# Patient Record
Sex: FEMALE | Race: WHITE | NOT HISPANIC OR LATINO | Employment: OTHER | URBAN - METROPOLITAN AREA
[De-identification: names, ages, dates, MRNs, and addresses within clinical notes are randomized per-mention and may not be internally consistent; named-entity substitution may affect disease eponyms.]

---

## 2017-01-31 ENCOUNTER — TELEPHONE (OUTPATIENT)
Dept: FAMILY MEDICINE | Facility: CLINIC | Age: 72
End: 2017-01-31

## 2017-01-31 RX ORDER — METOPROLOL SUCCINATE 50 MG/1
TABLET, EXTENDED RELEASE ORAL
Qty: 90 TABLET | Refills: 0 | OUTPATIENT
Start: 2017-01-31

## 2017-01-31 NOTE — TELEPHONE ENCOUNTER
Called pt to find out which med she needs replaced and to schedule appt. Last seen 3/2016. No answer. Left msg for pt to call back. Will ask Dr Gibson to approve x 1 mo.

## 2017-01-31 NOTE — TELEPHONE ENCOUNTER
----- Message from Hoa Floyd sent at 1/31/2017 10:18 AM CST -----  Patient has lost her blood pressure medication & she needs a new Rx, please call [phone]

## 2017-02-02 NOTE — TELEPHONE ENCOUNTER
Spoke w/ pt. She states she lost her metoprolol, but the pharmacy OK a refill that she had available. Disregard this request.

## 2017-02-13 ENCOUNTER — OFFICE VISIT (OUTPATIENT)
Dept: FAMILY MEDICINE | Facility: CLINIC | Age: 72
End: 2017-02-13
Payer: MEDICARE

## 2017-02-13 VITALS
DIASTOLIC BLOOD PRESSURE: 84 MMHG | HEIGHT: 64 IN | TEMPERATURE: 98 F | WEIGHT: 153 LBS | HEART RATE: 50 BPM | SYSTOLIC BLOOD PRESSURE: 164 MMHG | BODY MASS INDEX: 26.12 KG/M2

## 2017-02-13 DIAGNOSIS — I10 ESSENTIAL HYPERTENSION: Primary | ICD-10-CM

## 2017-02-13 DIAGNOSIS — F41.9 ANXIETY: ICD-10-CM

## 2017-02-13 PROCEDURE — 99499 UNLISTED E&M SERVICE: CPT | Mod: S$GLB,,, | Performed by: FAMILY MEDICINE

## 2017-02-13 PROCEDURE — 1160F RVW MEDS BY RX/DR IN RCRD: CPT | Mod: S$GLB,,, | Performed by: FAMILY MEDICINE

## 2017-02-13 PROCEDURE — 1159F MED LIST DOCD IN RCRD: CPT | Mod: S$GLB,,, | Performed by: FAMILY MEDICINE

## 2017-02-13 PROCEDURE — 1125F AMNT PAIN NOTED PAIN PRSNT: CPT | Mod: S$GLB,,, | Performed by: FAMILY MEDICINE

## 2017-02-13 PROCEDURE — 3079F DIAST BP 80-89 MM HG: CPT | Mod: S$GLB,,, | Performed by: FAMILY MEDICINE

## 2017-02-13 PROCEDURE — 1157F ADVNC CARE PLAN IN RCRD: CPT | Mod: S$GLB,,, | Performed by: FAMILY MEDICINE

## 2017-02-13 PROCEDURE — 99214 OFFICE O/P EST MOD 30 MIN: CPT | Mod: S$GLB,,, | Performed by: FAMILY MEDICINE

## 2017-02-13 PROCEDURE — 3077F SYST BP >= 140 MM HG: CPT | Mod: S$GLB,,, | Performed by: FAMILY MEDICINE

## 2017-02-13 PROCEDURE — 99999 PR PBB SHADOW E&M-EST. PATIENT-LVL III: CPT | Mod: PBBFAC,,, | Performed by: FAMILY MEDICINE

## 2017-02-13 RX ORDER — INDAPAMIDE 1.25 MG/1
1.25 TABLET ORAL DAILY
Qty: 90 TABLET | Refills: 3 | Status: SHIPPED | OUTPATIENT
Start: 2017-02-13 | End: 2017-02-24

## 2017-02-13 RX ORDER — LORAZEPAM 0.5 MG/1
0.5 TABLET ORAL 2 TIMES DAILY
Qty: 180 TABLET | Refills: 0 | Status: SHIPPED | OUTPATIENT
Start: 2017-02-13 | End: 2017-04-13 | Stop reason: SDUPTHER

## 2017-02-13 NOTE — MR AVS SNAPSHOT
[facility]  [address]  [facility] [address]  Phone: [phone]  Fax: [phone]                  Evelina Pinon   2017 3:30 PM   Office Visit    Description:  Female : 1945   Provider:  Cisco Gibson MD   Department:  [facility]           Reason for Visit     Hypertension           Diagnoses this Visit        Comments    Essential hypertension    -  Primary     Anxiety                To Do List           Future Appointments        Provider Department Dept Phone    2017 1:45 PM Cisco Gibson MD [facility] [phone]      Goals (5 Years of Data)     None      Follow-Up and Disposition     Return in about 1 week (around 2017).    Follow-up and Disposition History       These Medications        Disp Refills Start End    indapamide (LOZOL) 1.25 MG Tab 90 tablet 3 2017     Take 1 tablet (1.25 mg total) by mouth once daily. - Oral    Pharmacy: [facility] [place] [address] AT [place] [address] & [facility] Ph #: [phone]       lorazepam (ATIVAN) 0.5 MG tablet 180 tablet 0 2017     Take 1 tablet (0.5 mg total) by mouth 2 (two) times daily. - Oral    Pharmacy: [facility] [place] [address] & [facility] Ph #: [phone]         [facility] On Call     [facility] On Call Nurse Care Line -  Assistance  Registered nurses in the [facility] provide clinical advisement, health education, appointment booking, and other advisory services.  Call for this free service at [phone].             Medications           Message regarding Medications     Verify the changes and/or additions to your medication regime listed below are the same as discussed with your clinician today.  If any of these changes or additions are incorrect, please notify your healthcare provider.        CHANGE how you are taking these  "medications     Start Taking Instead of    lorazepam (ATIVAN) 0.5 MG tablet lorazepam (ATIVAN) 0.5 MG tablet    Dosage:  Take 1 tablet (0.5 mg total) by mouth 2 (two) times daily. Dosage:  TAKE 1 TABLET BY MOUTH TWICE DAILY    Reason for Change:  Reorder            Verify that the below list of medications is an accurate representation of the medications you are currently taking.  If none reported, the list may be blank. If incorrect, please contact your healthcare provider. Carry this list with you in case of emergency.           Current Medications     allopurinol (ZYLOPRIM) 100 MG tablet Take 2 tablets (200 mg total) by mouth once daily.    aspirin (ECOTRIN) 81 MG EC tablet Take 81 mg by mouth once daily.      cloNIDine (CATAPRES) 0.1 MG tablet Take 1 tablet (0.1 mg total) by mouth 2 (two) times daily.    clopidogrel (PLAVIX) 75 mg tablet TAKE 1 TABLET BY MOUTH EVERY DAY    lorazepam (ATIVAN) 0.5 MG tablet Take 1 tablet (0.5 mg total) by mouth 2 (two) times daily.    losartan (COZAAR) 100 MG tablet Take 1 tablet (100 mg total) by mouth once daily.    metoprolol succinate (TOPROL-XL) 50 MG 24 hr tablet TAKE 1 TABLET BY MOUTH EVERY DAY    ranitidine (ZANTAC) 150 MG tablet TAKE 1 TABLET BY MOUTH TWICE DAILY    indapamide (LOZOL) 1.25 MG Tab Take 1 tablet (1.25 mg total) by mouth once daily.    ketoconazole (NIZORAL) 2 % cream Apply topically once daily.           Clinical Reference Information           Your Vitals Were     BP Pulse Temp Height Weight BMI    164/84 50 98.3 °F (36.8 °C) (Oral) 5' 4" (1.626 m) 69.4 kg (153 lb) 26.26 kg/m2      Blood Pressure          Most Recent Value    BP  (!)  164/84      Allergies as of 2/13/2017     Augmentin [Amoxicillin-pot Clavulanate]    Keflex [Cephalexin]      Immunizations Administered on Date of Encounter - 2/13/2017     None      [facility] Sign-Up     Activating your [facility] account is as easy as 1-2-3!     1) Visit [email or web address], select Sign Up Now, enter this " activation code and your date of birth, then select Next.  [email or web address]  Expires: 3/19/2017  1:51 PM      2) Create a username and password to use when you visit [facility] in the future and select a security question in case you lose your password and select Next.    3) Enter your e-mail address and click Sign Up!    Additional Information  If you have questions, please e-mail [email or web address] or call [phone] to talk to our [facility] staff. Remember, [facility] is NOT to be used for urgent needs. For medical emergencies, dial 911.         Instructions    Resume indapamide.  Measure BP twice a day.  If your pressure is still 180 or higher, take 1.5 of the losartan 100 mg.  You can take 1 in am and 1/2 in pm       Language Assistance Services     ATTENTION: Language assistance services are available, free of charge. Please call [phone].      ATENCIÓN: Si habla tiffany, tiene a arzate disposición servicios gratuitos de asistencia lingüística. Llame al [phone].     TEODORA Ý: N?u b?n nói Ti?ng Vi?t, có các d?ch v? h? tr? ngôn ng? mi?n phí dành cho b?n. G?i s? [phone].         [facility] complies with applicable Federal civil rights laws and does not discriminate on the basis of race, color, national origin, age, disability, or sex.

## 2017-02-13 NOTE — PATIENT INSTRUCTIONS
Resume indapamide.  Measure BP twice a day.  If your pressure is still 180 or higher, take 1.5 of the losartan 100 mg.  You can take 1 in am and 1/2 in pm

## 2017-02-13 NOTE — PROGRESS NOTES
"Subjective:       Patient ID: Evelina Pinon is a 71 y.o. female.    Chief Complaint: Hypertension (Elevated BP x > 1wk. Pt [email or web address]last weekend) and [facility](Sat 2/11/17))    HPI Comments: Her blood pressures been very high the past 2 weeks.  She went to the emergency room on February 2 with accelerated blood pressure.  I reviewed the emergency room record and she had normal lab work and a normal chest x-ray and a unremarkable EKG.  She went to [facility] emergency room this past weekend for the same problem.  She does have some headaches.  No chest pain or dyspnea.  Sometimes she takes clonidine for high blood pressure.  It tends to make her very sluggish.  She has taken a higher dose of Toprol-XL in the past but it also made her very sluggish.  I started her on indapamide 11 months ago.  She says that she only took it for a few weeks.  A friend of first told her that it could harm her kidneys.  She has been having some anxiety.  She thinks that Ativan does help lower her blood pressure sometimes.    Hypertension   Associated symptoms include headaches. Pertinent negatives include no chest pain, palpitations or shortness of breath.     Review of Systems   Constitutional: Negative for fever and unexpected weight change.   Respiratory: Negative for cough and shortness of breath.    Cardiovascular: Negative for chest pain, palpitations and leg swelling.   Neurological: Positive for headaches.       Objective:     Blood pressure (!) 164/84, pulse (!) 50, temperature 98.3 °F (36.8 °C), temperature source Oral, height 5' 4" (1.626 m), weight 69.4 kg (153 lb).      Physical Exam   Constitutional: She appears well-nourished. No distress.   Eyes:   Normal blood vessels with no hemorrhages.  I think that her optic disc is sharp.   Cardiovascular: Normal rate, regular rhythm, normal heart sounds and intact distal pulses.    No murmur heard.  Pulmonary/Chest: Effort normal and breath sounds normal. No respiratory " distress.   Musculoskeletal: She exhibits no edema.   Neurological: She is alert.       Assessment:       1. Essential hypertension    2. Anxiety        Plan:       I gave her 0.1 mg of clonidine by mouth.  After observation and rest for one hour her blood pressure was 164/84.  She is to resume indapamide.  I would like to see her next week.

## 2017-02-20 ENCOUNTER — OFFICE VISIT (OUTPATIENT)
Dept: FAMILY MEDICINE | Facility: CLINIC | Age: 72
End: 2017-02-20
Payer: MEDICARE

## 2017-02-20 VITALS
DIASTOLIC BLOOD PRESSURE: 84 MMHG | WEIGHT: 147.5 LBS | TEMPERATURE: 98 F | HEIGHT: 64 IN | SYSTOLIC BLOOD PRESSURE: 124 MMHG | HEART RATE: 64 BPM | BODY MASS INDEX: 25.18 KG/M2

## 2017-02-20 DIAGNOSIS — I10 ESSENTIAL HYPERTENSION: Primary | ICD-10-CM

## 2017-02-20 DIAGNOSIS — F41.9 ANXIETY: ICD-10-CM

## 2017-02-20 PROCEDURE — 99999 PR PBB SHADOW E&M-EST. PATIENT-LVL III: CPT | Mod: PBBFAC,,, | Performed by: FAMILY MEDICINE

## 2017-02-20 PROCEDURE — 3074F SYST BP LT 130 MM HG: CPT | Mod: S$GLB,,, | Performed by: FAMILY MEDICINE

## 2017-02-20 PROCEDURE — 1159F MED LIST DOCD IN RCRD: CPT | Mod: S$GLB,,, | Performed by: FAMILY MEDICINE

## 2017-02-20 PROCEDURE — 99499 UNLISTED E&M SERVICE: CPT | Mod: S$GLB,,, | Performed by: FAMILY MEDICINE

## 2017-02-20 PROCEDURE — 1157F ADVNC CARE PLAN IN RCRD: CPT | Mod: S$GLB,,, | Performed by: FAMILY MEDICINE

## 2017-02-20 PROCEDURE — 99213 OFFICE O/P EST LOW 20 MIN: CPT | Mod: S$GLB,,, | Performed by: FAMILY MEDICINE

## 2017-02-20 PROCEDURE — 1126F AMNT PAIN NOTED NONE PRSNT: CPT | Mod: S$GLB,,, | Performed by: FAMILY MEDICINE

## 2017-02-20 PROCEDURE — 3079F DIAST BP 80-89 MM HG: CPT | Mod: S$GLB,,, | Performed by: FAMILY MEDICINE

## 2017-02-20 NOTE — PROGRESS NOTES
"Subjective:       Patient ID: Evelina Pinon is a 71 y.o. female.    Chief Complaint: Follow-up (1 wk f/u)    HPI Comments: FU HTN. She was seen last week with 220/110 and indapamide was restarted. Her home BP has improved. Still a bit high due to nervousness but overall feels fine. Has lost a little fluid weight. No chest pain or dyspnea.   I reviewed ER record. Back pain and neg CT renal stone study.     Review of Systems   Constitutional: Positive for unexpected weight change. Negative for fatigue and fever.   Respiratory: Negative for cough, shortness of breath and stridor.    Cardiovascular: Negative for chest pain, palpitations and leg swelling.   Psychiatric/Behavioral: The patient is nervous/anxious.        Objective:   Blood pressure 124/84, pulse 64, temperature 98.4 °F (36.9 °C), temperature source Oral, height 5' 4" (1.626 m), weight 66.9 kg (147 lb 7.8 oz).  I confirmed 128/72    Physical Exam   Constitutional: She appears well-nourished. No distress.   Cardiovascular: Normal rate, regular rhythm, normal heart sounds and intact distal pulses.    No murmur heard.  Pulmonary/Chest: Effort normal and breath sounds normal. No respiratory distress.   Musculoskeletal: She exhibits no edema.   Psychiatric: She has a normal mood and affect.       Assessment:       1. Essential hypertension    2. Anxiety        Plan:       Same meds. Gradually reduce BP check to  Twice a month.      "

## 2017-02-20 NOTE — MR AVS SNAPSHOT
[facility]  [address]  [place] [address]  Phone: [phone]  Fax: [phone]                  Evelina Pinon   2017 1:45 PM   Office Visit    Description:  Female : 1945   Provider:  Cisco Gibson MD   Department:  [facility]           Reason for Visit     Follow-up           Diagnoses this Visit        Comments    Essential hypertension    -  Primary     Anxiety                To Do List           Goals (5 Years of Data)     None      Follow-Up and Disposition     Return in about 6 months (around 2017).      [facility]     [facility] Care [facility] -  Assistance  Registered nurses in the [facility] provide clinical advisement, health education, appointment booking, and other advisory services.  Call for this free service at [phone].             Medications           Message regarding Medications     Verify the changes and/or additions to your medication regime listed below are the same as discussed with your clinician today.  If any of these changes or additions are incorrect, please notify your healthcare provider.             Verify that the below list of medications is an accurate representation of the medications you are currently taking.  If none reported, the list may be blank. If incorrect, please contact your healthcare provider. Carry this list with you in case of emergency.           Current Medications     allopurinol (ZYLOPRIM) 100 MG tablet Take 2 tablets (200 mg total) by mouth once daily.    aspirin (ECOTRIN) 81 MG EC tablet Take 81 mg by mouth once daily.      clopidogrel (PLAVIX) 75 mg tablet TAKE 1 TABLET BY MOUTH EVERY DAY    indapamide (LOZOL) 1.25 MG Tab Take 1 tablet (1.25 mg total) by mouth once daily.    lorazepam (ATIVAN) 0.5 MG tablet Take 1 tablet (0.5 mg total) by mouth 2 (two) times daily.    losartan (COZAAR) 100 MG tablet Take 1 tablet (100 mg total) by mouth once  "daily.    metoprolol succinate (TOPROL-XL) 50 MG 24 hr tablet TAKE 1 TABLET BY MOUTH EVERY DAY    ranitidine (ZANTAC) 150 MG tablet TAKE 1 TABLET BY MOUTH TWICE DAILY    ketoconazole (NIZORAL) 2 % cream Apply topically once daily.           Clinical Reference Information           Your Vitals Were     BP Pulse Temp Height Weight BMI    124/84 (BP Location: Left arm) 64 98.4 °F (36.9 °C) (Oral) 5' 4" (1.626 m) 66.9 kg (147 lb 7.8 oz) 25.32 kg/m2      Blood Pressure          Most Recent Value    BP  124/84      Allergies as of 2/20/2017     Augmentin [Amoxicillin-pot Clavulanate]    Keflex [Cephalexin]      Immunizations Administered on Date of Encounter - 2/20/2017     None      Language Assistance Services     ATTENTION: Language assistance services are available, free of charge. Please call [phone].      ATENCIÓN: Si habla bobañol, tiene a arzate disposición servicios gratuitos de asistencia lingüística. Llame al [phone].     TEODORA Ý: N?u b?n nói Ti?ng Vi?t, có các d?ch v? h? tr? ngôn ng? mi?n phí dành cho b?n. G?i s? [phone].         [facility] complies with applicable Federal civil rights laws and does not discriminate on the basis of race, color, national origin, age, disability, or sex.        "

## 2017-02-24 ENCOUNTER — TELEPHONE (OUTPATIENT)
Dept: FAMILY MEDICINE | Facility: CLINIC | Age: 72
End: 2017-02-24

## 2017-02-24 RX ORDER — HYDROCHLOROTHIAZIDE 12.5 MG/1
12.5 CAPSULE ORAL DAILY
Qty: 30 CAPSULE | Refills: 2 | Status: SHIPPED | OUTPATIENT
Start: 2017-02-24 | End: 2017-03-15

## 2017-02-24 NOTE — TELEPHONE ENCOUNTER
May try to discontinue Lozol and start HCTZ. Will send Rx to her pharmacy. Continue home b/p monitoring and notify clinic if b/p is > 140/90 persistently.       If sx persist, she may need to rtc.

## 2017-02-24 NOTE — TELEPHONE ENCOUNTER
----- Message from Levi Valladares sent at 2/24/2017 10:29 AM CST -----  Contact: nalini   Calling about side effects of new medication Rx Indapamide. Please call back [phone]. Thanks

## 2017-02-24 NOTE — TELEPHONE ENCOUNTER
Returned pt call. Pt states she has been getting nauseated since she started taking indapamide a week ago from Dr Gibson.  She states her blood pressure is running 110/50's on the medication.She is also c/o ringing in her ear which was present prior to taking the medication  Would like to know if she needs to change the dose or if there are any recommendations to ease the nausea symptoms. Please advise.

## 2017-03-01 NOTE — TELEPHONE ENCOUNTER
Spoke with pt,she will start HCTZ tomorrow morning and stop the inadapamide.  She understands to call if out of parameters.

## 2017-03-04 ENCOUNTER — NURSE TRIAGE (OUTPATIENT)
Dept: ADMINISTRATIVE | Facility: CLINIC | Age: 72
End: 2017-03-04

## 2017-03-04 NOTE — TELEPHONE ENCOUNTER
"    Reason for Disposition   Caller has URGENT medication question about med that PCP prescribed and triager unable to answer question    Answer Assessment - Initial Assessment Questions  1. BLOOD PRESSURE: "What is the blood pressure?" "Did you take at least two measurements 5 minutes apart?"      141/75-  Now.    2. ONSET: "When did you take your blood pressure?"      *No Answer*  3. HOW: "How did you obtain the blood pressure?" (e.g., visiting nurse, automatic home BP monitor)      *No Answer*  4. HISTORY: "Do you have a history of low blood pressure?" "What is your blood pressure normally?"      *No Answer*  5. MEDICATIONS: "Are you taking any medications for blood pressure?" If yes: "Have they been changed recently?"      *No Answer*  6. PULSE RATE: "Do you know what your pulse rate is?"       *No Answer*  7. OTHER SYMPTOMS: "Have you been sick recently?" "Have you had a recent injury?"      *No Answer*  8. PREGNANCY: "Is there any chance you are pregnant?" "When was your last menstrual period?"      *No Answer*    Protocols used:  MEDICATION QUESTION CALL-A-,  LOW BLOOD PRESSURE-A-AH    Was son indapamide for htn and was having very low blood pressure.  Was switched to HCTZ  4 days ago.  First 2 days blood pressure was ok but yesterday bp when down to - 92/56- and 86/56 . Yesterday was third day taking it.  She took her other two blood pressure medication this morning. metoprolol succinate 50mg. and cozaar 100mg but was not sure if she should take the HCTZ>  Advised will contact on call provider for additional recommendation.   Spoke with on call provider Dr. Mortensen.  advised to hold HCTZ and see how how blood does.  She can call back tomorrow for additional recommendation.  Need to follow up with Dr. Gibson on Monday.  She should go to ED for any fever, weakness, dizziness or low blood pressure.  Advised patient of above.  Wanted to scheduled an appt, but no available with Dr. Gibson until the end of the " week.  Did not want to scheduled with another provider.  Advised her to call the office on Monday.  Please contact caller directly with any further care advice regarding an appointment.

## 2017-03-07 ENCOUNTER — TELEPHONE (OUTPATIENT)
Dept: FAMILY MEDICINE | Facility: CLINIC | Age: 72
End: 2017-03-07

## 2017-03-07 NOTE — TELEPHONE ENCOUNTER
Yesterday at 2 pm  87/56- tired,dizzy, weak  161/80- took losartan and metoprolol-7:30 am  141/67 9:30 am  133/80 10:34 am  Pt has not taken the HCTZ this morning. Pt want to know if she should continue taking the HCTZ  Pt states she has a capsule form in the HCTZ right now. Can she get the tablet form and cut in half if you still want her to take.  Please advise.  See nurse triage message on 3/4

## 2017-03-07 NOTE — TELEPHONE ENCOUNTER
May take 1/2 tablet.   Hold for b/p < 100/60.    Ensure she is drinking adequate fluids.   If her b/p continues to fluctuate she needs an appt, as I  am not familiar with the patient since it has been 2 years since I have last seen her.

## 2017-03-07 NOTE — TELEPHONE ENCOUNTER
----- Message from Katja Johnsonza sent at 3/7/2017  7:41 AM CST -----  Contact: self [phone]  Please call her regarding her blood pressure.  It is low, so she is not sure if she should take the medication.  Thank you!

## 2017-03-13 ENCOUNTER — TELEPHONE (OUTPATIENT)
Dept: FAMILY MEDICINE | Facility: CLINIC | Age: 72
End: 2017-03-13

## 2017-03-13 NOTE — TELEPHONE ENCOUNTER
----- Message from Ada Polo sent at 3/13/2017 10:45 AM CDT -----  Patient is returning office call. Please call back with details at [phone].

## 2017-03-15 RX ORDER — HYDROCHLOROTHIAZIDE 12.5 MG/1
TABLET ORAL
Qty: 30 TABLET | Refills: 0 | Status: SHIPPED | OUTPATIENT
Start: 2017-03-15 | End: 2017-03-16 | Stop reason: SDUPTHER

## 2017-03-16 ENCOUNTER — OFFICE VISIT (OUTPATIENT)
Dept: FAMILY MEDICINE | Facility: CLINIC | Age: 72
End: 2017-03-16
Payer: MEDICARE

## 2017-03-16 VITALS
HEART RATE: 64 BPM | TEMPERATURE: 98 F | HEIGHT: 64 IN | BODY MASS INDEX: 27.35 KG/M2 | WEIGHT: 160.19 LBS | SYSTOLIC BLOOD PRESSURE: 146 MMHG | RESPIRATION RATE: 16 BRPM | DIASTOLIC BLOOD PRESSURE: 90 MMHG

## 2017-03-16 DIAGNOSIS — M1A.9XX0 CHRONIC GOUT INVOLVING TOE, UNSPECIFIED CAUSE, UNSPECIFIED LATERALITY: ICD-10-CM

## 2017-03-16 DIAGNOSIS — F41.9 ANXIETY: ICD-10-CM

## 2017-03-16 DIAGNOSIS — I10 ESSENTIAL HYPERTENSION: Primary | ICD-10-CM

## 2017-03-16 PROCEDURE — 1160F RVW MEDS BY RX/DR IN RCRD: CPT | Mod: S$GLB,,, | Performed by: FAMILY MEDICINE

## 2017-03-16 PROCEDURE — 3080F DIAST BP >= 90 MM HG: CPT | Mod: S$GLB,,, | Performed by: FAMILY MEDICINE

## 2017-03-16 PROCEDURE — 1126F AMNT PAIN NOTED NONE PRSNT: CPT | Mod: S$GLB,,, | Performed by: FAMILY MEDICINE

## 2017-03-16 PROCEDURE — 99999 PR PBB SHADOW E&M-EST. PATIENT-LVL III: CPT | Mod: PBBFAC,,, | Performed by: FAMILY MEDICINE

## 2017-03-16 PROCEDURE — 99499 UNLISTED E&M SERVICE: CPT | Mod: S$GLB,,, | Performed by: FAMILY MEDICINE

## 2017-03-16 PROCEDURE — 99214 OFFICE O/P EST MOD 30 MIN: CPT | Mod: S$GLB,,, | Performed by: FAMILY MEDICINE

## 2017-03-16 PROCEDURE — 1157F ADVNC CARE PLAN IN RCRD: CPT | Mod: S$GLB,,, | Performed by: FAMILY MEDICINE

## 2017-03-16 PROCEDURE — 3077F SYST BP >= 140 MM HG: CPT | Mod: S$GLB,,, | Performed by: FAMILY MEDICINE

## 2017-03-16 PROCEDURE — 1159F MED LIST DOCD IN RCRD: CPT | Mod: S$GLB,,, | Performed by: FAMILY MEDICINE

## 2017-03-16 RX ORDER — CLOPIDOGREL BISULFATE 75 MG/1
75 TABLET ORAL DAILY
Qty: 90 TABLET | Refills: 3 | Status: SHIPPED | OUTPATIENT
Start: 2017-03-16 | End: 2018-03-29 | Stop reason: SDUPTHER

## 2017-03-16 RX ORDER — ALLOPURINOL 100 MG/1
200 TABLET ORAL DAILY
Qty: 180 TABLET | Refills: 3 | Status: SHIPPED | OUTPATIENT
Start: 2017-03-16 | End: 2018-03-29 | Stop reason: SDUPTHER

## 2017-03-16 RX ORDER — METOPROLOL SUCCINATE 50 MG/1
50 TABLET, EXTENDED RELEASE ORAL DAILY
Qty: 90 TABLET | Refills: 3 | Status: SHIPPED | OUTPATIENT
Start: 2017-03-16 | End: 2018-01-15 | Stop reason: SDUPTHER

## 2017-03-16 RX ORDER — HYDROCHLOROTHIAZIDE 12.5 MG/1
12.5 TABLET ORAL DAILY
Qty: 90 TABLET | Refills: 3 | Status: SHIPPED | OUTPATIENT
Start: 2017-03-16 | End: 2017-04-13

## 2017-03-16 RX ORDER — LOSARTAN POTASSIUM 100 MG/1
100 TABLET ORAL DAILY
Qty: 90 TABLET | Refills: 3 | Status: SHIPPED | OUTPATIENT
Start: 2017-03-16 | End: 2018-03-29 | Stop reason: SDUPTHER

## 2017-03-16 NOTE — MR AVS SNAPSHOT
[facility]  [address]  [facility] [address]  Phone: [phone]  Fax: [phone]                  Evelina Pinon   3/16/2017 3:00 PM   Office Visit    Description:  Female : 1945   Provider:  Cisco Gibson MD   Department:  [facility]           Reason for Visit     Follow-up                To Do List           Future Appointments        Provider Department Dept Phone    2017 3:00 PM Cisco Gibson MD [facility] [phone]      Goals (5 Years of Data)     None      Follow-Up and Disposition     Return in about 4 weeks (around 2017).       These Medications        Disp Refills Start End    hydrochlorothiazide (HYDRODIURIL) 12.5 MG Tab 90 tablet 3 3/16/2017     Take 1 tablet (12.5 mg total) by mouth once daily. - Oral    Pharmacy: [place] [address] [address] [place] [address] & [facility] Ph #: [phone]       losartan (COZAAR) 100 MG tablet 90 tablet 3 3/16/2017     Take 1 tablet (100 mg total) by mouth once daily. - Oral    Pharmacy: [place] [address] [address] [place] [address] & [facility] Ph #: [phone]       allopurinol (ZYLOPRIM) 100 MG tablet 180 tablet 3 3/16/2017     Take 2 tablets (200 mg total) by mouth once daily. - Oral    Pharmacy: [place] [address] [address] [place] [address] & [facility] Ph #: [phone]       metoprolol succinate (TOPROL-XL) 50 MG 24 hr tablet 90 tablet 3 3/16/2017     Take 1 tablet (50 mg total) by mouth once daily. - Oral    Pharmacy: [place] [address] [address] [place] [address] & [facility] Ph #: [phone]       clopidogrel (PLAVIX) 75 mg tablet 90 tablet 3 3/16/2017     Take 1 tablet (75 mg total) by mouth once daily. - Oral    Pharmacy: [facility] [address] - [address] &  [facility] #: [phone]         [facility]     [facility] - 24/7 Assistance  Registered nurses in the [facility] provide clinical advisement, health education, appointment booking, and other advisory services.  Call for this free service at [phone].             Medications           Message regarding Medications     Verify the changes and/or additions to your medication regime listed below are the same as discussed with your clinician today.  If any of these changes or additions are incorrect, please notify your healthcare provider.        CHANGE how you are taking these medications     Start Taking Instead of    hydrochlorothiazide (HYDRODIURIL) 12.5 MG Tab hydrochlorothiazide (HYDRODIURIL) 12.5 MG Tab    Dosage:  Take 1 tablet (12.5 mg total) by mouth once daily. Dosage:  TAKE ONE TABLET BY MOUTH EVERY DAY    Reason for Change:  Reorder     metoprolol succinate (TOPROL-XL) 50 MG 24 hr tablet metoprolol succinate (TOPROL-XL) 50 MG 24 hr tablet    Dosage:  Take 1 tablet (50 mg total) by mouth once daily. Dosage:  TAKE 1 TABLET BY MOUTH EVERY DAY    Reason for Change:  Reorder     clopidogrel (PLAVIX) 75 mg tablet clopidogrel (PLAVIX) 75 mg tablet    Dosage:  Take 1 tablet (75 mg total) by mouth once daily. Dosage:  TAKE 1 TABLET BY MOUTH EVERY DAY    Reason for Change:  Reorder            Verify that the below list of medications is an accurate representation of the medications you are currently taking.  If none reported, the list may be blank. If incorrect, please contact your healthcare provider. Carry this list with you in case of emergency.           Current Medications     allopurinol (ZYLOPRIM) 100 MG tablet Take 2 tablets (200 mg total) by mouth once daily.    aspirin (ECOTRIN) 81 MG EC tablet Take 81 mg by mouth once daily.      clopidogrel (PLAVIX) 75 mg tablet Take 1 tablet (75 mg total) by mouth once daily.    hydrochlorothiazide (HYDRODIURIL) 12.5 MG  "Tab Take 1 tablet (12.5 mg total) by mouth once daily.    ketoconazole (NIZORAL) 2 % cream Apply topically once daily.    lorazepam (ATIVAN) 0.5 MG tablet Take 1 tablet (0.5 mg total) by mouth 2 (two) times daily.    losartan (COZAAR) 100 MG tablet Take 1 tablet (100 mg total) by mouth once daily.    metoprolol succinate (TOPROL-XL) 50 MG 24 hr tablet Take 1 tablet (50 mg total) by mouth once daily.    ranitidine (ZANTAC) 150 MG tablet TAKE 1 TABLET BY MOUTH TWICE DAILY           Clinical Reference Information           Your Vitals Were     BP Pulse Temp Resp Height Weight    146/90 (BP Location: Left arm, Patient Position: Sitting) 64 98 °F (36.7 °C) (Oral) 16 5' 4" (1.626 m) 72.7 kg (160 lb 2.6 oz)    BMI                27.49 kg/m2          Blood Pressure          Most Recent Value    BP  (!)  146/90      Allergies as of 3/16/2017     Augmentin [Amoxicillin-pot Clavulanate]    Keflex [Cephalexin]      Immunizations Administered on Date of Encounter - 3/16/2017     None      Instructions    Take losartan 100 mg in evening.   Take HCTZ tablet 12.5 mg in AM  Take Metoprolol XL 50 mg in the morning.   Check bp daily for 1-2 weeks. Some readings in am and some in PM.        Language Assistance Services     ATTENTION: Language assistance services are available, free of charge. Please call [phone].      ATENCIÓN: Si habla tiffany, tiene a arzate disposición servicios gratuitos de asistencia lingüística. Llame al [phone].     [facility] Ý: N?u b?n nói Ti?ng Vi?t, có các d?ch v? h? tr? ngôn ng? mi?n phí dành cho b?n. G?i s? [phone].         [facility] complies with applicable Federal civil rights laws and does not discriminate on the basis of race, color, national origin, age, disability, or sex.        "

## 2017-03-17 NOTE — PROGRESS NOTES
"Subjective:       Patient ID: Evelina Pinon is a 71 y.o. female.    Chief Complaint: Follow-up (blood pressure)    HPI Comments: She has been having labile Blood pressure.  It went to 220 and required clonidine. She took indapamide and went low and switched back to HCTZ.  She felt the capsule was too strong and switched to the tablet. Her AM BP yesterday was 170.  Sometimes lower in the afternoon No chest pain. No stimulants. She does acknowledge anxiety. No gout on allopurinol    Review of Systems   Constitutional: Negative for fever and unexpected weight change.   Respiratory: Negative for cough, chest tightness and shortness of breath.    Cardiovascular: Negative for palpitations and leg swelling.       Objective:     Blood pressure (!) 146/90, pulse 64, temperature 98 °F (36.7 °C), temperature source Oral, resp. rate 16, height 5' 4" (1.626 m), weight 72.7 kg (160 lb 2.6 oz).      Physical Exam   Constitutional: She appears well-nourished. No distress.   Cardiovascular: Normal rate, regular rhythm and normal heart sounds.    No murmur heard.  Pulmonary/Chest: Effort normal and breath sounds normal. No respiratory distress.   Musculoskeletal: She exhibits no edema.   Neurological: She is alert.       Assessment:       1. Essential hypertension    2. Anxiety    3. Chronic gout involving toe, unspecified cause, unspecified laterality        Plan:     Continue HCTZ . Move losartan to PM. See me in 4 weeks      "

## 2017-03-30 ENCOUNTER — PATIENT OUTREACH (OUTPATIENT)
Dept: ADMINISTRATIVE | Facility: HOSPITAL | Age: 72
End: 2017-03-30

## 2017-03-30 NOTE — LETTER
March 30, 2017    Evelina Pinon  [address] [facility] [address]             [facility]  [address]  [facility] [address]  Phone: [phone] Dear Mrs. Pinon:    [facility] is committed to your overall health.  To help you get the most out of each of your visits, we will review your information to make sure you are up to date on all of your recommended tests and/or procedures.      Dr. Cisco Gibson has found that you may be due for your fasting cholesterol labs, mammogram, osteoporosis screening, and possibly some immunizations (flu, pneumonia, and tetanus).     If you have had any of the above done at another facility, please bring the records or information with you so that your record at [facility] will be complete.  If you would like to schedule any of these, please contact me.    If you are currently taking medication, please bring it with you to your appointment for review.    Also, if you have any type of Advanced Directives, please bring them with you to your office visit so we may scan them into your chart.    If you have any questions or concerns, please don't hesitate to call.    Thank you for letting us care for you,  Comfort Soriano LPN Clinical Care Coordinator  [facility] [place]  [phone]

## 2017-04-13 ENCOUNTER — OFFICE VISIT (OUTPATIENT)
Dept: FAMILY MEDICINE | Facility: CLINIC | Age: 72
End: 2017-04-13
Payer: MEDICARE

## 2017-04-13 VITALS
HEIGHT: 64 IN | HEART RATE: 54 BPM | WEIGHT: 163.94 LBS | BODY MASS INDEX: 27.99 KG/M2 | TEMPERATURE: 98 F | DIASTOLIC BLOOD PRESSURE: 62 MMHG | SYSTOLIC BLOOD PRESSURE: 124 MMHG

## 2017-04-13 DIAGNOSIS — F41.9 ANXIETY: ICD-10-CM

## 2017-04-13 DIAGNOSIS — I10 ESSENTIAL HYPERTENSION: Primary | ICD-10-CM

## 2017-04-13 PROCEDURE — 1126F AMNT PAIN NOTED NONE PRSNT: CPT | Mod: S$GLB,,, | Performed by: FAMILY MEDICINE

## 2017-04-13 PROCEDURE — 1160F RVW MEDS BY RX/DR IN RCRD: CPT | Mod: S$GLB,,, | Performed by: FAMILY MEDICINE

## 2017-04-13 PROCEDURE — 1159F MED LIST DOCD IN RCRD: CPT | Mod: S$GLB,,, | Performed by: FAMILY MEDICINE

## 2017-04-13 PROCEDURE — 3074F SYST BP LT 130 MM HG: CPT | Mod: S$GLB,,, | Performed by: FAMILY MEDICINE

## 2017-04-13 PROCEDURE — 99999 PR PBB SHADOW E&M-EST. PATIENT-LVL III: CPT | Mod: PBBFAC,,, | Performed by: FAMILY MEDICINE

## 2017-04-13 PROCEDURE — 3078F DIAST BP <80 MM HG: CPT | Mod: S$GLB,,, | Performed by: FAMILY MEDICINE

## 2017-04-13 PROCEDURE — 99214 OFFICE O/P EST MOD 30 MIN: CPT | Mod: S$GLB,,, | Performed by: FAMILY MEDICINE

## 2017-04-13 PROCEDURE — 1157F ADVNC CARE PLAN IN RCRD: CPT | Mod: S$GLB,,, | Performed by: FAMILY MEDICINE

## 2017-04-13 RX ORDER — INDAPAMIDE 1.25 MG/1
1.25 TABLET ORAL DAILY
Qty: 90 TABLET | Refills: 3
Start: 2017-04-13 | End: 2018-03-29 | Stop reason: SDUPTHER

## 2017-04-13 RX ORDER — LORAZEPAM 0.5 MG/1
0.5 TABLET ORAL 2 TIMES DAILY
Qty: 180 TABLET | Refills: 1 | Status: SHIPPED | OUTPATIENT
Start: 2017-04-13 | End: 2018-03-28 | Stop reason: SDUPTHER

## 2017-04-13 NOTE — PATIENT INSTRUCTIONS
If your blood pressure is too low and you feel weak or lightheaded, then break the losartan in half.

## 2017-04-13 NOTE — PROGRESS NOTES
"Subjective:       Patient ID: Evelina Pinon is a 71 y.o. female.    Chief Complaint: Follow-up (1 mo f/u. Refill Ranitidine and Lorazepam. Please update medlist. Pt is taking Indapamide and not HCTZ.)    HPI Comments: Follow-up hypertension.  She had been having very labile hypertension.  Please see the previous note.  She had decided not to take indapamide but she ended up going back to it and I confirmed by showing her the colored picture of the tablet.  Her home blood pressures have been in the 123/90 range.  She has had some readings of 100/75.  She has not been feeling lightheaded.  She takes to losartan 100 mg at night and the other medications in the morning.  She has not had chest pain or dyspnea.  She takes lorazepam twice a day to help control her anxiety.  She takes Zantac 150 mg twice a day as needed for heartburn.    Review of Systems   Constitutional: Negative for appetite change, fever and unexpected weight change.   Respiratory: Negative for cough and shortness of breath.    Cardiovascular: Negative for chest pain, palpitations and leg swelling.       Objective:     Blood pressure 124/62, pulse (!) 54, temperature 97.7 °F (36.5 °C), temperature source Oral, height 5' 4" (1.626 m), weight 74.3 kg (163 lb 14.6 oz).      Physical Exam   Constitutional: She appears well-nourished. No distress.   Cardiovascular: Normal rate, regular rhythm and normal heart sounds.    Pulmonary/Chest: Effort normal and breath sounds normal. No respiratory distress.   Musculoskeletal: She exhibits no edema.   Neurological: She is alert.       Assessment:       1. Essential hypertension    2. Anxiety        Plan:       I updated her med list and refilled lorazepam and ranitidine.  Follow-up with me in 6 months.     "

## 2017-04-13 NOTE — MR AVS SNAPSHOT
[facility]  [address] [address]  Phone: [phone]  Fax: [phone]                  Evelina Pinon   2017 3:00 PM   Office Visit    Description:  Female : 1945   Provider:  Cisco Gibson MD   Department:  [facility]           Reason for Visit     Follow-up           Diagnoses this Visit        Comments    Essential hypertension    -  Primary     Anxiety                To Do List           Goals (5 Years of Data)     None       These Medications        Disp Refills Start End    indapamide (LOZOL) 1.25 MG Tab 90 tablet 3 2017     Take 1 tablet (1.25 mg total) by mouth once daily. - Oral    Pharmacy: [facility] [place] [address] [facility] Ph #: [phone]       ranitidine (ZANTAC) 150 MG tablet 60 tablet 11 2017     Take 1 tablet (150 mg total) by mouth 2 (two) times daily. - Oral    Pharmacy: [facility] [place] [address] [facility] Ph #: [phone]       lorazepam (ATIVAN) 0.5 MG tablet 180 tablet 1 2017     Take 1 tablet (0.5 mg total) by mouth 2 (two) times daily. - Oral    Pharmacy: [facility] [place] [address] [facility] Ph #: [phone]         [facility]     [facility] Nurse Care Line -  Assistance  Unless otherwise directed by your provider, please contact [facility], our nurse care line that is available for  assistance.     Registered nurses in the [facility] provide: appointment scheduling, clinical advisement, health education, and other advisory services.  Call: [phone] (toll free)               Medications           Message regarding Medications     Verify the changes and/or additions to your medication regime listed below are the same as discussed with your clinician today.  If  any of these changes or additions are incorrect, please notify your healthcare provider.        START taking these NEW medications        Refills    indapamide (LOZOL) 1.25 MG Tab 3    Sig: Take 1 tablet (1.25 mg total) by mouth once daily.    Class: No Print    Route: Oral      CHANGE how you are taking these medications     Start Taking Instead of    ranitidine (ZANTAC) 150 MG tablet ranitidine (ZANTAC) 150 MG tablet    Dosage:  Take 1 tablet (150 mg total) by mouth 2 (two) times daily. Dosage:  TAKE 1 TABLET BY MOUTH TWICE DAILY    Reason for Change:  Reorder       STOP taking these medications     hydrochlorothiazide (HYDRODIURIL) 12.5 MG Tab Take 1 tablet (12.5 mg total) by mouth once daily.           Verify that the below list of medications is an accurate representation of the medications you are currently taking.  If none reported, the list may be blank. If incorrect, please contact your healthcare provider. Carry this list with you in case of emergency.           Current Medications     allopurinol (ZYLOPRIM) 100 MG tablet Take 2 tablets (200 mg total) by mouth once daily.    clopidogrel (PLAVIX) 75 mg tablet Take 1 tablet (75 mg total) by mouth once daily.    lorazepam (ATIVAN) 0.5 MG tablet Take 1 tablet (0.5 mg total) by mouth 2 (two) times daily.    losartan (COZAAR) 100 MG tablet Take 1 tablet (100 mg total) by mouth once daily.    metoprolol succinate (TOPROL-XL) 50 MG 24 hr tablet Take 1 tablet (50 mg total) by mouth once daily.    ranitidine (ZANTAC) 150 MG tablet Take 1 tablet (150 mg total) by mouth 2 (two) times daily.    aspirin (ECOTRIN) 81 MG EC tablet Take 81 mg by mouth once daily.      indapamide (LOZOL) 1.25 MG Tab Take 1 tablet (1.25 mg total) by mouth once daily.    ketoconazole (NIZORAL) 2 % cream Apply topically once daily.           Clinical Reference Information           Your Vitals Were     BP Pulse Temp Height Weight BMI    124/62 (BP Location: Left arm) 54 97.7 °F (36.5 °C)  "(Oral) 5' 4" (1.626 m) 74.3 kg (163 lb 14.6 oz) 28.14 kg/m2      Blood Pressure          Most Recent Value    BP  124/62      Allergies as of 4/13/2017     Augmentin [Amoxicillin-pot Clavulanate]    Keflex [Cephalexin]      Immunizations Administered on Date of Encounter - 4/13/2017     None      Instructions    If your blood pressure is too low and you feel weak or lightheaded, then break the losartan in half.        Language Assistance Services     ATTENTION: Language assistance services are available, free of charge. Please call [phone].      ATENCIÓN: Si habla tiffany, tiene a arzate disposición servicios gratuitos de asistencia lingüística. Llame al [phone].     CHÚ Ý: N?u b?n nói Ti?ng Vi?t, có các d?ch v? h? tr? ngôn ng? mi?n phí dành cho b?n. G?i s? [phone].         [facility] complies with applicable Federal civil rights laws and does not discriminate on the basis of race, color, national origin, age, disability, or sex.        "

## 2017-04-23 RX ORDER — LOSARTAN POTASSIUM 100 MG/1
TABLET ORAL
Qty: 90 TABLET | Refills: 3 | Status: SHIPPED | OUTPATIENT
Start: 2017-04-23 | End: 2018-01-16 | Stop reason: SDUPTHER

## 2018-01-10 ENCOUNTER — TELEPHONE (OUTPATIENT)
Dept: FAMILY MEDICINE | Facility: CLINIC | Age: 73
End: 2018-01-10

## 2018-01-10 NOTE — TELEPHONE ENCOUNTER
----- Message from Hoa Floyd sent at 1/10/2018  8:42 AM CST -----  Please call patient in regards to handicap parking paperwork that was dropped off Friday, [phone]

## 2018-01-10 NOTE — TELEPHONE ENCOUNTER
Spoke w/ pt. Informed pt about need for appointment prior to form being filled out. pt verbalized understanding, but stated she did not want to come and that I should just throw away the paperwork because she is not coming in.

## 2018-01-15 RX ORDER — METOPROLOL SUCCINATE 50 MG/1
TABLET, EXTENDED RELEASE ORAL
Qty: 90 TABLET | Refills: 0 | Status: SHIPPED | OUTPATIENT
Start: 2018-01-15 | End: 2018-03-29 | Stop reason: SDUPTHER

## 2018-01-16 ENCOUNTER — OFFICE VISIT (OUTPATIENT)
Dept: FAMILY MEDICINE | Facility: CLINIC | Age: 73
End: 2018-01-16
Payer: MEDICARE

## 2018-01-16 VITALS
HEART RATE: 72 BPM | TEMPERATURE: 98 F | DIASTOLIC BLOOD PRESSURE: 70 MMHG | BODY MASS INDEX: 27.58 KG/M2 | SYSTOLIC BLOOD PRESSURE: 132 MMHG | HEIGHT: 65 IN | WEIGHT: 165.56 LBS

## 2018-01-16 DIAGNOSIS — M17.12 ARTHRITIS OF LEFT KNEE: ICD-10-CM

## 2018-01-16 DIAGNOSIS — F41.9 ANXIETY: ICD-10-CM

## 2018-01-16 DIAGNOSIS — G45.9 TRANSIENT CEREBRAL ISCHEMIA, UNSPECIFIED TYPE: Primary | ICD-10-CM

## 2018-01-16 DIAGNOSIS — M1A.9XX0 CHRONIC GOUT INVOLVING TOE WITHOUT TOPHUS, UNSPECIFIED CAUSE, UNSPECIFIED LATERALITY: ICD-10-CM

## 2018-01-16 DIAGNOSIS — I10 ESSENTIAL HYPERTENSION: ICD-10-CM

## 2018-01-16 DIAGNOSIS — Z78.0 POST-MENOPAUSE: ICD-10-CM

## 2018-01-16 PROCEDURE — 99999 PR PBB SHADOW E&M-EST. PATIENT-LVL III: CPT | Mod: PBBFAC,,, | Performed by: FAMILY MEDICINE

## 2018-01-16 PROCEDURE — 99499 UNLISTED E&M SERVICE: CPT | Mod: S$GLB,,, | Performed by: FAMILY MEDICINE

## 2018-01-16 PROCEDURE — G0009 ADMIN PNEUMOCOCCAL VACCINE: HCPCS | Mod: S$GLB,,, | Performed by: FAMILY MEDICINE

## 2018-01-16 PROCEDURE — 99397 PER PM REEVAL EST PAT 65+ YR: CPT | Mod: S$GLB,,, | Performed by: FAMILY MEDICINE

## 2018-01-16 PROCEDURE — 90670 PCV13 VACCINE IM: CPT | Mod: S$GLB,,, | Performed by: FAMILY MEDICINE

## 2018-01-16 NOTE — PROGRESS NOTES
"Subjective:       Patient ID: Evelina Pinon is a 72 y.o. female.    Chief Complaint: Annual Exam (Annual check up. Wants handicapped tag.)    Annual exam.  She would like to have a handicapped tag.  She is limited by left knee arthritis.  She had a fracture of her left patella approximately 4 or 5 years ago.  It required 2 surgeries.  She has to use an electric cart at the grocery store.  She has a past history of gout in her left great toe.  Nothing recent.  Her anxiety is controlled with Ativan twice a day.  She had flu in November but has recovered.  Her blood pressures been well-controlled.  She continues on Plavix for previous TIA.  Past Medical History, Surgical History, Family History, Social History, Medications and Allergies reviewed.         Review of Systems   Constitutional: Negative for fatigue, fever and unexpected weight change.   HENT: Negative for congestion, hearing loss and rhinorrhea.    Eyes: Negative for photophobia and visual disturbance.   Respiratory: Negative for cough, shortness of breath and wheezing.    Cardiovascular: Negative for chest pain and palpitations.   Gastrointestinal: Negative for abdominal pain, blood in stool, constipation, diarrhea and nausea.   Genitourinary: Negative for difficulty urinating, dysuria, hematuria, urgency, vaginal bleeding and vaginal discharge.   Musculoskeletal: Positive for arthralgias. Negative for joint swelling.   Neurological: Negative for numbness and headaches.       Objective:     Blood pressure 132/70, pulse 72, temperature 98.4 °F (36.9 °C), temperature source Oral, height 5' 5" (1.651 m), weight 75.1 kg (165 lb 9.1 oz).      Physical Exam   Constitutional: She is oriented to person, place, and time. She appears well-developed and well-nourished. No distress.   HENT:   Right Ear: External ear normal.   Left Ear: External ear normal.   Mouth/Throat: No oropharyngeal exudate.   Eyes: Conjunctivae and EOM are normal. Pupils are equal, round, " and reactive to light. No scleral icterus.   Neck: Normal range of motion. No thyromegaly present.   Cardiovascular: Normal rate, regular rhythm, normal heart sounds and intact distal pulses.    No murmur heard.  Pulmonary/Chest: Effort normal. No respiratory distress. She has no wheezes. She has no rales. She exhibits no tenderness.   Abdominal: Soft. She exhibits no distension and no mass. There is no tenderness.   Musculoskeletal: She exhibits no edema.   She has prominent anterior osteophytes on her left knee including patella.  She has difficulty and pain flexing further than 70°.   Lymphadenopathy:     She has no cervical adenopathy.   Neurological: She is alert and oriented to person, place, and time.   Normal gait   Skin: No rash noted.   Psychiatric: She has a normal mood and affect.       Assessment:       1. Transient cerebral ischemia, unspecified type    2. Essential hypertension    3. Chronic gout involving toe without tophus, unspecified cause, unspecified laterality    4. Anxiety    5. Arthritis of left knee    6. Post-menopause        Plan:       Lab work ordered.  Physical therapy for knee arthritis.    DEXA scan

## 2018-02-09 RX ORDER — INDAPAMIDE 1.25 MG/1
TABLET ORAL
Qty: 90 TABLET | Refills: 3 | Status: SHIPPED | OUTPATIENT
Start: 2018-02-09 | End: 2018-03-28 | Stop reason: SDUPTHER

## 2018-03-28 ENCOUNTER — OFFICE VISIT (OUTPATIENT)
Dept: FAMILY MEDICINE | Facility: CLINIC | Age: 73
End: 2018-03-28
Payer: MEDICARE

## 2018-03-28 VITALS
OXYGEN SATURATION: 98 % | SYSTOLIC BLOOD PRESSURE: 122 MMHG | WEIGHT: 166.56 LBS | HEART RATE: 75 BPM | TEMPERATURE: 98 F | DIASTOLIC BLOOD PRESSURE: 66 MMHG | HEIGHT: 65 IN | BODY MASS INDEX: 27.75 KG/M2

## 2018-03-28 DIAGNOSIS — I10 ESSENTIAL HYPERTENSION: Primary | ICD-10-CM

## 2018-03-28 DIAGNOSIS — F41.9 ANXIETY: ICD-10-CM

## 2018-03-28 DIAGNOSIS — M1A.9XX0 CHRONIC GOUT INVOLVING TOE WITHOUT TOPHUS, UNSPECIFIED CAUSE, UNSPECIFIED LATERALITY: ICD-10-CM

## 2018-03-28 PROCEDURE — 3078F DIAST BP <80 MM HG: CPT | Mod: CPTII,S$GLB,, | Performed by: FAMILY MEDICINE

## 2018-03-28 PROCEDURE — 3074F SYST BP LT 130 MM HG: CPT | Mod: CPTII,S$GLB,, | Performed by: FAMILY MEDICINE

## 2018-03-28 PROCEDURE — 99499 UNLISTED E&M SERVICE: CPT | Mod: S$GLB,,, | Performed by: FAMILY MEDICINE

## 2018-03-28 PROCEDURE — 99214 OFFICE O/P EST MOD 30 MIN: CPT | Mod: S$GLB,,, | Performed by: FAMILY MEDICINE

## 2018-03-28 PROCEDURE — 99999 PR PBB SHADOW E&M-EST. PATIENT-LVL III: CPT | Mod: PBBFAC,,, | Performed by: FAMILY MEDICINE

## 2018-03-28 RX ORDER — LORAZEPAM 0.5 MG/1
0.5 TABLET ORAL 2 TIMES DAILY
Qty: 180 TABLET | Refills: 1 | Status: SHIPPED | OUTPATIENT
Start: 2018-03-28 | End: 2018-12-24 | Stop reason: SDUPTHER

## 2018-03-28 NOTE — PROGRESS NOTES
"Subjective:       Patient ID: Evelina Pinon is a 72 y.o. female.    Chief Complaint: Follow-up (refills)    Due for refill of lorazepam. BID.  Wants written Rx. Worries a lot. Worried because I told her she had decreased kidney function. Her dGFR was 56 1 yr ago. Stable level. BP well controlled on indapamide, metoprolol and losartan. No recent gout.       Review of Systems   Constitutional: Negative for fever and unexpected weight change.   Respiratory: Negative for shortness of breath.    Cardiovascular: Negative for chest pain, palpitations and leg swelling.       Objective:     Blood pressure 122/66, pulse 75, temperature 98.2 °F (36.8 °C), temperature source Oral, height 5' 5" (1.651 m), weight 75.5 kg (166 lb 8.9 oz), SpO2 98 %.      Physical Exam   Constitutional: She appears well-developed and well-nourished. No distress.   Cardiovascular: Normal rate, regular rhythm and normal heart sounds.    No murmur heard.  Pulmonary/Chest: Effort normal and breath sounds normal. No respiratory distress.   Neurological: She is alert.       Assessment:       1. Essential hypertension    2. Anxiety    3. Chronic gout involving toe without tophus, unspecified cause, unspecified laterality        Plan:       Lab tomorrow. Refilled lorazepam for 6 months.    She last filled 180 on 10/17/17 so she does not take bid every day.   "

## 2018-03-29 ENCOUNTER — LAB VISIT (OUTPATIENT)
Dept: LAB | Facility: HOSPITAL | Age: 73
End: 2018-03-29
Attending: FAMILY MEDICINE
Payer: MEDICARE

## 2018-03-29 DIAGNOSIS — I10 ESSENTIAL HYPERTENSION: ICD-10-CM

## 2018-03-29 DIAGNOSIS — M1A.9XX0 CHRONIC GOUT INVOLVING TOE WITHOUT TOPHUS, UNSPECIFIED CAUSE, UNSPECIFIED LATERALITY: ICD-10-CM

## 2018-03-29 LAB
ALBUMIN SERPL BCP-MCNC: 4 G/DL
ALP SERPL-CCNC: 115 U/L
ALT SERPL W/O P-5'-P-CCNC: 16 U/L
ANION GAP SERPL CALC-SCNC: 10 MMOL/L
AST SERPL-CCNC: 20 U/L
BILIRUB SERPL-MCNC: 0.4 MG/DL
BUN SERPL-MCNC: 30 MG/DL
CALCIUM SERPL-MCNC: 9.6 MG/DL
CHLORIDE SERPL-SCNC: 109 MMOL/L
CHOLEST SERPL-MCNC: 200 MG/DL
CHOLEST/HDLC SERPL: 4.4 {RATIO}
CO2 SERPL-SCNC: 22 MMOL/L
CREAT SERPL-MCNC: 1.4 MG/DL
EST. GFR  (AFRICAN AMERICAN): 43 ML/MIN/1.73 M^2
EST. GFR  (NON AFRICAN AMERICAN): 37.3 ML/MIN/1.73 M^2
GLUCOSE SERPL-MCNC: 97 MG/DL
HDLC SERPL-MCNC: 45 MG/DL
HDLC SERPL: 22.5 %
LDLC SERPL CALC-MCNC: 131.8 MG/DL
NONHDLC SERPL-MCNC: 155 MG/DL
POTASSIUM SERPL-SCNC: 4.7 MMOL/L
PROT SERPL-MCNC: 6.7 G/DL
SODIUM SERPL-SCNC: 141 MMOL/L
TRIGL SERPL-MCNC: 116 MG/DL
URATE SERPL-MCNC: 4.9 MG/DL

## 2018-03-29 PROCEDURE — 36415 COLL VENOUS BLD VENIPUNCTURE: CPT | Mod: PN

## 2018-03-29 PROCEDURE — 80061 LIPID PANEL: CPT

## 2018-03-29 PROCEDURE — 84550 ASSAY OF BLOOD/URIC ACID: CPT

## 2018-03-29 PROCEDURE — 80053 COMPREHEN METABOLIC PANEL: CPT

## 2018-03-29 RX ORDER — CLOPIDOGREL BISULFATE 75 MG/1
75 TABLET ORAL DAILY
Qty: 90 TABLET | Refills: 3 | Status: SHIPPED | OUTPATIENT
Start: 2018-03-29 | End: 2019-04-11 | Stop reason: SDUPTHER

## 2018-03-29 RX ORDER — INDAPAMIDE 1.25 MG/1
1.25 TABLET ORAL DAILY
Qty: 90 TABLET | Refills: 3 | Status: SHIPPED | OUTPATIENT
Start: 2018-03-29 | End: 2019-04-11 | Stop reason: SDUPTHER

## 2018-03-29 RX ORDER — LOSARTAN POTASSIUM 100 MG/1
100 TABLET ORAL DAILY
Qty: 90 TABLET | Refills: 3 | Status: SHIPPED | OUTPATIENT
Start: 2018-03-29 | End: 2019-04-11 | Stop reason: SDUPTHER

## 2018-03-29 RX ORDER — ALLOPURINOL 100 MG/1
200 TABLET ORAL DAILY
Qty: 180 TABLET | Refills: 3 | Status: SHIPPED | OUTPATIENT
Start: 2018-03-29 | End: 2019-04-11 | Stop reason: SDUPTHER

## 2018-03-29 RX ORDER — METOPROLOL SUCCINATE 50 MG/1
TABLET, EXTENDED RELEASE ORAL
Qty: 90 TABLET | Refills: 3 | Status: SHIPPED | OUTPATIENT
Start: 2018-03-29 | End: 2019-04-11 | Stop reason: SDUPTHER

## 2018-05-10 RX ORDER — ALLOPURINOL 100 MG/1
TABLET ORAL
Qty: 180 TABLET | Refills: 3 | Status: SHIPPED | OUTPATIENT
Start: 2018-05-10 | End: 2019-04-11 | Stop reason: SDUPTHER

## 2018-06-26 ENCOUNTER — TELEPHONE (OUTPATIENT)
Dept: FAMILY MEDICINE | Facility: CLINIC | Age: 73
End: 2018-06-26

## 2018-06-26 NOTE — TELEPHONE ENCOUNTER
Pt due for mammo. Spoke w/ pt. She states she gets this done at a breast clinic in [place] and is followed there. She will try to get a copy of the last report.

## 2018-07-10 ENCOUNTER — PES CALL (OUTPATIENT)
Dept: ADMINISTRATIVE | Facility: CLINIC | Age: 73
End: 2018-07-10

## 2018-12-24 RX ORDER — CLOPIDOGREL BISULFATE 75 MG/1
TABLET ORAL
Qty: 90 TABLET | Refills: 0 | Status: SHIPPED | OUTPATIENT
Start: 2018-12-24 | End: 2019-04-11 | Stop reason: SDUPTHER

## 2018-12-24 RX ORDER — LORAZEPAM 0.5 MG/1
TABLET ORAL
Qty: 180 TABLET | Refills: 0 | Status: SHIPPED | OUTPATIENT
Start: 2018-12-24 | End: 2019-04-11 | Stop reason: SDUPTHER

## 2019-04-11 ENCOUNTER — OFFICE VISIT (OUTPATIENT)
Dept: FAMILY MEDICINE | Facility: CLINIC | Age: 74
End: 2019-04-11
Payer: MEDICARE

## 2019-04-11 ENCOUNTER — LAB VISIT (OUTPATIENT)
Dept: LAB | Facility: HOSPITAL | Age: 74
End: 2019-04-11
Attending: FAMILY MEDICINE
Payer: MEDICARE

## 2019-04-11 VITALS
TEMPERATURE: 98 F | SYSTOLIC BLOOD PRESSURE: 122 MMHG | BODY MASS INDEX: 26.08 KG/M2 | HEIGHT: 65 IN | DIASTOLIC BLOOD PRESSURE: 70 MMHG | WEIGHT: 156.5 LBS

## 2019-04-11 DIAGNOSIS — M1A.9XX0 CHRONIC GOUT INVOLVING TOE WITHOUT TOPHUS, UNSPECIFIED CAUSE, UNSPECIFIED LATERALITY: ICD-10-CM

## 2019-04-11 DIAGNOSIS — I10 ESSENTIAL HYPERTENSION: Primary | ICD-10-CM

## 2019-04-11 DIAGNOSIS — I10 ESSENTIAL HYPERTENSION: ICD-10-CM

## 2019-04-11 DIAGNOSIS — F41.9 ANXIETY: ICD-10-CM

## 2019-04-11 DIAGNOSIS — G45.9 TIA (TRANSIENT ISCHEMIC ATTACK): ICD-10-CM

## 2019-04-11 DIAGNOSIS — Z00.00 HEALTH MAINTENANCE EXAMINATION: ICD-10-CM

## 2019-04-11 DIAGNOSIS — K21.9 GASTROESOPHAGEAL REFLUX DISEASE WITHOUT ESOPHAGITIS: ICD-10-CM

## 2019-04-11 PROBLEM — L64.9: Status: ACTIVE | Noted: 2019-04-11

## 2019-04-11 LAB
ALBUMIN SERPL BCP-MCNC: 4 G/DL (ref 3.5–5.2)
ALP SERPL-CCNC: 97 U/L (ref 55–135)
ALT SERPL W/O P-5'-P-CCNC: 13 U/L (ref 10–44)
ANION GAP SERPL CALC-SCNC: 9 MMOL/L (ref 8–16)
AST SERPL-CCNC: 17 U/L (ref 10–40)
BILIRUB SERPL-MCNC: 0.7 MG/DL (ref 0.1–1)
BUN SERPL-MCNC: 32 MG/DL (ref 8–23)
CALCIUM SERPL-MCNC: 10.2 MG/DL (ref 8.7–10.5)
CHLORIDE SERPL-SCNC: 107 MMOL/L (ref 95–110)
CHOLEST SERPL-MCNC: 193 MG/DL (ref 120–199)
CHOLEST/HDLC SERPL: 4.6 {RATIO} (ref 2–5)
CO2 SERPL-SCNC: 24 MMOL/L (ref 23–29)
CREAT SERPL-MCNC: 1.4 MG/DL (ref 0.5–1.4)
ERYTHROCYTE [DISTWIDTH] IN BLOOD BY AUTOMATED COUNT: 13.7 % (ref 11.5–14.5)
EST. GFR  (AFRICAN AMERICAN): 42.7 ML/MIN/1.73 M^2
EST. GFR  (NON AFRICAN AMERICAN): 37 ML/MIN/1.73 M^2
GLUCOSE SERPL-MCNC: 90 MG/DL (ref 70–110)
HCT VFR BLD AUTO: 42.4 % (ref 37–48.5)
HDLC SERPL-MCNC: 42 MG/DL (ref 40–75)
HDLC SERPL: 21.8 % (ref 20–50)
HGB BLD-MCNC: 13.3 G/DL (ref 12–16)
LDLC SERPL CALC-MCNC: 123.8 MG/DL (ref 63–159)
MCH RBC QN AUTO: 30.3 PG (ref 27–31)
MCHC RBC AUTO-ENTMCNC: 31.4 G/DL (ref 32–36)
MCV RBC AUTO: 97 FL (ref 82–98)
NONHDLC SERPL-MCNC: 151 MG/DL
PLATELET # BLD AUTO: 221 K/UL (ref 150–350)
PMV BLD AUTO: 11.6 FL (ref 9.2–12.9)
POTASSIUM SERPL-SCNC: 4.3 MMOL/L (ref 3.5–5.1)
PROT SERPL-MCNC: 6.5 G/DL (ref 6–8.4)
RBC # BLD AUTO: 4.39 M/UL (ref 4–5.4)
SODIUM SERPL-SCNC: 140 MMOL/L (ref 136–145)
TRIGL SERPL-MCNC: 136 MG/DL (ref 30–150)
URATE SERPL-MCNC: 4.1 MG/DL (ref 2.4–5.7)
WBC # BLD AUTO: 7.41 K/UL (ref 3.9–12.7)

## 2019-04-11 PROCEDURE — 99499 RISK ADDL DX/OHS AUDIT: ICD-10-PCS | Mod: HCNC,S$GLB,, | Performed by: FAMILY MEDICINE

## 2019-04-11 PROCEDURE — 99499 UNLISTED E&M SERVICE: CPT | Mod: HCNC,S$GLB,, | Performed by: FAMILY MEDICINE

## 2019-04-11 PROCEDURE — 3078F PR MOST RECENT DIASTOLIC BLOOD PRESSURE < 80 MM HG: ICD-10-PCS | Mod: HCNC,CPTII,S$GLB, | Performed by: FAMILY MEDICINE

## 2019-04-11 PROCEDURE — 80061 LIPID PANEL: CPT | Mod: HCNC

## 2019-04-11 PROCEDURE — 3074F PR MOST RECENT SYSTOLIC BLOOD PRESSURE < 130 MM HG: ICD-10-PCS | Mod: HCNC,CPTII,S$GLB, | Performed by: FAMILY MEDICINE

## 2019-04-11 PROCEDURE — 99999 PR PBB SHADOW E&M-EST. PATIENT-LVL IV: CPT | Mod: PBBFAC,HCNC,, | Performed by: FAMILY MEDICINE

## 2019-04-11 PROCEDURE — 99999 PR PBB SHADOW E&M-EST. PATIENT-LVL IV: ICD-10-PCS | Mod: PBBFAC,HCNC,, | Performed by: FAMILY MEDICINE

## 2019-04-11 PROCEDURE — 99397 PER PM REEVAL EST PAT 65+ YR: CPT | Mod: HCNC,S$GLB,, | Performed by: FAMILY MEDICINE

## 2019-04-11 PROCEDURE — 80053 COMPREHEN METABOLIC PANEL: CPT | Mod: HCNC

## 2019-04-11 PROCEDURE — 85027 COMPLETE CBC AUTOMATED: CPT | Mod: HCNC

## 2019-04-11 PROCEDURE — 99397 PR PREVENTIVE VISIT,EST,65 & OVER: ICD-10-PCS | Mod: HCNC,S$GLB,, | Performed by: FAMILY MEDICINE

## 2019-04-11 PROCEDURE — 36415 COLL VENOUS BLD VENIPUNCTURE: CPT | Mod: HCNC,PN

## 2019-04-11 PROCEDURE — 84550 ASSAY OF BLOOD/URIC ACID: CPT | Mod: HCNC

## 2019-04-11 PROCEDURE — 3078F DIAST BP <80 MM HG: CPT | Mod: HCNC,CPTII,S$GLB, | Performed by: FAMILY MEDICINE

## 2019-04-11 PROCEDURE — 3074F SYST BP LT 130 MM HG: CPT | Mod: HCNC,CPTII,S$GLB, | Performed by: FAMILY MEDICINE

## 2019-04-11 RX ORDER — INDAPAMIDE 1.25 MG/1
1.25 TABLET ORAL DAILY
Qty: 90 TABLET | Refills: 3 | Status: SHIPPED | OUTPATIENT
Start: 2019-04-11 | End: 2020-01-25

## 2019-04-11 RX ORDER — ALLOPURINOL 100 MG/1
200 TABLET ORAL DAILY
Qty: 180 TABLET | Refills: 3 | Status: SHIPPED | OUTPATIENT
Start: 2019-04-11 | End: 2020-03-04

## 2019-04-11 RX ORDER — METOPROLOL SUCCINATE 50 MG/1
TABLET, EXTENDED RELEASE ORAL
Qty: 90 TABLET | Refills: 3 | Status: SHIPPED | OUTPATIENT
Start: 2019-04-11 | End: 2020-03-28

## 2019-04-11 RX ORDER — LOSARTAN POTASSIUM 100 MG/1
100 TABLET ORAL DAILY
Qty: 90 TABLET | Refills: 3 | Status: SHIPPED | OUTPATIENT
Start: 2019-04-11 | End: 2020-02-12 | Stop reason: SDUPTHER

## 2019-04-11 RX ORDER — CLOPIDOGREL BISULFATE 75 MG/1
75 TABLET ORAL DAILY
Qty: 90 TABLET | Refills: 0 | Status: SHIPPED | OUTPATIENT
Start: 2019-04-11 | End: 2019-07-16 | Stop reason: SDUPTHER

## 2019-04-11 RX ORDER — LORAZEPAM 0.5 MG/1
0.5 TABLET ORAL 2 TIMES DAILY
Qty: 180 TABLET | Refills: 0 | Status: SHIPPED | OUTPATIENT
Start: 2019-04-11 | End: 2019-11-17 | Stop reason: SDUPTHER

## 2019-04-11 NOTE — PROGRESS NOTES
"Subjective:       Patient ID: Evelina Pinon is a 73 y.o. female.    Chief Complaint: Follow-up (med f/u)    She would like a refill on all of her medications.  She takes lorazepam once a day but occasionally twice a day.  She gets 180 pills at a time and her last prescription was in December of 2018.  She is also due for Pneumovax.  Due for lab work.  She has a past history of gout but no recent attacks on allopurinol.  Her most recent uric acid level was 4.9.  She takes Plavix for TIA prevention.  Her blood pressure has been controlled on indapamide plus Cozaar plus metoprolol.  Past Medical History, Surgical History, Family History, Social History, Medications and Allergies reviewed.       Review of Systems   Constitutional: Negative for fatigue, fever and unexpected weight change.   HENT: Negative for congestion, hearing loss and rhinorrhea.    Eyes: Negative for photophobia and visual disturbance.   Respiratory: Negative for cough, shortness of breath and wheezing.    Cardiovascular: Negative for chest pain and palpitations.   Gastrointestinal: Negative for abdominal pain, blood in stool, constipation, diarrhea and nausea.   Genitourinary: Negative for difficulty urinating, dysuria, hematuria, urgency, vaginal bleeding and vaginal discharge.   Musculoskeletal: Negative for arthralgias and joint swelling.   Skin:        Her alopecia started age 11.  She wears a wig.   Neurological: Negative for numbness and headaches.       Objective:     Blood pressure 122/70, temperature 97.9 °F (36.6 °C), temperature source Oral, height 5' 5" (1.651 m), weight 71 kg (156 lb 8.4 oz).      Physical Exam   Constitutional: She is oriented to person, place, and time. She appears well-developed and well-nourished. No distress.   HENT:   Right Ear: External ear normal.   Left Ear: External ear normal.   Mouth/Throat: No oropharyngeal exudate.   Eyes: Pupils are equal, round, and reactive to light. Conjunctivae and EOM are " normal. No scleral icterus.   Neck: Normal range of motion. No thyromegaly present.   Cardiovascular: Normal rate, regular rhythm, normal heart sounds and intact distal pulses.   No murmur heard.  Pulmonary/Chest: Effort normal and breath sounds normal. No respiratory distress. She has no wheezes. She has no rales. She exhibits no tenderness.   Abdominal: Soft. She exhibits no distension and no mass. There is no tenderness.   Musculoskeletal: She exhibits no edema.   Lymphadenopathy:     She has no cervical adenopathy.   Neurological: She is alert and oriented to person, place, and time.   Normal gait   Skin: No rash noted.   Psychiatric: She has a normal mood and affect.       Assessment:       1. Essential hypertension    2. Health maintenance examination    3. Chronic gout involving toe without tophus, unspecified cause, unspecified laterality    4. Anxiety    5. TIA (transient ischemic attack)    6. Gastroesophageal reflux disease without esophagitis    7. Hereditary alopecia        Plan:       I refilled her medications.  Pneumovax today.  Lab work scheduled.

## 2019-04-11 NOTE — PATIENT INSTRUCTIONS
Understanding Alopecia Areata    Alopecia areata is a condition that causes your hair to fall out. It causes bald patches on the scalp, but it can also cause hair loss on other parts of the body.  How to say it  [id number]   What causes alopecia areata?  Alopecia areata is an autoimmune disease. This means its caused by the bodys immune system attacking its own tissues. The immune system attacks the hair follicles. It causes hair to stop growing, and then break off and fall out.  You may be more likely to have alopecia areata if you have another type of autoimmune disease, such as:  · Thyroid disease  · Vitiligo  · Eczema (atopic dermatitis)  Symptoms of alopecia areata  The main symptom is one or more bald patches on the scalp that occur over a few weeks as hair falls out. Bald patches most often occur on the scalp, but hair can also fall out on the face and other parts of the body. The skin may itch or burn before the hair falls out. Then the skin may be smooth, or have some short hairs left. In some people, the rest of the hair on the head and the entire body may also thin or fall out.  Some people also have small dents or pits in their fingernails, or other nail symptoms. These may include roughness, cracks, red spots, or the nail pulling away from the finger.  Treatment for alopecia areata  You can choose not to have any treatment. For many people, the hair will grow back in time. In some cases, it may fall out again. Treatment doesnt prevent hair from falling out in the future.  Some medicines can help regrow hair faster, or stop hair from falling out. These medicines include:  · Corticosteroid medicine. This is injected into the areas where hair is falling out or gone. The injections are done every 4 to 12 weeks. Corticosteroid medicine can also be used as an ointment or cream put on the skin. These are often used along with the injections.  · Immunotherapy medicine. This is a type of  medicine that causes an allergic reaction on the skin. You apply it once a week as a lotion onto the skin of the scalp.  · Topical minoxidil. You put this over-the-counter medicine right on the scalp. It may help new hair grow.  · Other medicines. Many other medicines can be used, but they may suppress the immune system. They can have unwanted side effects.  Medicines used for treatment have side effects. They also work better on some people than others. It depends on how severe your hair loss is. Talk with your healthcare provider about what medicines are the best options for you.  Living with alopecia areata  For many people, the hair grows back within a year. But your hair may fall out again the future. This process may occur a few times over several years. Or the hair may not fully grow back. In some people, all the scalp hair or body hair may fall out.  Hair loss is more likely to happen again if you have any of these:  · Hair loss for more than 1 year  · Nail symptoms  · A family history of alopecia areata  · Hair loss that started in childhood  · Loss of a lot of hair  · Loss of hair in a band from the ears around the back of the head  Hair loss can cause stress and other emotional upset. Talk with your healthcare provider about finding support groups in your area to help you manage your condition. You can also talk to him or her about cosmetic fixes. A wig can be used to conceal bald patches. Tattooing of the eyebrows can restore the look of eyebrow hairs. Your healthcare provider may have resources to help.  When to call your healthcare provider  Call your healthcare provider right away if you have any of these:  · Symptoms that dont get better, or get worse  · New symptoms   Date Last Reviewed: 5/1/2016  © 8622-7578 [employer]. [address] [place] [address], [place] [address]. All rights reserved. This information is not intended as a substitute for professional medical care. Always follow your  healthcare professional's instructions.

## 2019-07-02 ENCOUNTER — TELEPHONE (OUTPATIENT)
Dept: FAMILY MEDICINE | Facility: CLINIC | Age: 74
End: 2019-07-02

## 2019-07-16 RX ORDER — CLOPIDOGREL BISULFATE 75 MG/1
TABLET ORAL
Qty: 90 TABLET | Refills: 3 | Status: SHIPPED | OUTPATIENT
Start: 2019-07-16 | End: 2020-06-23

## 2019-08-21 ENCOUNTER — OFFICE VISIT (OUTPATIENT)
Dept: FAMILY MEDICINE | Facility: CLINIC | Age: 74
End: 2019-08-21
Payer: MEDICARE

## 2019-08-21 VITALS
WEIGHT: 152.31 LBS | SYSTOLIC BLOOD PRESSURE: 120 MMHG | HEART RATE: 56 BPM | HEIGHT: 65 IN | DIASTOLIC BLOOD PRESSURE: 74 MMHG | BODY MASS INDEX: 25.38 KG/M2

## 2019-08-21 DIAGNOSIS — R04.0 EPISTAXIS: Primary | ICD-10-CM

## 2019-08-21 PROCEDURE — 3074F PR MOST RECENT SYSTOLIC BLOOD PRESSURE < 130 MM HG: ICD-10-PCS | Mod: HCNC,CPTII,S$GLB, | Performed by: NURSE PRACTITIONER

## 2019-08-21 PROCEDURE — 99999 PR PBB SHADOW E&M-EST. PATIENT-LVL IV: ICD-10-PCS | Mod: PBBFAC,HCNC,, | Performed by: NURSE PRACTITIONER

## 2019-08-21 PROCEDURE — 1101F PR PT FALLS ASSESS DOC 0-1 FALLS W/OUT INJ PAST YR: ICD-10-PCS | Mod: HCNC,CPTII,S$GLB, | Performed by: NURSE PRACTITIONER

## 2019-08-21 PROCEDURE — 99213 OFFICE O/P EST LOW 20 MIN: CPT | Mod: HCNC,S$GLB,, | Performed by: NURSE PRACTITIONER

## 2019-08-21 PROCEDURE — 3074F SYST BP LT 130 MM HG: CPT | Mod: HCNC,CPTII,S$GLB, | Performed by: NURSE PRACTITIONER

## 2019-08-21 PROCEDURE — 3078F DIAST BP <80 MM HG: CPT | Mod: HCNC,CPTII,S$GLB, | Performed by: NURSE PRACTITIONER

## 2019-08-21 PROCEDURE — 3078F PR MOST RECENT DIASTOLIC BLOOD PRESSURE < 80 MM HG: ICD-10-PCS | Mod: HCNC,CPTII,S$GLB, | Performed by: NURSE PRACTITIONER

## 2019-08-21 PROCEDURE — 1101F PT FALLS ASSESS-DOCD LE1/YR: CPT | Mod: HCNC,CPTII,S$GLB, | Performed by: NURSE PRACTITIONER

## 2019-08-21 PROCEDURE — 99999 PR PBB SHADOW E&M-EST. PATIENT-LVL IV: CPT | Mod: PBBFAC,HCNC,, | Performed by: NURSE PRACTITIONER

## 2019-08-21 PROCEDURE — 99213 PR OFFICE/OUTPT VISIT, EST, LEVL III, 20-29 MIN: ICD-10-PCS | Mod: HCNC,S$GLB,, | Performed by: NURSE PRACTITIONER

## 2019-08-21 NOTE — PATIENT INSTRUCTIONS
Hold pressure on nose if bleeding occurs. Return to clinic if it happens more often. Seek care if you have other bleeding, bruising, black stools, or blood in stool.   Afrin nasal spray will stop active bleeding. Caution it does burn.

## 2019-08-21 NOTE — PROGRESS NOTES
This dictation has been generated using Modal Fluency Dictation some phonetic errors may occur. Please contact author for clarification if needed.     Problem List Items Addressed This Visit     None      Visit Diagnoses     Epistaxis    -  Primary        Epistaxis.  No current nose bleed.  Discussed using pressure and ice to control bleeding.  Also discussed that Afrin can stop bleeding but will cause burning.  Patient Instructions   Hold pressure on nose if bleeding occurs. Return to clinic if it happens more often. Seek care if you have other bleeding, bruising, black stools, or blood in stool.   Afrin nasal spray will stop active bleeding. Caution it does burn.     Continue Plavix.  May discuss with PCP or neuro aspirin use as solo therapy.  Follow up if symptoms worsen or fail to improve.    ________________________________________________________________  ________________________________________________________________      Chief Complaint   Patient presents with    Epistaxis     pt states she had a nose bleed recently and this morning she noticed some dried blood in her nose     History of present illness  This 74 y.o. presents today for complaint of blood in nose.  Symptoms started today.  She noted some crusting around her nose.  She noted that it was dark.  She is worried about nose bleed and impetigo.  Patient denies presence of rash.  No fever or chills  No sinus pain or pressure  No hemoptysis    Past medical and social history reviewed.  Patient new to me.  Follows with in the clinic    Past Medical History:   Diagnosis Date    ALLERGIC RHINITIS 2/22/2012    Anxiety 2/22/2012    Fx patella     Left    Gout 2/22/2012    HTN (hypertension) 2/22/2012    TIA (transient ischemic attack) 2/22/2012       Past Surgical History:   Procedure Laterality Date    HYSTERECTOMY      PATELLA FRACTURE SURGERY Left 2013       Family History   Problem Relation Age of Onset    Anxiety disorder Mother         Social History     Socioeconomic History    Marital status:      Spouse name: Not on file    Number of children: Not on file    Years of education: Not on file    Highest education level: Not on file   Occupational History    Not on file   Social Needs    Financial resource strain: Not on file    Food insecurity:     Worry: Not on file     Inability: Not on file    Transportation needs:     Medical: Not on file     Non-medical: Not on file   Tobacco Use    Smoking status: Former Smoker    Smokeless tobacco: Never Used   Substance and Sexual Activity    Alcohol use: No    Drug use: No    Sexual activity: Not on file   Lifestyle    Physical activity:     Days per week: Not on file     Minutes per session: Not on file    Stress: Not on file   Relationships    Social connections:     Talks on phone: Not on file     Gets together: Not on file     Attends [employer]an service: Not on file     Active member of club or organization: Not on file     Attends meetings of clubs or organizations: Not on file     Relationship status: Not on file   Other Topics Concern    Not on file   Social History Narrative    Not on file       Current Outpatient Medications   Medication Sig Dispense Refill    allopurinol (ZYLOPRIM) 100 MG tablet Take 2 tablets (200 mg total) by mouth once daily. 180 tablet 3    clopidogrel (PLAVIX) 75 mg tablet TAKE 1 TABLET BY MOUTH ONCE DAILY 90 tablet 3    indapamide (LOZOL) 1.25 MG Tab Take 1 tablet (1.25 mg total) by mouth once daily. 90 tablet 3    LORazepam (ATIVAN) 0.5 MG tablet Take 1 tablet (0.5 mg total) by mouth 2 (two) times daily. 180 tablet 0    losartan (COZAAR) 100 MG tablet Take 1 tablet (100 mg total) by mouth once daily. 90 tablet 3    metoprolol succinate (TOPROL-XL) 50 MG 24 hr tablet TAKE 1 TABLET(50MG) BY MOUTH ONCE DAILY 90 tablet 3    ranitidine (ZANTAC) 150 MG tablet Take 1 tablet (150 mg total) by mouth 2 (two) times daily. 180 tablet 3    aspirin  (ECOTRIN) 81 MG EC tablet Take 81 mg by mouth once daily.         No current facility-administered medications for this visit.        Review of patient's allergies indicates:   Allergen Reactions    Augmentin [amoxicillin-pot clavulanate] Rash    Keflex [cephalexin] Rash       Physical examination  Vitals Reviewed  Gen. Well-dressed well-nourished   Skin warm dry and intact.  No rashes noted.  HEENT.  TM intact bilateral with normal light reflex.  No mastoid tenderness during percussion.  Nares patent bilateral.  Pharynx is unremarkable.  No maxillary or frontal sinus tenderness when percussed.    Neck is supple without adenopathy  Chest.  Respirations are even unlabored.  Lungs are clear to auscultation.  Cardiac regular rate and rhythm.  No chest wall adenopathy noted.  Neuro. Awake alert oriented x4.  Normal judgment and cognition noted.  Extremities no clubbing cyanosis     Call or return to clinic prn if these symptoms worsen or fail to improve as anticipated.

## 2019-10-21 ENCOUNTER — OFFICE VISIT (OUTPATIENT)
Dept: FAMILY MEDICINE | Facility: CLINIC | Age: 74
End: 2019-10-21
Payer: MEDICARE

## 2019-10-21 VITALS
OXYGEN SATURATION: 97 % | HEART RATE: 60 BPM | BODY MASS INDEX: 24.96 KG/M2 | HEIGHT: 64 IN | TEMPERATURE: 98 F | DIASTOLIC BLOOD PRESSURE: 60 MMHG | WEIGHT: 146.19 LBS | SYSTOLIC BLOOD PRESSURE: 116 MMHG

## 2019-10-21 DIAGNOSIS — B02.9 HERPES ZOSTER WITHOUT COMPLICATION: Primary | ICD-10-CM

## 2019-10-21 PROCEDURE — 3078F DIAST BP <80 MM HG: CPT | Mod: HCNC,CPTII,S$GLB, | Performed by: NURSE PRACTITIONER

## 2019-10-21 PROCEDURE — 99999 PR PBB SHADOW E&M-EST. PATIENT-LVL IV: CPT | Mod: PBBFAC,HCNC,, | Performed by: NURSE PRACTITIONER

## 2019-10-21 PROCEDURE — 3074F SYST BP LT 130 MM HG: CPT | Mod: HCNC,CPTII,S$GLB, | Performed by: NURSE PRACTITIONER

## 2019-10-21 PROCEDURE — 1101F PR PT FALLS ASSESS DOC 0-1 FALLS W/OUT INJ PAST YR: ICD-10-PCS | Mod: HCNC,CPTII,S$GLB, | Performed by: NURSE PRACTITIONER

## 2019-10-21 PROCEDURE — 99214 PR OFFICE/OUTPT VISIT, EST, LEVL IV, 30-39 MIN: ICD-10-PCS | Mod: HCNC,S$GLB,, | Performed by: NURSE PRACTITIONER

## 2019-10-21 PROCEDURE — 99999 PR PBB SHADOW E&M-EST. PATIENT-LVL IV: ICD-10-PCS | Mod: PBBFAC,HCNC,, | Performed by: NURSE PRACTITIONER

## 2019-10-21 PROCEDURE — 99214 OFFICE O/P EST MOD 30 MIN: CPT | Mod: HCNC,S$GLB,, | Performed by: NURSE PRACTITIONER

## 2019-10-21 PROCEDURE — 1101F PT FALLS ASSESS-DOCD LE1/YR: CPT | Mod: HCNC,CPTII,S$GLB, | Performed by: NURSE PRACTITIONER

## 2019-10-21 PROCEDURE — 3078F PR MOST RECENT DIASTOLIC BLOOD PRESSURE < 80 MM HG: ICD-10-PCS | Mod: HCNC,CPTII,S$GLB, | Performed by: NURSE PRACTITIONER

## 2019-10-21 PROCEDURE — 3074F PR MOST RECENT SYSTOLIC BLOOD PRESSURE < 130 MM HG: ICD-10-PCS | Mod: HCNC,CPTII,S$GLB, | Performed by: NURSE PRACTITIONER

## 2019-10-21 RX ORDER — GABAPENTIN 100 MG/1
100 CAPSULE ORAL 3 TIMES DAILY
Qty: 90 CAPSULE | Refills: 0 | Status: SHIPPED | OUTPATIENT
Start: 2019-10-21 | End: 2019-12-19 | Stop reason: ALTCHOICE

## 2019-10-21 RX ORDER — VALACYCLOVIR HYDROCHLORIDE 1 G/1
1000 TABLET, FILM COATED ORAL EVERY 12 HOURS
Qty: 10 TABLET | Refills: 0 | Status: SHIPPED | OUTPATIENT
Start: 2019-10-21 | End: 2019-12-19 | Stop reason: ALTCHOICE

## 2019-10-21 RX ORDER — TRAMADOL HYDROCHLORIDE 50 MG/1
50 TABLET ORAL EVERY 6 HOURS PRN
Qty: 10 TABLET | Refills: 0 | Status: SHIPPED | OUTPATIENT
Start: 2019-10-21 | End: 2019-12-19

## 2019-10-21 NOTE — PROGRESS NOTES
This dictation has been generated using Modal Fluency Dictation some phonetic errors may occur. Please contact author for clarification if needed.     Problem List Items Addressed This Visit     None      Visit Diagnoses     Herpes zoster without complication    -  Primary        Orders Placed This Encounter    traMADol (ULTRAM) 50 mg tablet    valACYclovir (VALTREX) 1000 MG tablet    gabapentin (NEURONTIN) 100 MG capsule     Herpes zoster: valacyclovir. Gabapentin up to three times daily for nerve pain. Instructed patient to begin taking once daily at night for pain and increase to daytime dose if needed. Tramadol  as needed for pain. Instructed to avoid driving while taking these medications. Discussed need for shingles vaccine in the future. Risk of somnolence discussed with patient. Do not drive or operate machinery while taking medication. Do not engage in task that require mental alertness.       Follow up if symptoms worsen or fail to improve.    ________________________________________________________________  ________________________________________________________________      Chief Complaint   Patient presents with    Hospital Follow Up     painful red rash on upper back      History of present illness  This 74 y.o. presents today for complaint of painful rash to her back and left axilla. Pt notes falling about one week ago and began having pain to her mid back. She was seen at Urgent Care the evening of 10/14/19 for her back pain, she was concerned there was a problem with her lungs/t-spine.  Xrays was completed and showed no problems concerning lungs.  At this time no rash was present. Within a few days, she notes the pain worsened and a rash developed first on her back and spreading under her left arm and across her breast. Days before the rash began, she admits she felt feverish, had a decreased appetite, and was feeling unwell overall. The pain has kept her from sleeping well at night. She  "explains that it feels like "hot marbles" under her arm. She states the pain is excruciating. She took tylenol for the pain but saw no improvement. She took half of an 800mg Ibuprofen which helped her pain. She notes having chicken pox as a child as well as shingles. She did not have last flare of shingles treated, rash improved before she sought treatment. Discussed avoiding topical agents as well as NSAIDs.   Review of Systems:   Notes fever. Denies chills  Notes fatigue, decreased appetite.  Rash to mid-upper back spreading under left arm and across breast. Severe pain associated with rash.     Past medical history reviewed.    Past Medical History:   Diagnosis Date    ALLERGIC RHINITIS 2/22/2012    Anxiety 2/22/2012    Fx patella     Left    Gout 2/22/2012    HTN (hypertension) 2/22/2012    TIA (transient ischemic attack) 2/22/2012       Past Surgical History:   Procedure Laterality Date    HYSTERECTOMY      PATELLA FRACTURE SURGERY Left 2013       Family History   Problem Relation Age of Onset    Anxiety disorder Mother        Social History     Socioeconomic History    Marital status:      Spouse name: Not on file    Number of children: Not on file    Years of education: Not on file    Highest education level: Not on file   Occupational History    Not on file   Social Needs    Financial resource strain: Not on file    Food insecurity:     Worry: Not on file     Inability: Not on file    Transportation needs:     Medical: Not on file     Non-medical: Not on file   Tobacco Use    Smoking status: Former Smoker    Smokeless tobacco: Never Used   Substance and Sexual Activity    Alcohol use: No    Drug use: No    Sexual activity: Not on file   Lifestyle    Physical activity:     Days per week: Not on file     Minutes per session: Not on file    Stress: Not on file   Relationships    Social connections:     Talks on phone: Not on file     Gets together: Not on file     Attends " Protestant service: Not on file     Active member of club or organization: Not on file     Attends meetings of clubs or organizations: Not on file     Relationship status: Not on file   Other Topics Concern    Not on file   Social History Narrative    Not on file       Current Outpatient Medications   Medication Sig Dispense Refill    allopurinol (ZYLOPRIM) 100 MG tablet Take 2 tablets (200 mg total) by mouth once daily. 180 tablet 3    clopidogrel (PLAVIX) 75 mg tablet TAKE 1 TABLET BY MOUTH ONCE DAILY 90 tablet 3    indapamide (LOZOL) 1.25 MG Tab Take 1 tablet (1.25 mg total) by mouth once daily. 90 tablet 3    losartan (COZAAR) 100 MG tablet Take 1 tablet (100 mg total) by mouth once daily. 90 tablet 3    metoprolol succinate (TOPROL-XL) 50 MG 24 hr tablet TAKE 1 TABLET(50MG) BY MOUTH ONCE DAILY 90 tablet 3    ranitidine (ZANTAC) 150 MG tablet Take 1 tablet (150 mg total) by mouth 2 (two) times daily. 180 tablet 3    gabapentin (NEURONTIN) 100 MG capsule Take 1 capsule (100 mg total) by mouth 3 (three) times daily. Start at night and add daytime doses if needed. 90 capsule 0    LORazepam (ATIVAN) 0.5 MG tablet Take 1 tablet (0.5 mg total) by mouth 2 (two) times daily. (Patient not taking: Reported on 10/21/2019) 180 tablet 0    traMADol (ULTRAM) 50 mg tablet Take 1 tablet (50 mg total) by mouth every 6 (six) hours as needed for Pain. 10 tablet 0    valACYclovir (VALTREX) 1000 MG tablet Take 1 tablet (1,000 mg total) by mouth every 12 (twelve) hours. for 5 days 10 tablet 0     No current facility-administered medications for this visit.        Review of patient's allergies indicates:   Allergen Reactions    Augmentin [amoxicillin-pot clavulanate] Rash    Keflex [cephalexin] Rash       Physical examination  Vitals Reviewed  Gen. Well-dressed well-nourished   Skin warm dry and intact. Erythematous without forming vesicles rash to upper back spreading along dermatome under left axilla.  Chest.   Respirations are even unlabored.   Neuro. Awake alert oriented x4.  Normal judgment and cognition noted.  Extremities no clubbing cyanosis or edema noted.     Call or return to clinic prn if these symptoms worsen or fail to improve as anticipated.

## 2019-11-17 DIAGNOSIS — I10 ESSENTIAL HYPERTENSION: Primary | ICD-10-CM

## 2019-11-18 RX ORDER — LORAZEPAM 0.5 MG/1
TABLET ORAL
Qty: 180 TABLET | Refills: 0 | Status: SHIPPED | OUTPATIENT
Start: 2019-11-18 | End: 2020-03-28

## 2019-11-18 NOTE — TELEPHONE ENCOUNTER
Called and spoke with patient. Pt was informed of rx refill and that she needed to have labs and follow up appt. Pt asked why she had to have follow up labs. Pt informed her labs came back abnormal and that Dr. Amaya's nurse would call her back.

## 2019-11-20 NOTE — TELEPHONE ENCOUNTER
Spoke w/ pt. Advised her labs were abnormal but stable, per Dr Gibson and she is due for 6 mo repeat labs and appt for her med f/u. Lab sched for  12/9/19 and f/u appt for 12/11/19 at 3pm. Pt agreed.

## 2019-12-06 ENCOUNTER — LAB VISIT (OUTPATIENT)
Dept: LAB | Facility: HOSPITAL | Age: 74
End: 2019-12-06
Attending: FAMILY MEDICINE
Payer: MEDICARE

## 2019-12-06 DIAGNOSIS — I10 ESSENTIAL HYPERTENSION: ICD-10-CM

## 2019-12-06 LAB
ERYTHROCYTE [DISTWIDTH] IN BLOOD BY AUTOMATED COUNT: 14.1 % (ref 11.5–14.5)
HCT VFR BLD AUTO: 39.7 % (ref 37–48.5)
HGB BLD-MCNC: 12 G/DL (ref 12–16)
MCH RBC QN AUTO: 31 PG (ref 27–31)
MCHC RBC AUTO-ENTMCNC: 30.2 G/DL (ref 32–36)
MCV RBC AUTO: 103 FL (ref 82–98)
PLATELET # BLD AUTO: 223 K/UL (ref 150–350)
PMV BLD AUTO: 11.5 FL (ref 9.2–12.9)
RBC # BLD AUTO: 3.87 M/UL (ref 4–5.4)
WBC # BLD AUTO: 7.08 K/UL (ref 3.9–12.7)

## 2019-12-06 PROCEDURE — 80053 COMPREHEN METABOLIC PANEL: CPT | Mod: HCNC

## 2019-12-06 PROCEDURE — 85027 COMPLETE CBC AUTOMATED: CPT | Mod: HCNC

## 2019-12-06 PROCEDURE — 36415 COLL VENOUS BLD VENIPUNCTURE: CPT | Mod: HCNC,PN

## 2019-12-07 LAB
ALBUMIN SERPL BCP-MCNC: 3.7 G/DL (ref 3.5–5.2)
ALP SERPL-CCNC: 79 U/L (ref 55–135)
ALT SERPL W/O P-5'-P-CCNC: 10 U/L (ref 10–44)
ANION GAP SERPL CALC-SCNC: 6 MMOL/L (ref 8–16)
AST SERPL-CCNC: 17 U/L (ref 10–40)
BILIRUB SERPL-MCNC: 0.4 MG/DL (ref 0.1–1)
BUN SERPL-MCNC: 24 MG/DL (ref 8–23)
CALCIUM SERPL-MCNC: 9.2 MG/DL (ref 8.7–10.5)
CHLORIDE SERPL-SCNC: 108 MMOL/L (ref 95–110)
CO2 SERPL-SCNC: 26 MMOL/L (ref 23–29)
CREAT SERPL-MCNC: 1.1 MG/DL (ref 0.5–1.4)
EST. GFR  (AFRICAN AMERICAN): 56.8 ML/MIN/1.73 M^2
EST. GFR  (NON AFRICAN AMERICAN): 49.2 ML/MIN/1.73 M^2
GLUCOSE SERPL-MCNC: 85 MG/DL (ref 70–110)
POTASSIUM SERPL-SCNC: 4.1 MMOL/L (ref 3.5–5.1)
PROT SERPL-MCNC: 5.9 G/DL (ref 6–8.4)
SODIUM SERPL-SCNC: 140 MMOL/L (ref 136–145)

## 2019-12-19 ENCOUNTER — OFFICE VISIT (OUTPATIENT)
Dept: FAMILY MEDICINE | Facility: CLINIC | Age: 74
End: 2019-12-19
Payer: MEDICARE

## 2019-12-19 VITALS
BODY MASS INDEX: 25.43 KG/M2 | HEIGHT: 64 IN | DIASTOLIC BLOOD PRESSURE: 80 MMHG | WEIGHT: 148.94 LBS | SYSTOLIC BLOOD PRESSURE: 138 MMHG | HEART RATE: 60 BPM | TEMPERATURE: 98 F

## 2019-12-19 DIAGNOSIS — F41.9 ANXIETY: Primary | ICD-10-CM

## 2019-12-19 DIAGNOSIS — I10 ESSENTIAL HYPERTENSION: ICD-10-CM

## 2019-12-19 DIAGNOSIS — D75.89 MACROCYTOSIS: ICD-10-CM

## 2019-12-19 DIAGNOSIS — M1A.9XX0 CHRONIC GOUT INVOLVING TOE WITHOUT TOPHUS, UNSPECIFIED CAUSE, UNSPECIFIED LATERALITY: ICD-10-CM

## 2019-12-19 PROCEDURE — 3079F PR MOST RECENT DIASTOLIC BLOOD PRESSURE 80-89 MM HG: ICD-10-PCS | Mod: HCNC,CPTII,S$GLB, | Performed by: FAMILY MEDICINE

## 2019-12-19 PROCEDURE — 3075F SYST BP GE 130 - 139MM HG: CPT | Mod: HCNC,CPTII,S$GLB, | Performed by: FAMILY MEDICINE

## 2019-12-19 PROCEDURE — 99214 PR OFFICE/OUTPT VISIT, EST, LEVL IV, 30-39 MIN: ICD-10-PCS | Mod: HCNC,S$GLB,, | Performed by: FAMILY MEDICINE

## 2019-12-19 PROCEDURE — 3079F DIAST BP 80-89 MM HG: CPT | Mod: HCNC,CPTII,S$GLB, | Performed by: FAMILY MEDICINE

## 2019-12-19 PROCEDURE — 1126F AMNT PAIN NOTED NONE PRSNT: CPT | Mod: HCNC,S$GLB,, | Performed by: FAMILY MEDICINE

## 2019-12-19 PROCEDURE — 3075F PR MOST RECENT SYSTOLIC BLOOD PRESS GE 130-139MM HG: ICD-10-PCS | Mod: HCNC,CPTII,S$GLB, | Performed by: FAMILY MEDICINE

## 2019-12-19 PROCEDURE — 99999 PR PBB SHADOW E&M-EST. PATIENT-LVL III: ICD-10-PCS | Mod: PBBFAC,HCNC,, | Performed by: FAMILY MEDICINE

## 2019-12-19 PROCEDURE — 1101F PT FALLS ASSESS-DOCD LE1/YR: CPT | Mod: HCNC,CPTII,S$GLB, | Performed by: FAMILY MEDICINE

## 2019-12-19 PROCEDURE — 99999 PR PBB SHADOW E&M-EST. PATIENT-LVL III: CPT | Mod: PBBFAC,HCNC,, | Performed by: FAMILY MEDICINE

## 2019-12-19 PROCEDURE — 1101F PR PT FALLS ASSESS DOC 0-1 FALLS W/OUT INJ PAST YR: ICD-10-PCS | Mod: HCNC,CPTII,S$GLB, | Performed by: FAMILY MEDICINE

## 2019-12-19 PROCEDURE — 99214 OFFICE O/P EST MOD 30 MIN: CPT | Mod: HCNC,S$GLB,, | Performed by: FAMILY MEDICINE

## 2019-12-19 PROCEDURE — 1159F MED LIST DOCD IN RCRD: CPT | Mod: HCNC,S$GLB,, | Performed by: FAMILY MEDICINE

## 2019-12-19 PROCEDURE — 1159F PR MEDICATION LIST DOCUMENTED IN MEDICAL RECORD: ICD-10-PCS | Mod: HCNC,S$GLB,, | Performed by: FAMILY MEDICINE

## 2019-12-19 PROCEDURE — 1126F PR PAIN SEVERITY QUANTIFIED, NO PAIN PRESENT: ICD-10-PCS | Mod: HCNC,S$GLB,, | Performed by: FAMILY MEDICINE

## 2019-12-19 RX ORDER — PRAVASTATIN SODIUM 20 MG/1
20 TABLET ORAL DAILY
Qty: 90 TABLET | Refills: 3 | Status: SHIPPED | OUTPATIENT
Start: 2019-12-19 | End: 2020-08-13

## 2019-12-19 NOTE — PROGRESS NOTES
"Subjective:       Patient ID: Evelina Pinon is a 74 y.o. female.    Chief Complaint: Follow-up    Follow-up anxiety.  She has long-term anxiety with panic attacks.  [place] she was a young mother [place] for little daughters.  She has had a lot of difficulty driving on free weighs and bridges since then.  She has made some progress but still is frightened by high bridges.  She had to call for help when she took a wrong turn and ended up in front of the [place].  Ativan 0.5 mg b.i.d. has been a stable help for her.  She has hypertension and her home blood pressure is usually in the 130/80 range.  She takes Plavix for previous TIA.  She has never taken a statin.  She has a past history of gout but no recent attacks.    Review of Systems   Constitutional: Negative for fever and unexpected weight change.   Neurological:        She had a recent severe episode of shingles.       Objective:     Blood pressure 138/80, pulse 60, temperature 97.7 °F (36.5 °C), temperature source Oral, height 5' 4" (1.626 m), weight 67.5 kg (148 lb 14.7 oz).      Physical Exam   Constitutional: She appears well-developed and well-nourished. No distress.   Cardiovascular: Normal rate and regular rhythm.   No carotid bruits.  No heart murmur.   Pulmonary/Chest: Effort normal and breath sounds normal. No respiratory distress.   Musculoskeletal: She exhibits no edema.   Neurological: She is alert.       Assessment:       1. Anxiety    2. Essential hypertension    3. Macrocytosis    4. Chronic gout involving toe without tophus, unspecified cause, unspecified laterality        Plan:       I reviewed recent lab work.  .  Her previous cholesterol profile was pretty good but she would still benefit from a statin for risk reduction.  Pravastatin 20 mg.  Vitamin B12 1000 mcg daily.  I will continue to refill her Ativan when it is due.  Follow up every 6 months.      "

## 2020-01-25 RX ORDER — INDAPAMIDE 1.25 MG/1
TABLET ORAL
Qty: 90 TABLET | Refills: 3 | Status: ON HOLD | OUTPATIENT
Start: 2020-01-25 | End: 2020-08-25 | Stop reason: HOSPADM

## 2020-02-12 RX ORDER — LOSARTAN POTASSIUM 100 MG/1
TABLET ORAL
Qty: 90 TABLET | Refills: 3 | Status: SHIPPED | OUTPATIENT
Start: 2020-02-12 | End: 2021-03-31

## 2020-02-20 ENCOUNTER — OFFICE VISIT (OUTPATIENT)
Dept: FAMILY MEDICINE | Facility: CLINIC | Age: 75
End: 2020-02-20
Payer: MEDICARE

## 2020-02-20 VITALS
OXYGEN SATURATION: 99 % | HEIGHT: 64 IN | DIASTOLIC BLOOD PRESSURE: 70 MMHG | TEMPERATURE: 98 F | BODY MASS INDEX: 25.61 KG/M2 | SYSTOLIC BLOOD PRESSURE: 136 MMHG | WEIGHT: 150 LBS | HEART RATE: 53 BPM

## 2020-02-20 DIAGNOSIS — L93.2 CUTANEOUS LUPUS ERYTHEMATOSUS: Primary | ICD-10-CM

## 2020-02-20 DIAGNOSIS — F41.9 ANXIETY: ICD-10-CM

## 2020-02-20 PROCEDURE — 99499 UNLISTED E&M SERVICE: CPT | Mod: HCNC,S$GLB,, | Performed by: FAMILY MEDICINE

## 2020-02-20 PROCEDURE — 99214 PR OFFICE/OUTPT VISIT, EST, LEVL IV, 30-39 MIN: ICD-10-PCS | Mod: HCNC,S$GLB,, | Performed by: FAMILY MEDICINE

## 2020-02-20 PROCEDURE — 3075F PR MOST RECENT SYSTOLIC BLOOD PRESS GE 130-139MM HG: ICD-10-PCS | Mod: HCNC,CPTII,S$GLB, | Performed by: FAMILY MEDICINE

## 2020-02-20 PROCEDURE — 3078F DIAST BP <80 MM HG: CPT | Mod: HCNC,CPTII,S$GLB, | Performed by: FAMILY MEDICINE

## 2020-02-20 PROCEDURE — 1159F PR MEDICATION LIST DOCUMENTED IN MEDICAL RECORD: ICD-10-PCS | Mod: HCNC,S$GLB,, | Performed by: FAMILY MEDICINE

## 2020-02-20 PROCEDURE — 1126F PR PAIN SEVERITY QUANTIFIED, NO PAIN PRESENT: ICD-10-PCS | Mod: HCNC,S$GLB,, | Performed by: FAMILY MEDICINE

## 2020-02-20 PROCEDURE — 3078F PR MOST RECENT DIASTOLIC BLOOD PRESSURE < 80 MM HG: ICD-10-PCS | Mod: HCNC,CPTII,S$GLB, | Performed by: FAMILY MEDICINE

## 2020-02-20 PROCEDURE — 1101F PR PT FALLS ASSESS DOC 0-1 FALLS W/OUT INJ PAST YR: ICD-10-PCS | Mod: HCNC,CPTII,S$GLB, | Performed by: FAMILY MEDICINE

## 2020-02-20 PROCEDURE — 1159F MED LIST DOCD IN RCRD: CPT | Mod: HCNC,S$GLB,, | Performed by: FAMILY MEDICINE

## 2020-02-20 PROCEDURE — 99214 OFFICE O/P EST MOD 30 MIN: CPT | Mod: HCNC,S$GLB,, | Performed by: FAMILY MEDICINE

## 2020-02-20 PROCEDURE — 99999 PR PBB SHADOW E&M-EST. PATIENT-LVL IV: ICD-10-PCS | Mod: PBBFAC,HCNC,, | Performed by: FAMILY MEDICINE

## 2020-02-20 PROCEDURE — 1101F PT FALLS ASSESS-DOCD LE1/YR: CPT | Mod: HCNC,CPTII,S$GLB, | Performed by: FAMILY MEDICINE

## 2020-02-20 PROCEDURE — 1126F AMNT PAIN NOTED NONE PRSNT: CPT | Mod: HCNC,S$GLB,, | Performed by: FAMILY MEDICINE

## 2020-02-20 PROCEDURE — 99999 PR PBB SHADOW E&M-EST. PATIENT-LVL IV: CPT | Mod: PBBFAC,HCNC,, | Performed by: FAMILY MEDICINE

## 2020-02-20 PROCEDURE — 99499 RISK ADDL DX/OHS AUDIT: ICD-10-PCS | Mod: HCNC,S$GLB,, | Performed by: FAMILY MEDICINE

## 2020-02-20 PROCEDURE — 3075F SYST BP GE 130 - 139MM HG: CPT | Mod: HCNC,CPTII,S$GLB, | Performed by: FAMILY MEDICINE

## 2020-02-20 RX ORDER — FAMOTIDINE 40 MG/1
40 TABLET, FILM COATED ORAL DAILY PRN
Qty: 30 TABLET | Refills: 11 | Status: SHIPPED | OUTPATIENT
Start: 2020-02-20 | End: 2021-07-04 | Stop reason: CLARIF

## 2020-02-20 NOTE — PROGRESS NOTES
"Subjective:       Patient ID: Evelina Pinon is a 74 y.o. female.    Chief Complaint: lab results (patient is here to go over some recent results she got )    She is here today quite concerned about a recent diagnosis of lupus.  She had developed a red patch on her back that lasted for several weeks.  Then she developed another 1 and it was somewhat itchy.  She saw Dermatology, Dr. Andersen.  She brings in her lab results.  Total complement level slightly elevated at 60, creatinine 1.12, normal urinalysis.  SSA 2.7.  SSB negative.  CARMINE screen positive at 1:80.  Nuclear speckled pattern.  She had another CARMINE titer 1:640 with nucleolar pattern.  Her rash responded to betamethasone cream.  It came back but still responds to the cream.  She also had a skin biopsy that shows interface dermatitis.  Those findings are compatible with lupus or lupus-like drug eruption.  I reviewed her medication list.  She is tolerating indapamide losartan and metoprolol for her hypertension.  She is also tolerating pravastatin.  She has heartburn that has been treated with ranitidine but she needs a replacement.    Review of Systems   Constitutional: Negative for fever and unexpected weight change.   Eyes: Negative for visual disturbance.   Respiratory: Negative for choking, chest tightness and shortness of breath.    Cardiovascular: Negative for chest pain, palpitations and leg swelling.   Genitourinary:        No history of kidney disease.   Musculoskeletal: Negative for arthralgias.       Objective:     Blood pressure 136/70, pulse (!) 53, temperature 97.8 °F (36.6 °C), temperature source Oral, height 5' 4" (1.626 m), weight 68.1 kg (150 lb 0.4 oz), SpO2 99 %.      Physical Exam   Constitutional: She appears well-developed and well-nourished. No distress.   Cardiovascular: Normal rate.   Pulmonary/Chest: Effort normal and breath sounds normal.   Skin:   She does have the residual of a rash in her mid thoracic back.  The area is " approximately 6 x 10 cm.  Mild redness.       Assessment:       1. Cutaneous lupus erythematosus    2. Anxiety        Plan:       I do not think we need to do any further workup presently.  She does not seem to have systemic involvement of lupus.  We will consider rheumatology consultation in the future if needed.  Continue current medication.    she does have an MCV of 103.  I suggest vitamin B12 1000 mcg daily.

## 2020-03-04 RX ORDER — ALLOPURINOL 100 MG/1
TABLET ORAL
Qty: 180 TABLET | Refills: 3 | Status: SHIPPED | OUTPATIENT
Start: 2020-03-04 | End: 2021-03-31

## 2020-03-28 RX ORDER — METOPROLOL SUCCINATE 50 MG/1
TABLET, EXTENDED RELEASE ORAL
Qty: 90 TABLET | Refills: 3 | Status: SHIPPED | OUTPATIENT
Start: 2020-03-28 | End: 2021-03-23

## 2020-03-28 RX ORDER — LORAZEPAM 0.5 MG/1
TABLET ORAL
Qty: 180 TABLET | Refills: 0 | Status: ON HOLD | OUTPATIENT
Start: 2020-03-28 | End: 2020-08-21 | Stop reason: SDUPTHER

## 2020-05-26 ENCOUNTER — TELEPHONE (OUTPATIENT)
Dept: FAMILY MEDICINE | Facility: CLINIC | Age: 75
End: 2020-05-26

## 2020-05-26 ENCOUNTER — OFFICE VISIT (OUTPATIENT)
Dept: URGENT CARE | Facility: CLINIC | Age: 75
End: 2020-05-26
Payer: MEDICARE

## 2020-05-26 VITALS
SYSTOLIC BLOOD PRESSURE: 134 MMHG | OXYGEN SATURATION: 97 % | TEMPERATURE: 98 F | DIASTOLIC BLOOD PRESSURE: 71 MMHG | BODY MASS INDEX: 25.61 KG/M2 | HEART RATE: 54 BPM | WEIGHT: 150 LBS | HEIGHT: 64 IN

## 2020-05-26 DIAGNOSIS — H61.22 CERUMEN DEBRIS ON TYMPANIC MEMBRANE, LEFT: Primary | ICD-10-CM

## 2020-05-26 DIAGNOSIS — T78.40XA ALLERGIC STATE, INITIAL ENCOUNTER: ICD-10-CM

## 2020-05-26 DIAGNOSIS — H92.02 LEFT EAR PAIN: ICD-10-CM

## 2020-05-26 DIAGNOSIS — H60.90 OTITIS EXTERNA, UNSPECIFIED CHRONICITY, UNSPECIFIED LATERALITY, UNSPECIFIED TYPE: ICD-10-CM

## 2020-05-26 PROCEDURE — 69209 EAR CERUMEN REMOVAL: ICD-10-PCS | Mod: LT,S$GLB,, | Performed by: NURSE PRACTITIONER

## 2020-05-26 PROCEDURE — 99214 OFFICE O/P EST MOD 30 MIN: CPT | Mod: 25,S$GLB,, | Performed by: NURSE PRACTITIONER

## 2020-05-26 PROCEDURE — 99214 PR OFFICE/OUTPT VISIT, EST, LEVL IV, 30-39 MIN: ICD-10-PCS | Mod: 25,S$GLB,, | Performed by: NURSE PRACTITIONER

## 2020-05-26 PROCEDURE — 69209 REMOVE IMPACTED EAR WAX UNI: CPT | Mod: LT,S$GLB,, | Performed by: NURSE PRACTITIONER

## 2020-05-26 RX ORDER — FLUTICASONE PROPIONATE 50 MCG
SPRAY, SUSPENSION (ML) NASAL
Qty: 48 G | Refills: 0 | Status: SHIPPED | OUTPATIENT
Start: 2020-05-26 | End: 2020-08-20 | Stop reason: CLARIF

## 2020-05-26 RX ORDER — NEOMYCIN SULFATE, POLYMYXIN B SULFATE, HYDROCORTISONE 3.5; 10000; 1 MG/ML; [USP'U]/ML; MG/ML
3 SOLUTION/ DROPS AURICULAR (OTIC) 3 TIMES DAILY
Qty: 10 ML | Refills: 0 | Status: SHIPPED | OUTPATIENT
Start: 2020-05-26 | End: 2020-06-05

## 2020-05-26 RX ORDER — FLUTICASONE PROPIONATE 50 MCG
1 SPRAY, SUSPENSION (ML) NASAL DAILY
Qty: 16 G | Refills: 0 | Status: SHIPPED | OUTPATIENT
Start: 2020-05-26 | End: 2020-05-26

## 2020-05-26 NOTE — PATIENT INSTRUCTIONS
[address] [place] [address] Patient Status:  Hospital Outpatient Surgery   Age/Gender 69year old male MRN [id number]   [facility] SURGERY Attending Price Huff MD   Hosp Day # 0 PCP Fauzia Wong MD       Anesthesia Post-op Not Follow up with your doctor in a few days.  Return to the urgent care or go to the ER if symptoms get worse.      Ear drops to left ear as directed.  Start flonase spray daily and daily claritin , allergra, or zyrtec for the next 7-10 days.  Follow up if ear pain not improving.      Earwax Removal    The ear canal makes earwax from the canals lining. The ears make wax to lubricate and protect the ear canal. The ear canal is the tube that connects the middle ear to the outside of the ear. The wax protects the ear from bacteria, infection, and damage from water or trauma.  The wax that forms in the canal naturally moves toward the outside of the ear and falls out. In some cases, the ear may make too much wax. If the wax causes problems or keeps the healthcare provider from seeing into the ear, the extra wax may be removed.  Too much wax can affect your hearing. It can cause itching. In rare cases, it can be painful. Earwax should not be removed unless it is causing a problem. You should not stick objects into your ear to remove wax unless told to do so by your healthcare provider.  Healthcare providers can remove earwax safely. It is important to stay still during the procedure to avoid damage to the ear canal. But removing earwax generally doesnt hurt. You will not usually need anesthesia or pain medicine when the provider removes the earwax.  A number of conditions lead to earwax buildup. These include some skin problems, a narrow ear canal, or ears that make too much earwax. Using cotton swabs in the canal pushes earwax deeper into the ear and contributes to the buildup of earwax.  Home care  · The healthcare provider may recommend mineral oil or an over-the-counter eardrop to use at home to soften the earwax. Use these products only if the provider recommends them. Use these products only if the provider recommends them. Carefully follow the instructions given.  · Dont use mineral oil or OTC eardrops if you  might have an ear infection or a ruptured eardrum. Tell your healthcare provider right away if you have diabetes or an immune disorder.  · Dont use cotton swabs in your ears. Cotton swabs may push wax deeper into the ear canal or damage the eardrum. Use cotton gauze or a wet washcloth  to gently remove wax on the outside of the ear and around the opening to the ear canal.  · Don't use any probing device or object such as cotton-tipped swabs or gabrielle pins to clean the inside of your ears.  · Dont use ear candles to clean your ears. Candling can be dangerous. It can burn the ear canal. It can also make the condition worse instead of better.  · Dont use cold water to rinse the ear. This will make you dizzy. If your provider tells you to rinse your ear, use only warm water or follow his or her instructions.  · Check the ear for signs of infection or irritation listed below under When to seek medical advice.  Steps for using eardrops  1. Warm the medicine bottle by rubbing it between your hands for a few minutes.  2. Lie down on your side, with the affected ear up.  3. Place the recommended number of drops in the ear. Wet a cotton ball with the medicine. Gently put the cotton ball into the ear opening.  Follow-up care  Follow up with your healthcare provider, or as directed.  When to seek medical advice  Call the provider right away if you have:  · Ear pain that gets worse  · Fever of 100.4F°F (38°C) or higher, or as directed by your healthcare provider  · Worsening wax buildup  · Severe pain, dizziness, or nausea  · Bleeding from the ear  · Hearing problems  · Signs of irritation from the eardrops, such as burning, stinging, or swelling and tenderness  · Foul-smelling fluid draining from the ear  · Swelling, redness, or tenderness of the outer ear  · Headache, neck pain, or stiff neck  Date Last Reviewed: 3/22/2015  © 5446-2599 [employer]. [address] [place] [address], [place] [address]. All rights reserved.  This information is not intended as a substitute for professional medical care. Always follow your healthcare professional's instructions.        Earache, No Infection (Adult)  Earaches can happen without an infection. This occurs when air and fluid build up behind the eardrum causing a feeling of fullness and discomfort and reduced hearing. This is called otitis media with effusion (OME) or serous otitis media. It means there is fluid in the middle ear. It is not the same as acute otitis media, which is typically from infection.  OME can happen when you have a cold if congestion blocks the passage that drains the middle ear. This passage is called the eustachian tube. OME may also occur with nasal allergies or after a bacterial middle ear infection.    The pain or discomfort may come and go. You may hear clicking or popping sounds when you chew or swallow. You may feel that your balance is off. Or you may hear ringing in the ear.  It often takes from several weeks up to 3 months for the fluid to clear on its own. Oral pain relievers and ear drops help if there is pain. Decongestants and antihistamines sometimes help. Antibiotics don't help since there is no infection. Your doctor may prescribe a nasal spray to help reduce swelling in the nose and eustachian tube. This can allow the ear to drain.  If your OME doesn't improve after 3 months, surgery may be used to drain the fluid and insert a small tube in the eardrum to allow continued drainage.  Because the middle ear fluid can become infected, it is important to watch for signs of an ear infection which may develop later. These signs include increased ear pain, fever, or drainage from the ear.  Home care  The following guidelines will help you care for yourself at home:  · You may use over-the-counter medicine as directed to control pain, unless another medicine was prescribed. If you have chronic liver or kidney disease or ever had a stomach ulcer or GI bleeding,  talk with your doctor before using these medicines. Aspirin should never be used in anyone under 18 years of age who is ill with a fever. It may cause severe liver damage.  · You may use over-the-counter decongestants such as phenylephrine or pseudoephedrine. But they are not always helpful. Don't use nasal spray decongestants more than 3 days. Longer use can make congestion worse. Prescription nasal sprays from your doctor don't typically have those restrictions.  · Antihistamines may help if you are also having allergy symptoms.  · You may use medicines such as guaifenesin to thin mucus and promote drainage.  Follow-up care  Follow up with your healthcare provider or as advised if you are not feeling better after 3 days.  When to seek medical advice  Call your healthcare provider right away if any of the following occur:  · Your ear pain gets worse or does not start to improve   · Fever of 100.4°F (38°C) or higher, or as directed by your healthcare provider  · Fluid or blood draining from the ear  · Headache or sinus pain  · Stiff neck  · Unusual drowsiness or confusion  Date Last Reviewed: 10/1/2016  © 5733-3343 [employer]. [address] [place] [address], [place] [address]. All rights reserved. This information is not intended as a substitute for professional medical care. Always follow your healthcare professional's instructions.

## 2020-05-26 NOTE — PROGRESS NOTES
"Subjective:       Patient ID: Evelina Pinon is a 74 y.o. female.    Vitals:  height is 5' 4" (1.626 m) and weight is 68 kg (150 lb). Her temperature is 97.5 °F (36.4 °C). Her blood pressure is 134/71 and her pulse is 54 (abnormal). Her oxygen saturation is 97%.     Chief Complaint: Otalgia    Patient presents to clinic today with left ear pain when swallowing and taking a deep breath for approximately 3 days. Patient states she did have a sinus drip on the same side approximately 2 weeks ago.   smh htn, allergic rhinitis, sle. Denies dizziness/nausea. reporrts hx of dizziness/vertigo like in the past.     Otalgia    There is pain in the left ear. This is a new problem. The current episode started in the past 7 days. The problem occurs constantly. The problem has been gradually worsening. There has been no fever. Pertinent negatives include no abdominal pain, coughing, diarrhea, ear discharge, headaches, hearing loss, neck pain, rash, rhinorrhea, sore throat or vomiting. There is no history of a chronic ear infection, hearing loss or a tympanostomy tube.       Constitution: Negative for chills, fatigue and fever.   HENT: Positive for ear pain. Negative for ear discharge, hearing loss, congestion and sore throat.    Neck: Negative for neck pain and painful lymph nodes.   Cardiovascular: Negative for chest pain and leg swelling.   Eyes: Negative for double vision and blurred vision.   Respiratory: Negative for cough and shortness of breath.    Gastrointestinal: Negative for abdominal pain, nausea, vomiting and diarrhea.   Genitourinary: Negative for dysuria, frequency, urgency and history of kidney stones.   Musculoskeletal: Negative for joint pain, joint swelling, muscle cramps and muscle ache.   Skin: Negative for color change, pale, rash and bruising.   Allergic/Immunologic: Negative for seasonal allergies.   Neurological: Negative for dizziness, history of vertigo, light-headedness, passing out and headaches. "   Hematologic/Lymphatic: Negative for swollen lymph nodes.   Psychiatric/Behavioral: Negative for nervous/anxious, sleep disturbance and depression. The patient is not nervous/anxious.          Objective:      Physical Exam   Constitutional: She is oriented to person, place, and time. She appears well-developed and well-nourished. She is cooperative.  Non-toxic appearance. She does not have a sickly appearance. She does not appear ill. No distress.   HENT:   Head: Normocephalic and atraumatic.   Right Ear: Hearing, tympanic membrane, external ear and ear canal normal. No cerumen not present. Tympanic membrane is not erythematous and not bulging. No middle ear effusion.   Left Ear: Hearing, tympanic membrane, external ear and ear canal normal. There is cerumen present.   Nose: Nose normal. No mucosal edema, rhinorrhea or nasal deformity. No epistaxis. Right sinus exhibits no maxillary sinus tenderness and no frontal sinus tenderness. Left sinus exhibits no maxillary sinus tenderness and no frontal sinus tenderness.   Mouth/Throat: Uvula is midline, oropharynx is clear and moist and mucous membranes are normal. No trismus in the jaw. Normal dentition. No uvula swelling. No oropharyngeal exudate, posterior oropharyngeal edema or posterior oropharyngeal erythema.   Left cerumen impaction- post cerumen removal- TM intact, canal with erythema and mild inflammation noted.   Eyes: Conjunctivae and lids are normal. No scleral icterus.   Neck: Trachea normal, full passive range of motion without pain and phonation normal. Neck supple. No neck rigidity. No edema and no erythema present.   Cardiovascular: Normal rate, regular rhythm, normal heart sounds, intact distal pulses and normal pulses.   Pulmonary/Chest: Effort normal and breath sounds normal. No respiratory distress. She has no decreased breath sounds. She has no rhonchi.   Abdominal: Normal appearance.   Musculoskeletal: Normal range of motion. She exhibits no edema  or deformity.   Neurological: She is alert and oriented to person, place, and time. She exhibits normal muscle tone. Coordination normal.   Skin: Skin is warm, dry, intact, not diaphoretic and not pale.   Psychiatric: She has a normal mood and affect. Her speech is normal and behavior is normal. Judgment and thought content normal. Cognition and memory are normal.   Nursing note and vitals reviewed.      Ear Cerumen Removal  Date/Time: 5/26/2020 12:05 PM  Performed by: Fide Machado NP  Authorized by: Fide Machado NP     Medication Used:  Other  Location details:  Left ear  Procedure type: irrigation    Cerumen  Removal Results:  Cerumen completely removed      Assessment:       1. Cerumen debris on tympanic membrane, left    2. Left ear pain    3. Otitis externa, unspecified chronicity, unspecified laterality, unspecified type    4. Allergic state, initial encounter        Plan:     treat with antihistamine, flonase for possible allergies  Ear drops for OE      Cerumen debris on tympanic membrane, left  -     Ear wax removal    Left ear pain    Otitis externa, unspecified chronicity, unspecified laterality, unspecified type  -     neomycin-polymyxin-hydrocortisone (CORTISPORIN) otic solution; Place 3 drops into the left ear 3 (three) times daily. for 10 days  Dispense: 10 mL; Refill: 0    Allergic state, initial encounter  -     Discontinue: fluticasone propionate (FLONASE) 50 mcg/actuation nasal spray; 1 spray (50 mcg total) by Each Nostril route once daily.  Dispense: 16 g; Refill: 0    Other orders  -     Ear Cerumen Removal      Patient Instructions   Follow up with your doctor in a few days.  Return to the urgent care or go to the ER if symptoms get worse.      Ear drops to left ear as directed.  Start flonase spray daily and daily claritin , allergra, or zyrtec for the next 7-10 days.  Follow up if ear pain not improving.      Earwax Removal    The ear canal makes earwax from the canals  lining. The ears make wax to lubricate and protect the ear canal. The ear canal is the tube that connects the middle ear to the outside of the ear. The wax protects the ear from bacteria, infection, and damage from water or trauma.  The wax that forms in the canal naturally moves toward the outside of the ear and falls out. In some cases, the ear may make too much wax. If the wax causes problems or keeps the healthcare provider from seeing into the ear, the extra wax may be removed.  Too much wax can affect your hearing. It can cause itching. In rare cases, it can be painful. Earwax should not be removed unless it is causing a problem. You should not stick objects into your ear to remove wax unless told to do so by your healthcare provider.  Healthcare providers can remove earwax safely. It is important to stay still during the procedure to avoid damage to the ear canal. But removing earwax generally doesnt hurt. You will not usually need anesthesia or pain medicine when the provider removes the earwax.  A number of conditions lead to earwax buildup. These include some skin problems, a narrow ear canal, or ears that make too much earwax. Using cotton swabs in the canal pushes earwax deeper into the ear and contributes to the buildup of earwax.  Home care  · The healthcare provider may recommend mineral oil or an over-the-counter eardrop to use at home to soften the earwax. Use these products only if the provider recommends them. Use these products only if the provider recommends them. Carefully follow the instructions given.  · Dont use mineral oil or OTC eardrops if you might have an ear infection or a ruptured eardrum. Tell your healthcare provider right away if you have diabetes or an immune disorder.  · Dont use cotton swabs in your ears. Cotton swabs may push wax deeper into the ear canal or damage the eardrum. Use cotton gauze or a wet washcloth  to gently remove wax on the outside of the ear and around the  opening to the ear canal.  · Don't use any probing device or object such as cotton-tipped swabs or gabrielle pins to clean the inside of your ears.  · Dont use ear candles to clean your ears. Candling can be dangerous. It can burn the ear canal. It can also make the condition worse instead of better.  · Dont use cold water to rinse the ear. This will make you dizzy. If your provider tells you to rinse your ear, use only warm water or follow his or her instructions.  · Check the ear for signs of infection or irritation listed below under When to seek medical advice.  Steps for using eardrops  1. Warm the medicine bottle by rubbing it between your hands for a few minutes.  2. Lie down on your side, with the affected ear up.  3. Place the recommended number of drops in the ear. Wet a cotton ball with the medicine. Gently put the cotton ball into the ear opening.  Follow-up care  Follow up with your healthcare provider, or as directed.  When to seek medical advice  Call the provider right away if you have:  · Ear pain that gets worse  · Fever of 100.4F°F (38°C) or higher, or as directed by your healthcare provider  · Worsening wax buildup  · Severe pain, dizziness, or nausea  · Bleeding from the ear  · Hearing problems  · Signs of irritation from the eardrops, such as burning, stinging, or swelling and tenderness  · Foul-smelling fluid draining from the ear  · Swelling, redness, or tenderness of the outer ear  · Headache, neck pain, or stiff neck  Date Last Reviewed: 3/22/2015  © 6364-5479 [employer] [place]. [address] [place] [address]. All rights reserved. This information is not intended as a substitute for professional medical care. Always follow your healthcare professional's instructions.        Earache, No Infection (Adult)  Earaches can happen without an infection. This occurs when air and fluid build up behind the eardrum causing a feeling of fullness and discomfort and reduced hearing. This  is called otitis media with effusion (OME) or serous otitis media. It means there is fluid in the middle ear. It is not the same as acute otitis media, which is typically from infection.  OME can happen when you have a cold if congestion blocks the passage that drains the middle ear. This passage is called the eustachian tube. OME may also occur with nasal allergies or after a bacterial middle ear infection.    The pain or discomfort may come and go. You may hear clicking or popping sounds when you chew or swallow. You may feel that your balance is off. Or you may hear ringing in the ear.  It often takes from several weeks up to 3 months for the fluid to clear on its own. Oral pain relievers and ear drops help if there is pain. Decongestants and antihistamines sometimes help. Antibiotics don't help since there is no infection. Your doctor may prescribe a nasal spray to help reduce swelling in the nose and eustachian tube. This can allow the ear to drain.  If your OME doesn't improve after 3 months, surgery may be used to drain the fluid and insert a small tube in the eardrum to allow continued drainage.  Because the middle ear fluid can become infected, it is important to watch for signs of an ear infection which may develop later. These signs include increased ear pain, fever, or drainage from the ear.  Home care  The following guidelines will help you care for yourself at home:  · You may use over-the-counter medicine as directed to control pain, unless another medicine was prescribed. If you have chronic liver or kidney disease or ever had a stomach ulcer or GI bleeding, talk with your doctor before using these medicines. Aspirin should never be used in anyone under 18 years of age who is ill with a fever. It may cause severe liver damage.  · You may use over-the-counter decongestants such as phenylephrine or pseudoephedrine. But they are not always helpful. Don't use nasal spray decongestants more than 3 days.  Longer use can make congestion worse. Prescription nasal sprays from your doctor don't typically have those restrictions.  · Antihistamines may help if you are also having allergy symptoms.  · You may use medicines such as guaifenesin to thin mucus and promote drainage.  Follow-up care  Follow up with your healthcare provider or as advised if you are not feeling better after 3 days.  When to seek medical advice  Call your healthcare provider right away if any of the following occur:  · Your ear pain gets worse or does not start to improve   · Fever of 100.4°F (38°C) or higher, or as directed by your healthcare provider  · Fluid or blood draining from the ear  · Headache or sinus pain  · Stiff neck  · Unusual drowsiness or confusion  Date Last Reviewed: 10/1/2016  © 5924-8770 [employer] [address] [place] [address], [place] [address]. All rights reserved. This information is not intended as a substitute for professional medical care. Always follow your healthcare professional's instructions.

## 2020-05-26 NOTE — TELEPHONE ENCOUNTER
----- Message from Chanel Jefferson sent at 5/26/2020  8:16 AM CDT -----  Contact: patient  Name of Caller patient  Reason for Visit/Symptoms left ear pain  Best Contact Number or Confirm if Anjali Preferred [phone]  Preferred Date/Time of Appointment Today  Interested in Virtual Visit (yes/no) No  Additional Information requesting a call back to confirm appt

## 2020-05-27 ENCOUNTER — OFFICE VISIT (OUTPATIENT)
Dept: FAMILY MEDICINE | Facility: CLINIC | Age: 75
End: 2020-05-27
Payer: MEDICARE

## 2020-05-27 VITALS
OXYGEN SATURATION: 98 % | TEMPERATURE: 99 F | WEIGHT: 154.31 LBS | SYSTOLIC BLOOD PRESSURE: 108 MMHG | BODY MASS INDEX: 26.34 KG/M2 | HEART RATE: 55 BPM | HEIGHT: 64 IN | DIASTOLIC BLOOD PRESSURE: 60 MMHG

## 2020-05-27 DIAGNOSIS — H69.90 DISORDER OF EUSTACHIAN TUBE, UNSPECIFIED LATERALITY: Primary | ICD-10-CM

## 2020-05-27 PROCEDURE — 1101F PR PT FALLS ASSESS DOC 0-1 FALLS W/OUT INJ PAST YR: ICD-10-PCS | Mod: HCNC,CPTII,S$GLB, | Performed by: FAMILY MEDICINE

## 2020-05-27 PROCEDURE — 1101F PT FALLS ASSESS-DOCD LE1/YR: CPT | Mod: HCNC,CPTII,S$GLB, | Performed by: FAMILY MEDICINE

## 2020-05-27 PROCEDURE — 1125F PR PAIN SEVERITY QUANTIFIED, PAIN PRESENT: ICD-10-PCS | Mod: HCNC,S$GLB,, | Performed by: FAMILY MEDICINE

## 2020-05-27 PROCEDURE — 3078F PR MOST RECENT DIASTOLIC BLOOD PRESSURE < 80 MM HG: ICD-10-PCS | Mod: HCNC,CPTII,S$GLB, | Performed by: FAMILY MEDICINE

## 2020-05-27 PROCEDURE — 3074F SYST BP LT 130 MM HG: CPT | Mod: HCNC,CPTII,S$GLB, | Performed by: FAMILY MEDICINE

## 2020-05-27 PROCEDURE — 99999 PR PBB SHADOW E&M-EST. PATIENT-LVL III: CPT | Mod: PBBFAC,HCNC,, | Performed by: FAMILY MEDICINE

## 2020-05-27 PROCEDURE — 99213 PR OFFICE/OUTPT VISIT, EST, LEVL III, 20-29 MIN: ICD-10-PCS | Mod: HCNC,S$GLB,, | Performed by: FAMILY MEDICINE

## 2020-05-27 PROCEDURE — 1159F PR MEDICATION LIST DOCUMENTED IN MEDICAL RECORD: ICD-10-PCS | Mod: HCNC,S$GLB,, | Performed by: FAMILY MEDICINE

## 2020-05-27 PROCEDURE — 99999 PR PBB SHADOW E&M-EST. PATIENT-LVL III: ICD-10-PCS | Mod: PBBFAC,HCNC,, | Performed by: FAMILY MEDICINE

## 2020-05-27 PROCEDURE — 1159F MED LIST DOCD IN RCRD: CPT | Mod: HCNC,S$GLB,, | Performed by: FAMILY MEDICINE

## 2020-05-27 PROCEDURE — 99213 OFFICE O/P EST LOW 20 MIN: CPT | Mod: HCNC,S$GLB,, | Performed by: FAMILY MEDICINE

## 2020-05-27 PROCEDURE — 3074F PR MOST RECENT SYSTOLIC BLOOD PRESSURE < 130 MM HG: ICD-10-PCS | Mod: HCNC,CPTII,S$GLB, | Performed by: FAMILY MEDICINE

## 2020-05-27 PROCEDURE — 1125F AMNT PAIN NOTED PAIN PRSNT: CPT | Mod: HCNC,S$GLB,, | Performed by: FAMILY MEDICINE

## 2020-05-27 PROCEDURE — 3078F DIAST BP <80 MM HG: CPT | Mod: HCNC,CPTII,S$GLB, | Performed by: FAMILY MEDICINE

## 2020-05-27 RX ORDER — BETAMETHASONE DIPROPIONATE 0.5 MG/G
CREAM TOPICAL
COMMUNITY
Start: 2020-04-14 | End: 2020-07-13 | Stop reason: SDUPTHER

## 2020-05-27 RX ORDER — METHYLPREDNISOLONE 4 MG/1
TABLET ORAL
Qty: 1 PACKAGE | Refills: 0 | Status: SHIPPED | OUTPATIENT
Start: 2020-05-27 | End: 2020-08-13 | Stop reason: ALTCHOICE

## 2020-05-27 NOTE — PROGRESS NOTES
THIS DOCUMENT WAS MADE IN PART WITH VOICE RECOGNITION SOFTWARE.  OCCASIONALLY THIS SOFTWARE WILL MISINTERPRET WORDS OR PHRASES.    Assessment and Plan:    1. Disorder of Eustachian tube, unspecified laterality  Recommended Flonase and antihistamines  - methylPREDNISolone (MEDROL DOSEPACK) 4 mg tablet; use as directed  Dispense: 1 Package; Refill: 0        ______________________________________________________________________  Subjective:    Chief Complaint:  Chief Complaint   Patient presents with    Otalgia     x 4 days, no dizziness or drainage         HPI:  Evelina is a 74 y.o. year old     74-year-old female complains of left ear discomfort  Recently seen and urgent care, prescribed topical antibiotic, steroid  Reports no improvement in symptoms  Denies any ear discharge, change in hearing, tinnitus  Associated with mild nasal symptoms  Denies any fever    Past Medical History:  Past Medical History:   Diagnosis Date    ALLERGIC RHINITIS 2/22/2012    Anxiety 2/22/2012    Fx patella     Left    Gout 2/22/2012    HTN (hypertension) 2/22/2012    TIA (transient ischemic attack) 2/22/2012       Past Surgical History:  Past Surgical History:   Procedure Laterality Date    HYSTERECTOMY      PATELLA FRACTURE SURGERY Left 2013       Family History:  Family History   Problem Relation Age of Onset    Anxiety disorder Mother        Social History:  Social History     Socioeconomic History    Marital status:      Spouse name: Not on file    Number of children: Not on file    Years of education: Not on file    Highest education level: Not on file   Occupational History    Not on file   Social Needs    Financial resource strain: Not on file    Food insecurity:     Worry: Not on file     Inability: Not on file    Transportation needs:     Medical: Not on file     Non-medical: Not on file   Tobacco Use    Smoking status: Former Smoker    Smokeless tobacco: Never Used   Substance and Sexual Activity     Alcohol use: No    Drug use: No    Sexual activity: Not on file   Lifestyle    Physical activity:     Days per week: Not on file     Minutes per session: Not on file    Stress: Not on file   Relationships    Social connections:     Talks on phone: Not on file     Gets together: Not on file     Attends Jainism service: Not on file     Active member of club or organization: Not on file     Attends meetings of clubs or organizations: Not on file     Relationship status: Not on file   Other Topics Concern    Not on file   Social History Narrative    Not on file       Medications:  Current Outpatient Medications on File Prior to Visit   Medication Sig Dispense Refill    allopurinoL (ZYLOPRIM) 100 MG tablet TAKE TWO TABLETS BY MOUTH ONCE DAILY 180 tablet 3    betamethasone dipropionate (DIPROLENE) 0.05 % cream       clopidogrel (PLAVIX) 75 mg tablet TAKE 1 TABLET BY MOUTH ONCE DAILY 90 tablet 3    famotidine (PEPCID) 40 MG tablet Take 1 tablet (40 mg total) by mouth daily as needed for Heartburn. 30 tablet 11    fluticasone propionate (FLONASE) 50 mcg/actuation nasal spray SHAKE LIQUID AND USE 1 SPRAY(50 MCG) IN EACH NOSTRIL EVERY DAY 48 g 0    indapamide (LOZOL) 1.25 MG Tab TAKE 1 TABLET BY MOUTH EVERY DAY 90 tablet 3    LORazepam (ATIVAN) 0.5 MG tablet TAKE 1 TABLET BY MOUTH TWICE DAILY 180 tablet 0    losartan (COZAAR) 100 MG tablet TAKE 1 TABLET BY MOUTH EVERY DAY 90 tablet 3    metoprolol succinate (TOPROL-XL) 50 MG 24 hr tablet TAKE 1 TABLET BY MOUTH EVERY DAY 90 tablet 3    neomycin-polymyxin-hydrocortisone (CORTISPORIN) otic solution Place 3 drops into the left ear 3 (three) times daily. for 10 days 10 mL 0    pravastatin (PRAVACHOL) 20 MG tablet Take 1 tablet (20 mg total) by mouth once daily. 90 tablet 3    ranitidine (ZANTAC) 150 MG tablet Take 1 tablet (150 mg total) by mouth 2 (two) times daily. 180 tablet 3     No current facility-administered medications on file prior to visit.   "      Allergies:  Augmentin [amoxicillin-pot clavulanate] and Keflex [cephalexin]    Immunizations:  Immunization History   Administered Date(s) Administered    Pneumococcal Conjugate - 13 Valent 01/16/2018       Review of Systems:  Review of Systems   HENT: Positive for ear pain.    All other systems reviewed and are negative.      Objective:    Vitals:  Vitals:    05/27/20 1518   BP: 108/60   Pulse: (!) 55   Temp: 98.8 °F (37.1 °C)   TempSrc: Oral   SpO2: 98%   Weight: 70 kg (154 lb 5.2 oz)   Height: 5' 4" (1.626 m)   PainSc:   5   PainLoc: Ear       Physical Exam   Constitutional: She appears well-developed. No distress.   HENT:   Head: Normocephalic and atraumatic.   Left Ear: External ear normal. A middle ear effusion is present.   Eyes: EOM are normal.   Neck: Normal range of motion.   Pulmonary/Chest: Effort normal. No respiratory distress.   Psychiatric: She has a normal mood and affect. Her behavior is normal. Judgment and thought content normal.   Vitals reviewed.      Data:  Previous Urgent care visit reviewed and pertinent for Treatment of ear pain.        Garcia Sarmiento MD  Family Medicine    "

## 2020-07-13 ENCOUNTER — PES CALL (OUTPATIENT)
Dept: ADMINISTRATIVE | Facility: CLINIC | Age: 75
End: 2020-07-13

## 2020-07-13 RX ORDER — BETAMETHASONE DIPROPIONATE 0.5 MG/G
CREAM TOPICAL 2 TIMES DAILY
Qty: 45 G | Refills: 1 | Status: SHIPPED | OUTPATIENT
Start: 2020-07-13 | End: 2020-08-13 | Stop reason: SDUPTHER

## 2020-07-13 NOTE — TELEPHONE ENCOUNTER
----- Message from Leandro Mahajan sent at 7/13/2020  7:14 AM CDT -----  Regarding: refill  Contact: patient  Patient called in and stated her skin lupus is acting up & wanted to see if Dr. Gibson would refill her Rx for betamethasone dipropionate (DIPROLENE) 0.05 % cream (listed as historical med)?  Patient did make an appointment for a skin lupus check up for 7/27/2020.      [id number] [place] [address] & [address] [address]  Phone: [phone] Fax: [phone]    Patient call back number is [phone]

## 2020-08-13 ENCOUNTER — OFFICE VISIT (OUTPATIENT)
Dept: FAMILY MEDICINE | Facility: CLINIC | Age: 75
End: 2020-08-13
Payer: MEDICARE

## 2020-08-13 VITALS
WEIGHT: 155.63 LBS | SYSTOLIC BLOOD PRESSURE: 136 MMHG | DIASTOLIC BLOOD PRESSURE: 74 MMHG | TEMPERATURE: 99 F | HEIGHT: 64 IN | BODY MASS INDEX: 26.57 KG/M2

## 2020-08-13 DIAGNOSIS — L93.2 CUTANEOUS LUPUS ERYTHEMATOSUS: Primary | ICD-10-CM

## 2020-08-13 DIAGNOSIS — R53.83 FATIGUE, UNSPECIFIED TYPE: ICD-10-CM

## 2020-08-13 DIAGNOSIS — E78.5 HYPERLIPIDEMIA, UNSPECIFIED HYPERLIPIDEMIA TYPE: ICD-10-CM

## 2020-08-13 PROCEDURE — 99499 UNLISTED E&M SERVICE: CPT | Mod: S$GLB,,, | Performed by: FAMILY MEDICINE

## 2020-08-13 PROCEDURE — 3078F DIAST BP <80 MM HG: CPT | Mod: HCNC,CPTII,S$GLB, | Performed by: FAMILY MEDICINE

## 2020-08-13 PROCEDURE — 3078F PR MOST RECENT DIASTOLIC BLOOD PRESSURE < 80 MM HG: ICD-10-PCS | Mod: HCNC,CPTII,S$GLB, | Performed by: FAMILY MEDICINE

## 2020-08-13 PROCEDURE — 1101F PT FALLS ASSESS-DOCD LE1/YR: CPT | Mod: HCNC,CPTII,S$GLB, | Performed by: FAMILY MEDICINE

## 2020-08-13 PROCEDURE — 3075F SYST BP GE 130 - 139MM HG: CPT | Mod: HCNC,CPTII,S$GLB, | Performed by: FAMILY MEDICINE

## 2020-08-13 PROCEDURE — 99499 RISK ADDL DX/OHS AUDIT: ICD-10-PCS | Mod: S$GLB,,, | Performed by: FAMILY MEDICINE

## 2020-08-13 PROCEDURE — 99999 PR PBB SHADOW E&M-EST. PATIENT-LVL IV: CPT | Mod: PBBFAC,HCNC,, | Performed by: FAMILY MEDICINE

## 2020-08-13 PROCEDURE — 1126F PR PAIN SEVERITY QUANTIFIED, NO PAIN PRESENT: ICD-10-PCS | Mod: HCNC,S$GLB,, | Performed by: FAMILY MEDICINE

## 2020-08-13 PROCEDURE — 1159F PR MEDICATION LIST DOCUMENTED IN MEDICAL RECORD: ICD-10-PCS | Mod: HCNC,S$GLB,, | Performed by: FAMILY MEDICINE

## 2020-08-13 PROCEDURE — 99999 PR PBB SHADOW E&M-EST. PATIENT-LVL IV: ICD-10-PCS | Mod: PBBFAC,HCNC,, | Performed by: FAMILY MEDICINE

## 2020-08-13 PROCEDURE — 99214 OFFICE O/P EST MOD 30 MIN: CPT | Mod: HCNC,S$GLB,, | Performed by: FAMILY MEDICINE

## 2020-08-13 PROCEDURE — 3075F PR MOST RECENT SYSTOLIC BLOOD PRESS GE 130-139MM HG: ICD-10-PCS | Mod: HCNC,CPTII,S$GLB, | Performed by: FAMILY MEDICINE

## 2020-08-13 PROCEDURE — 1101F PR PT FALLS ASSESS DOC 0-1 FALLS W/OUT INJ PAST YR: ICD-10-PCS | Mod: HCNC,CPTII,S$GLB, | Performed by: FAMILY MEDICINE

## 2020-08-13 PROCEDURE — 1159F MED LIST DOCD IN RCRD: CPT | Mod: HCNC,S$GLB,, | Performed by: FAMILY MEDICINE

## 2020-08-13 PROCEDURE — 99214 PR OFFICE/OUTPT VISIT, EST, LEVL IV, 30-39 MIN: ICD-10-PCS | Mod: HCNC,S$GLB,, | Performed by: FAMILY MEDICINE

## 2020-08-13 PROCEDURE — 1126F AMNT PAIN NOTED NONE PRSNT: CPT | Mod: HCNC,S$GLB,, | Performed by: FAMILY MEDICINE

## 2020-08-13 RX ORDER — PRAVASTATIN SODIUM 40 MG/1
40 TABLET ORAL DAILY
Qty: 90 TABLET | Refills: 3
Start: 2020-08-13 | End: 2021-05-18 | Stop reason: SINTOL

## 2020-08-13 RX ORDER — BETAMETHASONE DIPROPIONATE 0.5 MG/G
CREAM TOPICAL 2 TIMES DAILY
Qty: 90 G | Refills: 3 | Status: SHIPPED | OUTPATIENT
Start: 2020-08-13 | End: 2021-01-14

## 2020-08-13 NOTE — PROGRESS NOTES
"Subjective:       Patient ID: Evelina Pinon is a 75 y.o. female.    Chief Complaint: Follow-up (Pt was rx'd betamethasone cream for "lupus skin condition" would like refill)    76yo woman with PMH of cutaneous lupus erythematosus, hereditary alopecia, HTN, TIA, anxiety, gout, macrocytosis, allergic rhinitis, and GERD presents today for f/u of cutaneous lupus erythematosus and refill of betamethasone 0.05% cream. She has a scaly, erythematous, macular rash with ill-defined borders on sun-exposed regions of her body, including chest, ears, posterior neck, upper back, shoulders, and upper arms. She reports several lesions on her bilateral buttocks as well as a lesion on her right leg. The lesions occasionally itch/burn but overall do not bother her. The rash is well-controlled with betamethasone 0.05% cream, which she applies to affected areas 1-2x daily. She reports occasional joint pain in knees, which is alleviated with OTC Tylenol as needed. She also reports fatigue, which is possibly attributed to her low vitamin B12 levels as she reports improvement in fatigue after receiving vitamin B12 injection. She denies HA, dizziness, changes in vision, photophobia, SOB, chest pain, leg edema, flank pain, hematuria, changes in bladder pattern, changes in bowel habits, and new or worsening skin lesions. She recently saw Dr. Cari Deal, who ordered lab work, which showed elevated cholesterol levels. Given elevated cholesterol levels and patient's calculated 10-year ASCVD risk is 25.5%, we will increase pravastatin from 20mg to 40mg qd. She is tolerating all of her medications well without any side effects.    Review of Systems   All other systems reviewed and are negative.        Objective:      Physical Exam  Constitutional:       General: She is not in acute distress.     Appearance: Normal appearance. She is normal weight. She is not ill-appearing.   HENT:      Head: Normocephalic and atraumatic.      Ears:      " Comments: Erythematous, scaly, macular lesions present on bilateral external ears, sparing ear lobes     Nose: Nose normal.   Cardiovascular:      Rate and Rhythm: Normal rate and regular rhythm.      Pulses: Normal pulses.      Heart sounds: Normal heart sounds.   Pulmonary:      Effort: Pulmonary effort is normal.      Breath sounds: Normal breath sounds.   Musculoskeletal: Normal range of motion.   Skin:     General: Skin is warm and dry.      Findings: Rash present. Rash is macular and scaling.   Neurological:      Mental Status: She is alert.         Assessment:       1. Cutaneous lupus erythematosus    2. Hyperlipidemia, unspecified hyperlipidemia type    3. Fatigue, unspecified type        Plan:       #cutaneous lupus erythematous  -patient has no evidence of systemic involvement at this time; continue applying betamethasone 0.05% cream 1x daily to affected areas  -if rash worsens, will consider referring to dermatology    #HLD  -considering patient's 10-year ASCVD risk of 25.5%, we will increase pravastatin from 20mg qd to 40mg qd    #fatigue  -patient's 12/6/19 labs showed macrocytosis of 103; recommend that patient takes 1000mcg oral pills of vitamin B12 daily

## 2020-08-20 PROBLEM — R07.9 CHEST PAIN: Status: ACTIVE | Noted: 2020-08-20

## 2020-08-22 PROBLEM — I25.9 CHEST PAIN DUE TO MYOCARDIAL ISCHEMIA: Status: ACTIVE | Noted: 2020-08-20

## 2020-08-22 PROBLEM — R94.39 POSITIVE CARDIAC STRESS TEST: Status: ACTIVE | Noted: 2020-08-22

## 2020-08-24 ENCOUNTER — OFFICE VISIT (OUTPATIENT)
Dept: CARDIOLOGY | Facility: CLINIC | Age: 75
End: 2020-08-24
Payer: MEDICARE

## 2020-08-24 VITALS
WEIGHT: 152.13 LBS | DIASTOLIC BLOOD PRESSURE: 60 MMHG | HEIGHT: 60 IN | BODY MASS INDEX: 29.86 KG/M2 | HEART RATE: 50 BPM | SYSTOLIC BLOOD PRESSURE: 131 MMHG

## 2020-08-24 DIAGNOSIS — E78.5 HYPERLIPIDEMIA, UNSPECIFIED HYPERLIPIDEMIA TYPE: ICD-10-CM

## 2020-08-24 DIAGNOSIS — I10 ESSENTIAL HYPERTENSION: ICD-10-CM

## 2020-08-24 DIAGNOSIS — R94.39 POSITIVE CARDIAC STRESS TEST: Primary | ICD-10-CM

## 2020-08-24 DIAGNOSIS — R07.2 PRECORDIAL PAIN: ICD-10-CM

## 2020-08-24 PROCEDURE — 1101F PT FALLS ASSESS-DOCD LE1/YR: CPT | Mod: HCNC,CPTII,S$GLB, | Performed by: INTERNAL MEDICINE

## 2020-08-24 PROCEDURE — 1159F PR MEDICATION LIST DOCUMENTED IN MEDICAL RECORD: ICD-10-PCS | Mod: HCNC,S$GLB,, | Performed by: INTERNAL MEDICINE

## 2020-08-24 PROCEDURE — 1126F PR PAIN SEVERITY QUANTIFIED, NO PAIN PRESENT: ICD-10-PCS | Mod: HCNC,S$GLB,, | Performed by: INTERNAL MEDICINE

## 2020-08-24 PROCEDURE — 1159F MED LIST DOCD IN RCRD: CPT | Mod: HCNC,S$GLB,, | Performed by: INTERNAL MEDICINE

## 2020-08-24 PROCEDURE — 99999 PR PBB SHADOW E&M-EST. PATIENT-LVL III: ICD-10-PCS | Mod: PBBFAC,HCNC,, | Performed by: INTERNAL MEDICINE

## 2020-08-24 PROCEDURE — 3075F SYST BP GE 130 - 139MM HG: CPT | Mod: HCNC,CPTII,S$GLB, | Performed by: INTERNAL MEDICINE

## 2020-08-24 PROCEDURE — 3075F PR MOST RECENT SYSTOLIC BLOOD PRESS GE 130-139MM HG: ICD-10-PCS | Mod: HCNC,CPTII,S$GLB, | Performed by: INTERNAL MEDICINE

## 2020-08-24 PROCEDURE — 1101F PR PT FALLS ASSESS DOC 0-1 FALLS W/OUT INJ PAST YR: ICD-10-PCS | Mod: HCNC,CPTII,S$GLB, | Performed by: INTERNAL MEDICINE

## 2020-08-24 PROCEDURE — 1126F AMNT PAIN NOTED NONE PRSNT: CPT | Mod: HCNC,S$GLB,, | Performed by: INTERNAL MEDICINE

## 2020-08-24 PROCEDURE — 3078F PR MOST RECENT DIASTOLIC BLOOD PRESSURE < 80 MM HG: ICD-10-PCS | Mod: HCNC,CPTII,S$GLB, | Performed by: INTERNAL MEDICINE

## 2020-08-24 PROCEDURE — 3078F DIAST BP <80 MM HG: CPT | Mod: HCNC,CPTII,S$GLB, | Performed by: INTERNAL MEDICINE

## 2020-08-24 PROCEDURE — 99999 PR PBB SHADOW E&M-EST. PATIENT-LVL III: CPT | Mod: PBBFAC,HCNC,, | Performed by: INTERNAL MEDICINE

## 2020-08-24 PROCEDURE — 99215 OFFICE O/P EST HI 40 MIN: CPT | Mod: HCNC,S$GLB,, | Performed by: INTERNAL MEDICINE

## 2020-08-24 PROCEDURE — 99215 PR OFFICE/OUTPT VISIT, EST, LEVL V, 40-54 MIN: ICD-10-PCS | Mod: HCNC,S$GLB,, | Performed by: INTERNAL MEDICINE

## 2020-08-24 RX ORDER — CLOPIDOGREL 300 MG/1
300 TABLET, FILM COATED ORAL ONCE
Status: CANCELLED | OUTPATIENT
Start: 2020-08-24 | End: 2020-08-24

## 2020-08-24 RX ORDER — CLOPIDOGREL BISULFATE 75 MG/1
75 TABLET ORAL ONCE
Status: CANCELLED | OUTPATIENT
Start: 2020-08-24 | End: 2020-08-24

## 2020-08-24 RX ORDER — SODIUM CHLORIDE 450 MG/100ML
INJECTION, SOLUTION INTRAVENOUS CONTINUOUS
Status: CANCELLED | OUTPATIENT
Start: 2020-08-24

## 2020-08-24 NOTE — PROGRESS NOTES
Subjective:    Patient ID:  Evelina Pinon is a 75 y.o. female who presents for evaluation of chest pain    HPI  She was admitted to [facility] last weekend with chest pain. Stress test (+) for ischemia LAD territory    Review of Systems   Constitution: Negative for decreased appetite, malaise/fatigue, weight gain and weight loss.   Cardiovascular: Negative for chest pain, dyspnea on exertion, leg swelling, palpitations and syncope.   Respiratory: Negative for cough and shortness of breath.    Gastrointestinal: Negative.    Neurological: Negative for weakness.   All other systems reviewed and are negative.       Objective:      Physical Exam   Constitutional: She is oriented to person, place, and time. She appears well-developed and well-nourished.   HENT:   Head: Normocephalic.   Eyes: Pupils are equal, round, and reactive to light.   Neck: Normal range of motion. Neck supple. No JVD present. Carotid bruit is not present. No thyromegaly present.   Cardiovascular: Normal rate, regular rhythm, normal heart sounds, intact distal pulses and normal pulses. PMI is not displaced. Exam reveals no gallop.   No murmur heard.  Pulmonary/Chest: Effort normal and breath sounds normal.   Abdominal: Soft. Normal appearance. She exhibits no mass. There is no hepatosplenomegaly. There is no abdominal tenderness.   Musculoskeletal: Normal range of motion.         General: No edema.   Neurological: She is alert and oriented to person, place, and time. She has normal strength and normal reflexes. No sensory deficit.   Skin: Skin is warm and intact.   Psychiatric: She has a normal mood and affect.   Nursing note and vitals reviewed.        Assessment:       1. Positive cardiac stress test    2. Precordial pain    3. Essential hypertension    4. Hyperlipidemia, unspecified hyperlipidemia type         Plan:   C +/-  groin access

## 2020-08-24 NOTE — PATIENT INSTRUCTIONS
Angiogram    Arrive for procedure at: [facility] Tuesday 8/24/20 @ 11:00 AM.  THE PROCEDURE WILL START AT 1 PM WITH DR. MERCADO.    You will receive a phone call from [facility] Pre-Op Department with further instructions prior to your scheduled procedure.    Notify the nurse if you are ALLERGIC TO IODINE.    FASTING: You MAY NOT have anything to eat or drink AFTER MIDNIGHT the day before your procedure. If your procedure is scheduled in the afternoon, you may have a LIGHT BREAKFAST 6-8 hours prior to your procedure.  For example: Two slices of toast; black coffee or black tea.    MEDICATIONS: You may take your regular morning medications with water. If there are any medications that you should not take, you will be instructed to hold them for that morning.    ? CARDIOLOGY PRE-PROCEDURE MEDICATION ORDERS:  ** Please hold any medications that are checked below:    HOLD   # OF DAYS TO HOLD  ? Coumadin   Consult with Coumadin Clinic   ? Xarelto    _DAY BEFORE & DAY OF_  ? Pradaxa  _ DAY BEFORE & DAY OF _  ? Eliquis   _ DAY BEFORE & DAY OF _  ? Metformin    Day before procedure & morning of procedure  ? Short acting insulin   Morning of procedure    CONTINUE the Following Medications   ? Plavix      ? Effient     ? Aspirin    WHAT TO EXPECT:    How long will the procedure take?  The procedure will take an average of 1 - 2 hours to perform.  After the procedure, you will need to lay flat for around 4 - 6 hours to minimize bleeding from the puncture site. If the wrist is accessed you will need to keep your arm still as instructed by the nurse.    When can I go home?  You may be able to be discharged home that same afternoon if there were no complications.  If you have one of the following: balloon; stent; pacemaker or defibrillator procedures, you may spend one night for observation.  Your doctor will determine your discharge based upon your progress.  The results of your procedure will be discussed with you  before you are discharged.  Any further testing or procedures will be scheduled for you either before you leave or you will be instructed to call for a future appointment.      TRANSPORTATION:  PLEASE ARRANGE TO HAVE SOMEONE DRIVE YOU HOME FOLLOWING YOUR PROCEDURE, YOU WILL NOT BE ALLOWED TO DRIVE.

## 2020-09-09 ENCOUNTER — PATIENT OUTREACH (OUTPATIENT)
Dept: ADMINISTRATIVE | Facility: OTHER | Age: 75
End: 2020-09-09

## 2020-09-09 NOTE — PROGRESS NOTES
LINKS immunization registry updated  Care Everywhere updated  Health Maintenance updated  Chart reviewed for overdue Proactive [facility] Encounters (SUKI) health maintenance testing (CRS, Breast Ca, Diabetic Eye Exam)   Orders entered:N/A

## 2020-09-17 ENCOUNTER — OFFICE VISIT (OUTPATIENT)
Dept: CARDIOLOGY | Facility: CLINIC | Age: 75
End: 2020-09-17
Payer: MEDICARE

## 2020-09-17 VITALS — HEIGHT: 64 IN | BODY MASS INDEX: 26.39 KG/M2 | WEIGHT: 154.56 LBS

## 2020-09-17 DIAGNOSIS — I25.10 CORONARY ARTERY DISEASE INVOLVING NATIVE CORONARY ARTERY OF NATIVE HEART WITHOUT ANGINA PECTORIS: ICD-10-CM

## 2020-09-17 DIAGNOSIS — I10 ESSENTIAL HYPERTENSION: Primary | ICD-10-CM

## 2020-09-17 DIAGNOSIS — E78.5 HYPERLIPIDEMIA, UNSPECIFIED HYPERLIPIDEMIA TYPE: ICD-10-CM

## 2020-09-17 PROCEDURE — 1126F AMNT PAIN NOTED NONE PRSNT: CPT | Mod: HCNC,S$GLB,, | Performed by: INTERNAL MEDICINE

## 2020-09-17 PROCEDURE — 1101F PT FALLS ASSESS-DOCD LE1/YR: CPT | Mod: HCNC,CPTII,S$GLB, | Performed by: INTERNAL MEDICINE

## 2020-09-17 PROCEDURE — 99213 OFFICE O/P EST LOW 20 MIN: CPT | Mod: HCNC,S$GLB,, | Performed by: INTERNAL MEDICINE

## 2020-09-17 PROCEDURE — 99999 PR PBB SHADOW E&M-EST. PATIENT-LVL II: ICD-10-PCS | Mod: PBBFAC,HCNC,, | Performed by: INTERNAL MEDICINE

## 2020-09-17 PROCEDURE — 1126F PR PAIN SEVERITY QUANTIFIED, NO PAIN PRESENT: ICD-10-PCS | Mod: HCNC,S$GLB,, | Performed by: INTERNAL MEDICINE

## 2020-09-17 PROCEDURE — 99999 PR PBB SHADOW E&M-EST. PATIENT-LVL II: CPT | Mod: PBBFAC,HCNC,, | Performed by: INTERNAL MEDICINE

## 2020-09-17 PROCEDURE — 1101F PR PT FALLS ASSESS DOC 0-1 FALLS W/OUT INJ PAST YR: ICD-10-PCS | Mod: HCNC,CPTII,S$GLB, | Performed by: INTERNAL MEDICINE

## 2020-09-17 PROCEDURE — 1159F MED LIST DOCD IN RCRD: CPT | Mod: HCNC,S$GLB,, | Performed by: INTERNAL MEDICINE

## 2020-09-17 PROCEDURE — 99213 PR OFFICE/OUTPT VISIT, EST, LEVL III, 20-29 MIN: ICD-10-PCS | Mod: HCNC,S$GLB,, | Performed by: INTERNAL MEDICINE

## 2020-09-17 PROCEDURE — 1159F PR MEDICATION LIST DOCUMENTED IN MEDICAL RECORD: ICD-10-PCS | Mod: HCNC,S$GLB,, | Performed by: INTERNAL MEDICINE

## 2020-09-17 NOTE — PROGRESS NOTES
Subjective:    Patient ID:  Evelina Pinon is a 75 y.o. female who presents for follow-up of cad    HPI  Had LHC last month revealing non-obstructive CAD.  She comes with no complaints, no chest pain, no shortness of breath      Review of Systems   Constitution: Negative for decreased appetite, malaise/fatigue, weight gain and weight loss.   Cardiovascular: Negative for chest pain, dyspnea on exertion, leg swelling, palpitations and syncope.   Respiratory: Negative for cough and shortness of breath.    Gastrointestinal: Negative.    Neurological: Negative for weakness.   All other systems reviewed and are negative.       Objective:      Physical Exam   Constitutional: She is oriented to person, place, and time. She appears well-developed and well-nourished.   HENT:   Head: Normocephalic.   Eyes: Pupils are equal, round, and reactive to light.   Neck: Normal range of motion. Neck supple. No JVD present. Carotid bruit is not present. No thyromegaly present.   Cardiovascular: Normal rate, regular rhythm, normal heart sounds, intact distal pulses and normal pulses. PMI is not displaced. Exam reveals no gallop.   No murmur heard.  Pulmonary/Chest: Effort normal and breath sounds normal.   Abdominal: Soft. Normal appearance. She exhibits no mass. There is no hepatosplenomegaly. There is no abdominal tenderness.   Musculoskeletal: Normal range of motion.         General: No edema.   Neurological: She is alert and oriented to person, place, and time. She has normal strength and normal reflexes. No sensory deficit.   Skin: Skin is warm and intact.   Psychiatric: She has a normal mood and affect.   Nursing note and vitals reviewed.        Assessment:       1. Essential hypertension    2. Coronary artery disease involving native coronary artery of native heart without angina pectoris    3. Hyperlipidemia, unspecified hyperlipidemia type         Plan:     Continue all cardiac medications  Regular exercise program  Weight  loss  1 yr f/u           187.96

## 2020-10-19 ENCOUNTER — TELEPHONE (OUTPATIENT)
Dept: FAMILY MEDICINE | Facility: CLINIC | Age: 75
End: 2020-10-19

## 2020-10-19 RX ORDER — INDAPAMIDE 1.25 MG/1
1.25 TABLET ORAL DAILY
Qty: 90 TABLET | Refills: 3 | Status: SHIPPED | OUTPATIENT
Start: 2020-10-19 | End: 2021-05-18 | Stop reason: SINTOL

## 2020-10-19 NOTE — TELEPHONE ENCOUNTER
"Spoke w/ pt. She states the hospital MD d/c'd indapamide because [facility] told her "it was stopped". Advised pt that this has not been stopped, per Dr Escalante and that we took out a duplicate on her list in her chart. She should have refills available. Recommended that tika contact the pharmacy and have them check her refills based on the medication and not the Rx# as they may have it on file as another rx #. Pt agreed. She will call back to get Dr Escalante to refill if there is any problems refilling.  "

## 2020-10-19 NOTE — TELEPHONE ENCOUNTER
----- Message from Arabella Baker sent at 10/19/2020  9:12 AM CDT -----  Regarding: sooner appt request  Contact: GILMA BANKS [[id number]]  Type:  Sooner Appointment Request    Caller is requesting a sooner appointment.  Caller is requesting a message be sent to doctor.    Name of Caller: GILMA BANKS [[id number]]  When is the first available appointment? 11/16/2020  Symptoms: due to changes in her medication and she unsure why, and expresses she really need to get in to be seen because she only have a couple days of medication left   Would the patient rather a call back or a response via [facility]? Call back   Best Call Back Number: [phone]

## 2020-10-19 NOTE — TELEPHONE ENCOUNTER
----- Message from Laura Eldridge sent at 10/19/2020 12:06 PM CDT -----  Regarding: pharmacy  Contact: Cesar with [facility]  Type:  Pharmacy Calling to Clarify an RX    Name of Caller:  Cesar  Pharmacy Name:  [facility]  Prescription Name:  indapamide (LOZOL) 1.25 MG Tab  What do they need to clarify?:    Best Call Back Number:  [phone]  Additional Information:  Cesar states patient was unaware the doctor stopped the medication. Cesar needs instructions if the patient needs to be inform to stop taking the medication. Please call Cesar. Thanks!

## 2020-10-19 NOTE — TELEPHONE ENCOUNTER
Called to speak w/ Cesar. Sat on hold x 6:30min. Had to disconnect due to nurse visit in clinic. We will have to try again later as we need to clarify this pt's rx prior to end of day so we can let her know what the plan is.

## 2020-10-19 NOTE — TELEPHONE ENCOUNTER
----- Message from Elia Reyes sent at 10/19/2020 12:12 PM CDT -----  Regarding: Rx refill  Contact: Patient [phone]  RX request - refill or new RX.  Is this a refill or new RX:  Refill   RX name and strength: indapamide (LOZOL) 1.25 MG Tab  Directions:   Is this a 30 day or 90 day RX:    Pharmacy name and phone # ([id number] [place] [address] AT [address] [phone] (Phone)  [phone] (Fax)    Comments:  patient stating the pharmacy does not have any Rx refills on file, is needing office to send request, call to inform has been sent, only has pills left for today.     Please call an advise  Thank you

## 2020-10-19 NOTE — TELEPHONE ENCOUNTER
Spoke w/ Andra. Evidently indapamide was a duplicate at one point and when MD took it out of her chart, it canceled her future refills at the pharmacy. She states that they have since gotten refills, looks like OK'd by Dr Chan and pt can fill this. Spoke w/ pt and advised this has been done

## 2021-01-09 ENCOUNTER — IMMUNIZATION (OUTPATIENT)
Dept: FAMILY MEDICINE | Facility: CLINIC | Age: 76
End: 2021-01-09
Payer: MEDICARE

## 2021-01-09 DIAGNOSIS — Z23 NEED FOR VACCINATION: ICD-10-CM

## 2021-01-09 PROCEDURE — 91300 COVID-19, MRNA, LNP-S, PF, 30 MCG/0.3 ML DOSE VACCINE: CPT | Mod: PBBFAC | Performed by: FAMILY MEDICINE

## 2021-01-30 ENCOUNTER — IMMUNIZATION (OUTPATIENT)
Dept: FAMILY MEDICINE | Facility: CLINIC | Age: 76
End: 2021-01-30
Payer: MEDICARE

## 2021-01-30 DIAGNOSIS — Z23 NEED FOR VACCINATION: Primary | ICD-10-CM

## 2021-01-30 PROCEDURE — 91300 COVID-19, MRNA, LNP-S, PF, 30 MCG/0.3 ML DOSE VACCINE: CPT | Mod: PBBFAC | Performed by: FAMILY MEDICINE

## 2021-01-30 PROCEDURE — 0002A COVID-19, MRNA, LNP-S, PF, 30 MCG/0.3 ML DOSE VACCINE: CPT | Mod: PBBFAC | Performed by: FAMILY MEDICINE

## 2021-03-23 RX ORDER — METOPROLOL SUCCINATE 50 MG/1
TABLET, EXTENDED RELEASE ORAL
Qty: 90 TABLET | Refills: 3 | Status: ON HOLD | OUTPATIENT
Start: 2021-03-23 | End: 2021-07-10 | Stop reason: HOSPADM

## 2021-03-31 RX ORDER — LORAZEPAM 0.5 MG/1
TABLET ORAL
Qty: 180 TABLET | Refills: 0 | Status: SHIPPED | OUTPATIENT
Start: 2021-03-31 | End: 2021-07-27 | Stop reason: SDUPTHER

## 2021-03-31 RX ORDER — ALLOPURINOL 100 MG/1
TABLET ORAL
Qty: 180 TABLET | Refills: 0 | Status: SHIPPED | OUTPATIENT
Start: 2021-03-31 | End: 2021-06-26

## 2021-03-31 RX ORDER — LOSARTAN POTASSIUM 100 MG/1
TABLET ORAL
Qty: 90 TABLET | Refills: 0 | Status: SHIPPED | OUTPATIENT
Start: 2021-03-31 | End: 2021-06-26

## 2021-05-18 ENCOUNTER — LAB VISIT (OUTPATIENT)
Dept: LAB | Facility: HOSPITAL | Age: 76
End: 2021-05-18
Attending: FAMILY MEDICINE
Payer: MEDICARE

## 2021-05-18 ENCOUNTER — OFFICE VISIT (OUTPATIENT)
Dept: FAMILY MEDICINE | Facility: CLINIC | Age: 76
End: 2021-05-18
Payer: MEDICARE

## 2021-05-18 VITALS
SYSTOLIC BLOOD PRESSURE: 90 MMHG | HEART RATE: 60 BPM | WEIGHT: 136.56 LBS | DIASTOLIC BLOOD PRESSURE: 48 MMHG | BODY MASS INDEX: 22.75 KG/M2 | HEIGHT: 65 IN

## 2021-05-18 DIAGNOSIS — M62.81 MUSCLE WEAKNESS: Primary | ICD-10-CM

## 2021-05-18 DIAGNOSIS — M62.81 MUSCLE WEAKNESS: ICD-10-CM

## 2021-05-18 DIAGNOSIS — R63.4 ABNORMAL WEIGHT LOSS: ICD-10-CM

## 2021-05-18 DIAGNOSIS — I10 ESSENTIAL HYPERTENSION: ICD-10-CM

## 2021-05-18 DIAGNOSIS — I25.10 CORONARY ARTERY DISEASE INVOLVING NATIVE CORONARY ARTERY OF NATIVE HEART WITHOUT ANGINA PECTORIS: ICD-10-CM

## 2021-05-18 DIAGNOSIS — R29.6 FREQUENT FALLS: ICD-10-CM

## 2021-05-18 LAB
ALBUMIN SERPL BCP-MCNC: 3.4 G/DL (ref 3.5–5.2)
ALP SERPL-CCNC: 62 U/L (ref 55–135)
ALT SERPL W/O P-5'-P-CCNC: 11 U/L (ref 10–44)
ANION GAP SERPL CALC-SCNC: 10 MMOL/L (ref 8–16)
AST SERPL-CCNC: 21 U/L (ref 10–40)
BILIRUB SERPL-MCNC: 0.6 MG/DL (ref 0.1–1)
BUN SERPL-MCNC: 35 MG/DL (ref 8–23)
CALCIUM SERPL-MCNC: 11 MG/DL (ref 8.7–10.5)
CHLORIDE SERPL-SCNC: 109 MMOL/L (ref 95–110)
CK SERPL-CCNC: 34 U/L (ref 20–180)
CO2 SERPL-SCNC: 21 MMOL/L (ref 23–29)
CREAT SERPL-MCNC: 1.4 MG/DL (ref 0.5–1.4)
CRP SERPL-MCNC: 3.6 MG/L (ref 0–8.2)
ERYTHROCYTE [SEDIMENTATION RATE] IN BLOOD BY WESTERGREN METHOD: 21 MM/HR (ref 0–36)
EST. GFR  (AFRICAN AMERICAN): 42.1 ML/MIN/1.73 M^2
EST. GFR  (NON AFRICAN AMERICAN): 36.5 ML/MIN/1.73 M^2
GLUCOSE SERPL-MCNC: 87 MG/DL (ref 70–110)
MAGNESIUM SERPL-MCNC: 1.2 MG/DL (ref 1.6–2.6)
POTASSIUM SERPL-SCNC: 4.5 MMOL/L (ref 3.5–5.1)
PROT SERPL-MCNC: 5.8 G/DL (ref 6–8.4)
SODIUM SERPL-SCNC: 140 MMOL/L (ref 136–145)
TSH SERPL DL<=0.005 MIU/L-ACNC: 3.28 UIU/ML (ref 0.4–4)

## 2021-05-18 PROCEDURE — 84443 ASSAY THYROID STIM HORMONE: CPT | Performed by: FAMILY MEDICINE

## 2021-05-18 PROCEDURE — 1159F MED LIST DOCD IN RCRD: CPT | Mod: S$GLB,,, | Performed by: FAMILY MEDICINE

## 2021-05-18 PROCEDURE — 1126F PR PAIN SEVERITY QUANTIFIED, NO PAIN PRESENT: ICD-10-PCS | Mod: S$GLB,,, | Performed by: FAMILY MEDICINE

## 2021-05-18 PROCEDURE — 99215 PR OFFICE/OUTPT VISIT, EST, LEVL V, 40-54 MIN: ICD-10-PCS | Mod: S$GLB,,, | Performed by: FAMILY MEDICINE

## 2021-05-18 PROCEDURE — 99999 PR PBB SHADOW E&M-EST. PATIENT-LVL IV: ICD-10-PCS | Mod: PBBFAC,,, | Performed by: FAMILY MEDICINE

## 2021-05-18 PROCEDURE — 3288F FALL RISK ASSESSMENT DOCD: CPT | Mod: CPTII,S$GLB,, | Performed by: FAMILY MEDICINE

## 2021-05-18 PROCEDURE — 1100F PTFALLS ASSESS-DOCD GE2>/YR: CPT | Mod: CPTII,S$GLB,, | Performed by: FAMILY MEDICINE

## 2021-05-18 PROCEDURE — 1159F PR MEDICATION LIST DOCUMENTED IN MEDICAL RECORD: ICD-10-PCS | Mod: S$GLB,,, | Performed by: FAMILY MEDICINE

## 2021-05-18 PROCEDURE — 82550 ASSAY OF CK (CPK): CPT | Performed by: FAMILY MEDICINE

## 2021-05-18 PROCEDURE — 99499 RISK ADDL DX/OHS AUDIT: ICD-10-PCS | Mod: HCNC,S$GLB,, | Performed by: FAMILY MEDICINE

## 2021-05-18 PROCEDURE — 99999 PR PBB SHADOW E&M-EST. PATIENT-LVL IV: CPT | Mod: PBBFAC,,, | Performed by: FAMILY MEDICINE

## 2021-05-18 PROCEDURE — 99215 OFFICE O/P EST HI 40 MIN: CPT | Mod: S$GLB,,, | Performed by: FAMILY MEDICINE

## 2021-05-18 PROCEDURE — 36415 COLL VENOUS BLD VENIPUNCTURE: CPT | Mod: PN | Performed by: FAMILY MEDICINE

## 2021-05-18 PROCEDURE — 85652 RBC SED RATE AUTOMATED: CPT | Performed by: FAMILY MEDICINE

## 2021-05-18 PROCEDURE — 99499 UNLISTED E&M SERVICE: CPT | Mod: HCNC,S$GLB,, | Performed by: FAMILY MEDICINE

## 2021-05-18 PROCEDURE — 83735 ASSAY OF MAGNESIUM: CPT | Performed by: FAMILY MEDICINE

## 2021-05-18 PROCEDURE — 1100F PR PT FALLS ASSESS DOC 2+ FALLS/FALL W/INJURY/YR: ICD-10-PCS | Mod: CPTII,S$GLB,, | Performed by: FAMILY MEDICINE

## 2021-05-18 PROCEDURE — 86140 C-REACTIVE PROTEIN: CPT | Performed by: FAMILY MEDICINE

## 2021-05-18 PROCEDURE — 80053 COMPREHEN METABOLIC PANEL: CPT | Performed by: FAMILY MEDICINE

## 2021-05-18 PROCEDURE — 3288F PR FALLS RISK ASSESSMENT DOCUMENTED: ICD-10-PCS | Mod: CPTII,S$GLB,, | Performed by: FAMILY MEDICINE

## 2021-05-18 PROCEDURE — 1126F AMNT PAIN NOTED NONE PRSNT: CPT | Mod: S$GLB,,, | Performed by: FAMILY MEDICINE

## 2021-05-18 PROCEDURE — 85027 COMPLETE CBC AUTOMATED: CPT | Performed by: FAMILY MEDICINE

## 2021-05-18 RX ORDER — HYDROCORTISONE 25 MG/G
CREAM TOPICAL 2 TIMES DAILY
COMMUNITY
End: 2021-05-18 | Stop reason: SDUPTHER

## 2021-05-18 RX ORDER — HYDROCORTISONE 25 MG/G
CREAM TOPICAL 2 TIMES DAILY
Qty: 30 G | Refills: 6 | Status: ON HOLD | OUTPATIENT
Start: 2021-05-18 | End: 2023-12-20 | Stop reason: HOSPADM

## 2021-05-18 RX ORDER — BETAMETHASONE DIPROPIONATE 0.5 MG/G
CREAM TOPICAL
Qty: 90 G | Refills: 3 | Status: ON HOLD | OUTPATIENT
Start: 2021-05-18 | End: 2023-12-20 | Stop reason: HOSPADM

## 2021-05-19 ENCOUNTER — TELEPHONE (OUTPATIENT)
Dept: FAMILY MEDICINE | Facility: CLINIC | Age: 76
End: 2021-05-19

## 2021-05-19 DIAGNOSIS — R63.4 ABNORMAL WEIGHT LOSS: ICD-10-CM

## 2021-05-19 DIAGNOSIS — E83.52 HYPERCALCEMIA: Primary | ICD-10-CM

## 2021-05-19 LAB
ERYTHROCYTE [DISTWIDTH] IN BLOOD BY AUTOMATED COUNT: 14.8 % (ref 11.5–14.5)
HCT VFR BLD AUTO: 39.3 % (ref 37–48.5)
HGB BLD-MCNC: 11.9 G/DL (ref 12–16)
MCH RBC QN AUTO: 31.2 PG (ref 27–31)
MCHC RBC AUTO-ENTMCNC: 30.3 G/DL (ref 32–36)
MCV RBC AUTO: 103 FL (ref 82–98)
PLATELET # BLD AUTO: 233 K/UL (ref 150–450)
PMV BLD AUTO: 11 FL (ref 9.2–12.9)
RBC # BLD AUTO: 3.81 M/UL (ref 4–5.4)
WBC # BLD AUTO: 7.55 K/UL (ref 3.9–12.7)

## 2021-05-19 RX ORDER — LANOLIN ALCOHOL/MO/W.PET/CERES
400 CREAM (GRAM) TOPICAL DAILY
Qty: 30 TABLET | Refills: 3 | Status: SHIPPED | OUTPATIENT
Start: 2021-05-19 | End: 2021-09-06 | Stop reason: SDUPTHER

## 2021-05-20 ENCOUNTER — TELEPHONE (OUTPATIENT)
Dept: FAMILY MEDICINE | Facility: CLINIC | Age: 76
End: 2021-05-20

## 2021-05-26 PROBLEM — R93.89 ABNORMAL FINDING ON CT SCAN: Status: ACTIVE | Noted: 2021-05-26

## 2021-06-26 RX ORDER — ALLOPURINOL 100 MG/1
TABLET ORAL
Qty: 180 TABLET | Refills: 3 | Status: SHIPPED | OUTPATIENT
Start: 2021-06-26 | End: 2021-12-30

## 2021-06-26 RX ORDER — LOSARTAN POTASSIUM 100 MG/1
TABLET ORAL
Qty: 90 TABLET | Refills: 3 | Status: ON HOLD | OUTPATIENT
Start: 2021-06-26 | End: 2021-07-10 | Stop reason: HOSPADM

## 2021-06-26 RX ORDER — CLOPIDOGREL BISULFATE 75 MG/1
TABLET ORAL
Qty: 90 TABLET | Refills: 3 | Status: SHIPPED | OUTPATIENT
Start: 2021-06-26 | End: 2022-05-05 | Stop reason: SDUPTHER

## 2021-07-01 ENCOUNTER — PATIENT MESSAGE (OUTPATIENT)
Dept: ADMINISTRATIVE | Facility: OTHER | Age: 76
End: 2021-07-01

## 2021-07-04 PROBLEM — E87.1 HYPONATREMIA: Status: ACTIVE | Noted: 2021-07-04

## 2021-07-04 PROBLEM — E83.42 HYPOMAGNESEMIA: Status: ACTIVE | Noted: 2021-07-04

## 2021-07-04 PROBLEM — M62.82 NON-TRAUMATIC RHABDOMYOLYSIS: Status: ACTIVE | Noted: 2021-07-04

## 2021-07-04 PROBLEM — R59.0 MEDIASTINAL LYMPHADENOPATHY: Status: ACTIVE | Noted: 2021-07-04

## 2021-07-04 PROBLEM — R79.89 ELEVATED BRAIN NATRIURETIC PEPTIDE (BNP) LEVEL: Status: ACTIVE | Noted: 2021-07-04
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
## 2021-07-04 PROBLEM — R29.6 FALLS: Status: ACTIVE | Noted: 2021-07-04

## 2021-07-04 PROBLEM — R79.89 TROPONIN LEVEL ELEVATED: Status: ACTIVE | Noted: 2021-07-04

## 2021-07-04 PROBLEM — R06.2 WHEEZING: Status: ACTIVE | Noted: 2021-07-04

## 2021-07-04 PROBLEM — A04.8 H. PYLORI INFECTION: Status: ACTIVE | Noted: 2021-07-04

## 2021-07-04 PROBLEM — L92.9 NON-CASEATING GRANULOMA: Status: ACTIVE | Noted: 2021-07-04

## 2021-07-05 PROBLEM — J96.01 ACUTE HYPOXEMIC RESPIRATORY FAILURE: Status: ACTIVE | Noted: 2021-07-05

## 2021-07-08 PROBLEM — I27.20 PULMONARY HYPERTENSION: Status: ACTIVE | Noted: 2021-07-08

## 2021-07-09 PROBLEM — R79.89 ELEVATED BRAIN NATRIURETIC PEPTIDE (BNP) LEVEL: Status: RESOLVED | Noted: 2021-07-04 | Resolved: 2021-07-09

## 2021-07-09 PROBLEM — R06.2 WHEEZING: Status: RESOLVED | Noted: 2021-07-04 | Resolved: 2021-07-09

## 2021-07-09 PROBLEM — E83.42 HYPOMAGNESEMIA: Status: RESOLVED | Noted: 2021-07-04 | Resolved: 2021-07-09

## 2021-07-09 PROBLEM — R59.0 MEDIASTINAL LYMPHADENOPATHY: Status: RESOLVED | Noted: 2021-07-04 | Resolved: 2021-07-09

## 2021-07-09 PROBLEM — A04.8 H. PYLORI INFECTION: Status: RESOLVED | Noted: 2021-07-04 | Resolved: 2021-07-09

## 2021-07-09 PROBLEM — J96.01 ACUTE HYPOXEMIC RESPIRATORY FAILURE: Status: RESOLVED | Noted: 2021-07-05 | Resolved: 2021-07-09

## 2021-07-11 PROCEDURE — G0180 MD CERTIFICATION HHA PATIENT: HCPCS | Mod: ,,, | Performed by: FAMILY MEDICINE

## 2021-07-11 PROCEDURE — G0180 PR HOME HEALTH MD CERTIFICATION: ICD-10-PCS | Mod: ,,, | Performed by: FAMILY MEDICINE

## 2021-07-15 ENCOUNTER — TELEPHONE (OUTPATIENT)
Dept: FAMILY MEDICINE | Facility: CLINIC | Age: 76
End: 2021-07-15

## 2021-07-16 ENCOUNTER — TELEPHONE (OUTPATIENT)
Dept: FAMILY MEDICINE | Facility: CLINIC | Age: 76
End: 2021-07-16

## 2021-07-16 RX ORDER — ONDANSETRON 4 MG/1
4 TABLET, ORALLY DISINTEGRATING ORAL EVERY 8 HOURS PRN
Qty: 10 TABLET | Refills: 1 | Status: SHIPPED | OUTPATIENT
Start: 2021-07-16 | End: 2022-01-26

## 2021-07-19 ENCOUNTER — TELEPHONE (OUTPATIENT)
Dept: FAMILY MEDICINE | Facility: CLINIC | Age: 76
End: 2021-07-19

## 2021-07-27 ENCOUNTER — OFFICE VISIT (OUTPATIENT)
Dept: FAMILY MEDICINE | Facility: CLINIC | Age: 76
End: 2021-07-27
Payer: MEDICARE

## 2021-07-27 VITALS
BODY MASS INDEX: 20.97 KG/M2 | WEIGHT: 125.88 LBS | SYSTOLIC BLOOD PRESSURE: 136 MMHG | HEART RATE: 62 BPM | HEIGHT: 65 IN | DIASTOLIC BLOOD PRESSURE: 84 MMHG | RESPIRATION RATE: 16 BRPM

## 2021-07-27 DIAGNOSIS — I10 ESSENTIAL HYPERTENSION: ICD-10-CM

## 2021-07-27 DIAGNOSIS — D53.8 OTHER SPECIFIED NUTRITIONAL ANEMIAS: ICD-10-CM

## 2021-07-27 DIAGNOSIS — D53.9 NUTRITIONAL ANEMIA, UNSPECIFIED: ICD-10-CM

## 2021-07-27 DIAGNOSIS — L92.9 NON-CASEATING GRANULOMA: Primary | ICD-10-CM

## 2021-07-27 PROCEDURE — 1126F AMNT PAIN NOTED NONE PRSNT: CPT | Mod: CPTII,S$GLB,, | Performed by: FAMILY MEDICINE

## 2021-07-27 PROCEDURE — 3075F SYST BP GE 130 - 139MM HG: CPT | Mod: CPTII,S$GLB,, | Performed by: FAMILY MEDICINE

## 2021-07-27 PROCEDURE — 1159F PR MEDICATION LIST DOCUMENTED IN MEDICAL RECORD: ICD-10-PCS | Mod: CPTII,S$GLB,, | Performed by: FAMILY MEDICINE

## 2021-07-27 PROCEDURE — 3079F DIAST BP 80-89 MM HG: CPT | Mod: CPTII,S$GLB,, | Performed by: FAMILY MEDICINE

## 2021-07-27 PROCEDURE — 99999 PR PBB SHADOW E&M-EST. PATIENT-LVL IV: CPT | Mod: PBBFAC,,, | Performed by: FAMILY MEDICINE

## 2021-07-27 PROCEDURE — 1126F PR PAIN SEVERITY QUANTIFIED, NO PAIN PRESENT: ICD-10-PCS | Mod: CPTII,S$GLB,, | Performed by: FAMILY MEDICINE

## 2021-07-27 PROCEDURE — 1160F RVW MEDS BY RX/DR IN RCRD: CPT | Mod: CPTII,S$GLB,, | Performed by: FAMILY MEDICINE

## 2021-07-27 PROCEDURE — 3075F PR MOST RECENT SYSTOLIC BLOOD PRESS GE 130-139MM HG: ICD-10-PCS | Mod: CPTII,S$GLB,, | Performed by: FAMILY MEDICINE

## 2021-07-27 PROCEDURE — 3079F PR MOST RECENT DIASTOLIC BLOOD PRESSURE 80-89 MM HG: ICD-10-PCS | Mod: CPTII,S$GLB,, | Performed by: FAMILY MEDICINE

## 2021-07-27 PROCEDURE — 1111F DSCHRG MED/CURRENT MED MERGE: CPT | Mod: CPTII,S$GLB,, | Performed by: FAMILY MEDICINE

## 2021-07-27 PROCEDURE — 99214 OFFICE O/P EST MOD 30 MIN: CPT | Mod: S$GLB,,, | Performed by: FAMILY MEDICINE

## 2021-07-27 PROCEDURE — 1101F PT FALLS ASSESS-DOCD LE1/YR: CPT | Mod: CPTII,S$GLB,, | Performed by: FAMILY MEDICINE

## 2021-07-27 PROCEDURE — 1160F PR REVIEW ALL MEDS BY PRESCRIBER/CLIN PHARMACIST DOCUMENTED: ICD-10-PCS | Mod: CPTII,S$GLB,, | Performed by: FAMILY MEDICINE

## 2021-07-27 PROCEDURE — 3288F FALL RISK ASSESSMENT DOCD: CPT | Mod: CPTII,S$GLB,, | Performed by: FAMILY MEDICINE

## 2021-07-27 PROCEDURE — 1111F PR DISCHARGE MEDS RECONCILED W/ CURRENT OUTPATIENT MED LIST: ICD-10-PCS | Mod: CPTII,S$GLB,, | Performed by: FAMILY MEDICINE

## 2021-07-27 PROCEDURE — 1101F PR PT FALLS ASSESS DOC 0-1 FALLS W/OUT INJ PAST YR: ICD-10-PCS | Mod: CPTII,S$GLB,, | Performed by: FAMILY MEDICINE

## 2021-07-27 PROCEDURE — 3288F PR FALLS RISK ASSESSMENT DOCUMENTED: ICD-10-PCS | Mod: CPTII,S$GLB,, | Performed by: FAMILY MEDICINE

## 2021-07-27 PROCEDURE — 1159F MED LIST DOCD IN RCRD: CPT | Mod: CPTII,S$GLB,, | Performed by: FAMILY MEDICINE

## 2021-07-27 PROCEDURE — 99214 PR OFFICE/OUTPT VISIT, EST, LEVL IV, 30-39 MIN: ICD-10-PCS | Mod: S$GLB,,, | Performed by: FAMILY MEDICINE

## 2021-07-27 PROCEDURE — 99999 PR PBB SHADOW E&M-EST. PATIENT-LVL IV: ICD-10-PCS | Mod: PBBFAC,,, | Performed by: FAMILY MEDICINE

## 2021-07-27 RX ORDER — METOPROLOL SUCCINATE 100 MG/1
100 TABLET, EXTENDED RELEASE ORAL DAILY
Qty: 90 TABLET | Refills: 3 | Status: SHIPPED | OUTPATIENT
Start: 2021-07-27 | End: 2022-04-28

## 2021-07-27 RX ORDER — LORAZEPAM 0.5 MG/1
0.5 TABLET ORAL 2 TIMES DAILY PRN
Qty: 60 TABLET | Refills: 1 | Status: SHIPPED | OUTPATIENT
Start: 2021-07-27 | End: 2021-10-05

## 2021-07-28 ENCOUNTER — TELEPHONE (OUTPATIENT)
Dept: FAMILY MEDICINE | Facility: CLINIC | Age: 76
End: 2021-07-28

## 2021-07-28 DIAGNOSIS — D86.9 SARCOIDOSIS: Primary | ICD-10-CM

## 2021-08-02 ENCOUNTER — EXTERNAL HOME HEALTH (OUTPATIENT)
Dept: HOME HEALTH SERVICES | Facility: HOSPITAL | Age: 76
End: 2021-08-02
Payer: MEDICARE

## 2021-08-02 ENCOUNTER — TELEPHONE (OUTPATIENT)
Dept: FAMILY MEDICINE | Facility: CLINIC | Age: 76
End: 2021-08-02

## 2021-08-03 ENCOUNTER — TELEPHONE (OUTPATIENT)
Dept: FAMILY MEDICINE | Facility: CLINIC | Age: 76
End: 2021-08-03

## 2021-08-03 RX ORDER — ALBUTEROL SULFATE 0.63 MG/3ML
0.63 SOLUTION RESPIRATORY (INHALATION) EVERY 6 HOURS PRN
Qty: 1 BOX | Refills: 3 | Status: SHIPPED | OUTPATIENT
Start: 2021-08-03 | End: 2021-09-21

## 2021-08-05 ENCOUNTER — DOCUMENT SCAN (OUTPATIENT)
Dept: HOME HEALTH SERVICES | Facility: HOSPITAL | Age: 76
End: 2021-08-05
Payer: MEDICARE

## 2021-08-10 ENCOUNTER — TELEPHONE (OUTPATIENT)
Dept: FAMILY MEDICINE | Facility: CLINIC | Age: 76
End: 2021-08-10

## 2021-08-16 ENCOUNTER — DOCUMENT SCAN (OUTPATIENT)
Dept: HOME HEALTH SERVICES | Facility: HOSPITAL | Age: 76
End: 2021-08-16
Payer: MEDICARE

## 2021-09-14 ENCOUNTER — LAB VISIT (OUTPATIENT)
Dept: LAB | Facility: HOSPITAL | Age: 76
End: 2021-09-14
Attending: FAMILY MEDICINE
Payer: MEDICARE

## 2021-09-14 DIAGNOSIS — D53.9 NUTRITIONAL ANEMIA, UNSPECIFIED: ICD-10-CM

## 2021-09-14 DIAGNOSIS — D53.8 OTHER SPECIFIED NUTRITIONAL ANEMIAS: ICD-10-CM

## 2021-09-14 DIAGNOSIS — L92.9 NON-CASEATING GRANULOMA: ICD-10-CM

## 2021-09-14 LAB
ERYTHROCYTE [DISTWIDTH] IN BLOOD BY AUTOMATED COUNT: 14 % (ref 11.5–14.5)
HCT VFR BLD AUTO: 41.9 % (ref 37–48.5)
HGB BLD-MCNC: 12.8 G/DL (ref 12–16)
MCH RBC QN AUTO: 30.8 PG (ref 27–31)
MCHC RBC AUTO-ENTMCNC: 30.5 G/DL (ref 32–36)
MCV RBC AUTO: 101 FL (ref 82–98)
PLATELET # BLD AUTO: 269 K/UL (ref 150–450)
PMV BLD AUTO: 10.8 FL (ref 9.2–12.9)
RBC # BLD AUTO: 4.15 M/UL (ref 4–5.4)
WBC # BLD AUTO: 5.56 K/UL (ref 3.9–12.7)

## 2021-09-14 PROCEDURE — 85027 COMPLETE CBC AUTOMATED: CPT | Mod: HCNC | Performed by: FAMILY MEDICINE

## 2021-09-14 PROCEDURE — 84466 ASSAY OF TRANSFERRIN: CPT | Mod: HCNC | Performed by: FAMILY MEDICINE

## 2021-09-14 PROCEDURE — 82607 VITAMIN B-12: CPT | Mod: HCNC | Performed by: FAMILY MEDICINE

## 2021-09-14 PROCEDURE — 36415 COLL VENOUS BLD VENIPUNCTURE: CPT | Mod: HCNC,PN | Performed by: FAMILY MEDICINE

## 2021-09-15 LAB
IRON SERPL-MCNC: 63 UG/DL (ref 30–160)
SATURATED IRON: 20 % (ref 20–50)
TOTAL IRON BINDING CAPACITY: 314 UG/DL (ref 250–450)
TRANSFERRIN SERPL-MCNC: 212 MG/DL (ref 200–375)
VIT B12 SERPL-MCNC: 156 PG/ML (ref 210–950)

## 2021-09-21 ENCOUNTER — OFFICE VISIT (OUTPATIENT)
Dept: FAMILY MEDICINE | Facility: CLINIC | Age: 76
End: 2021-09-21
Payer: MEDICARE

## 2021-09-21 ENCOUNTER — LAB VISIT (OUTPATIENT)
Dept: LAB | Facility: HOSPITAL | Age: 76
End: 2021-09-21
Attending: FAMILY MEDICINE
Payer: MEDICARE

## 2021-09-21 VITALS
OXYGEN SATURATION: 97 % | DIASTOLIC BLOOD PRESSURE: 66 MMHG | HEART RATE: 60 BPM | BODY MASS INDEX: 20.73 KG/M2 | HEIGHT: 65 IN | SYSTOLIC BLOOD PRESSURE: 136 MMHG | WEIGHT: 124.44 LBS

## 2021-09-21 DIAGNOSIS — G45.9 TIA (TRANSIENT ISCHEMIC ATTACK): ICD-10-CM

## 2021-09-21 DIAGNOSIS — R63.4 ABNORMAL WEIGHT LOSS: ICD-10-CM

## 2021-09-21 DIAGNOSIS — F41.9 ANXIETY: ICD-10-CM

## 2021-09-21 DIAGNOSIS — R63.4 ABNORMAL WEIGHT LOSS: Primary | ICD-10-CM

## 2021-09-21 DIAGNOSIS — E53.8 VITAMIN B12 DEFICIENCY: ICD-10-CM

## 2021-09-21 DIAGNOSIS — L92.9 NON-CASEATING GRANULOMA: ICD-10-CM

## 2021-09-21 DIAGNOSIS — I10 ESSENTIAL HYPERTENSION: ICD-10-CM

## 2021-09-21 LAB
ALBUMIN SERPL BCP-MCNC: 3.4 G/DL (ref 3.5–5.2)
ALP SERPL-CCNC: 73 U/L (ref 55–135)
ALT SERPL W/O P-5'-P-CCNC: 8 U/L (ref 10–44)
ANION GAP SERPL CALC-SCNC: 9 MMOL/L (ref 8–16)
AST SERPL-CCNC: 21 U/L (ref 10–40)
BILIRUB SERPL-MCNC: 0.4 MG/DL (ref 0.1–1)
BUN SERPL-MCNC: 21 MG/DL (ref 8–23)
CALCIUM SERPL-MCNC: 10.8 MG/DL (ref 8.7–10.5)
CHLORIDE SERPL-SCNC: 106 MMOL/L (ref 95–110)
CO2 SERPL-SCNC: 23 MMOL/L (ref 23–29)
CREAT SERPL-MCNC: 1 MG/DL (ref 0.5–1.4)
ERYTHROCYTE [SEDIMENTATION RATE] IN BLOOD BY WESTERGREN METHOD: 13 MM/HR (ref 0–36)
EST. GFR  (AFRICAN AMERICAN): >60 ML/MIN/1.73 M^2
EST. GFR  (NON AFRICAN AMERICAN): 54.5 ML/MIN/1.73 M^2
GLUCOSE SERPL-MCNC: 96 MG/DL (ref 70–110)
POTASSIUM SERPL-SCNC: 4.4 MMOL/L (ref 3.5–5.1)
PROT SERPL-MCNC: 5.9 G/DL (ref 6–8.4)
SODIUM SERPL-SCNC: 138 MMOL/L (ref 136–145)

## 2021-09-21 PROCEDURE — 3288F FALL RISK ASSESSMENT DOCD: CPT | Mod: HCNC,CPTII,S$GLB, | Performed by: FAMILY MEDICINE

## 2021-09-21 PROCEDURE — 3075F PR MOST RECENT SYSTOLIC BLOOD PRESS GE 130-139MM HG: ICD-10-PCS | Mod: HCNC,CPTII,S$GLB, | Performed by: FAMILY MEDICINE

## 2021-09-21 PROCEDURE — 99499 RISK ADDL DX/OHS AUDIT: ICD-10-PCS | Mod: S$GLB,,, | Performed by: FAMILY MEDICINE

## 2021-09-21 PROCEDURE — 3078F DIAST BP <80 MM HG: CPT | Mod: HCNC,CPTII,S$GLB, | Performed by: FAMILY MEDICINE

## 2021-09-21 PROCEDURE — 1100F PR PT FALLS ASSESS DOC 2+ FALLS/FALL W/INJURY/YR: ICD-10-PCS | Mod: HCNC,CPTII,S$GLB, | Performed by: FAMILY MEDICINE

## 2021-09-21 PROCEDURE — 36415 COLL VENOUS BLD VENIPUNCTURE: CPT | Mod: HCNC,PN | Performed by: FAMILY MEDICINE

## 2021-09-21 PROCEDURE — 99999 PR PBB SHADOW E&M-EST. PATIENT-LVL IV: CPT | Mod: PBBFAC,HCNC,, | Performed by: FAMILY MEDICINE

## 2021-09-21 PROCEDURE — 99999 PR PBB SHADOW E&M-EST. PATIENT-LVL IV: ICD-10-PCS | Mod: PBBFAC,HCNC,, | Performed by: FAMILY MEDICINE

## 2021-09-21 PROCEDURE — 85652 RBC SED RATE AUTOMATED: CPT | Mod: HCNC | Performed by: FAMILY MEDICINE

## 2021-09-21 PROCEDURE — 1100F PTFALLS ASSESS-DOCD GE2>/YR: CPT | Mod: HCNC,CPTII,S$GLB, | Performed by: FAMILY MEDICINE

## 2021-09-21 PROCEDURE — 99214 PR OFFICE/OUTPT VISIT, EST, LEVL IV, 30-39 MIN: ICD-10-PCS | Mod: HCNC,S$GLB,, | Performed by: FAMILY MEDICINE

## 2021-09-21 PROCEDURE — 99214 OFFICE O/P EST MOD 30 MIN: CPT | Mod: HCNC,S$GLB,, | Performed by: FAMILY MEDICINE

## 2021-09-21 PROCEDURE — 3288F PR FALLS RISK ASSESSMENT DOCUMENTED: ICD-10-PCS | Mod: HCNC,CPTII,S$GLB, | Performed by: FAMILY MEDICINE

## 2021-09-21 PROCEDURE — 80053 COMPREHEN METABOLIC PANEL: CPT | Mod: HCNC | Performed by: FAMILY MEDICINE

## 2021-09-21 PROCEDURE — 1160F RVW MEDS BY RX/DR IN RCRD: CPT | Mod: HCNC,CPTII,S$GLB, | Performed by: FAMILY MEDICINE

## 2021-09-21 PROCEDURE — 3075F SYST BP GE 130 - 139MM HG: CPT | Mod: HCNC,CPTII,S$GLB, | Performed by: FAMILY MEDICINE

## 2021-09-21 PROCEDURE — 1159F PR MEDICATION LIST DOCUMENTED IN MEDICAL RECORD: ICD-10-PCS | Mod: HCNC,CPTII,S$GLB, | Performed by: FAMILY MEDICINE

## 2021-09-21 PROCEDURE — 1159F MED LIST DOCD IN RCRD: CPT | Mod: HCNC,CPTII,S$GLB, | Performed by: FAMILY MEDICINE

## 2021-09-21 PROCEDURE — 1160F PR REVIEW ALL MEDS BY PRESCRIBER/CLIN PHARMACIST DOCUMENTED: ICD-10-PCS | Mod: HCNC,CPTII,S$GLB, | Performed by: FAMILY MEDICINE

## 2021-09-21 PROCEDURE — 99499 UNLISTED E&M SERVICE: CPT | Mod: S$GLB,,, | Performed by: FAMILY MEDICINE

## 2021-09-21 PROCEDURE — 3078F PR MOST RECENT DIASTOLIC BLOOD PRESSURE < 80 MM HG: ICD-10-PCS | Mod: HCNC,CPTII,S$GLB, | Performed by: FAMILY MEDICINE

## 2021-09-21 RX ORDER — LOSARTAN POTASSIUM 100 MG/1
100 TABLET ORAL DAILY
Qty: 90 TABLET | Refills: 3
Start: 2021-09-21 | End: 2021-11-23 | Stop reason: SDUPTHER

## 2021-09-21 RX ORDER — ATORVASTATIN CALCIUM 10 MG/1
10 TABLET, FILM COATED ORAL DAILY
Qty: 90 TABLET | Refills: 3 | Status: SHIPPED | OUTPATIENT
Start: 2021-09-21 | End: 2022-08-08 | Stop reason: SDUPTHER

## 2021-09-23 ENCOUNTER — OFFICE VISIT (OUTPATIENT)
Dept: CARDIOLOGY | Facility: CLINIC | Age: 76
End: 2021-09-23
Payer: MEDICARE

## 2021-09-23 VITALS
HEIGHT: 65 IN | WEIGHT: 125 LBS | HEART RATE: 62 BPM | DIASTOLIC BLOOD PRESSURE: 66 MMHG | SYSTOLIC BLOOD PRESSURE: 176 MMHG | BODY MASS INDEX: 20.83 KG/M2

## 2021-09-23 DIAGNOSIS — I10 ESSENTIAL HYPERTENSION: Primary | ICD-10-CM

## 2021-09-23 DIAGNOSIS — I27.20 PULMONARY HYPERTENSION: ICD-10-CM

## 2021-09-23 DIAGNOSIS — I25.10 CORONARY ARTERY DISEASE INVOLVING NATIVE CORONARY ARTERY OF NATIVE HEART WITHOUT ANGINA PECTORIS: ICD-10-CM

## 2021-09-23 DIAGNOSIS — E78.2 MIXED HYPERLIPIDEMIA: ICD-10-CM

## 2021-09-23 PROCEDURE — 1159F PR MEDICATION LIST DOCUMENTED IN MEDICAL RECORD: ICD-10-PCS | Mod: HCNC,CPTII,S$GLB, | Performed by: INTERNAL MEDICINE

## 2021-09-23 PROCEDURE — 99999 PR PBB SHADOW E&M-EST. PATIENT-LVL III: ICD-10-PCS | Mod: PBBFAC,HCNC,, | Performed by: INTERNAL MEDICINE

## 2021-09-23 PROCEDURE — 99499 UNLISTED E&M SERVICE: CPT | Mod: S$GLB,,, | Performed by: INTERNAL MEDICINE

## 2021-09-23 PROCEDURE — 99499 RISK ADDL DX/OHS AUDIT: ICD-10-PCS | Mod: S$GLB,,, | Performed by: INTERNAL MEDICINE

## 2021-09-23 PROCEDURE — 3077F PR MOST RECENT SYSTOLIC BLOOD PRESSURE >= 140 MM HG: ICD-10-PCS | Mod: HCNC,CPTII,S$GLB, | Performed by: INTERNAL MEDICINE

## 2021-09-23 PROCEDURE — 99999 PR PBB SHADOW E&M-EST. PATIENT-LVL III: CPT | Mod: PBBFAC,HCNC,, | Performed by: INTERNAL MEDICINE

## 2021-09-23 PROCEDURE — 3078F DIAST BP <80 MM HG: CPT | Mod: HCNC,CPTII,S$GLB, | Performed by: INTERNAL MEDICINE

## 2021-09-23 PROCEDURE — 1101F PR PT FALLS ASSESS DOC 0-1 FALLS W/OUT INJ PAST YR: ICD-10-PCS | Mod: HCNC,CPTII,S$GLB, | Performed by: INTERNAL MEDICINE

## 2021-09-23 PROCEDURE — 99214 OFFICE O/P EST MOD 30 MIN: CPT | Mod: HCNC,S$GLB,, | Performed by: INTERNAL MEDICINE

## 2021-09-23 PROCEDURE — 1160F RVW MEDS BY RX/DR IN RCRD: CPT | Mod: HCNC,CPTII,S$GLB, | Performed by: INTERNAL MEDICINE

## 2021-09-23 PROCEDURE — 1160F PR REVIEW ALL MEDS BY PRESCRIBER/CLIN PHARMACIST DOCUMENTED: ICD-10-PCS | Mod: HCNC,CPTII,S$GLB, | Performed by: INTERNAL MEDICINE

## 2021-09-23 PROCEDURE — 99214 PR OFFICE/OUTPT VISIT, EST, LEVL IV, 30-39 MIN: ICD-10-PCS | Mod: HCNC,S$GLB,, | Performed by: INTERNAL MEDICINE

## 2021-09-23 PROCEDURE — 1159F MED LIST DOCD IN RCRD: CPT | Mod: HCNC,CPTII,S$GLB, | Performed by: INTERNAL MEDICINE

## 2021-09-23 PROCEDURE — 3288F PR FALLS RISK ASSESSMENT DOCUMENTED: ICD-10-PCS | Mod: HCNC,CPTII,S$GLB, | Performed by: INTERNAL MEDICINE

## 2021-09-23 PROCEDURE — 1101F PT FALLS ASSESS-DOCD LE1/YR: CPT | Mod: HCNC,CPTII,S$GLB, | Performed by: INTERNAL MEDICINE

## 2021-09-23 PROCEDURE — 1126F AMNT PAIN NOTED NONE PRSNT: CPT | Mod: HCNC,CPTII,S$GLB, | Performed by: INTERNAL MEDICINE

## 2021-09-23 PROCEDURE — 3288F FALL RISK ASSESSMENT DOCD: CPT | Mod: HCNC,CPTII,S$GLB, | Performed by: INTERNAL MEDICINE

## 2021-09-23 PROCEDURE — 3077F SYST BP >= 140 MM HG: CPT | Mod: HCNC,CPTII,S$GLB, | Performed by: INTERNAL MEDICINE

## 2021-09-23 PROCEDURE — 1126F PR PAIN SEVERITY QUANTIFIED, NO PAIN PRESENT: ICD-10-PCS | Mod: HCNC,CPTII,S$GLB, | Performed by: INTERNAL MEDICINE

## 2021-09-23 PROCEDURE — 3078F PR MOST RECENT DIASTOLIC BLOOD PRESSURE < 80 MM HG: ICD-10-PCS | Mod: HCNC,CPTII,S$GLB, | Performed by: INTERNAL MEDICINE

## 2021-09-23 RX ORDER — AMLODIPINE BESYLATE 2.5 MG/1
2.5 TABLET ORAL DAILY
Qty: 30 TABLET | Refills: 11 | Status: SHIPPED | OUTPATIENT
Start: 2021-09-23 | End: 2022-09-02

## 2021-09-27 ENCOUNTER — DOCUMENT SCAN (OUTPATIENT)
Dept: HOME HEALTH SERVICES | Facility: HOSPITAL | Age: 76
End: 2021-09-27
Payer: MEDICARE

## 2021-09-27 ENCOUNTER — HOSPITAL ENCOUNTER (OUTPATIENT)
Dept: RADIOLOGY | Facility: HOSPITAL | Age: 76
Discharge: HOME | End: 2021-09-27
Attending: FAMILY MEDICINE
Payer: MEDICARE

## 2021-09-27 DIAGNOSIS — G45.9 TIA (TRANSIENT ISCHEMIC ATTACK): ICD-10-CM

## 2021-09-27 PROCEDURE — 93880 EXTRACRANIAL BILAT STUDY: CPT | Mod: TC,HCNC,PO

## 2021-09-27 PROCEDURE — 93880 EXTRACRANIAL BILAT STUDY: CPT | Mod: 26,HCNC,, | Performed by: RADIOLOGY

## 2021-09-27 PROCEDURE — 93880 US CAROTID BILATERAL: ICD-10-PCS | Mod: 26,HCNC,, | Performed by: RADIOLOGY

## 2021-09-28 ENCOUNTER — DOCUMENT SCAN (OUTPATIENT)
Dept: HOME HEALTH SERVICES | Facility: HOSPITAL | Age: 76
End: 2021-09-28
Payer: MEDICARE

## 2021-10-05 RX ORDER — LORAZEPAM 0.5 MG/1
TABLET ORAL
Qty: 60 TABLET | Refills: 3 | Status: SHIPPED | OUTPATIENT
Start: 2021-10-05 | End: 2021-10-26 | Stop reason: SDUPTHER

## 2021-10-26 ENCOUNTER — OFFICE VISIT (OUTPATIENT)
Dept: FAMILY MEDICINE | Facility: CLINIC | Age: 76
End: 2021-10-26
Payer: MEDICARE

## 2021-10-26 VITALS
HEART RATE: 51 BPM | BODY MASS INDEX: 21.74 KG/M2 | WEIGHT: 130.5 LBS | SYSTOLIC BLOOD PRESSURE: 134 MMHG | OXYGEN SATURATION: 99 % | DIASTOLIC BLOOD PRESSURE: 58 MMHG | HEIGHT: 65 IN

## 2021-10-26 DIAGNOSIS — I25.10 CORONARY ARTERY DISEASE INVOLVING NATIVE CORONARY ARTERY OF NATIVE HEART WITHOUT ANGINA PECTORIS: ICD-10-CM

## 2021-10-26 DIAGNOSIS — F41.9 ANXIETY: ICD-10-CM

## 2021-10-26 DIAGNOSIS — I10 ESSENTIAL HYPERTENSION: Primary | ICD-10-CM

## 2021-10-26 DIAGNOSIS — R63.4 ABNORMAL WEIGHT LOSS: ICD-10-CM

## 2021-10-26 PROCEDURE — 3078F DIAST BP <80 MM HG: CPT | Mod: HCNC,CPTII,S$GLB, | Performed by: FAMILY MEDICINE

## 2021-10-26 PROCEDURE — 3288F PR FALLS RISK ASSESSMENT DOCUMENTED: ICD-10-PCS | Mod: HCNC,CPTII,S$GLB, | Performed by: FAMILY MEDICINE

## 2021-10-26 PROCEDURE — 99499 RISK ADDL DX/OHS AUDIT: ICD-10-PCS | Mod: HCNC,S$GLB,, | Performed by: FAMILY MEDICINE

## 2021-10-26 PROCEDURE — 1126F AMNT PAIN NOTED NONE PRSNT: CPT | Mod: HCNC,CPTII,S$GLB, | Performed by: FAMILY MEDICINE

## 2021-10-26 PROCEDURE — 3078F PR MOST RECENT DIASTOLIC BLOOD PRESSURE < 80 MM HG: ICD-10-PCS | Mod: HCNC,CPTII,S$GLB, | Performed by: FAMILY MEDICINE

## 2021-10-26 PROCEDURE — 1100F PR PT FALLS ASSESS DOC 2+ FALLS/FALL W/INJURY/YR: ICD-10-PCS | Mod: HCNC,CPTII,S$GLB, | Performed by: FAMILY MEDICINE

## 2021-10-26 PROCEDURE — 1126F PR PAIN SEVERITY QUANTIFIED, NO PAIN PRESENT: ICD-10-PCS | Mod: HCNC,CPTII,S$GLB, | Performed by: FAMILY MEDICINE

## 2021-10-26 PROCEDURE — 1160F RVW MEDS BY RX/DR IN RCRD: CPT | Mod: HCNC,CPTII,S$GLB, | Performed by: FAMILY MEDICINE

## 2021-10-26 PROCEDURE — 99499 UNLISTED E&M SERVICE: CPT | Mod: HCNC,S$GLB,, | Performed by: FAMILY MEDICINE

## 2021-10-26 PROCEDURE — 1159F PR MEDICATION LIST DOCUMENTED IN MEDICAL RECORD: ICD-10-PCS | Mod: HCNC,CPTII,S$GLB, | Performed by: FAMILY MEDICINE

## 2021-10-26 PROCEDURE — 99999 PR PBB SHADOW E&M-EST. PATIENT-LVL IV: ICD-10-PCS | Mod: PBBFAC,HCNC,, | Performed by: FAMILY MEDICINE

## 2021-10-26 PROCEDURE — 99214 PR OFFICE/OUTPT VISIT, EST, LEVL IV, 30-39 MIN: ICD-10-PCS | Mod: HCNC,S$GLB,, | Performed by: FAMILY MEDICINE

## 2021-10-26 PROCEDURE — 3075F PR MOST RECENT SYSTOLIC BLOOD PRESS GE 130-139MM HG: ICD-10-PCS | Mod: HCNC,CPTII,S$GLB, | Performed by: FAMILY MEDICINE

## 2021-10-26 PROCEDURE — 3288F FALL RISK ASSESSMENT DOCD: CPT | Mod: HCNC,CPTII,S$GLB, | Performed by: FAMILY MEDICINE

## 2021-10-26 PROCEDURE — 99999 PR PBB SHADOW E&M-EST. PATIENT-LVL IV: CPT | Mod: PBBFAC,HCNC,, | Performed by: FAMILY MEDICINE

## 2021-10-26 PROCEDURE — 3075F SYST BP GE 130 - 139MM HG: CPT | Mod: HCNC,CPTII,S$GLB, | Performed by: FAMILY MEDICINE

## 2021-10-26 PROCEDURE — 1159F MED LIST DOCD IN RCRD: CPT | Mod: HCNC,CPTII,S$GLB, | Performed by: FAMILY MEDICINE

## 2021-10-26 PROCEDURE — 1100F PTFALLS ASSESS-DOCD GE2>/YR: CPT | Mod: HCNC,CPTII,S$GLB, | Performed by: FAMILY MEDICINE

## 2021-10-26 PROCEDURE — 99214 OFFICE O/P EST MOD 30 MIN: CPT | Mod: HCNC,S$GLB,, | Performed by: FAMILY MEDICINE

## 2021-10-26 PROCEDURE — 1160F PR REVIEW ALL MEDS BY PRESCRIBER/CLIN PHARMACIST DOCUMENTED: ICD-10-PCS | Mod: HCNC,CPTII,S$GLB, | Performed by: FAMILY MEDICINE

## 2021-10-26 RX ORDER — LORAZEPAM 0.5 MG/1
0.5 TABLET ORAL 2 TIMES DAILY
Qty: 60 TABLET | Refills: 3 | Status: SHIPPED | OUTPATIENT
Start: 2021-10-26 | End: 2022-01-26

## 2021-11-23 ENCOUNTER — TELEPHONE (OUTPATIENT)
Dept: FAMILY MEDICINE | Facility: CLINIC | Age: 76
End: 2021-11-23
Payer: MEDICARE

## 2021-11-23 RX ORDER — LOSARTAN POTASSIUM 100 MG/1
100 TABLET ORAL DAILY
Qty: 90 TABLET | Refills: 3 | Status: SHIPPED | OUTPATIENT
Start: 2021-11-23 | End: 2022-01-26 | Stop reason: SDUPTHER

## 2021-11-23 NOTE — TELEPHONE ENCOUNTER
----- Message from Melanie Wiseman sent at 11/23/2021 10:09 AM CST -----  Contact: Jailene  Type:  RX Refill Request    Who Called:  Jailene    Refill or New Rx: Refill    RX Name and Strength: losartan (COZAAR) 100 MG tablet    How is the patient currently taking it? (ex. 1XDay):     Is this a 30 day or 90 day RX: 90    Preferred Pharmacy with phone number:     [id number] [place] [address] & [address] [address]  Phone: [phone] Fax: [phone]      Local or Mail Order: Local     Ordering Provider: Dr Gibson     Would the patient rather a call back or a response via [facility]?  Call    Best Call Back Number: [phone] (home)     Additional Information:

## 2021-12-30 RX ORDER — ALLOPURINOL 100 MG/1
TABLET ORAL
Qty: 180 TABLET | Refills: 3 | Status: SHIPPED | OUTPATIENT
Start: 2021-12-30 | End: 2022-01-26 | Stop reason: SDUPTHER

## 2021-12-30 RX ORDER — ALLOPURINOL 100 MG/1
TABLET ORAL
Qty: 180 TABLET | Refills: 3 | OUTPATIENT
Start: 2021-12-30

## 2021-12-30 RX ORDER — ALLOPURINOL 100 MG/1
TABLET ORAL
Qty: 180 TABLET | Refills: 3 | Status: SHIPPED | OUTPATIENT
Start: 2021-12-30 | End: 2022-02-11

## 2022-01-18 RX ORDER — LANOLIN ALCOHOL/MO/W.PET/CERES
CREAM (GRAM) TOPICAL
Qty: 30 TABLET | Refills: 3 | Status: SHIPPED | OUTPATIENT
Start: 2022-01-18 | End: 2022-05-05 | Stop reason: SDUPTHER

## 2022-01-26 ENCOUNTER — OFFICE VISIT (OUTPATIENT)
Dept: FAMILY MEDICINE | Facility: CLINIC | Age: 77
End: 2022-01-26
Payer: MEDICARE

## 2022-01-26 VITALS
DIASTOLIC BLOOD PRESSURE: 80 MMHG | HEART RATE: 68 BPM | HEIGHT: 64 IN | WEIGHT: 135.06 LBS | BODY MASS INDEX: 23.06 KG/M2 | SYSTOLIC BLOOD PRESSURE: 128 MMHG

## 2022-01-26 DIAGNOSIS — F41.9 ANXIETY: ICD-10-CM

## 2022-01-26 DIAGNOSIS — I10 ESSENTIAL HYPERTENSION: Primary | ICD-10-CM

## 2022-01-26 DIAGNOSIS — I25.10 CORONARY ARTERY DISEASE INVOLVING NATIVE CORONARY ARTERY OF NATIVE HEART WITHOUT ANGINA PECTORIS: ICD-10-CM

## 2022-01-26 DIAGNOSIS — I27.20 PULMONARY HYPERTENSION: ICD-10-CM

## 2022-01-26 DIAGNOSIS — G45.9 TIA (TRANSIENT ISCHEMIC ATTACK): ICD-10-CM

## 2022-01-26 PROCEDURE — 3074F PR MOST RECENT SYSTOLIC BLOOD PRESSURE < 130 MM HG: ICD-10-PCS | Mod: HCNC,CPTII,S$GLB, | Performed by: FAMILY MEDICINE

## 2022-01-26 PROCEDURE — 99214 PR OFFICE/OUTPT VISIT, EST, LEVL IV, 30-39 MIN: ICD-10-PCS | Mod: HCNC,S$GLB,, | Performed by: FAMILY MEDICINE

## 2022-01-26 PROCEDURE — 3079F DIAST BP 80-89 MM HG: CPT | Mod: HCNC,CPTII,S$GLB, | Performed by: FAMILY MEDICINE

## 2022-01-26 PROCEDURE — 1160F RVW MEDS BY RX/DR IN RCRD: CPT | Mod: HCNC,CPTII,S$GLB, | Performed by: FAMILY MEDICINE

## 2022-01-26 PROCEDURE — 1101F PT FALLS ASSESS-DOCD LE1/YR: CPT | Mod: HCNC,CPTII,S$GLB, | Performed by: FAMILY MEDICINE

## 2022-01-26 PROCEDURE — 3074F SYST BP LT 130 MM HG: CPT | Mod: HCNC,CPTII,S$GLB, | Performed by: FAMILY MEDICINE

## 2022-01-26 PROCEDURE — 99999 PR PBB SHADOW E&M-EST. PATIENT-LVL III: ICD-10-PCS | Mod: PBBFAC,HCNC,, | Performed by: FAMILY MEDICINE

## 2022-01-26 PROCEDURE — 1160F PR REVIEW ALL MEDS BY PRESCRIBER/CLIN PHARMACIST DOCUMENTED: ICD-10-PCS | Mod: HCNC,CPTII,S$GLB, | Performed by: FAMILY MEDICINE

## 2022-01-26 PROCEDURE — 1126F AMNT PAIN NOTED NONE PRSNT: CPT | Mod: HCNC,CPTII,S$GLB, | Performed by: FAMILY MEDICINE

## 2022-01-26 PROCEDURE — 3079F PR MOST RECENT DIASTOLIC BLOOD PRESSURE 80-89 MM HG: ICD-10-PCS | Mod: HCNC,CPTII,S$GLB, | Performed by: FAMILY MEDICINE

## 2022-01-26 PROCEDURE — 99499 RISK ADDL DX/OHS AUDIT: ICD-10-PCS | Mod: S$GLB,,, | Performed by: FAMILY MEDICINE

## 2022-01-26 PROCEDURE — 1159F MED LIST DOCD IN RCRD: CPT | Mod: HCNC,CPTII,S$GLB, | Performed by: FAMILY MEDICINE

## 2022-01-26 PROCEDURE — 1126F PR PAIN SEVERITY QUANTIFIED, NO PAIN PRESENT: ICD-10-PCS | Mod: HCNC,CPTII,S$GLB, | Performed by: FAMILY MEDICINE

## 2022-01-26 PROCEDURE — 99214 OFFICE O/P EST MOD 30 MIN: CPT | Mod: HCNC,S$GLB,, | Performed by: FAMILY MEDICINE

## 2022-01-26 PROCEDURE — 99999 PR PBB SHADOW E&M-EST. PATIENT-LVL III: CPT | Mod: PBBFAC,HCNC,, | Performed by: FAMILY MEDICINE

## 2022-01-26 PROCEDURE — 1101F PR PT FALLS ASSESS DOC 0-1 FALLS W/OUT INJ PAST YR: ICD-10-PCS | Mod: HCNC,CPTII,S$GLB, | Performed by: FAMILY MEDICINE

## 2022-01-26 PROCEDURE — 1159F PR MEDICATION LIST DOCUMENTED IN MEDICAL RECORD: ICD-10-PCS | Mod: HCNC,CPTII,S$GLB, | Performed by: FAMILY MEDICINE

## 2022-01-26 PROCEDURE — 3288F PR FALLS RISK ASSESSMENT DOCUMENTED: ICD-10-PCS | Mod: HCNC,CPTII,S$GLB, | Performed by: FAMILY MEDICINE

## 2022-01-26 PROCEDURE — 99499 UNLISTED E&M SERVICE: CPT | Mod: S$GLB,,, | Performed by: FAMILY MEDICINE

## 2022-01-26 PROCEDURE — 3288F FALL RISK ASSESSMENT DOCD: CPT | Mod: HCNC,CPTII,S$GLB, | Performed by: FAMILY MEDICINE

## 2022-01-26 RX ORDER — LORAZEPAM 0.5 MG/1
0.5 TABLET ORAL 2 TIMES DAILY
Qty: 60 TABLET | Refills: 3 | Status: SHIPPED | OUTPATIENT
Start: 2022-01-26 | End: 2022-08-08 | Stop reason: SDUPTHER

## 2022-01-26 RX ORDER — LOSARTAN POTASSIUM 100 MG/1
100 TABLET ORAL DAILY
Qty: 90 TABLET | Refills: 3 | Status: SHIPPED | OUTPATIENT
Start: 2022-01-26 | End: 2022-05-05 | Stop reason: SDUPTHER

## 2022-01-26 NOTE — PROGRESS NOTES
"Subjective:       Patient ID: Evelina Pinon is a 76 y.o. female.    Chief Complaint: Hypertension (HTN f/u)    Follow-up hypertension anxiety and cardiovascular disease.  She had a TIA last year.  She was wondering if that was related to her COVID vaccine 5 months prior.  I do not think so.  She was noted to have an abnormality on chest CT which may have been loculated fluid versus mass.  She is scheduled for pulmonary follow-up next month.  She takes losartan 100 mg, metoprolol 100 mg, amlodipine 2.5 mg. Her blood pressure has been well controlled.  She also has a history of pulmonary hypertension.    Review of Systems   Constitutional: Positive for appetite change and unexpected weight change (She has gained 5 lb.). Negative for activity change.   Respiratory: Negative for cough and shortness of breath.    Cardiovascular: Negative for chest pain, palpitations, leg swelling and claudication.   Gastrointestinal: Negative for abdominal pain.   Psychiatric/Behavioral:        She takes Ativan at night and sometimes during the day if she is anxious.         Objective:     Blood pressure 128/80, pulse 68, height 5' 4" (1.626 m), weight 61.3 kg (135 lb 0.5 oz).      Physical Exam  Constitutional:       General: She is not in acute distress.     Appearance: She is normal weight.   Cardiovascular:      Rate and Rhythm: Normal rate and regular rhythm.      Pulses: Normal pulses.      Heart sounds: No murmur heard.      Pulmonary:      Effort: No respiratory distress.      Breath sounds: No wheezing.   Abdominal:      General: There is no distension.      Tenderness: There is no abdominal tenderness.   Musculoskeletal:      Right lower leg: No edema.      Left lower leg: No edema.   Lymphadenopathy:      Cervical: No cervical adenopathy.   Neurological:      Mental Status: She is alert.         Assessment:       Problem List Items Addressed This Visit     Essential hypertension - Primary    Anxiety    TIA (transient " ischemic attack)    Coronary artery disease involving native coronary artery of native heart    Pulmonary hypertension          Plan:       I refilled losartan and lorazepam and will continue to refill her usual medications.    she will need CMP and lipid at her next visit.

## 2022-01-29 NOTE — TELEPHONE ENCOUNTER
No new care gaps identified.  Powered by [employer]. Reference number: [id number].   1/29/2022 8:04:38 AM CST

## 2022-02-11 RX ORDER — ALLOPURINOL 100 MG/1
TABLET ORAL
Qty: 180 TABLET | Refills: 1 | Status: SHIPPED | OUTPATIENT
Start: 2022-02-11 | End: 2022-06-30

## 2022-02-11 NOTE — TELEPHONE ENCOUNTER
Refill Authorization Note   Evelina Pinon  is requesting a refill authorization.  Brief Assessment and Rationale for Refill:  Approve     Medication Therapy Plan:       Medication Reconciliation Completed: No   Comments:   --->Care Gap information included below if applicable.       Requested Prescriptions   Pending Prescriptions Disp Refills    allopurinoL (ZYLOPRIM) 100 MG tablet [Pharmacy Med Name: ALLOPURINOL 100MG TABLETS] 180 tablet 1     Sig: TAKE 2 TABLETS BY MOUTH EVERY DAY       Endocrinology:  Gout Agents - allopurinol Passed - 1/29/2022  8:04 AM        Passed - Patient is at least 18 years old        Passed - Valid encounter within last 15 months     Recent Visits  Date Type Provider Dept   01/26/22 Office Visit Cisco Gibson MD [facility]   10/26/21 Office Visit Cisco Gibson MD [facility]   09/21/21 Office Visit Cisco Gibson MD [facility]   07/27/21 Office Visit Cisco Gibson MD [facility]   05/18/21 Office Visit Cisco Gibson MD [facility]   08/13/20 Office Visit Cisco Gibson MD [facility]   Showing recent visits within past 720 days and meeting all other requirements  Future Appointments  No visits were found meeting these conditions.  Showing future appointments within next 150 days and meeting all other requirements      Future Appointments              In 1 week RESPIRATORY THERAPY, [facility] PULMONARY FUNCTION [facility]    In 1 week Damaris Ye MD [place]    In 1 month Rex Kovacs MD [place] [facility] [place]                Passed - Uric Acid within 360 days     Uric Acid   Date Value Ref Range Status   07/07/2021 6.4 (H) 2.4 - 5.7 mg/dL Final   04/11/2019 4.1 2.4 - 5.7 mg/dL Final   03/29/2018 4.9 2.4 - 5.7 mg/dL Final              Passed - Cr is 1.39 or below and within 360  days     Lab Results   Component Value Date    CREATININE 1.0 09/21/2021    CREATININE 1.03 09/10/2021    CREATININE 0.77 07/10/2021              Passed - WBC within 360 days     WBC   Date Value Ref Range Status   09/14/2021 5.56 3.90 - 12.70 K/uL Final   09/10/2021 7.18 3.90 - 12.70 K/uL Final   07/10/2021 6.39 3.90 - 12.70 K/uL Final              Passed - RBC within 360 days     RBC   Date Value Ref Range Status   09/14/2021 4.15 4.00 - 5.40 M/uL Final   09/10/2021 3.79 (L) 4.00 - 5.40 M/uL Final   07/10/2021 3.14 (L) 4.00 - 5.40 M/uL Final              Passed - HGB within 360 days     Hemoglobin   Date Value Ref Range Status   09/14/2021 12.8 12.0 - 16.0 g/dL Final   09/10/2021 11.7 (L) 12.0 - 16.0 g/dL Final   07/10/2021 9.9 (L) 12.0 - 16.0 g/dL Final              Passed - HCT within 360 days     Hematocrit   Date Value Ref Range Status   09/14/2021 41.9 37.0 - 48.5 % Final   09/10/2021 36.6 (L) 37.0 - 48.5 % Final   07/10/2021 30.3 (L) 37.0 - 48.5 % Final              Passed - PLT within 360 days     Platelets   Date Value Ref Range Status   09/14/2021 269 150 - 450 K/uL Final   09/10/2021 256 150 - 450 K/uL Final   07/10/2021 278 150 - 450 K/uL Final              Passed - ALT is 131 or below and within 360 days     ALT   Date Value Ref Range Status   09/21/2021 8 (L) 10 - 44 U/L Final   09/10/2021 8 0 - 35 U/L Final   07/05/2021 23 0 - 35 U/L Final              Passed - AST is 119 or below and within 360 days     AST ([place])   Date Value Ref Range Status   02/21/2016 28 14 - 36 U/L Final     AST   Date Value Ref Range Status   09/21/2021 21 10 - 40 U/L Final   09/10/2021 27 14 - 36 U/L Final   07/05/2021 43 (H) 14 - 36 U/L Final              Passed - eGFR within 360 days     Lab Results   Component Value Date    EGFRNONAA 54.5 (A) 09/21/2021    EGFRNONAA 53 (A) 09/10/2021    EGFRNONAA >60 07/10/2021                    Appointments  past 12m or future 3m with PCP    Date Provider   Last Visit    1/26/2022 Cisco Gibson MD   Next Visit   Visit date not found Cisco Gibson MD   ED visits in past 90 days: 0     Note composed:8:40 AM 02/11/2022

## 2022-02-23 DIAGNOSIS — D84.9 IMMUNOSUPPRESSED STATUS: ICD-10-CM

## 2022-03-29 ENCOUNTER — OFFICE VISIT (OUTPATIENT)
Dept: CARDIOLOGY | Facility: CLINIC | Age: 77
End: 2022-03-29
Payer: MEDICARE

## 2022-03-29 VITALS
WEIGHT: 138.88 LBS | HEART RATE: 42 BPM | HEIGHT: 64 IN | SYSTOLIC BLOOD PRESSURE: 129 MMHG | BODY MASS INDEX: 23.71 KG/M2 | DIASTOLIC BLOOD PRESSURE: 65 MMHG

## 2022-03-29 DIAGNOSIS — E78.2 MIXED HYPERLIPIDEMIA: ICD-10-CM

## 2022-03-29 DIAGNOSIS — I25.10 CORONARY ARTERY DISEASE INVOLVING NATIVE CORONARY ARTERY OF NATIVE HEART WITHOUT ANGINA PECTORIS: ICD-10-CM

## 2022-03-29 DIAGNOSIS — I10 PRIMARY HYPERTENSION: Primary | ICD-10-CM

## 2022-03-29 PROCEDURE — 3074F PR MOST RECENT SYSTOLIC BLOOD PRESSURE < 130 MM HG: ICD-10-PCS | Mod: CPTII,S$GLB,, | Performed by: INTERNAL MEDICINE

## 2022-03-29 PROCEDURE — 1160F PR REVIEW ALL MEDS BY PRESCRIBER/CLIN PHARMACIST DOCUMENTED: ICD-10-PCS | Mod: CPTII,S$GLB,, | Performed by: INTERNAL MEDICINE

## 2022-03-29 PROCEDURE — 1126F PR PAIN SEVERITY QUANTIFIED, NO PAIN PRESENT: ICD-10-PCS | Mod: CPTII,S$GLB,, | Performed by: INTERNAL MEDICINE

## 2022-03-29 PROCEDURE — 3074F SYST BP LT 130 MM HG: CPT | Mod: CPTII,S$GLB,, | Performed by: INTERNAL MEDICINE

## 2022-03-29 PROCEDURE — 3078F DIAST BP <80 MM HG: CPT | Mod: CPTII,S$GLB,, | Performed by: INTERNAL MEDICINE

## 2022-03-29 PROCEDURE — 1126F AMNT PAIN NOTED NONE PRSNT: CPT | Mod: CPTII,S$GLB,, | Performed by: INTERNAL MEDICINE

## 2022-03-29 PROCEDURE — 99214 PR OFFICE/OUTPT VISIT, EST, LEVL IV, 30-39 MIN: ICD-10-PCS | Mod: S$GLB,,, | Performed by: INTERNAL MEDICINE

## 2022-03-29 PROCEDURE — 99999 PR PBB SHADOW E&M-EST. PATIENT-LVL III: ICD-10-PCS | Mod: PBBFAC,,, | Performed by: INTERNAL MEDICINE

## 2022-03-29 PROCEDURE — 1159F MED LIST DOCD IN RCRD: CPT | Mod: CPTII,S$GLB,, | Performed by: INTERNAL MEDICINE

## 2022-03-29 PROCEDURE — 1159F PR MEDICATION LIST DOCUMENTED IN MEDICAL RECORD: ICD-10-PCS | Mod: CPTII,S$GLB,, | Performed by: INTERNAL MEDICINE

## 2022-03-29 PROCEDURE — 1160F RVW MEDS BY RX/DR IN RCRD: CPT | Mod: CPTII,S$GLB,, | Performed by: INTERNAL MEDICINE

## 2022-03-29 PROCEDURE — 3078F PR MOST RECENT DIASTOLIC BLOOD PRESSURE < 80 MM HG: ICD-10-PCS | Mod: CPTII,S$GLB,, | Performed by: INTERNAL MEDICINE

## 2022-03-29 PROCEDURE — 99999 PR PBB SHADOW E&M-EST. PATIENT-LVL III: CPT | Mod: PBBFAC,,, | Performed by: INTERNAL MEDICINE

## 2022-03-29 PROCEDURE — 99214 OFFICE O/P EST MOD 30 MIN: CPT | Mod: S$GLB,,, | Performed by: INTERNAL MEDICINE

## 2022-03-29 NOTE — PROGRESS NOTES
Subjective:    Patient ID:  Evelina Pinon is a 76 y.o. female who presents for follow-up of Hypertension (6 month f/u )      HPI  She comes with no complaints, no chest pain, no shortness of breath  FC II  normal BP at home    Review of Systems   Constitutional: Negative for decreased appetite, malaise/fatigue, weight gain and weight loss.   Cardiovascular: Negative for chest pain, dyspnea on exertion, leg swelling, palpitations and syncope.   Respiratory: Negative for cough and shortness of breath.    Gastrointestinal: Negative.    Neurological: Negative for weakness.   All other systems reviewed and are negative.       Objective:      Physical Exam  Vitals and nursing note reviewed.   Constitutional:       Appearance: Normal appearance. She is well-developed.   HENT:      Head: Normocephalic.   Eyes:      Pupils: Pupils are equal, round, and reactive to light.   Neck:      Thyroid: No thyromegaly.      Vascular: No carotid bruit or JVD.   Cardiovascular:      Rate and Rhythm: Normal rate and regular rhythm.      Chest Wall: PMI is not displaced.      Pulses: Normal pulses and intact distal pulses.      Heart sounds: Normal heart sounds. No murmur heard.    No gallop.   Pulmonary:      Effort: Pulmonary effort is normal.      Breath sounds: Normal breath sounds.   Abdominal:      Palpations: Abdomen is soft. There is no mass.      Tenderness: There is no abdominal tenderness.   Musculoskeletal:         General: Normal range of motion.      Cervical back: Normal range of motion and neck supple.   Skin:     General: Skin is warm.   Neurological:      Mental Status: She is alert and oriented to person, place, and time.      Sensory: No sensory deficit.      Deep Tendon Reflexes: Reflexes are normal and symmetric.           Assessment:       1. Primary hypertension    2. Coronary artery disease involving native coronary artery of native heart without angina pectoris    3. Mixed hyperlipidemia         Plan:      Continue all cardiac medications  Regular exercise program  1 yr f/u with horace

## 2022-04-28 ENCOUNTER — TELEPHONE (OUTPATIENT)
Dept: FAMILY MEDICINE | Facility: CLINIC | Age: 77
End: 2022-04-28
Payer: MEDICARE

## 2022-04-28 ENCOUNTER — TELEPHONE (OUTPATIENT)
Dept: PRIMARY CARE CLINIC | Facility: CLINIC | Age: 77
End: 2022-04-28
Payer: MEDICARE

## 2022-04-28 NOTE — TELEPHONE ENCOUNTER
Spoke to patient and she was needing to establish with Dr. Garibay, due to Dr. Gibson retiring. Stated to patient that I could get her an appointment to see Dr. Garibay for her medication refills, but that in the future we may have to switch her to a new doctor coming on board with [facility] Pt verbalized understanding. States her  is a patient of Dr. Garibay and would love to stay with him.

## 2022-04-28 NOTE — TELEPHONE ENCOUNTER
----- Message from Caroline Cummings sent at 4/28/2022 10:55 AM CDT -----  Contact: pt  Type: Needs Medical Advice    Who Called: pt  Best Call Back Number: [phone]  Inquiry/Question: pt would like to est care with provider, please advise pt for scheduling  Thank you~

## 2022-04-28 NOTE — TELEPHONE ENCOUNTER
----- Message from Jordon Bob sent at 4/28/2022 10:42 AM CDT -----  Type: Needs Medical Advice  Who Called: Jillian/ Jailene     Pharmacy name and phone #:    [id number] - [place], [place] - [address] [place] [address] [address] [facility] [address] [address]  Phone: [phone] Fax: [phone]      Best Call Back Number: [phone]  Additional Information: Caller states that the patient's refill has not been called in yet:  metoprolol succinate (TOPROL-XL) 100 MG 24 hr tablet      Caller was informed that Dr. Gibson is retired but another provider could help with the order.

## 2022-04-28 NOTE — TELEPHONE ENCOUNTER
Being addressed in refill encounter. Pt aware she will be contacted once medication is sent to pharm.

## 2022-04-29 RX ORDER — METOPROLOL SUCCINATE 100 MG/1
TABLET, EXTENDED RELEASE ORAL
Qty: 90 TABLET | Refills: 0 | Status: SHIPPED | OUTPATIENT
Start: 2022-04-29 | End: 2022-05-05 | Stop reason: SDUPTHER

## 2022-05-05 ENCOUNTER — OFFICE VISIT (OUTPATIENT)
Dept: PRIMARY CARE CLINIC | Facility: CLINIC | Age: 77
End: 2022-05-05
Payer: MEDICARE

## 2022-05-05 VITALS
OXYGEN SATURATION: 95 % | BODY MASS INDEX: 23.88 KG/M2 | HEART RATE: 53 BPM | DIASTOLIC BLOOD PRESSURE: 60 MMHG | WEIGHT: 139.88 LBS | SYSTOLIC BLOOD PRESSURE: 135 MMHG | HEIGHT: 64 IN

## 2022-05-05 DIAGNOSIS — R79.89 LOW VITAMIN B12 LEVEL: Primary | ICD-10-CM

## 2022-05-05 DIAGNOSIS — N18.31 STAGE 3A CHRONIC KIDNEY DISEASE: ICD-10-CM

## 2022-05-05 DIAGNOSIS — Z23 NEED FOR PNEUMOCOCCAL VACCINATION: ICD-10-CM

## 2022-05-05 DIAGNOSIS — I25.10 CORONARY ARTERY DISEASE INVOLVING NATIVE CORONARY ARTERY OF NATIVE HEART WITHOUT ANGINA PECTORIS: Chronic | ICD-10-CM

## 2022-05-05 DIAGNOSIS — I10 ESSENTIAL HYPERTENSION: ICD-10-CM

## 2022-05-05 DIAGNOSIS — M1A.9XX0 CHRONIC GOUT INVOLVING TOE WITHOUT TOPHUS, UNSPECIFIED CAUSE, UNSPECIFIED LATERALITY: ICD-10-CM

## 2022-05-05 DIAGNOSIS — K21.9 GASTROESOPHAGEAL REFLUX DISEASE WITHOUT ESOPHAGITIS: Chronic | ICD-10-CM

## 2022-05-05 DIAGNOSIS — F41.9 ANXIETY: ICD-10-CM

## 2022-05-05 DIAGNOSIS — Z79.899 CHRONIC PRESCRIPTION BENZODIAZEPINE USE: ICD-10-CM

## 2022-05-05 DIAGNOSIS — E78.2 MIXED HYPERLIPIDEMIA: ICD-10-CM

## 2022-05-05 PROBLEM — I27.20 PULMONARY HYPERTENSION: Chronic | Status: ACTIVE | Noted: 2021-07-08

## 2022-05-05 PROBLEM — E78.5 HYPERLIPIDEMIA: Chronic | Status: ACTIVE | Noted: 2020-08-13

## 2022-05-05 PROCEDURE — 99215 OFFICE O/P EST HI 40 MIN: CPT | Mod: S$GLB,,, | Performed by: FAMILY MEDICINE

## 2022-05-05 PROCEDURE — 90732 PPSV23 VACC 2 YRS+ SUBQ/IM: CPT | Mod: S$GLB,,, | Performed by: FAMILY MEDICINE

## 2022-05-05 PROCEDURE — 99999 PR PBB SHADOW E&M-EST. PATIENT-LVL III: ICD-10-PCS | Mod: PBBFAC,,, | Performed by: FAMILY MEDICINE

## 2022-05-05 PROCEDURE — 1160F PR REVIEW ALL MEDS BY PRESCRIBER/CLIN PHARMACIST DOCUMENTED: ICD-10-PCS | Mod: CPTII,S$GLB,, | Performed by: FAMILY MEDICINE

## 2022-05-05 PROCEDURE — 99215 PR OFFICE/OUTPT VISIT, EST, LEVL V, 40-54 MIN: ICD-10-PCS | Mod: S$GLB,,, | Performed by: FAMILY MEDICINE

## 2022-05-05 PROCEDURE — G0009 ADMIN PNEUMOCOCCAL VACCINE: HCPCS | Mod: S$GLB,,, | Performed by: FAMILY MEDICINE

## 2022-05-05 PROCEDURE — 1126F AMNT PAIN NOTED NONE PRSNT: CPT | Mod: CPTII,S$GLB,, | Performed by: FAMILY MEDICINE

## 2022-05-05 PROCEDURE — 1126F PR PAIN SEVERITY QUANTIFIED, NO PAIN PRESENT: ICD-10-PCS | Mod: CPTII,S$GLB,, | Performed by: FAMILY MEDICINE

## 2022-05-05 PROCEDURE — 99499 UNLISTED E&M SERVICE: CPT | Mod: S$GLB,,, | Performed by: FAMILY MEDICINE

## 2022-05-05 PROCEDURE — 1159F MED LIST DOCD IN RCRD: CPT | Mod: CPTII,S$GLB,, | Performed by: FAMILY MEDICINE

## 2022-05-05 PROCEDURE — 3078F PR MOST RECENT DIASTOLIC BLOOD PRESSURE < 80 MM HG: ICD-10-PCS | Mod: CPTII,S$GLB,, | Performed by: FAMILY MEDICINE

## 2022-05-05 PROCEDURE — 1101F PR PT FALLS ASSESS DOC 0-1 FALLS W/OUT INJ PAST YR: ICD-10-PCS | Mod: CPTII,S$GLB,, | Performed by: FAMILY MEDICINE

## 2022-05-05 PROCEDURE — 99999 PR PBB SHADOW E&M-EST. PATIENT-LVL III: CPT | Mod: PBBFAC,,, | Performed by: FAMILY MEDICINE

## 2022-05-05 PROCEDURE — 1160F RVW MEDS BY RX/DR IN RCRD: CPT | Mod: CPTII,S$GLB,, | Performed by: FAMILY MEDICINE

## 2022-05-05 PROCEDURE — G0009 PNEUMOCOCCAL POLYSACCHARIDE VACCINE 23-VALENT =>2YO SQ IM: ICD-10-PCS | Mod: S$GLB,,, | Performed by: FAMILY MEDICINE

## 2022-05-05 PROCEDURE — 3075F PR MOST RECENT SYSTOLIC BLOOD PRESS GE 130-139MM HG: ICD-10-PCS | Mod: CPTII,S$GLB,, | Performed by: FAMILY MEDICINE

## 2022-05-05 PROCEDURE — 1101F PT FALLS ASSESS-DOCD LE1/YR: CPT | Mod: CPTII,S$GLB,, | Performed by: FAMILY MEDICINE

## 2022-05-05 PROCEDURE — 3075F SYST BP GE 130 - 139MM HG: CPT | Mod: CPTII,S$GLB,, | Performed by: FAMILY MEDICINE

## 2022-05-05 PROCEDURE — 3288F FALL RISK ASSESSMENT DOCD: CPT | Mod: CPTII,S$GLB,, | Performed by: FAMILY MEDICINE

## 2022-05-05 PROCEDURE — 3288F PR FALLS RISK ASSESSMENT DOCUMENTED: ICD-10-PCS | Mod: CPTII,S$GLB,, | Performed by: FAMILY MEDICINE

## 2022-05-05 PROCEDURE — 1159F PR MEDICATION LIST DOCUMENTED IN MEDICAL RECORD: ICD-10-PCS | Mod: CPTII,S$GLB,, | Performed by: FAMILY MEDICINE

## 2022-05-05 PROCEDURE — 99499 RISK ADDL DX/OHS AUDIT: ICD-10-PCS | Mod: S$GLB,,, | Performed by: FAMILY MEDICINE

## 2022-05-05 PROCEDURE — 3078F DIAST BP <80 MM HG: CPT | Mod: CPTII,S$GLB,, | Performed by: FAMILY MEDICINE

## 2022-05-05 PROCEDURE — 90732 PNEUMOCOCCAL POLYSACCHARIDE VACCINE 23-VALENT =>2YO SQ IM: ICD-10-PCS | Mod: S$GLB,,, | Performed by: FAMILY MEDICINE

## 2022-05-05 RX ORDER — LOSARTAN POTASSIUM 100 MG/1
100 TABLET ORAL DAILY
Qty: 90 TABLET | Refills: 3 | Status: SHIPPED | OUTPATIENT
Start: 2022-05-05 | End: 2022-08-08 | Stop reason: SDUPTHER

## 2022-05-05 RX ORDER — METOPROLOL SUCCINATE 100 MG/1
100 TABLET, EXTENDED RELEASE ORAL DAILY
Qty: 90 TABLET | Refills: 3 | Status: SHIPPED | OUTPATIENT
Start: 2022-05-05 | End: 2022-07-26

## 2022-05-05 RX ORDER — CLOPIDOGREL BISULFATE 75 MG/1
75 TABLET ORAL DAILY
Qty: 90 TABLET | Refills: 3 | Status: SHIPPED | OUTPATIENT
Start: 2022-05-05 | End: 2022-09-02 | Stop reason: SDUPTHER

## 2022-05-05 RX ORDER — LANOLIN ALCOHOL/MO/W.PET/CERES
400 CREAM (GRAM) TOPICAL DAILY
Qty: 90 TABLET | Refills: 3 | Status: SHIPPED | OUTPATIENT
Start: 2022-05-05 | End: 2022-06-30

## 2022-05-05 NOTE — ASSESSMENT & PLAN NOTE
Baseline creatinine is about 1.0.  This appears stable and age-appropriate.  No NSAID usage.  No urinary obstructive symptoms or uremic symptoms.

## 2022-05-05 NOTE — ASSESSMENT & PLAN NOTE
She presents to me today as a new patient dependent on prescription lorazepam 0.5 mg, prescribed twice a day, typically once most days, for previous diagnosis of chronic anxiety.  Discussed the nature of this medication, controlled substance, highly addictive, cognitive impairment, musculoskeletal impairment, risk of falls.  Discussed [place] list criteria.

## 2022-05-05 NOTE — ASSESSMENT & PLAN NOTE
Chart review revealed a low vitamin B12 level last year.  She has not been taking supplements I do recommend OTC B12 supplement 1000 mcg daily.

## 2022-05-05 NOTE — ASSESSMENT & PLAN NOTE
This appears well controlled on pantoprazole.  She follows with Dr. Beasley or Dr. Camilo.  She did have an EGD last year.  She also states they handle her colonoscopies and she feels she is up-to-date.

## 2022-05-05 NOTE — ASSESSMENT & PLAN NOTE
Apparently has a longstanding anxiety, history suggestive of generalized anxiety disorder.  She reports she has been on many different medications over the years but only lorazepam has controlled her symptoms.  Apparently she has been on this for many years typically 0.5 mg daily occasionally b.i.d..  She is very anxious about considering other options at this time, and is comfortable with her current therapy

## 2022-05-05 NOTE — PROGRESS NOTES
THIS DOCUMENT WAS MADE IN PART WITH VOICE RECOGNITION SOFTWARE.  OCCASIONALLY THIS SOFTWARE WILL MISINTERPRET WORDS OR PHRASES.      Primary Care Provider Appointment - [facility],   [facility] ([facility])      Patient ID: Evelina Pinon is a 76 y.o. female.    ASSESSMENT/PLAN by Problem List:  Problem List Items Addressed This Visit     Coronary artery disease involving native coronary artery of native heart (Chronic)     Followed by Cardiology.  Appears stable, she denies angina or change in current condition           Essential hypertension (Chronic)     Stable, satisfactory control on current medications           Relevant Orders    Comprehensive Metabolic Panel    CBC Auto Differential    Hyperlipidemia (Chronic)     Remains on atorvastatin.  No recent labs, these will be scheduled           Relevant Orders    Lipid Panel    Anxiety (Chronic)     Apparently has a longstanding anxiety, history suggestive of generalized anxiety disorder.  She reports she has been on many different medications over the years but only lorazepam has controlled her symptoms.  Apparently she has been on this for many years typically 0.5 mg daily occasionally b.i.d..  She is very anxious about considering other options at this time, and is comfortable with her current therapy           Chronic prescription benzodiazepine use (Chronic)     She presents to me today as a new patient dependent on prescription lorazepam 0.5 mg, prescribed twice a day, typically once most days, for previous diagnosis of chronic anxiety.  Discussed the nature of this medication, controlled substance, highly addictive, cognitive impairment, musculoskeletal impairment, risk of falls.  Discussed Beers list criteria.             Gout (Chronic)     Stable on allopurinol, no recent episodes           Relevant Orders    Uric Acid    Gastroesophageal reflux disease without esophagitis (Chronic)     This appears well controlled on  pantoprazole.  She follows with Dr. Beasley or Dr. Camilo.  She did have an EGD last year.  She also states they handle her colonoscopies and she feels she is up-to-date.           Low vitamin B12 level - Primary     Chart review revealed a low vitamin B12 level last year.  She has not been taking supplements I do recommend OTC B12 supplement 1000 mcg daily.           Relevant Orders    Vitamin B12    Stage 3a chronic kidney disease     Baseline creatinine is about 1.0.  This appears stable and age-appropriate.  No NSAID usage.  No urinary obstructive symptoms or uremic symptoms.           Need for pneumococcal vaccination    Relevant Orders    (In Office Administered) Pneumococcal Polysaccharide Vaccine (23 Valent) (SQ/IM)          Follow Up:  3-4 months    50+ minutes of total time spent on the encounter, which includes face to face time and non-face to face time preparing to see the patient (eg, review of tests), Obtaining and/or reviewing separately obtained history, Documenting clinical information in the electronic or other health record, Independently interpreting results (not separately reported) and communicating results to the patient/family/caregiver, or Care coordination (not separately reported).    Health Maintenance       Date Due Completion Date    Hepatitis C Screening Never done ---   Discussed and recommended, required at pharmacy TETANUS VACCINE Never done ---   Discussed and recommended, must get at pharmacy Shingles Vaccine (1 of 2) Never done ---   Discussed recommended, she declined.  I will remind her next time as well DEXA Scan 02/05/2016 2/5/2013 (Done)    Override on 2/5/2013: Done   Patient agrees, administered today. Pneumococcal Vaccines (Age 65+) (2 - PPSV23 or PCV20) 01/16/2019 1/16/2018   Encouraged her to get a booster shot. COVID-19 Vaccine (3 - Booster for Pfizer series) 06/30/2021 1/30/2021   Scheduled Lipid Panel 08/21/2021 8/21/2020    Influenza Vaccine (Season Ended)  "09/01/2022 ---              Subjective:     Chief Complaint   Patient presents with    [facility]     Former [facility] pt     I have reviewed the information entered by the ancillary staff regarding the chief complaint as well as the related history.    HPI    Patient is a/an 76 y.o.  female   RISK of ADMIT/ED: 15    [facility], previous PCP retired.  No acute concerns today.  See above for details.    For complete problem list, past medical history, surgical history, social history, etc., see appropriate section in the electronic medical record      Review of Systems   HENT: Negative.    Gastrointestinal: Negative.    Genitourinary: Negative.    Musculoskeletal: Negative.    Skin: Positive for rash.   Psychiatric/Behavioral: The patient is nervous/anxious.        Objective     Physical Exam  Vitals reviewed.   Constitutional:       General: She is not in acute distress.     Appearance: She is well-developed. She is not toxic-appearing.   HENT:      Head: Normocephalic and atraumatic.   Eyes:      General: No scleral icterus.  Cardiovascular:      Rate and Rhythm: Regular rhythm. Bradycardia present.      Heart sounds: Normal heart sounds. No murmur heard.     Comments: No peripheral edema  Pulmonary:      Effort: Pulmonary effort is normal. No respiratory distress.      Breath sounds: Normal breath sounds. No wheezing or rales.   Skin:     General: Skin is dry.   Neurological:      Mental Status: She is alert and oriented to person, place, and time.   Psychiatric:         Behavior: Behavior normal.       Vitals:    05/05/22 1104   BP: 135/60   BP Location: Right arm   Patient Position: Sitting   BP Method: Medium (Manual)   Pulse: (!) 53   SpO2: 95%   Weight: 63.5 kg (139 lb 14.1 oz)   Height: 5' 4" (1.626 m)       RECENT LABS:    Lab Results   Component Value Date    WBC 5.56 09/14/2021    HGB 12.8 09/14/2021    HCT 41.9 09/14/2021     09/14/2021    CHOL 132 08/21/2020    TRIG 179 (H) 08/21/2020    HDL " 33 (L) 08/21/2020    ALT 8 (L) 09/21/2021    AST 21 09/21/2021     09/21/2021    K 4.4 09/21/2021     09/21/2021    CREATININE 1.0 09/21/2021    BUN 21 09/21/2021    CO2 23 09/21/2021    TSH 3.540 07/04/2021    INR 1.2 08/20/2020    HGBA1C 5.4 08/20/2020       Results for orders placed or performed in visit on 09/21/21   Sedimentation rate   Result Value Ref Range    Sed Rate 13 0 - 36 mm/Hr   Comprehensive Metabolic Panel   Result Value Ref Range    Sodium 138 136 - 145 mmol/L    Potassium 4.4 3.5 - 5.1 mmol/L    Chloride 106 95 - 110 mmol/L    CO2 23 23 - 29 mmol/L    Glucose 96 70 - 110 mg/dL    BUN 21 8 - 23 mg/dL    Creatinine 1.0 0.5 - 1.4 mg/dL    Calcium 10.8 (H) 8.7 - 10.5 mg/dL    Total Protein 5.9 (L) 6.0 - 8.4 g/dL    Albumin 3.4 (L) 3.5 - 5.2 g/dL    Total Bilirubin 0.4 0.1 - 1.0 mg/dL    Alkaline Phosphatase 73 55 - 135 U/L    AST 21 10 - 40 U/L    ALT 8 (L) 10 - 44 U/L    Anion Gap 9 8 - 16 mmol/L    eGFR if African American >60.0 >60 mL/min/1.73 m^2    eGFR if non African American 54.5 (A) >60 mL/min/1.73 m^2

## 2022-05-18 DIAGNOSIS — Z78.0 ASYMPTOMATIC MENOPAUSAL STATE: ICD-10-CM

## 2022-05-23 ENCOUNTER — PATIENT MESSAGE (OUTPATIENT)
Dept: ADMINISTRATIVE | Facility: HOSPITAL | Age: 77
End: 2022-05-23
Payer: MEDICARE

## 2022-06-17 ENCOUNTER — PATIENT OUTREACH (OUTPATIENT)
Dept: ADMINISTRATIVE | Facility: HOSPITAL | Age: 77
End: 2022-06-17
Payer: MEDICARE

## 2022-07-06 ENCOUNTER — TELEPHONE (OUTPATIENT)
Dept: PRIMARY CARE CLINIC | Facility: CLINIC | Age: 77
End: 2022-07-06
Payer: MEDICARE

## 2022-07-06 DIAGNOSIS — M10.9 ACUTE GOUT INVOLVING TOE OF LEFT FOOT, UNSPECIFIED CAUSE: Primary | ICD-10-CM

## 2022-07-06 DIAGNOSIS — M1A.9XX0 CHRONIC GOUT INVOLVING TOE WITHOUT TOPHUS, UNSPECIFIED CAUSE, UNSPECIFIED LATERALITY: Primary | ICD-10-CM

## 2022-07-06 RX ORDER — COLCHICINE 0.6 MG/1
0.6 TABLET ORAL 2 TIMES DAILY
Qty: 14 TABLET | Refills: 0 | Status: SHIPPED | OUTPATIENT
Start: 2022-07-06 | End: 2022-07-07

## 2022-07-06 NOTE — TELEPHONE ENCOUNTER
She already ordered for labs. I would encourage her to have at least the uric acid done.   I will send in for colchicine as probenocid is contraindicated with her renal function.   Hydrate well while on the medication.

## 2022-07-06 NOTE — TELEPHONE ENCOUNTER
Spoke to patient and she is having pain in her L big toe in the joint. Pt believes she is having a gout flare up. Pt takes Allopurinol daily. Please advise if Colchicine could be prescribed and if labs should be ordered. Thanks.      Please advise in PCP absence.

## 2022-07-06 NOTE — TELEPHONE ENCOUNTER
----- Message from Clara Gil sent at 7/5/2022  4:46 PM CDT -----  Who Called: Patient    What is the reqeust in detail: Requesting call back to discuss her recent gout flare up and to have her medication for gout refilled. Please advise.     PHARMACY: [id number] [place] [address] AT [place] [address] & [facility]    Can the clinic reply by [facility]? no    Best Call Back Number: [phone]    Additional Information:

## 2022-07-06 NOTE — TELEPHONE ENCOUNTER
Spoke to patient and stated to her what was noted. Pt states she will have the lab drawn today. Verbalized understanding.

## 2022-07-07 RX ORDER — COLCHICINE 0.6 MG/1
0.6 CAPSULE ORAL 2 TIMES DAILY
Qty: 14 CAPSULE | Refills: 0 | Status: ON HOLD | OUTPATIENT
Start: 2022-07-07 | End: 2023-12-20 | Stop reason: HOSPADM

## 2022-07-07 NOTE — TELEPHONE ENCOUNTER
Received fax from pharmacy in regards to Colchicine. This medication is not covered under patient's plan. Alternative pended. Please advise. Thanks.

## 2022-07-25 NOTE — TELEPHONE ENCOUNTER
Care Due:                  Date            Visit Type   Department     Provider  --------------------------------------------------------------------------------                                ESTABLISHED                  Jeffrey Leon  Last Visit: 05-      PATIENT      None Found     Garibay                              ESTABLISHED                  Jeffrey Leon  Next Visit: 09-      PATIENT      None Found     Garibay                                                            Last  Test          Frequency    Reason                     Performed    Due Date  --------------------------------------------------------------------------------    CBC.........  12 months..  allopurinoL, clopidogreL.  09-   09-    CMP.........  12 months..  allopurinoL,               09- 09-                             atorvastatin, losartan...    Lipid Panel.  12 months..  atorvastatin.............  08- 08-    Uric Acid...  12 months..  allopurinoL..............  07- 07-    [facility] Embedded Care Gaps. Reference number: [id number]. 7/25/2022   10:43:32 AM CDT

## 2022-07-26 RX ORDER — METOPROLOL SUCCINATE 100 MG/1
TABLET, EXTENDED RELEASE ORAL
Qty: 90 TABLET | Refills: 3 | Status: SHIPPED | OUTPATIENT
Start: 2022-07-26 | End: 2023-05-31

## 2022-07-26 NOTE — TELEPHONE ENCOUNTER
Refill Decision Note   Evelina Pinon  is requesting a refill authorization.  Brief Assessment and Rationale for Refill:  Approve    -Medication-Related Problems Identified: Requires labs  Medication Therapy Plan:       Medication Reconciliation Completed: No   Comments:     No Care Gaps recommended.     Note composed:1:48 PM 07/26/2022

## 2022-08-02 ENCOUNTER — CLINICAL SUPPORT (OUTPATIENT)
Dept: PRIMARY CARE CLINIC | Facility: CLINIC | Age: 77
End: 2022-08-02
Payer: MEDICARE

## 2022-08-02 ENCOUNTER — TELEPHONE (OUTPATIENT)
Dept: PRIMARY CARE CLINIC | Facility: CLINIC | Age: 77
End: 2022-08-02
Payer: MEDICARE

## 2022-08-02 DIAGNOSIS — N39.0 URINARY TRACT INFECTION WITHOUT HEMATURIA, SITE UNSPECIFIED: ICD-10-CM

## 2022-08-02 DIAGNOSIS — R30.0 DYSURIA: ICD-10-CM

## 2022-08-02 DIAGNOSIS — R30.0 DYSURIA: Primary | ICD-10-CM

## 2022-08-02 LAB
BACTERIA #/AREA URNS AUTO: ABNORMAL /HPF
BILIRUB UR QL STRIP: NEGATIVE
CLARITY UR REFRACT.AUTO: ABNORMAL
COLOR UR AUTO: YELLOW
GLUCOSE UR QL STRIP: NEGATIVE
HGB UR QL STRIP: NEGATIVE
KETONES UR QL STRIP: NEGATIVE
LEUKOCYTE ESTERASE UR QL STRIP: NEGATIVE
MICROSCOPIC COMMENT: ABNORMAL
NITRITE UR QL STRIP: POSITIVE
PH UR STRIP: 5 [PH] (ref 5–8)
PROT UR QL STRIP: NEGATIVE
RBC #/AREA URNS AUTO: 0 /HPF (ref 0–4)
SP GR UR STRIP: 1.01 (ref 1–1.03)
SQUAMOUS #/AREA URNS AUTO: 0 /HPF
URN SPEC COLLECT METH UR: ABNORMAL
WBC #/AREA URNS AUTO: 1 /HPF (ref 0–5)

## 2022-08-02 PROCEDURE — 87088 URINE BACTERIA CULTURE: CPT | Performed by: FAMILY MEDICINE

## 2022-08-02 PROCEDURE — 87086 URINE CULTURE/COLONY COUNT: CPT | Performed by: FAMILY MEDICINE

## 2022-08-02 PROCEDURE — 87186 SC STD MICRODIL/AGAR DIL: CPT | Performed by: FAMILY MEDICINE

## 2022-08-02 PROCEDURE — 87077 CULTURE AEROBIC IDENTIFY: CPT | Performed by: FAMILY MEDICINE

## 2022-08-02 PROCEDURE — 81001 URINALYSIS AUTO W/SCOPE: CPT | Performed by: FAMILY MEDICINE

## 2022-08-02 RX ORDER — CIPROFLOXACIN 250 MG/1
250 TABLET, FILM COATED ORAL 2 TIMES DAILY
Qty: 14 TABLET | Refills: 0 | Status: SHIPPED | OUTPATIENT
Start: 2022-08-02 | End: 2022-08-09

## 2022-08-02 NOTE — TELEPHONE ENCOUNTER
I sent in a prescription for Cipro but please advise her not to start this until after she has collected the urine

## 2022-08-02 NOTE — TELEPHONE ENCOUNTER
Spoke to patient and she states she is having burning with urination, chills and urinary frequency. Orders entered for urinalysis and culture. Pt states she will come by our clinic this afternoon to give a urine sample. Please advise on antibiotic. Thanks.

## 2022-08-02 NOTE — TELEPHONE ENCOUNTER
----- Message from Peggy Harrison sent at 8/2/2022 10:50 AM CDT -----  Regarding: pt called  Name of Who is Calling: GILMA BANKS [[id number]]      What is the request in detail: pt is requesting to be seen today  due to UTI . Please advise       Can the clinic reply by [facility]: No      What Number to Call Back if not [facility]:   [phone]

## 2022-08-05 LAB — BACTERIA UR CULT: ABNORMAL

## 2022-08-08 ENCOUNTER — CLINICAL SUPPORT (OUTPATIENT)
Dept: PRIMARY CARE CLINIC | Facility: CLINIC | Age: 77
End: 2022-08-08
Payer: MEDICARE

## 2022-08-08 ENCOUNTER — TELEPHONE (OUTPATIENT)
Dept: PRIMARY CARE CLINIC | Facility: CLINIC | Age: 77
End: 2022-08-08

## 2022-08-08 DIAGNOSIS — Z79.899 ENCOUNTER FOR MEDICATION REVIEW: Primary | ICD-10-CM

## 2022-08-08 PROCEDURE — 99999 PR PBB SHADOW E&M-EST. PATIENT-LVL II: ICD-10-PCS | Mod: PBBFAC,,,

## 2022-08-08 PROCEDURE — 99999 PR PBB SHADOW E&M-EST. PATIENT-LVL II: CPT | Mod: PBBFAC,,,

## 2022-08-08 RX ORDER — LORAZEPAM 0.5 MG/1
0.5 TABLET ORAL 2 TIMES DAILY
Qty: 60 TABLET | Refills: 3 | Status: SHIPPED | OUTPATIENT
Start: 2022-08-08 | End: 2023-03-07 | Stop reason: SDUPTHER

## 2022-08-08 RX ORDER — LOSARTAN POTASSIUM 100 MG/1
100 TABLET ORAL DAILY
Qty: 90 TABLET | Refills: 3 | Status: SHIPPED | OUTPATIENT
Start: 2022-08-08 | End: 2022-09-02 | Stop reason: SDUPTHER

## 2022-08-08 RX ORDER — PANTOPRAZOLE SODIUM 40 MG/1
40 TABLET, DELAYED RELEASE ORAL DAILY
Qty: 90 TABLET | Refills: 0 | Status: SHIPPED | OUTPATIENT
Start: 2022-08-08 | End: 2022-10-11

## 2022-08-08 RX ORDER — ALLOPURINOL 100 MG/1
200 TABLET ORAL DAILY
Qty: 180 TABLET | Refills: 1 | Status: SHIPPED | OUTPATIENT
Start: 2022-08-08 | End: 2022-12-15

## 2022-08-08 RX ORDER — ATORVASTATIN CALCIUM 10 MG/1
10 TABLET, FILM COATED ORAL DAILY
Qty: 90 TABLET | Refills: 3 | Status: SHIPPED | OUTPATIENT
Start: 2022-08-08 | End: 2023-08-22 | Stop reason: SDUPTHER

## 2022-08-08 NOTE — PROGRESS NOTES
Pt here for medication review. Would like all medications that were previously filled by Dr. Gibson, to be filled by Dr. Garibay.

## 2022-08-08 NOTE — TELEPHONE ENCOUNTER
----- Message from Caroline Brown sent at 8/8/2022  1:44 PM CDT -----  Contact: Pt  Type: Needs Medical Advice    Who Called:  Pt    Best Call Back Number: [phone]    Additional Information: Please call back about pt's prescriptions.  Thanks.

## 2022-08-08 NOTE — Clinical Note
Please advise on medication refills. Pt requesting these medications to be sent to pharmacy under Dr. Garibay's name. Pended.

## 2022-09-01 ENCOUNTER — LAB VISIT (OUTPATIENT)
Dept: LAB | Facility: HOSPITAL | Age: 77
End: 2022-09-01
Attending: FAMILY MEDICINE
Payer: MEDICARE

## 2022-09-01 DIAGNOSIS — M1A.9XX0 CHRONIC GOUT INVOLVING TOE WITHOUT TOPHUS, UNSPECIFIED CAUSE, UNSPECIFIED LATERALITY: ICD-10-CM

## 2022-09-01 DIAGNOSIS — R79.89 LOW VITAMIN B12 LEVEL: ICD-10-CM

## 2022-09-01 DIAGNOSIS — I10 ESSENTIAL HYPERTENSION: ICD-10-CM

## 2022-09-01 DIAGNOSIS — E78.2 MIXED HYPERLIPIDEMIA: ICD-10-CM

## 2022-09-01 LAB
ALBUMIN SERPL BCP-MCNC: 3.3 G/DL (ref 3.5–5.2)
ALP SERPL-CCNC: 75 U/L (ref 55–135)
ALT SERPL W/O P-5'-P-CCNC: 13 U/L (ref 10–44)
ANION GAP SERPL CALC-SCNC: 10 MMOL/L (ref 8–16)
AST SERPL-CCNC: 21 U/L (ref 10–40)
BASOPHILS # BLD AUTO: 0.02 K/UL (ref 0–0.2)
BASOPHILS NFR BLD: 0.3 % (ref 0–1.9)
BILIRUB SERPL-MCNC: 0.6 MG/DL (ref 0.1–1)
BUN SERPL-MCNC: 30 MG/DL (ref 8–23)
CALCIUM SERPL-MCNC: 11.6 MG/DL (ref 8.7–10.5)
CHLORIDE SERPL-SCNC: 107 MMOL/L (ref 95–110)
CHOLEST SERPL-MCNC: 115 MG/DL (ref 120–199)
CHOLEST/HDLC SERPL: 3.8 {RATIO} (ref 2–5)
CO2 SERPL-SCNC: 23 MMOL/L (ref 23–29)
CREAT SERPL-MCNC: 1.4 MG/DL (ref 0.5–1.4)
DIFFERENTIAL METHOD: ABNORMAL
EOSINOPHIL # BLD AUTO: 0.2 K/UL (ref 0–0.5)
EOSINOPHIL NFR BLD: 3.2 % (ref 0–8)
ERYTHROCYTE [DISTWIDTH] IN BLOOD BY AUTOMATED COUNT: 14.4 % (ref 11.5–14.5)
EST. GFR  (NO RACE VARIABLE): 38.5 ML/MIN/1.73 M^2
GLUCOSE SERPL-MCNC: 88 MG/DL (ref 70–110)
HCT VFR BLD AUTO: 39.1 % (ref 37–48.5)
HDLC SERPL-MCNC: 30 MG/DL (ref 40–75)
HDLC SERPL: 26.1 % (ref 20–50)
HGB BLD-MCNC: 12.2 G/DL (ref 12–16)
IMM GRANULOCYTES # BLD AUTO: 0.02 K/UL (ref 0–0.04)
IMM GRANULOCYTES NFR BLD AUTO: 0.3 % (ref 0–0.5)
LDLC SERPL CALC-MCNC: 59.6 MG/DL (ref 63–159)
LYMPHOCYTES # BLD AUTO: 1.5 K/UL (ref 1–4.8)
LYMPHOCYTES NFR BLD: 22.5 % (ref 18–48)
MCH RBC QN AUTO: 31.9 PG (ref 27–31)
MCHC RBC AUTO-ENTMCNC: 31.2 G/DL (ref 32–36)
MCV RBC AUTO: 102 FL (ref 82–98)
MONOCYTES # BLD AUTO: 0.6 K/UL (ref 0.3–1)
MONOCYTES NFR BLD: 9.2 % (ref 4–15)
NEUTROPHILS # BLD AUTO: 4.2 K/UL (ref 1.8–7.7)
NEUTROPHILS NFR BLD: 64.5 % (ref 38–73)
NONHDLC SERPL-MCNC: 85 MG/DL
NRBC BLD-RTO: 0 /100 WBC
PLATELET # BLD AUTO: 241 K/UL (ref 150–450)
PMV BLD AUTO: 11.3 FL (ref 9.2–12.9)
POTASSIUM SERPL-SCNC: 4.2 MMOL/L (ref 3.5–5.1)
PROT SERPL-MCNC: 5.5 G/DL (ref 6–8.4)
RBC # BLD AUTO: 3.83 M/UL (ref 4–5.4)
SODIUM SERPL-SCNC: 140 MMOL/L (ref 136–145)
TRIGL SERPL-MCNC: 127 MG/DL (ref 30–150)
URATE SERPL-MCNC: 4.5 MG/DL (ref 2.4–5.7)
VIT B12 SERPL-MCNC: 214 PG/ML (ref 210–950)
WBC # BLD AUTO: 6.54 K/UL (ref 3.9–12.7)

## 2022-09-01 PROCEDURE — 80053 COMPREHEN METABOLIC PANEL: CPT | Performed by: FAMILY MEDICINE

## 2022-09-01 PROCEDURE — 80061 LIPID PANEL: CPT | Performed by: FAMILY MEDICINE

## 2022-09-01 PROCEDURE — 36415 COLL VENOUS BLD VENIPUNCTURE: CPT | Mod: PN | Performed by: FAMILY MEDICINE

## 2022-09-01 PROCEDURE — 82607 VITAMIN B-12: CPT | Performed by: FAMILY MEDICINE

## 2022-09-01 PROCEDURE — 84550 ASSAY OF BLOOD/URIC ACID: CPT | Performed by: FAMILY MEDICINE

## 2022-09-01 PROCEDURE — 85025 COMPLETE CBC W/AUTO DIFF WBC: CPT | Performed by: FAMILY MEDICINE

## 2022-09-02 ENCOUNTER — TELEPHONE (OUTPATIENT)
Dept: PRIMARY CARE CLINIC | Facility: CLINIC | Age: 77
End: 2022-09-02

## 2022-09-02 DIAGNOSIS — E83.52 HYPERCALCEMIA: Primary | ICD-10-CM

## 2022-09-02 NOTE — TELEPHONE ENCOUNTER
Spoke to patient and stated to her what was noted. Pt verbalized understanding and labs scheduled.

## 2022-09-02 NOTE — TELEPHONE ENCOUNTER
Please CALL and speak to her regarding lab results.  Her calcium is very high.  She immediately needs to start drinking more water to help dilute this.  She needs to return for additional tests as soon as possible.  Orders entered.  If she is feeling very badly then I want her to go to the emergency room.

## 2022-09-06 ENCOUNTER — LAB VISIT (OUTPATIENT)
Dept: LAB | Facility: HOSPITAL | Age: 77
End: 2022-09-06
Attending: FAMILY MEDICINE
Payer: MEDICARE

## 2022-09-06 DIAGNOSIS — E83.52 HYPERCALCEMIA: ICD-10-CM

## 2022-09-06 LAB
25(OH)D3+25(OH)D2 SERPL-MCNC: 14 NG/ML (ref 30–96)
ANION GAP SERPL CALC-SCNC: 8 MMOL/L (ref 8–16)
BUN SERPL-MCNC: 30 MG/DL (ref 8–23)
CA-I BLDV-SCNC: 1.56 MMOL/L (ref 1.06–1.42)
CALCIUM SERPL-MCNC: 11.5 MG/DL (ref 8.7–10.5)
CHLORIDE SERPL-SCNC: 100 MMOL/L (ref 95–110)
CO2 SERPL-SCNC: 25 MMOL/L (ref 23–29)
CREAT SERPL-MCNC: 1.4 MG/DL (ref 0.5–1.4)
EST. GFR  (NO RACE VARIABLE): 38.5 ML/MIN/1.73 M^2
GLUCOSE SERPL-MCNC: 99 MG/DL (ref 70–110)
POTASSIUM SERPL-SCNC: 4.6 MMOL/L (ref 3.5–5.1)
PTH-INTACT SERPL-MCNC: 8.1 PG/ML (ref 9–77)
SODIUM SERPL-SCNC: 133 MMOL/L (ref 136–145)

## 2022-09-06 PROCEDURE — 80048 BASIC METABOLIC PNL TOTAL CA: CPT | Performed by: FAMILY MEDICINE

## 2022-09-06 PROCEDURE — 82306 VITAMIN D 25 HYDROXY: CPT | Performed by: FAMILY MEDICINE

## 2022-09-06 PROCEDURE — 83970 ASSAY OF PARATHORMONE: CPT | Performed by: FAMILY MEDICINE

## 2022-09-06 PROCEDURE — 36415 COLL VENOUS BLD VENIPUNCTURE: CPT | Mod: PN | Performed by: FAMILY MEDICINE

## 2022-09-06 PROCEDURE — 82330 ASSAY OF CALCIUM: CPT | Performed by: FAMILY MEDICINE

## 2022-09-07 ENCOUNTER — TELEPHONE (OUTPATIENT)
Dept: PRIMARY CARE CLINIC | Facility: CLINIC | Age: 77
End: 2022-09-07

## 2022-09-07 NOTE — TELEPHONE ENCOUNTER
Please call regarding labs.  Calcium remains elevated.  It is very important that she keep her appointment tomorrow to discuss in more detail.  Please call her and advise

## 2022-09-07 NOTE — TELEPHONE ENCOUNTER
Spoke to patient and stated to her what was noted. Pt verbalized understanding. States she will be at her appointment tomorrow.

## 2022-09-08 ENCOUNTER — OFFICE VISIT (OUTPATIENT)
Dept: PRIMARY CARE CLINIC | Facility: CLINIC | Age: 77
End: 2022-09-08
Payer: MEDICARE

## 2022-09-08 VITALS
HEART RATE: 48 BPM | HEIGHT: 64 IN | BODY MASS INDEX: 22.7 KG/M2 | DIASTOLIC BLOOD PRESSURE: 60 MMHG | WEIGHT: 132.94 LBS | SYSTOLIC BLOOD PRESSURE: 128 MMHG | OXYGEN SATURATION: 97 % | RESPIRATION RATE: 18 BRPM

## 2022-09-08 DIAGNOSIS — E83.52 HYPERCALCEMIA: Primary | ICD-10-CM

## 2022-09-08 DIAGNOSIS — R79.89 LOW SERUM PARATHYROID HORMONE (PTH): ICD-10-CM

## 2022-09-08 DIAGNOSIS — R59.0 MEDIASTINAL ADENOPATHY: ICD-10-CM

## 2022-09-08 DIAGNOSIS — M54.31 SCIATICA OF RIGHT SIDE: ICD-10-CM

## 2022-09-08 PROCEDURE — 99999 PR PBB SHADOW E&M-EST. PATIENT-LVL IV: CPT | Mod: PBBFAC,,, | Performed by: FAMILY MEDICINE

## 2022-09-08 PROCEDURE — 3074F SYST BP LT 130 MM HG: CPT | Mod: CPTII,S$GLB,, | Performed by: FAMILY MEDICINE

## 2022-09-08 PROCEDURE — 99999 PR PBB SHADOW E&M-EST. PATIENT-LVL IV: ICD-10-PCS | Mod: PBBFAC,,, | Performed by: FAMILY MEDICINE

## 2022-09-08 PROCEDURE — 1159F PR MEDICATION LIST DOCUMENTED IN MEDICAL RECORD: ICD-10-PCS | Mod: CPTII,S$GLB,, | Performed by: FAMILY MEDICINE

## 2022-09-08 PROCEDURE — 3074F PR MOST RECENT SYSTOLIC BLOOD PRESSURE < 130 MM HG: ICD-10-PCS | Mod: CPTII,S$GLB,, | Performed by: FAMILY MEDICINE

## 2022-09-08 PROCEDURE — 1101F PR PT FALLS ASSESS DOC 0-1 FALLS W/OUT INJ PAST YR: ICD-10-PCS | Mod: CPTII,S$GLB,, | Performed by: FAMILY MEDICINE

## 2022-09-08 PROCEDURE — 3288F PR FALLS RISK ASSESSMENT DOCUMENTED: ICD-10-PCS | Mod: CPTII,S$GLB,, | Performed by: FAMILY MEDICINE

## 2022-09-08 PROCEDURE — 99215 PR OFFICE/OUTPT VISIT, EST, LEVL V, 40-54 MIN: ICD-10-PCS | Mod: S$GLB,,, | Performed by: FAMILY MEDICINE

## 2022-09-08 PROCEDURE — 1159F MED LIST DOCD IN RCRD: CPT | Mod: CPTII,S$GLB,, | Performed by: FAMILY MEDICINE

## 2022-09-08 PROCEDURE — 1126F AMNT PAIN NOTED NONE PRSNT: CPT | Mod: CPTII,S$GLB,, | Performed by: FAMILY MEDICINE

## 2022-09-08 PROCEDURE — 1101F PT FALLS ASSESS-DOCD LE1/YR: CPT | Mod: CPTII,S$GLB,, | Performed by: FAMILY MEDICINE

## 2022-09-08 PROCEDURE — 99215 OFFICE O/P EST HI 40 MIN: CPT | Mod: S$GLB,,, | Performed by: FAMILY MEDICINE

## 2022-09-08 PROCEDURE — 3288F FALL RISK ASSESSMENT DOCD: CPT | Mod: CPTII,S$GLB,, | Performed by: FAMILY MEDICINE

## 2022-09-08 PROCEDURE — 99499 UNLISTED E&M SERVICE: CPT | Mod: HCNC,S$GLB,, | Performed by: FAMILY MEDICINE

## 2022-09-08 PROCEDURE — 3078F DIAST BP <80 MM HG: CPT | Mod: CPTII,S$GLB,, | Performed by: FAMILY MEDICINE

## 2022-09-08 PROCEDURE — 1126F PR PAIN SEVERITY QUANTIFIED, NO PAIN PRESENT: ICD-10-PCS | Mod: CPTII,S$GLB,, | Performed by: FAMILY MEDICINE

## 2022-09-08 PROCEDURE — 3078F PR MOST RECENT DIASTOLIC BLOOD PRESSURE < 80 MM HG: ICD-10-PCS | Mod: CPTII,S$GLB,, | Performed by: FAMILY MEDICINE

## 2022-09-08 NOTE — PROGRESS NOTES
THIS DOCUMENT WAS MADE IN PART WITH VOICE RECOGNITION SOFTWARE.  OCCASIONALLY THIS SOFTWARE WILL MISINTERPRET WORDS OR PHRASES.      Primary Care Provider Appointment   [facility] ([facility])  [address], [place] [address]   Ph: [phone]  Fax: [phone]      Patient ID: Evelina Pinon is a 77 y.o. female.    Evelina was seen today for coronary artery disease, hypertension and anxiety.    Diagnoses and all orders for this visit:    Hypercalcemia  -     CT Chest Abdomen Pelvis Without Contrast (XPD); Future  -     PROTEIN ELECTROPHORESIS, SERUM; Future  -     IMMUNOFIXATION ELECTROPHORESIS, SERUM; Future  -     Protein Electrophoresis, Random Urine  -     IMMUNOGLOBULIN FREE LT CHAINS BLOOD; Future    Low serum parathyroid hormone (PTH)  -     CT Chest Abdomen Pelvis Without Contrast (XPD); Future    Mediastinal adenopathy  -     CT Chest Abdomen Pelvis Without Contrast (XPD); Future    Discussion:  Hypercalcemia, new onset, low PTH.  No thiazide diuretics or other obvious medication trigger.  Patient has risk for cancer, history of smoking, previously known mediastinal adenopathy, esophageal mass, (although biopsy was reportedly normal).  Sarcoidosis previously suspected (although I did not find lymph node biopsy to confirm, only speculation from pulmonology) and that can cause hypercalcemia however I do feel given other risk factors she needs to repeat CT of the chest, will go ahead and extend to the abdomen and pelvis.  Patient does need to follow back with pulmonology but will review the results of these images with her 1st.  Will check labs to screen for multiple myeloma.  May benefit from Endocrinology consultation although with low PTH, feel workup for malignancy should occur 1st.    Also note that patient was extremely anxious about these findings and initially and repeatedly declined to consider further workup.  However after I discuss this with her and  her , she does agree to proceed with workup although if we find anything of concern it is unclear whether she would want to even pursue that further but I feel we should at least gather all the information to help her make the most appropriate decision    Sciatica of right side  -     Ambulatory referral/consult to Physical/Occupational Therapy; Future        Follow Up:  After tests  Total time 52 minutes.  This included prolonged discussion, chart review, examination, etc..    Health Maintenance         Date Due Completion Date    Hepatitis C Screening Never done ---    TETANUS VACCINE Never done ---    Shingles Vaccine (1 of 2) Never done ---    DEXA Scan 02/05/2016 2/5/2013 (Done)    Override on 2/5/2013: Done    COVID-19 Vaccine (3 - Booster for Pfizer series) 06/30/2021 1/30/2021    Influenza Vaccine (1) Never done ---    Lipid Panel 09/01/2023 9/1/2022    Aspirin/Antiplatelet Therapy 09/02/2023 9/2/2022            Subjective:     Follow-up labs      HPI    Patient is a/an 77 y.o.  female   RISK of ADMIT/ED: 11    Follow-up multiple topics but recently found to have hypercalcemia.  Repeat confirmed hypercalcemia, parathyroid hormone level was low.  Raising suspicion for malignant process.  Patient was found to have mediastinal lymphadenopathy last year as well as an esophageal mass.  She underwent biopsy that was reported as negative.  She also consulted with pulmonology.  I do see mention of sarcoidosis although I do not see pathology confirming this.  She also follows with pulmonology for pulmonary hypertension.    Patient did not follow with pulmonology as instructed earlier this year and did not report for repeat CT scan.    No colonoscopy on file.  She has follows with external Gastroenterology, stated she was up-to-date but vague and not able to provide specific dates    She does report right-sided leg pain in the distribution of the sciatic nerve.  She says it occurred suddenly one morning when  she woke up.  Nothing strenuous prior although she has been doing some light walking for exercise.  Symptoms have since settle down.  She did have similar symptoms in the past.  She is interested in physical therapy to help improve strength, posture and reduce recurrence.        For complete problem list, past medical history, surgical history, social history, etc., see appropriate section in the electronic medical record    Review of Systems   Respiratory: Negative.     Cardiovascular: Negative.    Musculoskeletal:  Positive for arthralgias.   Psychiatric/Behavioral:  The patient is nervous/anxious.      Objective     Physical Exam  Vitals reviewed.   Constitutional:       General: She is not in acute distress.     Appearance: She is well-developed. She is not toxic-appearing.   HENT:      Head: Normocephalic and atraumatic.   Eyes:      General: No scleral icterus.  Cardiovascular:      Rate and Rhythm: Regular rhythm. Bradycardia present.      Heart sounds: Normal heart sounds. No murmur heard.     Comments: No peripheral edema  Pulmonary:      Effort: Pulmonary effort is normal.      Breath sounds: Normal breath sounds.   Skin:     General: Skin is dry.   Neurological:      Mental Status: She is alert and oriented to person, place, and time.   Psychiatric:         Mood and Affect: Mood is anxious.     There were no vitals filed for this visit.    RECENT LABS:    Lab Results   Component Value Date    WBC 6.54 09/01/2022    HGB 12.2 09/01/2022    HCT 39.1 09/01/2022     09/01/2022    CHOL 115 (L) 09/01/2022    TRIG 127 09/01/2022    HDL 30 (L) 09/01/2022    ALT 13 09/01/2022    AST 21 09/01/2022     (L) 09/06/2022    K 4.6 09/06/2022     09/06/2022    CREATININE 1.4 09/06/2022    BUN 30 (H) 09/06/2022    CO2 25 09/06/2022    TSH 3.540 07/04/2021    INR 1.2 08/20/2020    HGBA1C 5.4 08/20/2020       Results for orders placed or performed in visit on 09/06/22   Basic Metabolic Panel   Result Value  Ref Range    Sodium 133 (L) 136 - 145 mmol/L    Potassium 4.6 3.5 - 5.1 mmol/L    Chloride 100 95 - 110 mmol/L    CO2 25 23 - 29 mmol/L    Glucose 99 70 - 110 mg/dL    BUN 30 (H) 8 - 23 mg/dL    Creatinine 1.4 0.5 - 1.4 mg/dL    Calcium 11.5 (H) 8.7 - 10.5 mg/dL    Anion Gap 8 8 - 16 mmol/L    eGFR 38.5 (A) >60 mL/min/1.73 m^2   PTH, Intact   Result Value Ref Range    PTH, Intact 8.1 (L) 9.0 - 77.0 pg/mL   Vitamin D   Result Value Ref Range    Vit D, 25-Hydroxy 14 (L) 30 - 96 ng/mL   Calcium, Ionized   Result Value Ref Range    Ionized Calcium 1.56 (H) 1.06 - 1.42 mmol/L

## 2022-09-09 ENCOUNTER — TELEPHONE (OUTPATIENT)
Dept: PRIMARY CARE CLINIC | Facility: CLINIC | Age: 77
End: 2022-09-09
Payer: MEDICARE

## 2022-09-09 NOTE — TELEPHONE ENCOUNTER
Spoke to pt's , he stated that there is contrast that should be taken by pt, the company stated that they do not provide the oral contrast.     Called ST, out pt lenora, spoke with My, she stated that pt will be provided oral contrast and pt will need to be NPO for 4 hours prior and arrive 2 hours early to drink the oral contrast.     Called pt , Mr. Marlow, informed him of directions.    He also asked about labs. Explained pt does have labs to be drawn prior to test. Verbalized understanding.

## 2022-09-09 NOTE — TELEPHONE ENCOUNTER
----- Message from Diana Olivier sent at 9/9/2022  8:32 AM CDT -----  Regarding: ct test  If you please give  a call at [phone] concerning his wife's appointment for today. They are confuse with the instruction.      Thanks.    Diana

## 2022-10-25 ENCOUNTER — OFFICE VISIT (OUTPATIENT)
Dept: CARDIOLOGY | Facility: CLINIC | Age: 77
End: 2022-10-25
Payer: MEDICARE

## 2022-10-25 VITALS
HEIGHT: 64 IN | HEART RATE: 53 BPM | WEIGHT: 134.69 LBS | SYSTOLIC BLOOD PRESSURE: 145 MMHG | BODY MASS INDEX: 22.99 KG/M2 | DIASTOLIC BLOOD PRESSURE: 71 MMHG

## 2022-10-25 DIAGNOSIS — I25.10 CORONARY ARTERY DISEASE INVOLVING NATIVE CORONARY ARTERY OF NATIVE HEART WITHOUT ANGINA PECTORIS: Chronic | ICD-10-CM

## 2022-10-25 DIAGNOSIS — I10 ESSENTIAL HYPERTENSION: Primary | Chronic | ICD-10-CM

## 2022-10-25 DIAGNOSIS — E78.2 MIXED HYPERLIPIDEMIA: Chronic | ICD-10-CM

## 2022-10-25 PROCEDURE — 3078F DIAST BP <80 MM HG: CPT | Mod: CPTII,S$GLB,, | Performed by: INTERNAL MEDICINE

## 2022-10-25 PROCEDURE — 3078F PR MOST RECENT DIASTOLIC BLOOD PRESSURE < 80 MM HG: ICD-10-PCS | Mod: CPTII,S$GLB,, | Performed by: INTERNAL MEDICINE

## 2022-10-25 PROCEDURE — 3077F PR MOST RECENT SYSTOLIC BLOOD PRESSURE >= 140 MM HG: ICD-10-PCS | Mod: CPTII,S$GLB,, | Performed by: INTERNAL MEDICINE

## 2022-10-25 PROCEDURE — 99999 PR PBB SHADOW E&M-EST. PATIENT-LVL III: ICD-10-PCS | Mod: PBBFAC,,, | Performed by: INTERNAL MEDICINE

## 2022-10-25 PROCEDURE — 1160F RVW MEDS BY RX/DR IN RCRD: CPT | Mod: CPTII,S$GLB,, | Performed by: INTERNAL MEDICINE

## 2022-10-25 PROCEDURE — 99214 OFFICE O/P EST MOD 30 MIN: CPT | Mod: S$GLB,,, | Performed by: INTERNAL MEDICINE

## 2022-10-25 PROCEDURE — 1160F PR REVIEW ALL MEDS BY PRESCRIBER/CLIN PHARMACIST DOCUMENTED: ICD-10-PCS | Mod: CPTII,S$GLB,, | Performed by: INTERNAL MEDICINE

## 2022-10-25 PROCEDURE — 1126F AMNT PAIN NOTED NONE PRSNT: CPT | Mod: CPTII,S$GLB,, | Performed by: INTERNAL MEDICINE

## 2022-10-25 PROCEDURE — 99999 PR PBB SHADOW E&M-EST. PATIENT-LVL III: CPT | Mod: PBBFAC,,, | Performed by: INTERNAL MEDICINE

## 2022-10-25 PROCEDURE — 99214 PR OFFICE/OUTPT VISIT, EST, LEVL IV, 30-39 MIN: ICD-10-PCS | Mod: S$GLB,,, | Performed by: INTERNAL MEDICINE

## 2022-10-25 PROCEDURE — 3077F SYST BP >= 140 MM HG: CPT | Mod: CPTII,S$GLB,, | Performed by: INTERNAL MEDICINE

## 2022-10-25 PROCEDURE — 1126F PR PAIN SEVERITY QUANTIFIED, NO PAIN PRESENT: ICD-10-PCS | Mod: CPTII,S$GLB,, | Performed by: INTERNAL MEDICINE

## 2022-10-25 PROCEDURE — 1159F PR MEDICATION LIST DOCUMENTED IN MEDICAL RECORD: ICD-10-PCS | Mod: CPTII,S$GLB,, | Performed by: INTERNAL MEDICINE

## 2022-10-25 PROCEDURE — 1159F MED LIST DOCD IN RCRD: CPT | Mod: CPTII,S$GLB,, | Performed by: INTERNAL MEDICINE

## 2022-10-25 NOTE — PROGRESS NOTES
Subjective:    Patient ID:  Evelina Pinon is a 77 y.o. female who presents for follow-up of Hypertension      HPI  She comes with no complaints, no chest pain, no shortness of breath  BP normal at home    Review of Systems   Constitutional: Negative for decreased appetite, malaise/fatigue, weight gain and weight loss.   Cardiovascular:  Negative for chest pain, dyspnea on exertion, leg swelling, palpitations and syncope.   Respiratory:  Negative for cough and shortness of breath.    Gastrointestinal: Negative.    Neurological:  Negative for weakness.   All other systems reviewed and are negative.     Objective:      Physical Exam  Vitals and nursing note reviewed.   Constitutional:       Appearance: Normal appearance. She is well-developed.   HENT:      Head: Normocephalic.   Eyes:      Pupils: Pupils are equal, round, and reactive to light.   Neck:      Thyroid: No thyromegaly.      Vascular: No carotid bruit or JVD.   Cardiovascular:      Rate and Rhythm: Normal rate and regular rhythm.      Chest Wall: PMI is not displaced.      Pulses: Normal pulses and intact distal pulses.      Heart sounds: Normal heart sounds. No murmur heard.    No gallop.   Pulmonary:      Effort: Pulmonary effort is normal.      Breath sounds: Normal breath sounds.   Abdominal:      Palpations: Abdomen is soft. There is no mass.      Tenderness: There is no abdominal tenderness.   Musculoskeletal:         General: Normal range of motion.      Cervical back: Normal range of motion and neck supple.   Skin:     General: Skin is warm.   Neurological:      Mental Status: She is alert and oriented to person, place, and time.      Sensory: No sensory deficit.      Deep Tendon Reflexes: Reflexes are normal and symmetric.         Assessment:       1. Essential hypertension    2. Mixed hyperlipidemia    3. Coronary artery disease involving native coronary artery of native heart without angina pectoris         Plan:     Continue all cardiac  medications  Regular exercise program  1 yr f/u

## 2022-11-07 ENCOUNTER — TELEPHONE (OUTPATIENT)
Dept: FAMILY MEDICINE | Facility: CLINIC | Age: 77
End: 2022-11-07
Payer: MEDICARE

## 2022-11-28 ENCOUNTER — OFFICE VISIT (OUTPATIENT)
Dept: PRIMARY CARE CLINIC | Facility: CLINIC | Age: 77
End: 2022-11-28
Payer: MEDICARE

## 2022-11-28 VITALS
DIASTOLIC BLOOD PRESSURE: 70 MMHG | HEART RATE: 55 BPM | TEMPERATURE: 98 F | HEIGHT: 64 IN | SYSTOLIC BLOOD PRESSURE: 142 MMHG | WEIGHT: 131.19 LBS | BODY MASS INDEX: 22.4 KG/M2 | OXYGEN SATURATION: 94 %

## 2022-11-28 DIAGNOSIS — B37.31 VAGINAL YEAST INFECTION: Primary | ICD-10-CM

## 2022-11-28 DIAGNOSIS — N89.8 VAGINAL ITCHING: ICD-10-CM

## 2022-11-28 LAB
BACTERIA #/AREA URNS AUTO: ABNORMAL /HPF
BILIRUB UR QL STRIP: NEGATIVE
CLARITY UR REFRACT.AUTO: ABNORMAL
COLOR UR AUTO: YELLOW
GLUCOSE UR QL STRIP: NEGATIVE
HGB UR QL STRIP: NEGATIVE
KETONES UR QL STRIP: NEGATIVE
LEUKOCYTE ESTERASE UR QL STRIP: NEGATIVE
MICROSCOPIC COMMENT: ABNORMAL
NITRITE UR QL STRIP: POSITIVE
PH UR STRIP: 5 [PH] (ref 5–8)
PROT UR QL STRIP: NEGATIVE
RBC #/AREA URNS AUTO: 1 /HPF (ref 0–4)
SP GR UR STRIP: 1.02 (ref 1–1.03)
SQUAMOUS #/AREA URNS AUTO: 0 /HPF
URN SPEC COLLECT METH UR: ABNORMAL
WBC #/AREA URNS AUTO: 1 /HPF (ref 0–5)

## 2022-11-28 PROCEDURE — 99999 PR PBB SHADOW E&M-EST. PATIENT-LVL IV: ICD-10-PCS | Mod: PBBFAC,,, | Performed by: INTERNAL MEDICINE

## 2022-11-28 PROCEDURE — 99203 OFFICE O/P NEW LOW 30 MIN: CPT | Mod: S$GLB,,, | Performed by: INTERNAL MEDICINE

## 2022-11-28 PROCEDURE — 1159F PR MEDICATION LIST DOCUMENTED IN MEDICAL RECORD: ICD-10-PCS | Mod: CPTII,S$GLB,, | Performed by: INTERNAL MEDICINE

## 2022-11-28 PROCEDURE — 3077F SYST BP >= 140 MM HG: CPT | Mod: CPTII,S$GLB,, | Performed by: INTERNAL MEDICINE

## 2022-11-28 PROCEDURE — 81001 URINALYSIS AUTO W/SCOPE: CPT | Performed by: INTERNAL MEDICINE

## 2022-11-28 PROCEDURE — 1160F RVW MEDS BY RX/DR IN RCRD: CPT | Mod: CPTII,S$GLB,, | Performed by: INTERNAL MEDICINE

## 2022-11-28 PROCEDURE — 3078F PR MOST RECENT DIASTOLIC BLOOD PRESSURE < 80 MM HG: ICD-10-PCS | Mod: CPTII,S$GLB,, | Performed by: INTERNAL MEDICINE

## 2022-11-28 PROCEDURE — 99999 PR PBB SHADOW E&M-EST. PATIENT-LVL IV: CPT | Mod: PBBFAC,,, | Performed by: INTERNAL MEDICINE

## 2022-11-28 PROCEDURE — 1101F PR PT FALLS ASSESS DOC 0-1 FALLS W/OUT INJ PAST YR: ICD-10-PCS | Mod: CPTII,S$GLB,, | Performed by: INTERNAL MEDICINE

## 2022-11-28 PROCEDURE — 3077F PR MOST RECENT SYSTOLIC BLOOD PRESSURE >= 140 MM HG: ICD-10-PCS | Mod: CPTII,S$GLB,, | Performed by: INTERNAL MEDICINE

## 2022-11-28 PROCEDURE — 1160F PR REVIEW ALL MEDS BY PRESCRIBER/CLIN PHARMACIST DOCUMENTED: ICD-10-PCS | Mod: CPTII,S$GLB,, | Performed by: INTERNAL MEDICINE

## 2022-11-28 PROCEDURE — 1126F PR PAIN SEVERITY QUANTIFIED, NO PAIN PRESENT: ICD-10-PCS | Mod: CPTII,S$GLB,, | Performed by: INTERNAL MEDICINE

## 2022-11-28 PROCEDURE — 1101F PT FALLS ASSESS-DOCD LE1/YR: CPT | Mod: CPTII,S$GLB,, | Performed by: INTERNAL MEDICINE

## 2022-11-28 PROCEDURE — 99203 PR OFFICE/OUTPT VISIT, NEW, LEVL III, 30-44 MIN: ICD-10-PCS | Mod: S$GLB,,, | Performed by: INTERNAL MEDICINE

## 2022-11-28 PROCEDURE — 3288F FALL RISK ASSESSMENT DOCD: CPT | Mod: CPTII,S$GLB,, | Performed by: INTERNAL MEDICINE

## 2022-11-28 PROCEDURE — 1126F AMNT PAIN NOTED NONE PRSNT: CPT | Mod: CPTII,S$GLB,, | Performed by: INTERNAL MEDICINE

## 2022-11-28 PROCEDURE — 3288F PR FALLS RISK ASSESSMENT DOCUMENTED: ICD-10-PCS | Mod: CPTII,S$GLB,, | Performed by: INTERNAL MEDICINE

## 2022-11-28 PROCEDURE — 3078F DIAST BP <80 MM HG: CPT | Mod: CPTII,S$GLB,, | Performed by: INTERNAL MEDICINE

## 2022-11-28 PROCEDURE — 1159F MED LIST DOCD IN RCRD: CPT | Mod: CPTII,S$GLB,, | Performed by: INTERNAL MEDICINE

## 2022-11-28 RX ORDER — FLUCONAZOLE 150 MG/1
150 TABLET ORAL DAILY
Qty: 1 TABLET | Refills: 0 | Status: SHIPPED | OUTPATIENT
Start: 2022-11-28 | End: 2022-11-29

## 2022-11-28 RX ORDER — FLUCONAZOLE 150 MG/1
150 TABLET ORAL DAILY
Qty: 1 TABLET | Refills: 0 | Status: SHIPPED | OUTPATIENT
Start: 2022-11-28 | End: 2022-11-28

## 2022-11-28 NOTE — PROGRESS NOTES
INTERNAL MEDICINE PROGRESS/URGENT CARE NOTE    CHIEF COMPLAINT     Chief Complaint   Patient presents with    Vaginal Itching     Patient presents today with complaints of vaginal itching x 5 days. States she used OTC medication but symptoms returned. Pt denies any discharge at this time       HPI     Evelina Pinon is a 77 y.o.  female who presents for an urgent/follow up visit today.  Patient relays that for the past 5 days, shes had vaginal itching that was relieved with OTC antifungal cream and suppositories, but then the itching restarted.   She is here today for vaginal itching. Denies discharge. Says shes had fungal infections in the past and this is the exact sensation she had then   Vaginal exam notable for excoriations from itching. Erythematous, but with no discharge     Also mentions hemorrhoidal itch. Encouraged her to use preparation H.     Past Medical History:  Past Medical History:   Diagnosis Date    ALLERGIC RHINITIS 2/22/2012    Alopecia     Anticoagulant long-term use     Anxiety 2/22/2012    Dyslipidemia     Fx patella     Left    Gout 2/22/2012    HTN (hypertension) 2/22/2012    TIA (transient ischemic attack) 2/22/2012       Home Medications:  Prior to Admission medications    Medication Sig Start Date End Date Taking? Authorizing Provider   allopurinoL (ZYLOPRIM) 100 MG tablet Take 2 tablets (200 mg total) by mouth once daily. 8/8/22  Yes Jeffrey Garibay MD   amLODIPine (NORVASC) 2.5 MG tablet Take 1 tablet (2.5 mg total) by mouth once daily. 9/2/22  Yes Rex Kovacs MD   atorvastatin (LIPITOR) 10 MG tablet Take 1 tablet (10 mg total) by mouth once daily. 8/8/22 8/8/23 Yes Jeffrey Garibay MD   clopidogreL (PLAVIX) 75 mg tablet Take 1 tablet (75 mg total) by mouth once daily. 9/2/22  Yes Rex Kovacs MD   LORazepam (ATIVAN) 0.5 MG tablet Take 1 tablet (0.5 mg total) by mouth 2 (two) times daily. as needed for anxiety. 8/8/22  Yes Jeffrey Garibay MD  "  losartan (COZAAR) 100 MG tablet Take 1 tablet (100 mg total) by mouth once daily. 9/2/22 9/2/23 Yes Rex Kovacs MD   magnesium oxide (MAG-OX) 400 mg (241.3 mg magnesium) tablet TAKE 1 TABLET(400 MG) BY MOUTH EVERY DAY 6/30/22  Yes Jeffrey Garibay MD   metoprolol succinate (TOPROL-XL) 100 MG 24 hr tablet TAKE 1 TABLET(100 MG) BY MOUTH EVERY DAY 7/26/22  Yes Kat Biswas MD   pantoprazole (PROTONIX) 40 MG tablet TAKE 1 TABLET(40 MG) BY MOUTH EVERY DAY 10/11/22  Yes Jeffrey Garibay MD   betamethasone dipropionate 0.05 % cream APPLY EXTERNALLY TO THE AFFECTED AREA TWICE DAILY  Patient not taking: Reported on 11/28/2022 5/18/21   Cisco Gibson MD   colchicine (MITIGARE) 0.6 mg Cap Take 1 capsule (0.6 mg total) by mouth 2 (two) times daily. for 7 days 7/7/22 10/25/22  Melina Isaac MD   fluconazole (DIFLUCAN) 150 MG Tab Take 1 tablet (150 mg total) by mouth once daily. for 1 day 11/28/22 11/29/22  Melina Isaac MD   hydrocortisone 2.5 % cream Apply topically 2 (two) times daily.  Patient not taking: Reported on 11/28/2022 5/18/21   Cisco Gibson MD   fluconazole (DIFLUCAN) 150 MG Tab Take 1 tablet (150 mg total) by mouth once daily. for 1 day 11/28/22 11/28/22  Melina Isaac MD       Review of Systems:  Review of Systems  Hemorrhoids, vaginal itch       PHYSICAL EXAM     BP (!) 142/70 (BP Location: Right arm, Patient Position: Sitting, BP Method: Medium (Manual))   Pulse (!) 55   Temp 97.9 °F (36.6 °C) (Oral)   Ht 5' 4" (1.626 m)   Wt 59.5 kg (131 lb 2.8 oz)   SpO2 (!) 94%   BMI 22.52 kg/m²     GEN - A+OX4, NAD   Skin - No rash.    LABS       ASSESSMENT/PLAN     Evelina Pinon is a 77 y.o. female with  1. Vaginal yeast infection  -     Discontinue: fluconazole (DIFLUCAN) 150 MG Tab; Take 1 tablet (150 mg total) by mouth once daily. for 1 day  Dispense: 1 tablet; Refill: 0  -     fluconazole (DIFLUCAN) 150 MG Tab; Take 1 tablet (150 mg total) by mouth once " daily. for 1 day  Dispense: 1 tablet; Refill: 0    2. Vaginal itching  UA Negative for leuks, notable for nitrites.   -     Urinalysis, Reflex to Urine Culture Urine, Clean Catch        RTC to see PCP as scheduled     Melina Isaac MD  Board Certified Internist/Geriatrician  [facility]

## 2022-12-01 ENCOUNTER — TELEPHONE (OUTPATIENT)
Dept: PRIMARY CARE CLINIC | Facility: CLINIC | Age: 77
End: 2022-12-01
Payer: MEDICARE

## 2022-12-01 NOTE — TELEPHONE ENCOUNTER
Called and spoke with patient, states she has refilled diflucan and is still having symptoms. Advised to take second dose of diflucan, and keep area free of moisture. Pt also states she has been using OTC vaginal wipes, advised to not use and make sure vaginal area is clean and try. Advised to stay clear of harsh soaps and to use water to clean vaginal area. Pt verbalized understanding. Also advised to follow up on Monday with PCP if symptoms persist.

## 2022-12-01 NOTE — TELEPHONE ENCOUNTER
----- Message from April Abraham sent at 12/1/2022  8:53 AM CST -----  Regarding: UTI  Pt called and stated the that the meds that she has is not working and was told to call in so she can get another script for it. Please call back pt @ [phone]

## 2022-12-01 NOTE — TELEPHONE ENCOUNTER
----- Message from Jordon Bob sent at 12/1/2022  9:34 AM CST -----  Type:  RX Refill Request    Who Called:  Patient  Refill or New Rx:  Refill  RX Name and Strength:  Fluconazole-- 150 MG  How is the patient currently taking it? (ex. 1XDay):  1XDay--   Is this a 30 day or 90 day RX:  1 pill    Preferred Pharmacy with phone number:    [facility] #[id number] [place] [address] & [address] [address]  Phone: [phone] Fax: [phone]        Local or Mail Order:  Local  Ordering Provider:  Poncho Bello Call Back Number:  [phone]  Additional Information:

## 2023-01-27 ENCOUNTER — PES CALL (OUTPATIENT)
Dept: ADMINISTRATIVE | Facility: CLINIC | Age: 78
End: 2023-01-27
Payer: MEDICARE

## 2023-01-31 ENCOUNTER — TELEPHONE (OUTPATIENT)
Dept: PRIMARY CARE CLINIC | Facility: CLINIC | Age: 78
End: 2023-01-31
Payer: MEDICARE

## 2023-01-31 DIAGNOSIS — J32.9 SINUSITIS, UNSPECIFIED CHRONICITY, UNSPECIFIED LOCATION: Primary | ICD-10-CM

## 2023-01-31 RX ORDER — DOXYCYCLINE 100 MG/1
100 CAPSULE ORAL 2 TIMES DAILY
Qty: 20 CAPSULE | Refills: 0 | Status: SHIPPED | OUTPATIENT
Start: 2023-01-31 | End: 2023-03-07

## 2023-01-31 NOTE — TELEPHONE ENCOUNTER
----- Message from April Abraham sent at 1/31/2023 10:23 AM CST -----  Regarding: sinus infection  Pt called stated that she has sinus infection going into her ears. She wants to be seen, but I don't see anything until 2/6/23. She wants to know if Dr. Rose could send antibiotic for it, and a call back for medical advice. [phone]

## 2023-01-31 NOTE — TELEPHONE ENCOUNTER
Patient called in wanting to be seen for a possible sinus infection that she feels is going to her ears. Nothing is available until 2/6/23. She wants to know if she can have a antibiotic called in? Lov: 11/28/22

## 2023-01-31 NOTE — TELEPHONE ENCOUNTER
I sent in a prescription for doxycycline.  I would also recommend that she  some Flonase OTC.  Schedule the next available.  Although if she does get better we can always cancel that and she will keep appointment as scheduled in March.

## 2023-02-02 NOTE — TELEPHONE ENCOUNTER
Spoke with pt, she states she is feeling much better now. She has been taking her antibiotic.    Pt does not feel she needs to be seen prior to March.

## 2023-02-09 DIAGNOSIS — Z00.00 ENCOUNTER FOR MEDICARE ANNUAL WELLNESS EXAM: ICD-10-CM

## 2023-02-27 ENCOUNTER — TELEPHONE (OUTPATIENT)
Dept: PRIMARY CARE CLINIC | Facility: CLINIC | Age: 78
End: 2023-02-27

## 2023-02-27 ENCOUNTER — TELEPHONE (OUTPATIENT)
Dept: PRIMARY CARE CLINIC | Facility: CLINIC | Age: 78
End: 2023-02-27
Payer: MEDICARE

## 2023-02-27 DIAGNOSIS — R30.0 DYSURIA: Primary | ICD-10-CM

## 2023-02-27 LAB
BILIRUB SERPL-MCNC: NEGATIVE MG/DL
BLOOD URINE, POC: NEGATIVE
CLARITY, POC UA: CLEAR
COLOR, POC UA: YELLOW
GLUCOSE UR QL STRIP: NEGATIVE
KETONES UR QL STRIP: NEGATIVE
LEUKOCYTE ESTERASE URINE, POC: NEGATIVE
NITRITE, POC UA: NEGATIVE
PH, POC UA: 6
PROTEIN, POC: NEGATIVE
SPECIFIC GRAVITY, POC UA: 1.02
UROBILINOGEN, POC UA: 0.2

## 2023-02-27 PROCEDURE — 81002 POCT URINE DIPSTICK WITHOUT MICROSCOPE: ICD-10-PCS | Mod: S$GLB,,, | Performed by: FAMILY MEDICINE

## 2023-02-27 PROCEDURE — 81002 URINALYSIS NONAUTO W/O SCOPE: CPT | Mod: S$GLB,,, | Performed by: FAMILY MEDICINE

## 2023-02-27 RX ORDER — FLUCONAZOLE 150 MG/1
150 TABLET ORAL DAILY
Qty: 1 TABLET | Refills: 1 | Status: SHIPPED | OUTPATIENT
Start: 2023-02-27 | End: 2023-03-07 | Stop reason: SDUPTHER

## 2023-02-27 NOTE — TELEPHONE ENCOUNTER
Spoke with pt, informed her that UA is negative for UTI. Pt reports that she has been using Monistat for two weeks.    Preferred pharmacy: [facility] on [address]    Please advise.

## 2023-02-27 NOTE — TELEPHONE ENCOUNTER
No evidence of UTI.  Although symptoms are more consistent with vaginitis.  If she has tried Monistat without relief, I will send in a prescription for Diflucan.  If this does not help then she may need examination

## 2023-02-27 NOTE — TELEPHONE ENCOUNTER
----- Message from April Abraham sent at 2/27/2023 10:17 AM CST -----  Regarding: UTI  Pt is having a UTI problem again, she would like to come in for urine test and go from there. Can someone please ask Dr. Rose to put in urine order for it. Thank you

## 2023-02-27 NOTE — TELEPHONE ENCOUNTER
Being addressed in a separate encounter. Pt had multiple messages regarding vaginal itching and UTI concerns.

## 2023-02-27 NOTE — TELEPHONE ENCOUNTER
----- Message from Leticia Cordon sent at 2/27/2023  8:07 AM CST -----  Contact: Pt [phone]  [email or web address]     Patient is calling for Medical Advice regarding: Vaginal Itching     How long has patient had these symptoms:1 week     Pharmacy name and phone#:   [id number] - [place] [address] & [address] [address]  Phone: [phone] Fax: [phone]    Would like response via [employer]:  call Back     Comments:

## 2023-03-07 ENCOUNTER — OFFICE VISIT (OUTPATIENT)
Dept: PRIMARY CARE CLINIC | Facility: CLINIC | Age: 78
End: 2023-03-07
Payer: MEDICARE

## 2023-03-07 VITALS
HEART RATE: 55 BPM | SYSTOLIC BLOOD PRESSURE: 122 MMHG | DIASTOLIC BLOOD PRESSURE: 58 MMHG | WEIGHT: 135.38 LBS | RESPIRATION RATE: 18 BRPM | BODY MASS INDEX: 23.11 KG/M2 | OXYGEN SATURATION: 95 % | HEIGHT: 64 IN

## 2023-03-07 DIAGNOSIS — B37.31 VAGINAL YEAST INFECTION: ICD-10-CM

## 2023-03-07 DIAGNOSIS — N18.31 STAGE 3A CHRONIC KIDNEY DISEASE: ICD-10-CM

## 2023-03-07 DIAGNOSIS — R79.89 LOW SERUM PARATHYROID HORMONE (PTH): ICD-10-CM

## 2023-03-07 DIAGNOSIS — R59.0 MEDIASTINAL ADENOPATHY: ICD-10-CM

## 2023-03-07 DIAGNOSIS — F41.9 ANXIETY: ICD-10-CM

## 2023-03-07 DIAGNOSIS — I27.20 PULMONARY HYPERTENSION: ICD-10-CM

## 2023-03-07 DIAGNOSIS — I70.0 AORTIC ATHEROSCLEROSIS: ICD-10-CM

## 2023-03-07 DIAGNOSIS — R93.89 ABNORMAL CT OF THE CHEST: Primary | ICD-10-CM

## 2023-03-07 DIAGNOSIS — E83.52 HYPERCALCEMIA: ICD-10-CM

## 2023-03-07 DIAGNOSIS — M32.9 PERSONAL HISTORY OF SYSTEMIC LUPUS ERYTHEMATOSUS (SLE): ICD-10-CM

## 2023-03-07 PROCEDURE — 1160F RVW MEDS BY RX/DR IN RCRD: CPT | Mod: HCNC,CPTII,S$GLB, | Performed by: FAMILY MEDICINE

## 2023-03-07 PROCEDURE — 1126F AMNT PAIN NOTED NONE PRSNT: CPT | Mod: HCNC,CPTII,S$GLB, | Performed by: FAMILY MEDICINE

## 2023-03-07 PROCEDURE — 99999 PR PBB SHADOW E&M-EST. PATIENT-LVL IV: ICD-10-PCS | Mod: PBBFAC,HCNC,, | Performed by: FAMILY MEDICINE

## 2023-03-07 PROCEDURE — 99999 PR PBB SHADOW E&M-EST. PATIENT-LVL IV: CPT | Mod: PBBFAC,HCNC,, | Performed by: FAMILY MEDICINE

## 2023-03-07 PROCEDURE — 3288F FALL RISK ASSESSMENT DOCD: CPT | Mod: HCNC,CPTII,S$GLB, | Performed by: FAMILY MEDICINE

## 2023-03-07 PROCEDURE — 3288F PR FALLS RISK ASSESSMENT DOCUMENTED: ICD-10-PCS | Mod: HCNC,CPTII,S$GLB, | Performed by: FAMILY MEDICINE

## 2023-03-07 PROCEDURE — 1160F PR REVIEW ALL MEDS BY PRESCRIBER/CLIN PHARMACIST DOCUMENTED: ICD-10-PCS | Mod: HCNC,CPTII,S$GLB, | Performed by: FAMILY MEDICINE

## 2023-03-07 PROCEDURE — 3074F SYST BP LT 130 MM HG: CPT | Mod: HCNC,CPTII,S$GLB, | Performed by: FAMILY MEDICINE

## 2023-03-07 PROCEDURE — 3078F PR MOST RECENT DIASTOLIC BLOOD PRESSURE < 80 MM HG: ICD-10-PCS | Mod: HCNC,CPTII,S$GLB, | Performed by: FAMILY MEDICINE

## 2023-03-07 PROCEDURE — 1101F PR PT FALLS ASSESS DOC 0-1 FALLS W/OUT INJ PAST YR: ICD-10-PCS | Mod: HCNC,CPTII,S$GLB, | Performed by: FAMILY MEDICINE

## 2023-03-07 PROCEDURE — 1101F PT FALLS ASSESS-DOCD LE1/YR: CPT | Mod: HCNC,CPTII,S$GLB, | Performed by: FAMILY MEDICINE

## 2023-03-07 PROCEDURE — 99214 PR OFFICE/OUTPT VISIT, EST, LEVL IV, 30-39 MIN: ICD-10-PCS | Mod: HCNC,S$GLB,, | Performed by: FAMILY MEDICINE

## 2023-03-07 PROCEDURE — 1159F MED LIST DOCD IN RCRD: CPT | Mod: HCNC,CPTII,S$GLB, | Performed by: FAMILY MEDICINE

## 2023-03-07 PROCEDURE — 1159F PR MEDICATION LIST DOCUMENTED IN MEDICAL RECORD: ICD-10-PCS | Mod: HCNC,CPTII,S$GLB, | Performed by: FAMILY MEDICINE

## 2023-03-07 PROCEDURE — 99214 OFFICE O/P EST MOD 30 MIN: CPT | Mod: HCNC,S$GLB,, | Performed by: FAMILY MEDICINE

## 2023-03-07 PROCEDURE — 1126F PR PAIN SEVERITY QUANTIFIED, NO PAIN PRESENT: ICD-10-PCS | Mod: HCNC,CPTII,S$GLB, | Performed by: FAMILY MEDICINE

## 2023-03-07 PROCEDURE — 3074F PR MOST RECENT SYSTOLIC BLOOD PRESSURE < 130 MM HG: ICD-10-PCS | Mod: HCNC,CPTII,S$GLB, | Performed by: FAMILY MEDICINE

## 2023-03-07 PROCEDURE — 3078F DIAST BP <80 MM HG: CPT | Mod: HCNC,CPTII,S$GLB, | Performed by: FAMILY MEDICINE

## 2023-03-07 RX ORDER — FLUCONAZOLE 150 MG/1
TABLET ORAL
Qty: 2 TABLET | Refills: 5 | Status: ON HOLD | OUTPATIENT
Start: 2023-03-07 | End: 2023-12-20 | Stop reason: HOSPADM

## 2023-03-07 RX ORDER — LORAZEPAM 0.5 MG/1
0.5 TABLET ORAL 2 TIMES DAILY
Qty: 60 TABLET | Refills: 3 | Status: SHIPPED | OUTPATIENT
Start: 2023-03-07 | End: 2023-10-06 | Stop reason: SDUPTHER

## 2023-03-07 NOTE — PROGRESS NOTES
THIS DOCUMENT WAS MADE IN PART WITH VOICE RECOGNITION SOFTWARE.  OCCASIONALLY THIS SOFTWARE WILL MISINTERPRET WORDS OR PHRASES.      [facility] Care Provider Appointment   [facility]       Patient ID: Evelina Pinon is a 77 y.o. female.    Evelina was seen today for hypertension.    Diagnoses and all orders for this visit:    Abnormal CT of the chest  Mediastinal adenopathy  Hypercalcemia  Low serum parathyroid hormone (PTH)    We discussed the abnormal CT scan and history of elevated calcium.  She is convinced this is sarcoidosis and I agree that that is a high probability.  However there was still some uncertainty based on the radiology interpretation and I do recommend following back with pulmonology.  She declines.  I offered to repeat a CT scan as it has been six month, she continues to decline.  She was made aware that there is a possibility this could be malignant and the longer we delay in diagnosis could make the outcome much worse.  She still declines.  She states I wish you could be like that other who let me do what I wanted.  Although she was somewhat pleasant about it.  I explained my concerns and possibilities and that ultimately it is her decision.  I do worrisome that her anxiety is affecting her ability to process these decisions correctly yet I still can not force her.  She does have a lot on her mind today her  is undergoing valve replacement tomorrow so what will do is bring her back in a few months and continue to review.  But again as of today she would did not want to repeat a CT scan, did not want to follow with pulmonology, did not want to repeat blood work.  And she understands that I can not exclude malignant process here.    Anxiety  She does appear to have generalized anxiety disorder.  She does use lorazepam p.r.n. and this seems to help she does not use this daily.    Vaginal yeast infection  Also she is had some reoccurring vaginal region  itching and irritation that was relieved by Diflucan.  We did discuss measures to reduce risk of infection and I gave her a prescription for Diflucan to use in the future if needed.    Aortic atherosclerosis  Noted on previous imaging.  Continue to treat risk factors including blood pressure and lipids.    Personal history of systemic lupus erythematosus (SLE)  Previously documented chart.  She does not appear to be under any surveillance by Rheumatology    Pulmonary hypertension  Estimated pulmonary arterial pressure of 63 on echocardiogram from 2020.  She does not complain of shortness of breath.    Stage 3a chronic kidney disease  She has no uremic symptoms.  Patient was not interested in checking labs.  Will continue to monitor.    Other orders  -     LORazepam (ATIVAN) 0.5 MG tablet; Take 1 tablet (0.5 mg total) by mouth 2 (two) times daily. as needed for anxiety.  -     fluconazole (DIFLUCAN) 150 MG Tab; Take one pill as needed for a yeast infection.  May repeat in 4-5 days if symptoms persist.        Follow Up:  Three months      Advance Care Planning     Date: 03/07/2023  We discussed advanced care planning.  Information was given.  Questions were answered.  She will discuss with her , complete the paperwork and we can review or complete next time if she is ready.             Subjective:     Chief Complaint   Patient presents with    Hypertension     F/u visit      I have reviewed the information entered by the ancillary staff regarding the chief complaint as well as the related history.    HPI    Patient is a/an 77 y.o.  female     Follow-up hypertension which is well controlled but also follow-up abnormal CT of the chest and hypercalcemia.  See above for details addressed today.  Note that she is very anxious and becomes very anxious when coming to the doctor.  She is not interested in any further follow-up of the abnormal findings even after detailed discussion of possibilities, etc..  She is not  "interested in following back with    For complete problem list, past medical history, surgical history, social history, etc., see appropriate section in the electronic medical record    Review of Systems   HENT: Negative.     Respiratory: Negative.     Gastrointestinal: Negative.    Genitourinary:         Dysuria and vaginal itching has resolved   Musculoskeletal: Negative.    Psychiatric/Behavioral:  The patient is nervous/anxious.      Objective     Physical Exam  Vitals reviewed.   Constitutional:       General: She is not in acute distress.     Appearance: She is well-developed. She is not toxic-appearing.   HENT:      Head: Normocephalic and atraumatic.   Eyes:      General: No scleral icterus.  Cardiovascular:      Rate and Rhythm: Regular rhythm. Bradycardia present.      Heart sounds: Normal heart sounds. No murmur heard.     Comments: No peripheral edema  Pulmonary:      Comments: Mildly diminished breath sounds, few rhonchi.  No crackles.  Skin:     General: Skin is dry.   Neurological:      Mental Status: She is alert and oriented to person, place, and time.   Psychiatric:         Behavior: Behavior normal.     Vitals:    03/07/23 1525   BP: (!) 122/58   BP Location: Left arm   Patient Position: Sitting   BP Method: Medium (Manual)   Pulse: (!) 55   Resp: 18   SpO2: 95%   Weight: 61.4 kg (135 lb 5.8 oz)   Height: 5' 4" (1.626 m)       RECENT LABS:    Lab Results   Component Value Date    WBC 6.54 09/01/2022    HGB 12.2 09/01/2022    HCT 39.1 09/01/2022     09/01/2022    CHOL 115 (L) 09/01/2022    TRIG 127 09/01/2022    HDL 30 (L) 09/01/2022    ALT 13 09/01/2022    AST 21 09/01/2022     (L) 09/06/2022    K 4.6 09/06/2022     09/06/2022    CREATININE 1.4 09/06/2022    BUN 30 (H) 09/06/2022    CO2 25 09/06/2022    TSH 3.540 07/04/2021    INR 1.2 08/20/2020    HGBA1C 5.4 08/20/2020       Results for orders placed or performed in visit on 02/27/23   POCT URINE DIPSTICK WITHOUT MICROSCOPE "   Result Value Ref Range    Color, UA Yellow     pH, UA 6.0     WBC, UA Negative     Nitrite, UA Negative     Protein, POC Negative     Glucose, UA Negative     Ketones, UA Negative     Urobilinogen, UA 0.2     Bilirubin, POC Negative     Blood, UA Negative     Clarity, UA Clear     Spec Grav UA 1.020

## 2023-05-05 RX ORDER — PANTOPRAZOLE SODIUM 40 MG/1
40 TABLET, DELAYED RELEASE ORAL DAILY
Qty: 30 TABLET | Refills: 5 | Status: SHIPPED | OUTPATIENT
Start: 2023-05-05 | End: 2023-10-25

## 2023-05-05 NOTE — TELEPHONE ENCOUNTER
----- Message from Karolina Perez sent at 5/5/2023 12:49 PM CDT -----  Contact: [phone]  Requesting an RX refill or new RX.  Is this a refill or new RX: refill  RX name and strength (copy/paste from chart):  pantoprazole (PROTONIX) 40 MG tablet  Is this a 30 day or 90 day RX: 30  Pharmacy name and phone # (copy/paste from chart):    [facility] #[id number] [place] [address] & [address] [address]  Phone: [phone] Fax: [phone]    The doctors have asked that we provide their patients with the following 2 reminders -- prescription refills can take up to 72 hours, and a friendly reminder that in the future you can use your [facility] account to request refills: yes

## 2023-05-31 ENCOUNTER — TELEPHONE (OUTPATIENT)
Dept: PRIMARY CARE CLINIC | Facility: CLINIC | Age: 78
End: 2023-05-31
Payer: MEDICARE

## 2023-05-31 RX ORDER — METOPROLOL SUCCINATE 100 MG/1
TABLET, EXTENDED RELEASE ORAL
Qty: 90 TABLET | Refills: 3 | Status: SHIPPED | OUTPATIENT
Start: 2023-05-31

## 2023-05-31 NOTE — TELEPHONE ENCOUNTER
----- Message from Leticia Cordon sent at 5/31/2023  2:20 PM CDT -----  Contact: Pt [phone]  Pt is requesting an appt asap for heel pain.     Thank you

## 2023-05-31 NOTE — TELEPHONE ENCOUNTER
Refill Decision Note   Evelina Pinon  is requesting a refill authorization.  Brief Assessment and Rationale for Refill:  Approve     Medication Therapy Plan:       Medication Reconciliation Completed: No   Comments:     Provider Staff:     Action is required for this patient.   Please see care gap opportunities below in Care Due Message.     Thanks!  [facility]     Appointments      Date Provider   Last Visit   3/7/2023 Jeffrey Garibay MD   Next Visit   6/6/2023 Jeffrey Garibay MD     Note composed:4:36 AM 05/31/2023           Note composed:4:36 AM 05/31/2023

## 2023-05-31 NOTE — TELEPHONE ENCOUNTER
Spoke with pt's , reports that he believes that pt stepped on glass. Pt scheduled for appt tomorrow and will be coming in at 1030. Pt's  encouraged for him and pt to wear shoes, verbalized understanding.

## 2023-05-31 NOTE — TELEPHONE ENCOUNTER
Care Due:                  Date            Visit Type   Department     Provider  --------------------------------------------------------------------------------                                ESTABLISHED                  Jeffrey Leon  Last Visit: 03-      PATIENT      None Found     Garibay                              EP -                              PRIMARY                     Jeffrey Edward  Next Visit: 06-      CARE ([facility])   None Found     Garibay                                                            Last  Test          Frequency    Reason                     Performed    Due Date  --------------------------------------------------------------------------------    CBC.........  12 months..  allopurinoL..............  09- 08-    CMP.........  12 months..  allopurinoL, atorvastatin  09- 08-    Lipid Panel.  12 months..  atorvastatin.............  09- 08-    Uric Acid...  12 months..  allopurinoL..............  09- 08-    [facility] Embedded Care Due Messages. Reference number: [id number].   5/31/2023 4:01:57 AM CDT

## 2023-06-01 ENCOUNTER — OFFICE VISIT (OUTPATIENT)
Dept: PRIMARY CARE CLINIC | Facility: CLINIC | Age: 78
End: 2023-06-01
Payer: MEDICARE

## 2023-06-01 VITALS
BODY MASS INDEX: 22.86 KG/M2 | WEIGHT: 133.94 LBS | RESPIRATION RATE: 18 BRPM | OXYGEN SATURATION: 97 % | HEIGHT: 64 IN | SYSTOLIC BLOOD PRESSURE: 130 MMHG | HEART RATE: 42 BPM | DIASTOLIC BLOOD PRESSURE: 74 MMHG | TEMPERATURE: 98 F

## 2023-06-01 DIAGNOSIS — S90.851A FOREIGN BODY IN RIGHT FOOT, INITIAL ENCOUNTER: Primary | ICD-10-CM

## 2023-06-01 PROCEDURE — 1159F PR MEDICATION LIST DOCUMENTED IN MEDICAL RECORD: ICD-10-PCS | Mod: CPTII,S$GLB,, | Performed by: FAMILY MEDICINE

## 2023-06-01 PROCEDURE — 3288F FALL RISK ASSESSMENT DOCD: CPT | Mod: CPTII,S$GLB,, | Performed by: FAMILY MEDICINE

## 2023-06-01 PROCEDURE — 3288F PR FALLS RISK ASSESSMENT DOCUMENTED: ICD-10-PCS | Mod: CPTII,S$GLB,, | Performed by: FAMILY MEDICINE

## 2023-06-01 PROCEDURE — 1126F PR PAIN SEVERITY QUANTIFIED, NO PAIN PRESENT: ICD-10-PCS | Mod: CPTII,S$GLB,, | Performed by: FAMILY MEDICINE

## 2023-06-01 PROCEDURE — 90471 TDAP VACCINE GREATER THAN OR EQUAL TO 7YO IM: ICD-10-PCS | Mod: S$GLB,,, | Performed by: FAMILY MEDICINE

## 2023-06-01 PROCEDURE — 1100F PTFALLS ASSESS-DOCD GE2>/YR: CPT | Mod: CPTII,S$GLB,, | Performed by: FAMILY MEDICINE

## 2023-06-01 PROCEDURE — 3078F DIAST BP <80 MM HG: CPT | Mod: CPTII,S$GLB,, | Performed by: FAMILY MEDICINE

## 2023-06-01 PROCEDURE — 99999 PR PBB SHADOW E&M-EST. PATIENT-LVL V: CPT | Mod: PBBFAC,,, | Performed by: FAMILY MEDICINE

## 2023-06-01 PROCEDURE — 99214 PR OFFICE/OUTPT VISIT, EST, LEVL IV, 30-39 MIN: ICD-10-PCS | Mod: 25,S$GLB,, | Performed by: FAMILY MEDICINE

## 2023-06-01 PROCEDURE — 1159F MED LIST DOCD IN RCRD: CPT | Mod: CPTII,S$GLB,, | Performed by: FAMILY MEDICINE

## 2023-06-01 PROCEDURE — 99214 OFFICE O/P EST MOD 30 MIN: CPT | Mod: 25,S$GLB,, | Performed by: FAMILY MEDICINE

## 2023-06-01 PROCEDURE — 90715 TDAP VACCINE GREATER THAN OR EQUAL TO 7YO IM: ICD-10-PCS | Mod: S$GLB,,, | Performed by: FAMILY MEDICINE

## 2023-06-01 PROCEDURE — 1126F AMNT PAIN NOTED NONE PRSNT: CPT | Mod: CPTII,S$GLB,, | Performed by: FAMILY MEDICINE

## 2023-06-01 PROCEDURE — 1160F PR REVIEW ALL MEDS BY PRESCRIBER/CLIN PHARMACIST DOCUMENTED: ICD-10-PCS | Mod: CPTII,S$GLB,, | Performed by: FAMILY MEDICINE

## 2023-06-01 PROCEDURE — 3075F PR MOST RECENT SYSTOLIC BLOOD PRESS GE 130-139MM HG: ICD-10-PCS | Mod: CPTII,S$GLB,, | Performed by: FAMILY MEDICINE

## 2023-06-01 PROCEDURE — 3078F PR MOST RECENT DIASTOLIC BLOOD PRESSURE < 80 MM HG: ICD-10-PCS | Mod: CPTII,S$GLB,, | Performed by: FAMILY MEDICINE

## 2023-06-01 PROCEDURE — 1100F PR PT FALLS ASSESS DOC 2+ FALLS/FALL W/INJURY/YR: ICD-10-PCS | Mod: CPTII,S$GLB,, | Performed by: FAMILY MEDICINE

## 2023-06-01 PROCEDURE — 99999 PR PBB SHADOW E&M-EST. PATIENT-LVL V: ICD-10-PCS | Mod: PBBFAC,,, | Performed by: FAMILY MEDICINE

## 2023-06-01 PROCEDURE — 90471 IMMUNIZATION ADMIN: CPT | Mod: S$GLB,,, | Performed by: FAMILY MEDICINE

## 2023-06-01 PROCEDURE — 3075F SYST BP GE 130 - 139MM HG: CPT | Mod: CPTII,S$GLB,, | Performed by: FAMILY MEDICINE

## 2023-06-01 PROCEDURE — 1160F RVW MEDS BY RX/DR IN RCRD: CPT | Mod: CPTII,S$GLB,, | Performed by: FAMILY MEDICINE

## 2023-06-01 PROCEDURE — 90715 TDAP VACCINE 7 YRS/> IM: CPT | Mod: S$GLB,,, | Performed by: FAMILY MEDICINE

## 2023-06-01 NOTE — PROGRESS NOTES
THIS DOCUMENT WAS MADE IN PART WITH VOICE RECOGNITION SOFTWARE.  OCCASIONALLY THIS SOFTWARE WILL MISINTERPRET WORDS OR PHRASES.      Primary Care Provider Appointment   [facility]       Patient ID: Evelina Pinon is a 77 y.o. female.    ASSESSMENT/PLAN by Problem List:    1. Foreign body in right foot, initial encounter  -     (In Office Administered) Tdap Vaccine     Patient presented for an urgent care visit.  Stepped on some glass in her utility room about two weeks ago still feels like there is something in the right foot.  She did remove some pieces already.  On exam there was a fairly shallow foreign body in the right lateral foot below the 5th MP joint I was able to shave down the callus tissue and remove what appeared to be a small fragment of glass.  Symptoms were improved.  I can not completely guarantee that all fragments were removed so I do recommend close observation and reporting if symptoms do not resolve.  No recent tetanus on file so tetanus administered today.  Procedure did not involve any bleeding will require anesthesia.  Just shaving of superficial layer of skin         Total time 31 minutes    Subjective:     Chief Complaint   Patient presents with    Follow-up    Foot Injury     Patient has suspected glass in bottom of foot x1 week.      I have reviewed the information entered by the ancillary staff regarding the chief complaint as well as the related history.    HPI    Patient is a/an 77 y.o.  female       Possible glass and foot, occurred two weeks ago  Right lateral foot, under 5th mp joint    For complete problem list, past medical history, surgical history, social history, etc., see appropriate section in the electronic medical record    Review of Systems   Respiratory: Negative.     Cardiovascular: Negative.    Gastrointestinal: Negative.    Musculoskeletal:  Positive for gait problem.     Objective     Physical Exam  Vitals reviewed.  "  Constitutional:       General: She is not in acute distress.     Appearance: She is well-developed. She is not diaphoretic.   HENT:      Head: Normocephalic and atraumatic.   Eyes:      General: No scleral icterus.  Pulmonary:      Effort: Pulmonary effort is normal. No respiratory distress.   Musculoskeletal:        Feet:    Neurological:      Mental Status: She is alert and oriented to person, place, and time.   Psychiatric:         Mood and Affect: Mood normal.         Behavior: Behavior normal.     Vitals:    06/01/23 1051   BP: (!) 160/64   BP Location: Left arm   Patient Position: Lying   BP Method: Medium (Manual)   Pulse: (!) 42   Resp: 18   Temp: 97.8 °F (36.6 °C)   TempSrc: Oral   SpO2: 97%   Weight: 60.7 kg (133 lb 14.9 oz)   Height: 5' 4" (1.626 m)       "

## 2023-06-06 ENCOUNTER — SOCIAL WORK (OUTPATIENT)
Dept: PRIMARY CARE CLINIC | Facility: CLINIC | Age: 78
End: 2023-06-06

## 2023-06-06 ENCOUNTER — OFFICE VISIT (OUTPATIENT)
Dept: PRIMARY CARE CLINIC | Facility: CLINIC | Age: 78
End: 2023-06-06
Payer: MEDICARE

## 2023-06-06 VITALS
OXYGEN SATURATION: 95 % | TEMPERATURE: 98 F | DIASTOLIC BLOOD PRESSURE: 72 MMHG | HEART RATE: 52 BPM | WEIGHT: 133.81 LBS | RESPIRATION RATE: 18 BRPM | HEIGHT: 64 IN | SYSTOLIC BLOOD PRESSURE: 122 MMHG | BODY MASS INDEX: 22.85 KG/M2

## 2023-06-06 DIAGNOSIS — R93.89 ABNORMAL CT OF THE CHEST: ICD-10-CM

## 2023-06-06 DIAGNOSIS — F41.9 ANXIETY: Primary | ICD-10-CM

## 2023-06-06 DIAGNOSIS — E83.52 HYPERCALCEMIA: ICD-10-CM

## 2023-06-06 PROBLEM — L93.2 CUTANEOUS LUPUS ERYTHEMATOSUS: Chronic | Status: ACTIVE | Noted: 2020-02-20

## 2023-06-06 PROCEDURE — 99215 OFFICE O/P EST HI 40 MIN: CPT | Mod: S$GLB,,, | Performed by: FAMILY MEDICINE

## 2023-06-06 PROCEDURE — 1160F RVW MEDS BY RX/DR IN RCRD: CPT | Mod: CPTII,S$GLB,, | Performed by: FAMILY MEDICINE

## 2023-06-06 PROCEDURE — 1159F PR MEDICATION LIST DOCUMENTED IN MEDICAL RECORD: ICD-10-PCS | Mod: CPTII,S$GLB,, | Performed by: FAMILY MEDICINE

## 2023-06-06 PROCEDURE — 1126F PR PAIN SEVERITY QUANTIFIED, NO PAIN PRESENT: ICD-10-PCS | Mod: CPTII,S$GLB,, | Performed by: FAMILY MEDICINE

## 2023-06-06 PROCEDURE — 3074F PR MOST RECENT SYSTOLIC BLOOD PRESSURE < 130 MM HG: ICD-10-PCS | Mod: CPTII,S$GLB,, | Performed by: FAMILY MEDICINE

## 2023-06-06 PROCEDURE — 1126F AMNT PAIN NOTED NONE PRSNT: CPT | Mod: CPTII,S$GLB,, | Performed by: FAMILY MEDICINE

## 2023-06-06 PROCEDURE — 1159F MED LIST DOCD IN RCRD: CPT | Mod: CPTII,S$GLB,, | Performed by: FAMILY MEDICINE

## 2023-06-06 PROCEDURE — 3078F PR MOST RECENT DIASTOLIC BLOOD PRESSURE < 80 MM HG: ICD-10-PCS | Mod: CPTII,S$GLB,, | Performed by: FAMILY MEDICINE

## 2023-06-06 PROCEDURE — 3074F SYST BP LT 130 MM HG: CPT | Mod: CPTII,S$GLB,, | Performed by: FAMILY MEDICINE

## 2023-06-06 PROCEDURE — 3078F DIAST BP <80 MM HG: CPT | Mod: CPTII,S$GLB,, | Performed by: FAMILY MEDICINE

## 2023-06-06 PROCEDURE — 1160F PR REVIEW ALL MEDS BY PRESCRIBER/CLIN PHARMACIST DOCUMENTED: ICD-10-PCS | Mod: CPTII,S$GLB,, | Performed by: FAMILY MEDICINE

## 2023-06-06 PROCEDURE — 99999 PR PBB SHADOW E&M-EST. PATIENT-LVL III: CPT | Mod: PBBFAC,,, | Performed by: FAMILY MEDICINE

## 2023-06-06 PROCEDURE — 99215 PR OFFICE/OUTPT VISIT, EST, LEVL V, 40-54 MIN: ICD-10-PCS | Mod: S$GLB,,, | Performed by: FAMILY MEDICINE

## 2023-06-06 PROCEDURE — 99999 PR PBB SHADOW E&M-EST. PATIENT-LVL III: ICD-10-PCS | Mod: PBBFAC,,, | Performed by: FAMILY MEDICINE
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
## 2023-06-06 NOTE — ASSESSMENT & PLAN NOTE
Again I reviewed the findings on CT scan.  Multiple small lymph nodes.  History is suggestive of sarcoidosis.  However I still recommend a pulmonology follow-up she declines.  She does not agree for regular surveillance.

## 2023-06-06 NOTE — PROGRESS NOTES
THIS DOCUMENT WAS MADE IN PART WITH VOICE RECOGNITION SOFTWARE.  OCCASIONALLY THIS SOFTWARE WILL MISINTERPRET WORDS OR PHRASES.      Primary Care Provider Appointment   [facility] Antonia Bishop       Patient ID: Evelina Pinon is a 77 y.o. female.    ASSESSMENT/PLAN by Problem List:    1. Anxiety  Assessment & Plan:  Patient has chronic anxiety.  She relates this back to much childhood trauma.  This anxiety does disrupt proper medical care as she refuses to allow me to order recommended tests to follow-up previous abnormal labs.  She is agreeable to talking to a counselor so will refer to Suzanne but again at this time she refused to allow me to order follow-ups with abnormal calcium as well as repeat CT scan as well as refuses to follow back with pulmonology.    Orders:  -     Ambulatory referral/consult to [facility]; Future; Expected date: 06/13/2023    2. Hypercalcemia  Assessment & Plan:  Again discussed and reviewed the hypercalcemia associated with low parathyroid hormone last fall.  She continues to refuse blood test.  She states she has severe anxiety related to childhood trauma and just can not tolerate the possibility of abnormal labs.  I tried to counseling educate her she still refuses to allow me to check any labs.  Discussed the possibility of malignancy or other worrisome finding and she still refuses.  She is agreeable to talk someone about anxiety and maybe over time we can get her to change her mind.      3. Abnormal CT of the chest  Assessment & Plan:  Again I reviewed the findings on CT scan.  Multiple small lymph nodes.  History is suggestive of sarcoidosis.  However I still recommend a pulmonology follow-up she declines.  She does not agree for regular surveillance.           Follow Up:  Three months    Forty-five minutes of total time spent on the encounter, time includes face to face time, and some or all of the following: review of  chart, lab, imaging, consultant notes, ER, hospital, documentation, care coordination, etc.    Subjective:     Chief complaint, follow-up multiple concerns.    HPI    Patient is a/an 77 y.o.  female       She is following up with multiple chronic concerns.    I did follow-up with an abnormal CT scan which revealed multiple shotty nonspecific lymph nodes in the chest.  I did encourage her to follow regularly with pulmonology.  She continues to decline.    Also last fall she had abnormal labs including elevated calcium with a low PTH.  I had recommended Endocrinology follow-up and continuing monitoring however she declined.  Of note she is very anxious and it is very stressful her to talk about her health conditions and abnormalities.          For complete problem list, past medical history, surgical history, social history, etc., see appropriate section in the electronic medical record    Review of Systems   Constitutional:  Negative for chills, fever and unexpected weight change.   Cardiovascular: Negative.    Gastrointestinal: Negative.    Musculoskeletal: Negative.    Psychiatric/Behavioral:  Negative for dysphoric mood and suicidal ideas. The patient is nervous/anxious.      Objective     Physical Exam  Vitals reviewed.   Constitutional:       General: She is not in acute distress.     Appearance: She is well-developed. She is not toxic-appearing.   HENT:      Head: Normocephalic and atraumatic.      Mouth/Throat:      Pharynx: Oropharynx is clear. No oropharyngeal exudate.   Eyes:      General: No scleral icterus.  Cardiovascular:      Rate and Rhythm: Regular rhythm. Bradycardia present.      Heart sounds: Normal heart sounds. No murmur heard.     Comments: No peripheral edema  Pulmonary:      Effort: Pulmonary effort is normal.      Breath sounds: No wheezing, rhonchi or rales.      Comments: Mildly diminished breath sounds, few rhonchi.  No crackles.  Neurological:      Mental Status: She is alert and  "oriented to person, place, and time.   Psychiatric:         Behavior: Behavior normal.     Vitals:    06/06/23 1325   BP: 122/72   BP Location: Left arm   Patient Position: Sitting   BP Method: Medium (Manual)   Pulse: (!) 52   Resp: 18   Temp: 98.1 °F (36.7 °C)   TempSrc: Oral   SpO2: 95%   Weight: 60.7 kg (133 lb 13.1 oz)   Height: 5' 4" (1.626 m)       "

## 2023-06-06 NOTE — ASSESSMENT & PLAN NOTE
Patient has chronic anxiety.  She relates this back to much childhood trauma.  This anxiety does disrupt proper medical care as she refuses to allow me to order recommended tests to follow-up previous abnormal labs.  She is agreeable to talking to a counselor so will refer to Suzanne but again at this time she refused to allow me to order follow-ups with abnormal calcium as well as repeat CT scan as well as refuses to follow back with pulmonology.

## 2023-06-06 NOTE — ASSESSMENT & PLAN NOTE
Again discussed and reviewed the hypercalcemia associated with low parathyroid hormone last fall.  She continues to refuse blood test.  She states she has severe anxiety related to childhood trauma and just can not tolerate the possibility of abnormal labs.  I tried to counseling educate her she still refuses to allow me to check any labs.  Discussed the possibility of malignancy or other worrisome finding and she still refuses.  She is agreeable to talk someone about anxiety and maybe over time we can get her to change her mind.

## 2023-06-09 ENCOUNTER — CLINICAL SUPPORT (OUTPATIENT)
Dept: PRIMARY CARE CLINIC | Facility: CLINIC | Age: 78
End: 2023-06-09
Payer: MEDICARE

## 2023-06-09 DIAGNOSIS — F40.240 CLAUSTROPHOBIA: Primary | ICD-10-CM

## 2023-06-09 DIAGNOSIS — F41.1 GAD (GENERALIZED ANXIETY DISORDER): ICD-10-CM

## 2023-06-09 PROCEDURE — 99499 NO LOS: ICD-10-PCS | Mod: S$GLB,,, | Performed by: SOCIAL WORKER

## 2023-06-09 PROCEDURE — 99499 UNLISTED E&M SERVICE: CPT | Mod: S$GLB,,, | Performed by: SOCIAL WORKER
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
## 2023-06-16 ENCOUNTER — CLINICAL SUPPORT (OUTPATIENT)
Dept: PRIMARY CARE CLINIC | Facility: CLINIC | Age: 78
End: 2023-06-16
Payer: MEDICARE

## 2023-06-16 DIAGNOSIS — F40.240 CLAUSTROPHOBIA: Primary | ICD-10-CM

## 2023-06-16 DIAGNOSIS — F41.1 GAD (GENERALIZED ANXIETY DISORDER): ICD-10-CM

## 2023-06-16 PROCEDURE — 99499 UNLISTED E&M SERVICE: CPT | Mod: S$GLB,,, | Performed by: SOCIAL WORKER

## 2023-06-16 PROCEDURE — 99499 NO LOS: ICD-10-PCS | Mod: S$GLB,,, | Performed by: SOCIAL WORKER
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
## 2023-06-23 ENCOUNTER — CLINICAL SUPPORT (OUTPATIENT)
Dept: PRIMARY CARE CLINIC | Facility: CLINIC | Age: 78
End: 2023-06-23
Payer: MEDICARE

## 2023-06-23 DIAGNOSIS — F41.1 GAD (GENERALIZED ANXIETY DISORDER): ICD-10-CM

## 2023-06-23 DIAGNOSIS — F32.A DEPRESSIVE DISORDER: Primary | ICD-10-CM
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
## 2023-06-29 ENCOUNTER — TELEPHONE (OUTPATIENT)
Dept: PRIMARY CARE CLINIC | Facility: CLINIC | Age: 78
End: 2023-06-29
Payer: MEDICARE

## 2023-06-29 NOTE — TELEPHONE ENCOUNTER
Spoke with pt, explained that Suzanne would not be in clinic tomorrow and would call to reschedule when she returned after July 4th.  Pt verbalized understanding.

## 2023-07-05 ENCOUNTER — TELEPHONE (OUTPATIENT)
Dept: PRIMARY CARE CLINIC | Facility: CLINIC | Age: 78
End: 2023-07-05
Payer: MEDICARE

## 2023-07-05 NOTE — TELEPHONE ENCOUNTER
----- Message from Diana Olivier sent at 7/5/2023 11:21 AM CDT -----  Regarding: eye surgery doc  [phone] - call back ;  wanting to follow up about the status of eye surgery document I gave you last 070/03/23.    Diana

## 2023-07-05 NOTE — TELEPHONE ENCOUNTER
Spoke with pt and informed her that her paperwork to the eye doctor would be sent off today, pt verbalized understanding.

## 2023-07-07 ENCOUNTER — TELEPHONE (OUTPATIENT)
Dept: PRIMARY CARE CLINIC | Facility: CLINIC | Age: 78
End: 2023-07-07
Payer: MEDICARE

## 2023-07-07 NOTE — TELEPHONE ENCOUNTER
Clinician contacted to reschedule canceled appt  by clinician on 6/30/2023. Attempted at [phone].  Spoke to patient and rescheduled for 7/14/2023 at 3:00.

## 2023-07-14 ENCOUNTER — CLINICAL SUPPORT (OUTPATIENT)
Dept: PRIMARY CARE CLINIC | Facility: CLINIC | Age: 78
End: 2023-07-14
Payer: MEDICARE

## 2023-07-14 DIAGNOSIS — F40.240 CLAUSTROPHOBIA: ICD-10-CM

## 2023-07-14 DIAGNOSIS — F32.A DEPRESSIVE DISORDER: Primary | ICD-10-CM

## 2023-07-14 PROCEDURE — 99499 NO LOS: ICD-10-PCS | Mod: HCNC,S$GLB,, | Performed by: SOCIAL WORKER

## 2023-07-14 PROCEDURE — 99499 UNLISTED E&M SERVICE: CPT | Mod: HCNC,S$GLB,, | Performed by: SOCIAL WORKER
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
## 2023-07-18 ENCOUNTER — CLINICAL SUPPORT (OUTPATIENT)
Dept: PRIMARY CARE CLINIC | Facility: CLINIC | Age: 78
End: 2023-07-18
Payer: MEDICARE

## 2023-07-18 DIAGNOSIS — F41.1 GAD (GENERALIZED ANXIETY DISORDER): ICD-10-CM

## 2023-07-18 DIAGNOSIS — F43.9 TRAUMA AND STRESSOR-RELATED DISORDER: Primary | ICD-10-CM

## 2023-07-18 PROCEDURE — 99499 NO LOS: ICD-10-PCS | Mod: HCNC,S$GLB,, | Performed by: SOCIAL WORKER

## 2023-07-18 PROCEDURE — 99499 UNLISTED E&M SERVICE: CPT | Mod: HCNC,S$GLB,, | Performed by: SOCIAL WORKER

## 2023-07-20 ENCOUNTER — CLINICAL SUPPORT (OUTPATIENT)
Dept: PRIMARY CARE CLINIC | Facility: CLINIC | Age: 78
End: 2023-07-20
Payer: MEDICARE

## 2023-07-20 DIAGNOSIS — F41.1 GAD (GENERALIZED ANXIETY DISORDER): ICD-10-CM

## 2023-07-20 DIAGNOSIS — F43.9 TRAUMA AND STRESSOR-RELATED DISORDER: Primary | ICD-10-CM

## 2023-07-20 PROCEDURE — 99499 NO LOS: ICD-10-PCS | Mod: HCNC,S$GLB,, | Performed by: SOCIAL WORKER

## 2023-07-20 PROCEDURE — 99499 UNLISTED E&M SERVICE: CPT | Mod: HCNC,S$GLB,, | Performed by: SOCIAL WORKER
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
## 2023-07-24 ENCOUNTER — CLINICAL SUPPORT (OUTPATIENT)
Dept: PRIMARY CARE CLINIC | Facility: CLINIC | Age: 78
End: 2023-07-24
Payer: MEDICARE

## 2023-07-24 DIAGNOSIS — F43.9 TRAUMA AND STRESSOR-RELATED DISORDER: Primary | ICD-10-CM

## 2023-07-24 DIAGNOSIS — F41.1 GAD (GENERALIZED ANXIETY DISORDER): ICD-10-CM

## 2023-07-24 PROCEDURE — 99499 UNLISTED E&M SERVICE: CPT | Mod: HCNC,S$GLB,, | Performed by: SOCIAL WORKER

## 2023-07-24 PROCEDURE — 99499 NO LOS: ICD-10-PCS | Mod: HCNC,S$GLB,, | Performed by: SOCIAL WORKER
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
## 2023-07-26 ENCOUNTER — CLINICAL SUPPORT (OUTPATIENT)
Dept: PRIMARY CARE CLINIC | Facility: CLINIC | Age: 78
End: 2023-07-26
Payer: MEDICARE

## 2023-07-26 DIAGNOSIS — F41.1 GAD (GENERALIZED ANXIETY DISORDER): ICD-10-CM

## 2023-07-26 DIAGNOSIS — F32.A DEPRESSIVE DISORDER: Primary | ICD-10-CM

## 2023-07-26 PROCEDURE — 99499 NO LOS: ICD-10-PCS | Mod: HCNC,S$GLB,, | Performed by: SOCIAL WORKER

## 2023-07-26 PROCEDURE — 99499 UNLISTED E&M SERVICE: CPT | Mod: HCNC,S$GLB,, | Performed by: SOCIAL WORKER
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
## 2023-07-31 ENCOUNTER — TELEPHONE (OUTPATIENT)
Dept: OPHTHALMOLOGY | Facility: CLINIC | Age: 78
End: 2023-07-31
Payer: MEDICARE

## 2023-07-31 NOTE — TELEPHONE ENCOUNTER
----- Message from Randy Cardona sent at 7/31/2023 12:37 PM CDT -----  Contact: self  Type: Needs Medical Advice  Who Called:  pt  Symptoms (please be specific):  cataracts infection    Best Call Back Number: [phone]   Additional Information: Pt states she had cataracts removed and has infection in the white portion of eye. Please advise  Thank you

## 2023-07-31 NOTE — TELEPHONE ENCOUNTER
----- Message from Dallas Markham sent at 7/29/2023 11:11 AM CDT -----  Regarding: sooner appt  Contact: nalini at [phone]  Type:  Sooner Appointment Request    Caller is requesting a sooner appointment.      Name of Caller:  Nalini    When is the first available appointment?  Dept book    Symptoms:  bleeding in retina after cataract surgery    Best Call Back Number:  [phone]    Additional Information:

## 2023-08-01 ENCOUNTER — CLINICAL SUPPORT (OUTPATIENT)
Dept: PRIMARY CARE CLINIC | Facility: CLINIC | Age: 78
End: 2023-08-01
Payer: MEDICARE

## 2023-08-01 DIAGNOSIS — F43.9 TRAUMA AND STRESSOR-RELATED DISORDER: ICD-10-CM

## 2023-08-01 DIAGNOSIS — F41.1 GAD (GENERALIZED ANXIETY DISORDER): Primary | ICD-10-CM

## 2023-08-01 PROCEDURE — 99499 UNLISTED E&M SERVICE: CPT | Mod: HCNC,S$GLB,, | Performed by: SOCIAL WORKER

## 2023-08-01 PROCEDURE — 99499 NO LOS: ICD-10-PCS | Mod: HCNC,S$GLB,, | Performed by: SOCIAL WORKER
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
## 2023-08-04 ENCOUNTER — CLINICAL SUPPORT (OUTPATIENT)
Dept: PRIMARY CARE CLINIC | Facility: CLINIC | Age: 78
End: 2023-08-04
Payer: MEDICARE

## 2023-08-04 DIAGNOSIS — F40.240 CLAUSTROPHOBIA: ICD-10-CM

## 2023-08-04 DIAGNOSIS — F41.1 GAD (GENERALIZED ANXIETY DISORDER): Primary | ICD-10-CM

## 2023-08-04 PROCEDURE — 99499 NO LOS: ICD-10-PCS | Mod: HCNC,S$GLB,, | Performed by: SOCIAL WORKER

## 2023-08-04 PROCEDURE — 99499 UNLISTED E&M SERVICE: CPT | Mod: HCNC,S$GLB,, | Performed by: SOCIAL WORKER
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
## 2023-08-09 ENCOUNTER — CLINICAL SUPPORT (OUTPATIENT)
Dept: PRIMARY CARE CLINIC | Facility: CLINIC | Age: 78
End: 2023-08-09
Payer: MEDICARE

## 2023-08-09 ENCOUNTER — TELEPHONE (OUTPATIENT)
Dept: PRIMARY CARE CLINIC | Facility: CLINIC | Age: 78
End: 2023-08-09
Payer: MEDICARE

## 2023-08-09 DIAGNOSIS — F32.A DEPRESSIVE DISORDER: ICD-10-CM

## 2023-08-09 DIAGNOSIS — F41.1 GAD (GENERALIZED ANXIETY DISORDER): Primary | ICD-10-CM

## 2023-08-09 DIAGNOSIS — F43.9 TRAUMA AND STRESSOR-RELATED DISORDER: ICD-10-CM

## 2023-08-09 PROCEDURE — 99499 NO LOS: ICD-10-PCS | Mod: HCNC,S$GLB,, | Performed by: SOCIAL WORKER

## 2023-08-09 PROCEDURE — 99499 UNLISTED E&M SERVICE: CPT | Mod: HCNC,S$GLB,, | Performed by: SOCIAL WORKER
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
## 2023-08-11 ENCOUNTER — CLINICAL SUPPORT (OUTPATIENT)
Dept: PRIMARY CARE CLINIC | Facility: CLINIC | Age: 78
End: 2023-08-11
Payer: MEDICARE

## 2023-08-11 DIAGNOSIS — F41.1 GAD (GENERALIZED ANXIETY DISORDER): ICD-10-CM

## 2023-08-11 DIAGNOSIS — F43.9 TRAUMA AND STRESSOR-RELATED DISORDER: ICD-10-CM

## 2023-08-11 DIAGNOSIS — F40.240 CLAUSTROPHOBIA: Primary | ICD-10-CM

## 2023-08-11 PROCEDURE — 99499 UNLISTED E&M SERVICE: CPT | Mod: HCNC,S$GLB,, | Performed by: SOCIAL WORKER

## 2023-08-11 PROCEDURE — 99499 NO LOS: ICD-10-PCS | Mod: HCNC,S$GLB,, | Performed by: SOCIAL WORKER
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
## 2023-08-14 ENCOUNTER — TELEPHONE (OUTPATIENT)
Dept: PRIMARY CARE CLINIC | Facility: CLINIC | Age: 78
End: 2023-08-14
Payer: MEDICARE

## 2023-08-14 ENCOUNTER — TELEPHONE (OUTPATIENT)
Dept: RHEUMATOLOGY | Facility: CLINIC | Age: 78
End: 2023-08-14
Payer: MEDICARE

## 2023-08-14 NOTE — TELEPHONE ENCOUNTER
----- Message from Millicent Vega sent at 8/14/2023  3:57 PM CDT -----  Contact: self  Type: Sooner Appointment Request        Caller is requesting a sooner appointment. Caller declined first available appointment listed below. Caller will not accept being placed on the waitlist and is requesting a message be sent to doctor.        Name of Caller: Patient   Best Call Back Number: [phone]  Additional Information: Pt states she has an infection from cataract surgery (retina issue) but the ophthalmologist says its not a retina issue  its the tissue in the eye that has an infection doesn't know what's causing it and reccommended a rheumatologist to the pt. to see what's causing it. Thanks

## 2023-08-16 ENCOUNTER — TELEPHONE (OUTPATIENT)
Dept: PRIMARY CARE CLINIC | Facility: CLINIC | Age: 78
End: 2023-08-16
Payer: MEDICARE

## 2023-08-16 DIAGNOSIS — H20.9 UVEITIS: ICD-10-CM

## 2023-08-16 DIAGNOSIS — L93.2 CUTANEOUS LUPUS ERYTHEMATOSUS: Primary | Chronic | ICD-10-CM

## 2023-08-16 NOTE — TELEPHONE ENCOUNTER
Spoke with pt, her Ophthalmologist, Dr. 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  Rheumatology referral created and pt scheduled to see MD in [place].

## 2023-08-16 NOTE — TELEPHONE ENCOUNTER
----- Message from Randy Cardona sent at 8/16/2023 10:05 AM CDT -----  Type:  Patient Requesting Referral    Who Called:  spouse Kashmir  Does the patient already have the specialty appointment scheduled?:  no  If yes, what is the date of that appointment?:    Referral to What Specialty:  rheumatology  Reason for Referral:  L Eye infection retina  Does the patient want the referral with a specific physician?:  THERESE Justin  Is the specialist an [facility] or [facility]  Patient Requesting a Call Back?:  yes  Best Call Back Number:  [phone]   Additional Information:   Spouse states pt had eye surgery and provider suggested pt see Dr Justin  Please advise  Thank you

## 2023-08-17 ENCOUNTER — CLINICAL SUPPORT (OUTPATIENT)
Dept: PRIMARY CARE CLINIC | Facility: CLINIC | Age: 78
End: 2023-08-17
Payer: MEDICARE

## 2023-08-17 DIAGNOSIS — F32.A DEPRESSIVE DISORDER: ICD-10-CM

## 2023-08-17 DIAGNOSIS — F41.1 GAD (GENERALIZED ANXIETY DISORDER): Primary | ICD-10-CM

## 2023-08-17 DIAGNOSIS — F43.9 TRAUMA AND STRESSOR-RELATED DISORDER: ICD-10-CM

## 2023-08-17 PROCEDURE — 99499 UNLISTED E&M SERVICE: CPT | Mod: HCNC,S$GLB,, | Performed by: SOCIAL WORKER

## 2023-08-17 PROCEDURE — 99499 NO LOS: ICD-10-PCS | Mod: HCNC,S$GLB,, | Performed by: SOCIAL WORKER
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
## 2023-08-21 ENCOUNTER — TELEPHONE (OUTPATIENT)
Dept: RHEUMATOLOGY | Facility: CLINIC | Age: 78
End: 2023-08-21
Payer: MEDICARE

## 2023-08-21 NOTE — TELEPHONE ENCOUNTER
----- Message from Rani Colbert sent at 8/21/2023 12:21 PM CDT -----  Contact: self  Type:  Sooner Appointment Request    Caller is requesting a sooner appointment.  Caller declined first available appointment listed below.  Caller will not accept being placed on the waitlist and is requesting a message be sent to doctor.  Name of Caller: Pt  When is the first available appointment? N/A  Symptoms: Pt has a referral in the system from Dr. Garibay, pt was scheduled for [place] but is looking to get closer to home...   Would the patient rather a call back or a response via [facility]?  call  Best Call Back Number: [phone]    Please call to advise... Thank you...        RN  Outcome: Ongoing

## 2023-08-22 RX ORDER — ATORVASTATIN CALCIUM 10 MG/1
10 TABLET, FILM COATED ORAL DAILY
Qty: 90 TABLET | Refills: 3 | Status: SHIPPED | OUTPATIENT
Start: 2023-08-22 | End: 2024-08-21

## 2023-08-22 NOTE — TELEPHONE ENCOUNTER
----- Message from Diana Olivier sent at 8/22/2023  1:53 PM CDT -----  Regarding: refill  [phone] - call back ; needed refill for she have 3 tablet left    atorvastatin (LIPITOR) 10 MG tablet    Send to:    [id number] - [place] [address] & [address] [address]  Phone: [phone] Fax: [phone]  Hours: Not open 24 hours    Diana

## 2023-08-24 ENCOUNTER — CLINICAL SUPPORT (OUTPATIENT)
Dept: PRIMARY CARE CLINIC | Facility: CLINIC | Age: 78
End: 2023-08-24
Payer: MEDICARE

## 2023-08-24 DIAGNOSIS — F43.9 TRAUMA AND STRESSOR-RELATED DISORDER: Primary | ICD-10-CM

## 2023-08-24 PROCEDURE — 99499 UNLISTED E&M SERVICE: CPT | Mod: HCNC,S$GLB,, | Performed by: SOCIAL WORKER

## 2023-08-24 PROCEDURE — 99499 NO LOS: ICD-10-PCS | Mod: HCNC,S$GLB,, | Performed by: SOCIAL WORKER
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
## 2023-08-28 ENCOUNTER — TELEPHONE (OUTPATIENT)
Dept: PRIMARY CARE CLINIC | Facility: CLINIC | Age: 78
End: 2023-08-28
Payer: MEDICARE

## 2023-08-28 NOTE — TELEPHONE ENCOUNTER
Clinician received a message late Friday afternoon to contact pt.    Call made to pt. She stated she has some flashbacks and is the reason she called in hope of getting a sooner appt that 9/1/2023. She identified coping with the memories by journaling and spending time with her family. She reported feeling much better today. She is scheduled to see clinician on 8/30/2023 at 3:00   right upper extremity findings

## 2023-08-30 ENCOUNTER — CLINICAL SUPPORT (OUTPATIENT)
Dept: PRIMARY CARE CLINIC | Facility: CLINIC | Age: 78
End: 2023-08-30
Payer: MEDICARE

## 2023-08-30 DIAGNOSIS — F41.1 GAD (GENERALIZED ANXIETY DISORDER): ICD-10-CM

## 2023-08-30 DIAGNOSIS — F43.9 TRAUMA AND STRESSOR-RELATED DISORDER: Primary | ICD-10-CM

## 2023-08-30 PROCEDURE — 99499 UNLISTED E&M SERVICE: CPT | Mod: HCNC,S$GLB,, | Performed by: SOCIAL WORKER

## 2023-08-30 PROCEDURE — 99499 NO LOS: ICD-10-PCS | Mod: HCNC,S$GLB,, | Performed by: SOCIAL WORKER
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
## 2023-09-01 ENCOUNTER — CLINICAL SUPPORT (OUTPATIENT)
Dept: PRIMARY CARE CLINIC | Facility: CLINIC | Age: 78
End: 2023-09-01
Payer: MEDICARE

## 2023-09-01 DIAGNOSIS — F43.9 TRAUMA AND STRESSOR-RELATED DISORDER: ICD-10-CM

## 2023-09-01 DIAGNOSIS — F40.240 CLAUSTROPHOBIA: ICD-10-CM

## 2023-09-01 DIAGNOSIS — F41.1 GAD (GENERALIZED ANXIETY DISORDER): Primary | ICD-10-CM

## 2023-09-01 PROCEDURE — 99499 NO LOS: ICD-10-PCS | Mod: HCNC,S$GLB,, | Performed by: SOCIAL WORKER

## 2023-09-01 PROCEDURE — 99499 UNLISTED E&M SERVICE: CPT | Mod: HCNC,S$GLB,, | Performed by: SOCIAL WORKER
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
## 2023-09-07 ENCOUNTER — CLINICAL SUPPORT (OUTPATIENT)
Dept: PRIMARY CARE CLINIC | Facility: CLINIC | Age: 78
End: 2023-09-07
Payer: MEDICARE

## 2023-09-07 DIAGNOSIS — F43.9 TRAUMA AND STRESSOR-RELATED DISORDER: Primary | ICD-10-CM

## 2023-09-07 DIAGNOSIS — F41.1 GAD (GENERALIZED ANXIETY DISORDER): ICD-10-CM

## 2023-09-07 DIAGNOSIS — F40.240 CLAUSTROPHOBIA: ICD-10-CM

## 2023-09-07 PROCEDURE — 99499 NO LOS: ICD-10-PCS | Mod: HCNC,S$GLB,, | Performed by: SOCIAL WORKER

## 2023-09-07 PROCEDURE — 99499 UNLISTED E&M SERVICE: CPT | Mod: HCNC,S$GLB,, | Performed by: SOCIAL WORKER

## 2023-09-07 RX ORDER — AMLODIPINE BESYLATE 2.5 MG/1
2.5 TABLET ORAL
Qty: 30 TABLET | Refills: 11 | Status: SHIPPED | OUTPATIENT
Start: 2023-09-07
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
## 2023-09-11 ENCOUNTER — TELEPHONE (OUTPATIENT)
Dept: PRIMARY CARE CLINIC | Facility: CLINIC | Age: 78
End: 2023-09-11
Payer: MEDICARE

## 2023-09-11 DIAGNOSIS — R93.89 ABNORMAL CT OF THE CHEST: Primary | ICD-10-CM

## 2023-09-11 DIAGNOSIS — R59.9 LYMPH NODES ENLARGED: ICD-10-CM

## 2023-09-11 NOTE — TELEPHONE ENCOUNTER
Spoke with pt's , Miguel Angel, and informed him to call [facility] clinic to get scheduled for a f/u appt per his wife's request to see Dr. Ye.    New referral pended as it has been greater than a year since pt saw Dr. Ye.

## 2023-09-11 NOTE — TELEPHONE ENCOUNTER
----- Message from Diana Olivier sent at 9/11/2023  1:17 PM CDT -----  Regarding: lung doctor referral  [phone]- call back  for Mr. Pinon; Needs referral for a pulmonologist (lung doctor) and if his wife can be can be scheduled for it.      Diana

## 2023-09-12 NOTE — TELEPHONE ENCOUNTER
"Spoke with pt, and informed her that we received a call from pt's daughter, Marlin, requesting information regarding blood work and CT Scan.  I informed pt that I would not disclose her PHI to her daughter as an involvement of care form was not on file.  Pt reports that she does not want her "nosey children" involved in her medical records.  I reassured pt that I would not disclose any information to her daughter, pt verbalized understanding.   "

## 2023-09-12 NOTE — TELEPHONE ENCOUNTER
----- Message from Leticia Cordon sent at 9/12/2023  4:01 PM CDT -----  Contact: Marlin/Luna [phone]  Please call pt daughter in regards to blood work and CT Scan.     Thank you

## 2023-09-13 ENCOUNTER — CLINICAL SUPPORT (OUTPATIENT)
Dept: PRIMARY CARE CLINIC | Facility: CLINIC | Age: 78
End: 2023-09-13
Payer: MEDICARE

## 2023-09-13 DIAGNOSIS — F43.9 TRAUMA AND STRESSOR-RELATED DISORDER: ICD-10-CM

## 2023-09-13 DIAGNOSIS — F41.1 GAD (GENERALIZED ANXIETY DISORDER): Primary | ICD-10-CM

## 2023-09-13 PROCEDURE — 99499 UNLISTED E&M SERVICE: CPT | Mod: HCNC,S$GLB,, | Performed by: SOCIAL WORKER

## 2023-09-13 PROCEDURE — 99499 NO LOS: ICD-10-PCS | Mod: HCNC,S$GLB,, | Performed by: SOCIAL WORKER
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
## 2023-09-18 ENCOUNTER — CLINICAL SUPPORT (OUTPATIENT)
Dept: PRIMARY CARE CLINIC | Facility: CLINIC | Age: 78
End: 2023-09-18
Payer: MEDICARE

## 2023-09-18 DIAGNOSIS — F40.240 CLAUSTROPHOBIA: ICD-10-CM

## 2023-09-18 DIAGNOSIS — F32.A DEPRESSIVE DISORDER: ICD-10-CM

## 2023-09-18 DIAGNOSIS — F41.1 GAD (GENERALIZED ANXIETY DISORDER): Primary | ICD-10-CM

## 2023-09-18 PROCEDURE — 99499 NO LOS: ICD-10-PCS | Mod: HCNC,S$GLB,, | Performed by: SOCIAL WORKER

## 2023-09-18 PROCEDURE — 99499 UNLISTED E&M SERVICE: CPT | Mod: HCNC,S$GLB,, | Performed by: SOCIAL WORKER
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
## 2023-09-19 NOTE — TELEPHONE ENCOUNTER
Care Due:                  Date            Visit Type   Department     Provider  --------------------------------------------------------------------------------                                EP -                              PRIMARY                     Jeffrey Leon  Last Visit: 06-      CARE ([facility])   None Found     Garibay                              ESTABLISHED                  Jeffrey Leon  Next Visit: 10-      PATIENT      None Found     Garibay                                                            Last  Test          Frequency    Reason                     Performed    Due Date  --------------------------------------------------------------------------------    CBC.........  12 months..  allopurinoL..............  09- 08-    CMP.........  12 months..  allopurinoL, atorvastatin  09- 08-    Lipid Panel.  12 months..  atorvastatin.............  09- 08-    Uric Acid...  12 months..  allopurinoL..............  09- 08-    [employer] Embedded Care Due Messages. Reference number: [id number].   9/19/2023 5:55:45 PM CDT

## 2023-09-20 RX ORDER — LOSARTAN POTASSIUM 100 MG/1
100 TABLET ORAL
Qty: 90 TABLET | Refills: 1 | Status: SHIPPED | OUTPATIENT
Start: 2023-09-20 | End: 2024-04-01

## 2023-09-20 RX ORDER — LANOLIN ALCOHOL/MO/W.PET/CERES
CREAM (GRAM) TOPICAL
Qty: 30 TABLET | Refills: 5 | Status: SHIPPED | OUTPATIENT
Start: 2023-09-20 | End: 2024-04-02

## 2023-09-20 NOTE — TELEPHONE ENCOUNTER
Refill Routing Note   Medication(s) are not appropriate for processing by [facility] for the following reason(s):      Medication outside of protocol: MAG-OXIDE   Required labs outdated: Losartan    [facility] action(s):  Defer  Route Care Due:  Labs due              Appointments  past 12m or future 3m with PCP    Date Provider   Last Visit   6/6/2023 Jeffrey Garibay MD   Next Visit   10/6/2023 Jeffrey Garibay MD   ED visits in past 90 days: 0        Note composed:10:11 PM 09/19/2023

## 2023-09-28 ENCOUNTER — CLINICAL SUPPORT (OUTPATIENT)
Dept: PRIMARY CARE CLINIC | Facility: CLINIC | Age: 78
End: 2023-09-28
Payer: MEDICARE

## 2023-09-28 DIAGNOSIS — F32.A DEPRESSIVE DISORDER: ICD-10-CM

## 2023-09-28 DIAGNOSIS — F40.240 CLAUSTROPHOBIA: Primary | ICD-10-CM

## 2023-09-28 PROCEDURE — 99499 NO LOS: ICD-10-PCS | Mod: HCNC,S$GLB,, | Performed by: SOCIAL WORKER

## 2023-09-28 PROCEDURE — 99499 UNLISTED E&M SERVICE: CPT | Mod: HCNC,S$GLB,, | Performed by: SOCIAL WORKER

## 2023-10-04 ENCOUNTER — CLINICAL SUPPORT (OUTPATIENT)
Dept: PRIMARY CARE CLINIC | Facility: CLINIC | Age: 78
End: 2023-10-04
Payer: MEDICARE

## 2023-10-04 DIAGNOSIS — F43.9 TRAUMA AND STRESSOR-RELATED DISORDER: Primary | ICD-10-CM

## 2023-10-04 PROCEDURE — 99499 NO LOS: ICD-10-PCS | Mod: HCNC,S$GLB,, | Performed by: SOCIAL WORKER

## 2023-10-04 PROCEDURE — 99499 UNLISTED E&M SERVICE: CPT | Mod: HCNC,S$GLB,, | Performed by: SOCIAL WORKER
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
## 2023-10-06 ENCOUNTER — OFFICE VISIT (OUTPATIENT)
Dept: PRIMARY CARE CLINIC | Facility: CLINIC | Age: 78
End: 2023-10-06
Payer: MEDICARE

## 2023-10-06 VITALS
OXYGEN SATURATION: 95 % | DIASTOLIC BLOOD PRESSURE: 78 MMHG | HEIGHT: 64 IN | SYSTOLIC BLOOD PRESSURE: 128 MMHG | HEART RATE: 56 BPM | RESPIRATION RATE: 18 BRPM | WEIGHT: 137 LBS | BODY MASS INDEX: 23.39 KG/M2 | TEMPERATURE: 98 F

## 2023-10-06 DIAGNOSIS — E83.52 HYPERCALCEMIA: Chronic | ICD-10-CM

## 2023-10-06 DIAGNOSIS — I10 ESSENTIAL HYPERTENSION: Chronic | ICD-10-CM

## 2023-10-06 DIAGNOSIS — D86.0 SARCOIDOSIS OF LUNG: Primary | ICD-10-CM

## 2023-10-06 DIAGNOSIS — L93.2 CUTANEOUS LUPUS ERYTHEMATOSUS: Chronic | ICD-10-CM

## 2023-10-06 PROCEDURE — 3078F PR MOST RECENT DIASTOLIC BLOOD PRESSURE < 80 MM HG: ICD-10-PCS | Mod: HCNC,CPTII,S$GLB, | Performed by: FAMILY MEDICINE

## 2023-10-06 PROCEDURE — 3074F SYST BP LT 130 MM HG: CPT | Mod: HCNC,CPTII,S$GLB, | Performed by: FAMILY MEDICINE

## 2023-10-06 PROCEDURE — 1101F PT FALLS ASSESS-DOCD LE1/YR: CPT | Mod: HCNC,CPTII,S$GLB, | Performed by: FAMILY MEDICINE

## 2023-10-06 PROCEDURE — 3288F PR FALLS RISK ASSESSMENT DOCUMENTED: ICD-10-PCS | Mod: HCNC,CPTII,S$GLB, | Performed by: FAMILY MEDICINE

## 2023-10-06 PROCEDURE — 1159F MED LIST DOCD IN RCRD: CPT | Mod: HCNC,CPTII,S$GLB, | Performed by: FAMILY MEDICINE

## 2023-10-06 PROCEDURE — 99214 OFFICE O/P EST MOD 30 MIN: CPT | Mod: HCNC,S$GLB,, | Performed by: FAMILY MEDICINE

## 2023-10-06 PROCEDURE — 1160F PR REVIEW ALL MEDS BY PRESCRIBER/CLIN PHARMACIST DOCUMENTED: ICD-10-PCS | Mod: HCNC,CPTII,S$GLB, | Performed by: FAMILY MEDICINE

## 2023-10-06 PROCEDURE — 1101F PR PT FALLS ASSESS DOC 0-1 FALLS W/OUT INJ PAST YR: ICD-10-PCS | Mod: HCNC,CPTII,S$GLB, | Performed by: FAMILY MEDICINE

## 2023-10-06 PROCEDURE — 1126F AMNT PAIN NOTED NONE PRSNT: CPT | Mod: HCNC,CPTII,S$GLB, | Performed by: FAMILY MEDICINE

## 2023-10-06 PROCEDURE — 99999 PR PBB SHADOW E&M-EST. PATIENT-LVL V: CPT | Mod: PBBFAC,HCNC,, | Performed by: FAMILY MEDICINE

## 2023-10-06 PROCEDURE — 3074F PR MOST RECENT SYSTOLIC BLOOD PRESSURE < 130 MM HG: ICD-10-PCS | Mod: HCNC,CPTII,S$GLB, | Performed by: FAMILY MEDICINE

## 2023-10-06 PROCEDURE — 3288F FALL RISK ASSESSMENT DOCD: CPT | Mod: HCNC,CPTII,S$GLB, | Performed by: FAMILY MEDICINE

## 2023-10-06 PROCEDURE — 1126F PR PAIN SEVERITY QUANTIFIED, NO PAIN PRESENT: ICD-10-PCS | Mod: HCNC,CPTII,S$GLB, | Performed by: FAMILY MEDICINE

## 2023-10-06 PROCEDURE — 99214 PR OFFICE/OUTPT VISIT, EST, LEVL IV, 30-39 MIN: ICD-10-PCS | Mod: HCNC,S$GLB,, | Performed by: FAMILY MEDICINE

## 2023-10-06 PROCEDURE — 1159F PR MEDICATION LIST DOCUMENTED IN MEDICAL RECORD: ICD-10-PCS | Mod: HCNC,CPTII,S$GLB, | Performed by: FAMILY MEDICINE

## 2023-10-06 PROCEDURE — 1160F RVW MEDS BY RX/DR IN RCRD: CPT | Mod: HCNC,CPTII,S$GLB, | Performed by: FAMILY MEDICINE

## 2023-10-06 PROCEDURE — 3078F DIAST BP <80 MM HG: CPT | Mod: HCNC,CPTII,S$GLB, | Performed by: FAMILY MEDICINE

## 2023-10-06 PROCEDURE — 99999 PR PBB SHADOW E&M-EST. PATIENT-LVL V: ICD-10-PCS | Mod: PBBFAC,HCNC,, | Performed by: FAMILY MEDICINE

## 2023-10-06 RX ORDER — LORAZEPAM 0.5 MG/1
0.5 TABLET ORAL 2 TIMES DAILY
Qty: 60 TABLET | Refills: 3 | Status: SHIPPED | OUTPATIENT
Start: 2023-10-06 | End: 2023-11-21

## 2023-10-06 NOTE — PROGRESS NOTES
[facility] Care Provider Appointment   [facility]       Patient ID: Evelina Pinon is a 78 y.o. female.    ASSESSMENT/PLAN by Problem List:    1. Sarcoidosis of lung  Assessment & Plan:  She has finally agreed to follow back with pulmonology and has follow-up scheduled with Rheumatology      2. Cutaneous lupus erythematosus  Assessment & Plan:  Previously diagnosed with cutaneous lupus.  Although she does have systemic findings may be related to sarcoidosis however can not exclude progression of lupus.  She has agreed to follow with Rheumatology and has an appointment scheduled.        3. Hypercalcemia  Assessment & Plan:  Probably related to sarcoidosis.  She will continue to follow with pulmonology and Rheumatology.  I have previously recommended endocrinology consultation and she had declined.      4. Essential hypertension  Assessment & Plan:  Chronic stable satisfactory.  Continue current therapy      Other orders  -     LORazepam (ATIVAN) 0.5 MG tablet; Take 1 tablet (0.5 mg total) by mouth 2 (two) times daily. as needed for anxiety.  Dispense: 60 tablet; Refill: 3     ** ** discussion: Refer to previous notes if needed.  The last few times I saw her she adamantly declined any further investigation into the above problems including mediastinal adenopathy, sarcoidosis, hypercalcemia, etc..  However she recently developed an inflammatory condition for eye that concerned her and prompted referrals to pulmonology and Rheumatology which she has now agreed to pursue.  She is already consulted with pulmonology and has an appointment with Rheumatology scheduled.  Note that she does have severe anxiety and used this as an excuse as to why she had avoided previous follow-up workup.  But she is been working closely with CHING Palacios, and I think this has had a significant impact on her ability to better address her medical problems and pursue needed workup.  I will see her back  "in a few months after her next set of consultations were specialists.    Follow Up:  Two months      Subjective:     Chief Complaint   Patient presents with    Follow-up     I have reviewed the information entered by the ancillary staff regarding the chief complaint as well as the related history.    Follow-up  Pertinent negatives include no chills or fever.       Patient is a/an 78 y.o.  female     Follow-up several topics.  Details were discussed above    For complete problem list, past medical history, surgical history, social history, etc., see appropriate section in the electronic medical record    Review of Systems   Constitutional:  Negative for chills, fever and unexpected weight change.   Cardiovascular: Negative.    Gastrointestinal: Negative.    Genitourinary: Negative.    Musculoskeletal: Negative.    Psychiatric/Behavioral:  Negative for dysphoric mood and suicidal ideas. The patient is nervous/anxious.        Objective     Physical Exam  Vitals reviewed.   Constitutional:       General: She is not in acute distress.     Appearance: She is well-developed. She is not toxic-appearing.   Eyes:      General: No scleral icterus.  Cardiovascular:      Rate and Rhythm: Regular rhythm. Bradycardia present.      Heart sounds: Normal heart sounds. No murmur heard.     Comments: No peripheral edema  Pulmonary:      Effort: Pulmonary effort is normal.      Breath sounds: No wheezing, rhonchi or rales.      Comments: Mildly diminished breath sounds, few rhonchi.  No rales  Neurological:      Mental Status: She is alert and oriented to person, place, and time.   Psychiatric:         Behavior: Behavior normal.       Vitals:    10/06/23 1309   BP: 128/78   BP Location: Right arm   Patient Position: Sitting   BP Method: Medium (Manual)   Pulse: (!) 56   Resp: 18   Temp: 97.8 °F (36.6 °C)   TempSrc: Oral   SpO2: 95%   Weight: 62.1 kg (137 lb 0.3 oz)   Height: 5' 4" (1.626 m)           THIS DOCUMENT WAS MADE IN PART WITH " VOICE RECOGNITION SOFTWARE.  OCCASIONALLY THIS SOFTWARE WILL MISINTERPRET WORDS OR PHRASES.

## 2023-10-08 PROBLEM — E83.52 HYPERCALCEMIA: Chronic | Status: ACTIVE | Noted: 2023-06-06

## 2023-10-08 PROBLEM — D86.0 SARCOIDOSIS OF LUNG: Chronic | Status: ACTIVE | Noted: 2023-10-08

## 2023-10-08 PROBLEM — D86.0 SARCOIDOSIS OF LUNG: Status: ACTIVE | Noted: 2023-10-08

## 2023-10-08 NOTE — ASSESSMENT & PLAN NOTE
Previously diagnosed with cutaneous lupus.  Although she does have systemic findings may be related to sarcoidosis however can not exclude progression of lupus.  She has agreed to follow with Rheumatology and has an appointment scheduled.

## 2023-10-08 NOTE — ASSESSMENT & PLAN NOTE
Probably related to sarcoidosis.  She will continue to follow with pulmonology and Rheumatology.  I have previously recommended endocrinology consultation and she had declined.

## 2023-10-08 NOTE — ASSESSMENT & PLAN NOTE
She has finally agreed to follow back with pulmonology and has follow-up scheduled with Rheumatology

## 2023-10-09 ENCOUNTER — CLINICAL SUPPORT (OUTPATIENT)
Dept: PRIMARY CARE CLINIC | Facility: CLINIC | Age: 78
End: 2023-10-09
Payer: MEDICARE

## 2023-10-09 DIAGNOSIS — F40.240 CLAUSTROPHOBIA: Primary | ICD-10-CM

## 2023-10-09 DIAGNOSIS — F41.1 GAD (GENERALIZED ANXIETY DISORDER): ICD-10-CM

## 2023-10-09 PROCEDURE — 99499 NO LOS: ICD-10-PCS | Mod: HCNC,S$GLB,, | Performed by: SOCIAL WORKER

## 2023-10-09 PROCEDURE — 99499 UNLISTED E&M SERVICE: CPT | Mod: HCNC,S$GLB,, | Performed by: SOCIAL WORKER
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
## 2023-10-10 ENCOUNTER — TELEPHONE (OUTPATIENT)
Dept: PRIMARY CARE CLINIC | Facility: CLINIC | Age: 78
End: 2023-10-10
Payer: MEDICARE

## 2023-10-10 NOTE — TELEPHONE ENCOUNTER
----- Message from Piyush Soriano sent at 10/9/2023  3:24 PM CDT -----   Mr. Miguel Angel Pinon is calling on behalf of . Evelina Pinon wants you to give him a call back [phone]

## 2023-10-10 NOTE — TELEPHONE ENCOUNTER
Spoke with pt's , and he reports that pt has 2 rheumatology appts on her portal.  Requested to cancel appt in [place].

## 2023-10-16 ENCOUNTER — CLINICAL SUPPORT (OUTPATIENT)
Dept: PRIMARY CARE CLINIC | Facility: CLINIC | Age: 78
End: 2023-10-16
Payer: MEDICARE

## 2023-10-16 DIAGNOSIS — F41.1 GAD (GENERALIZED ANXIETY DISORDER): Primary | ICD-10-CM

## 2023-10-16 DIAGNOSIS — F43.9 TRAUMA AND STRESSOR-RELATED DISORDER: ICD-10-CM

## 2023-10-16 PROCEDURE — 99499 UNLISTED E&M SERVICE: CPT | Mod: HCNC,S$GLB,, | Performed by: SOCIAL WORKER

## 2023-10-16 PROCEDURE — 99499 NO LOS: ICD-10-PCS | Mod: HCNC,S$GLB,, | Performed by: SOCIAL WORKER
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
## 2023-10-19 ENCOUNTER — OFFICE VISIT (OUTPATIENT)
Dept: PRIMARY CARE CLINIC | Facility: CLINIC | Age: 78
End: 2023-10-19
Payer: MEDICARE

## 2023-10-19 ENCOUNTER — TELEPHONE (OUTPATIENT)
Dept: PRIMARY CARE CLINIC | Facility: CLINIC | Age: 78
End: 2023-10-19
Payer: MEDICARE

## 2023-10-19 VITALS
RESPIRATION RATE: 18 BRPM | WEIGHT: 136.25 LBS | HEART RATE: 60 BPM | BODY MASS INDEX: 23.26 KG/M2 | OXYGEN SATURATION: 99 % | HEIGHT: 64 IN | DIASTOLIC BLOOD PRESSURE: 64 MMHG | TEMPERATURE: 98 F | SYSTOLIC BLOOD PRESSURE: 128 MMHG

## 2023-10-19 DIAGNOSIS — M54.9 BACK PAIN, UNSPECIFIED BACK LOCATION, UNSPECIFIED BACK PAIN LATERALITY, UNSPECIFIED CHRONICITY: Primary | ICD-10-CM

## 2023-10-19 PROCEDURE — 3078F DIAST BP <80 MM HG: CPT | Mod: HCNC,CPTII,S$GLB, | Performed by: FAMILY MEDICINE

## 2023-10-19 PROCEDURE — 81002 POCT URINE DIPSTICK WITHOUT MICROSCOPE: ICD-10-PCS | Mod: HCNC,S$GLB,, | Performed by: FAMILY MEDICINE

## 2023-10-19 PROCEDURE — 3078F PR MOST RECENT DIASTOLIC BLOOD PRESSURE < 80 MM HG: ICD-10-PCS | Mod: HCNC,CPTII,S$GLB, | Performed by: FAMILY MEDICINE

## 2023-10-19 PROCEDURE — 3074F SYST BP LT 130 MM HG: CPT | Mod: HCNC,CPTII,S$GLB, | Performed by: FAMILY MEDICINE

## 2023-10-19 PROCEDURE — 1159F PR MEDICATION LIST DOCUMENTED IN MEDICAL RECORD: ICD-10-PCS | Mod: HCNC,CPTII,S$GLB, | Performed by: FAMILY MEDICINE

## 2023-10-19 PROCEDURE — 1159F MED LIST DOCD IN RCRD: CPT | Mod: HCNC,CPTII,S$GLB, | Performed by: FAMILY MEDICINE

## 2023-10-19 PROCEDURE — 81002 URINALYSIS NONAUTO W/O SCOPE: CPT | Mod: HCNC,S$GLB,, | Performed by: FAMILY MEDICINE

## 2023-10-19 PROCEDURE — 3288F FALL RISK ASSESSMENT DOCD: CPT | Mod: HCNC,CPTII,S$GLB, | Performed by: FAMILY MEDICINE

## 2023-10-19 PROCEDURE — 99999 PR PBB SHADOW E&M-EST. PATIENT-LVL IV: CPT | Mod: PBBFAC,HCNC,, | Performed by: FAMILY MEDICINE

## 2023-10-19 PROCEDURE — 99999 PR PBB SHADOW E&M-EST. PATIENT-LVL IV: ICD-10-PCS | Mod: PBBFAC,HCNC,, | Performed by: FAMILY MEDICINE

## 2023-10-19 PROCEDURE — 1101F PT FALLS ASSESS-DOCD LE1/YR: CPT | Mod: HCNC,CPTII,S$GLB, | Performed by: FAMILY MEDICINE

## 2023-10-19 PROCEDURE — 1160F PR REVIEW ALL MEDS BY PRESCRIBER/CLIN PHARMACIST DOCUMENTED: ICD-10-PCS | Mod: HCNC,CPTII,S$GLB, | Performed by: FAMILY MEDICINE

## 2023-10-19 PROCEDURE — 1126F AMNT PAIN NOTED NONE PRSNT: CPT | Mod: HCNC,CPTII,S$GLB, | Performed by: FAMILY MEDICINE

## 2023-10-19 PROCEDURE — 1101F PR PT FALLS ASSESS DOC 0-1 FALLS W/OUT INJ PAST YR: ICD-10-PCS | Mod: HCNC,CPTII,S$GLB, | Performed by: FAMILY MEDICINE

## 2023-10-19 PROCEDURE — 99214 PR OFFICE/OUTPT VISIT, EST, LEVL IV, 30-39 MIN: ICD-10-PCS | Mod: HCNC,S$GLB,, | Performed by: FAMILY MEDICINE

## 2023-10-19 PROCEDURE — 1126F PR PAIN SEVERITY QUANTIFIED, NO PAIN PRESENT: ICD-10-PCS | Mod: HCNC,CPTII,S$GLB, | Performed by: FAMILY MEDICINE

## 2023-10-19 PROCEDURE — 3288F PR FALLS RISK ASSESSMENT DOCUMENTED: ICD-10-PCS | Mod: HCNC,CPTII,S$GLB, | Performed by: FAMILY MEDICINE

## 2023-10-19 PROCEDURE — 99214 OFFICE O/P EST MOD 30 MIN: CPT | Mod: HCNC,S$GLB,, | Performed by: FAMILY MEDICINE

## 2023-10-19 PROCEDURE — 1160F RVW MEDS BY RX/DR IN RCRD: CPT | Mod: HCNC,CPTII,S$GLB, | Performed by: FAMILY MEDICINE

## 2023-10-19 PROCEDURE — 3074F PR MOST RECENT SYSTOLIC BLOOD PRESSURE < 130 MM HG: ICD-10-PCS | Mod: HCNC,CPTII,S$GLB, | Performed by: FAMILY MEDICINE

## 2023-10-19 NOTE — PROGRESS NOTES
Primary Care Provider Appointment   [facility]       Patient ID: Evelina Pinon is a 78 y.o. female.    Seen today for an urgent care visit    ASSESSMENT/PLAN by Problem List:    1. Back pain, unspecified back location, unspecified back pain laterality, unspecified chronicity  -     POCT urine dipstick without microscope  -     Urine culture; Future; Expected date: 10/19/2023  -     Urinalysis Microscopic; Future     I believe her back pain is musculoskeletal.  It does seem to be improving.  Recommend gentle stretching, pain is not severe so I do not feel we need imaging although if symptoms worsen then may be wise to get thoracic x-ray to rule out compression fracture but again pain was not severe and did not appear to have pain with palpation and percussion over the vertebral spinous processes.  She is concerned about a UTI because of some other vague symptoms such as fatigue and subjective fever but no dysuria or hematuria.  Still I agreed to order a urinalysis but unfortunately she was not able to produce a urine sample because she urinated right before coming to our office so she is going to complete this in the morning but let us know if any actual urinary symptoms develop    Subjective:     Chief Complaint   Patient presents with    Follow-up     I have reviewed the information entered by the ancillary staff regarding the chief complaint as well as the related history.    HPI    Patient is a/an 78 y.o.  female     left back flank pain x 3 days  Sluggish, subjective fever yesterday but sweats or chills or actual measured fever and declines experiencing these symptoms today.  No dysuria, no frequency, no urgency, no hematuria  Pain is described as sore and aching, moderate but somewhat better today  Maybe worse standing or sitting back against chair  Nothing seems to alleviate the symptoms    She does report that a day or two prior to this she fell at a neighbor's  house apparently tripping on a transition zone between rooms she states that her knee was initially sore but that is doing better she did not feel the back pain immediately.      For complete problem list, past medical history, surgical history, social history, etc., see appropriate section in the electronic medical record    Review of Systems   Constitutional:  Positive for fatigue. Negative for chills and unexpected weight change.   Respiratory: Negative.     Cardiovascular: Negative.    Gastrointestinal:  Negative for abdominal distention, abdominal pain, blood in stool, constipation and diarrhea.   Genitourinary:  Positive for flank pain. Negative for difficulty urinating, dysuria, frequency, hematuria, pelvic pain, urgency and vaginal bleeding.   Musculoskeletal:  Positive for back pain.       Objective     Physical Exam  Vitals reviewed.   Constitutional:       General: She is not in acute distress.     Appearance: She is well-developed. She is not ill-appearing.   HENT:      Head: Normocephalic and atraumatic.   Eyes:      General: No scleral icterus.     Conjunctiva/sclera: Conjunctivae normal.   Cardiovascular:      Rate and Rhythm: Normal rate and regular rhythm.      Heart sounds: Normal heart sounds. No murmur heard.  Pulmonary:      Effort: Pulmonary effort is normal. No respiratory distress.      Breath sounds: Normal breath sounds. No wheezing or rales.       Abdominal:      General: Abdomen is flat. Bowel sounds are normal. There is no distension.      Tenderness: There is no abdominal tenderness. There is no right CVA tenderness or guarding.   Skin:     General: Skin is dry.      Findings: No rash.   Neurological:      Mental Status: She is alert and oriented to person, place, and time.   Psychiatric:         Behavior: Behavior normal.       Vitals:    10/19/23 1422 10/19/23 1945   BP: (!) 140/68 128/64   BP Location: Right arm    Patient Position: Sitting    BP Method: Medium (Manual)    Pulse: 60   "  Resp: 18    Temp: 97.9 °F (36.6 °C)    TempSrc: Oral    SpO2: 99%    Weight: 61.8 kg (136 lb 3.9 oz)    Height: 5' 4" (1.626 m)            THIS DOCUMENT WAS MADE IN PART WITH VOICE RECOGNITION SOFTWARE.  OCCASIONALLY THIS SOFTWARE WILL MISINTERPRET WORDS OR PHRASES.    "

## 2023-10-19 NOTE — TELEPHONE ENCOUNTER
----- Message from Danita Mcknight sent at 10/19/2023  8:10 AM CDT -----  Contact: Mr Pinon [phone]  Type:  Same Day Appointment Request    Caller is requesting a same day appointment.  Caller declined first available appointment listed below.      Name of Caller:  Pts  Mr Pinon   When is the first available appointment?  NA  Symptoms:  Side pain/ kidney pain   Best Call Back Number:  [phone]    Additional Information: Pls call back and advise

## 2023-10-19 NOTE — TELEPHONE ENCOUNTER
Spoke with pt, reports that she has a kidney infection and has been having L flank pain since Sunday.  Pt fell down on Sunday on her knees, denies hitting her head, and reports sore knees.     Consulted with Dr. Whyte and offered for pt to have urine specimen done at lab today or to be seen in clinic.  Pt scheduled to come in today at 2pm.

## 2023-10-20 ENCOUNTER — TELEPHONE (OUTPATIENT)
Dept: PRIMARY CARE CLINIC | Facility: CLINIC | Age: 78
End: 2023-10-20
Payer: MEDICARE

## 2023-10-20 LAB
BILIRUB SERPL-MCNC: NEGATIVE MG/DL
BLOOD URINE, POC: NEGATIVE
CLARITY, POC UA: CLEAR
COLOR, POC UA: NORMAL
GLUCOSE UR QL STRIP: NEGATIVE
KETONES UR QL STRIP: NEGATIVE
LEUKOCYTE ESTERASE URINE, POC: NEGATIVE
NITRITE, POC UA: NEGATIVE
PH, POC UA: 6
PROTEIN, POC: NEGATIVE
SPECIFIC GRAVITY, POC UA: 1.01
UROBILINOGEN, POC UA: NORMAL

## 2023-10-20 RX ORDER — TIZANIDINE 2 MG/1
TABLET ORAL
Qty: 14 TABLET | Refills: 0 | Status: ON HOLD | OUTPATIENT
Start: 2023-10-20 | End: 2023-12-20 | Stop reason: HOSPADM

## 2023-10-20 NOTE — TELEPHONE ENCOUNTER
Spoke with pt regarding POCT urinalysis and informed her that UA was normal.  Dr. Whyte offered to call in a mild muscle relaxant for patient and she didn't sound like she wanted this.      Called pt back at 1655 to re-discuss muscle relaxant but was unable to reach pt.  LM with pt that I would inform Dr. Whyte that he could call the muscle relaxant into [facility] on [address] in the event that she needs something over the weekend.     [id number] [place] [address] [address] AT [facility] [address]

## 2023-10-23 ENCOUNTER — OFFICE VISIT (OUTPATIENT)
Dept: CARDIOLOGY | Facility: CLINIC | Age: 78
End: 2023-10-23
Payer: MEDICARE

## 2023-10-23 VITALS
DIASTOLIC BLOOD PRESSURE: 65 MMHG | BODY MASS INDEX: 23.34 KG/M2 | WEIGHT: 136.69 LBS | HEIGHT: 64 IN | HEART RATE: 47 BPM | SYSTOLIC BLOOD PRESSURE: 117 MMHG

## 2023-10-23 DIAGNOSIS — I27.20 PULMONARY HYPERTENSION: Primary | Chronic | ICD-10-CM

## 2023-10-23 DIAGNOSIS — I10 ESSENTIAL HYPERTENSION: Chronic | ICD-10-CM

## 2023-10-23 DIAGNOSIS — I25.10 CORONARY ARTERY DISEASE INVOLVING NATIVE CORONARY ARTERY OF NATIVE HEART WITHOUT ANGINA PECTORIS: Chronic | ICD-10-CM

## 2023-10-23 DIAGNOSIS — R07.2 PRECORDIAL PAIN: ICD-10-CM

## 2023-10-23 DIAGNOSIS — E78.2 MIXED HYPERLIPIDEMIA: Chronic | ICD-10-CM

## 2023-10-23 PROCEDURE — 1159F PR MEDICATION LIST DOCUMENTED IN MEDICAL RECORD: ICD-10-PCS | Mod: HCNC,CPTII,S$GLB, | Performed by: INTERNAL MEDICINE

## 2023-10-23 PROCEDURE — 1126F PR PAIN SEVERITY QUANTIFIED, NO PAIN PRESENT: ICD-10-PCS | Mod: HCNC,CPTII,S$GLB, | Performed by: INTERNAL MEDICINE

## 2023-10-23 PROCEDURE — 1160F RVW MEDS BY RX/DR IN RCRD: CPT | Mod: HCNC,CPTII,S$GLB, | Performed by: INTERNAL MEDICINE

## 2023-10-23 PROCEDURE — 99999 PR PBB SHADOW E&M-EST. PATIENT-LVL III: ICD-10-PCS | Mod: PBBFAC,HCNC,, | Performed by: INTERNAL MEDICINE

## 2023-10-23 PROCEDURE — 99999 PR PBB SHADOW E&M-EST. PATIENT-LVL III: CPT | Mod: PBBFAC,HCNC,, | Performed by: INTERNAL MEDICINE

## 2023-10-23 PROCEDURE — 1126F AMNT PAIN NOTED NONE PRSNT: CPT | Mod: HCNC,CPTII,S$GLB, | Performed by: INTERNAL MEDICINE

## 2023-10-23 PROCEDURE — 1159F MED LIST DOCD IN RCRD: CPT | Mod: HCNC,CPTII,S$GLB, | Performed by: INTERNAL MEDICINE

## 2023-10-23 PROCEDURE — 1101F PT FALLS ASSESS-DOCD LE1/YR: CPT | Mod: HCNC,CPTII,S$GLB, | Performed by: INTERNAL MEDICINE

## 2023-10-23 PROCEDURE — 3288F PR FALLS RISK ASSESSMENT DOCUMENTED: ICD-10-PCS | Mod: HCNC,CPTII,S$GLB, | Performed by: INTERNAL MEDICINE

## 2023-10-23 PROCEDURE — 99214 PR OFFICE/OUTPT VISIT, EST, LEVL IV, 30-39 MIN: ICD-10-PCS | Mod: HCNC,S$GLB,, | Performed by: INTERNAL MEDICINE

## 2023-10-23 PROCEDURE — 1160F PR REVIEW ALL MEDS BY PRESCRIBER/CLIN PHARMACIST DOCUMENTED: ICD-10-PCS | Mod: HCNC,CPTII,S$GLB, | Performed by: INTERNAL MEDICINE

## 2023-10-23 PROCEDURE — 3288F FALL RISK ASSESSMENT DOCD: CPT | Mod: HCNC,CPTII,S$GLB, | Performed by: INTERNAL MEDICINE

## 2023-10-23 PROCEDURE — 3074F SYST BP LT 130 MM HG: CPT | Mod: HCNC,CPTII,S$GLB, | Performed by: INTERNAL MEDICINE

## 2023-10-23 PROCEDURE — 99214 OFFICE O/P EST MOD 30 MIN: CPT | Mod: HCNC,S$GLB,, | Performed by: INTERNAL MEDICINE

## 2023-10-23 PROCEDURE — 1101F PR PT FALLS ASSESS DOC 0-1 FALLS W/OUT INJ PAST YR: ICD-10-PCS | Mod: HCNC,CPTII,S$GLB, | Performed by: INTERNAL MEDICINE

## 2023-10-23 PROCEDURE — 3078F PR MOST RECENT DIASTOLIC BLOOD PRESSURE < 80 MM HG: ICD-10-PCS | Mod: HCNC,CPTII,S$GLB, | Performed by: INTERNAL MEDICINE

## 2023-10-23 PROCEDURE — 3074F PR MOST RECENT SYSTOLIC BLOOD PRESSURE < 130 MM HG: ICD-10-PCS | Mod: HCNC,CPTII,S$GLB, | Performed by: INTERNAL MEDICINE

## 2023-10-23 PROCEDURE — 3078F DIAST BP <80 MM HG: CPT | Mod: HCNC,CPTII,S$GLB, | Performed by: INTERNAL MEDICINE

## 2023-10-23 NOTE — PROGRESS NOTES
Subjective:    Patient ID:  Evelina Pinon is a 78 y.o. female who presents for follow-up of Carotid Artery Disease and Hypertension      HPI  She comes with no complaints, no chest pain, no shortness of breath  No cardiac complaints.    Blood pressure normal at home  Functional class 2    Review of Systems   Constitutional: Negative for decreased appetite, malaise/fatigue, weight gain and weight loss.   Cardiovascular:  Negative for chest pain, dyspnea on exertion, leg swelling, palpitations and syncope.   Respiratory:  Negative for cough and shortness of breath.    Gastrointestinal: Negative.    Neurological:  Negative for weakness.   All other systems reviewed and are negative.     Objective:      Physical Exam  Vitals and nursing note reviewed.   Constitutional:       Appearance: Normal appearance. She is well-developed.   HENT:      Head: Normocephalic.   Eyes:      Pupils: Pupils are equal, round, and reactive to light.   Neck:      Thyroid: No thyromegaly.      Vascular: No carotid bruit or JVD.   Cardiovascular:      Rate and Rhythm: Normal rate and regular rhythm.      Chest Wall: PMI is not displaced.      Pulses: Normal pulses and intact distal pulses.      Heart sounds: Normal heart sounds. No murmur heard.     No gallop.   Pulmonary:      Effort: Pulmonary effort is normal.      Breath sounds: Normal breath sounds.   Abdominal:      Palpations: Abdomen is soft. There is no mass.      Tenderness: There is no abdominal tenderness.   Musculoskeletal:         General: Normal range of motion.      Cervical back: Normal range of motion and neck supple.   Skin:     General: Skin is warm.   Neurological:      Mental Status: She is alert and oriented to person, place, and time.      Sensory: No sensory deficit.      Deep Tendon Reflexes: Reflexes are normal and symmetric.         Most Recent EKG Results  Results for orders placed or performed during the hospital encounter of 09/10/21   ECG 12 lead     Collection Time: 09/10/21  1:55 PM    Narrative    Test Reason : R29.818,    Vent. Rate : 054 BPM     Atrial Rate : 054 BPM     P-R Int : 156 ms          QRS Dur : 106 ms      QT Int : 484 ms       P-R-T Axes : 081 -55 081 degrees     QTc Int : 458 ms    Sinus bradycardia  Incomplete right bundle branch block  Left anterior fascicular block  Nonspecific ST and T wave abnormality  Abnormal ECG  When compared with ECG of 09-JUL-2021 06:55,  Fusion complexes are no longer Present  Premature supraventricular complexes are no longer Present  Left anterior fascicular block is now Present  Confirmed by Jordyn BENAVIDES, Lazaro YA ([id number]) on 9/10/2021 3:18:48 PM    Referred By: SHAD   SELF           Confirmed By:Lazaro Cobb MD       Most Recent Echocardiogram Results  Results for orders placed during the hospital encounter of 07/04/21    Echo    Interpretation Summary  · The left ventricle is normal in size with normal systolic function.  · The estimated ejection fraction is 55%.  · Grade II left ventricular diastolic dysfunction.  · Mild left atrial enlargement.  · Mild mitral regurgitation.  · Mild to moderate tricuspid regurgitation.  · Intermediate central venous pressure (8 mmHg).  · The estimated PA systolic pressure is 63 mmHg.  · There is pulmonary hypertension.      Most Recent Nuclear Stress Test Results  Results for orders placed during the hospital encounter of 08/20/20    Nuclear Stress - Cardiology Interpreted    Interpretation Summary    The study shows abnormal myocardial perfusion.    Abnormal myocardial perfusion study.    Perfusion Defect There is a moderate sized, reversible defect that is consistent with ischemia in the anteroapical wall(s) in the typical distribution of the LAD territory.    The EKG portion of this study is negative for ischemia.    The patient reported no chest pain during the stress test.    There were no arrhythmias during stress.    EF 92 %      Most Recent Cardiac PET Stress  Test Results  No results found for this or any previous visit.      Most Recent Cardiovascular Angiogram results  Results for orders placed during the hospital encounter of 07/04/21    Cardiac catheterization    Narrative  Right heart catheterization:  Right atrial pressure 13/10/8 mmHg  Right ventricular pressure 42/5/14 mm Hg  Pulmonary artery pressure 42/17/26 mm Hg  Pulmonary capillary wedge 16/22/18 mm Hg  Cardiac output 4.36.    The procedure log was documented by Documenter: Carly Castellanos RN and verified by Demetrius Medley MD.    Date: 7/9/2021  Time: 2:12 PM      Other Most Recent Cardiology Results  Results for orders placed during the hospital encounter of 09/10/21    CARDIAC MONITORING STRIPS      Labs reviewed    Assessment:       1. Pulmonary hypertension    2. Precordial pain    3. Mixed hyperlipidemia    4. Essential hypertension    5. Coronary artery disease involving native coronary artery of native heart without angina pectoris         Plan:     Continue:  Beta blocker, Calcium blocker, and Statin  Regular exercise program  Low cholesterol diet  One year follow-up with Jeane Salinas

## 2023-10-25 RX ORDER — PANTOPRAZOLE SODIUM 40 MG/1
40 TABLET, DELAYED RELEASE ORAL
Qty: 90 TABLET | Refills: 3 | Status: SHIPPED | OUTPATIENT
Start: 2023-10-25

## 2023-10-26 ENCOUNTER — CLINICAL SUPPORT (OUTPATIENT)
Dept: PRIMARY CARE CLINIC | Facility: CLINIC | Age: 78
End: 2023-10-26
Payer: MEDICARE

## 2023-10-26 DIAGNOSIS — F43.9 TRAUMA AND STRESSOR-RELATED DISORDER: Primary | ICD-10-CM

## 2023-10-26 PROCEDURE — 99499 UNLISTED E&M SERVICE: CPT | Mod: HCNC,S$GLB,, | Performed by: SOCIAL WORKER

## 2023-10-26 PROCEDURE — 99499 NO LOS: ICD-10-PCS | Mod: HCNC,S$GLB,, | Performed by: SOCIAL WORKER

## 2023-10-26 NOTE — TELEPHONE ENCOUNTER
Care Due:                  Date            Visit Type   Department     Provider  --------------------------------------------------------------------------------                                SAME DAY -                              ESTABLISHED                  Jeffrey Leon  Last Visit: 10-      PATIENT      None Found     Garibay                              ESTABLISHED                  Jeffrey Leon  Next Visit: 01-      PATIENT      None Found     Garibay                                                            Last  Test          Frequency    Reason                     Performed    Due Date  --------------------------------------------------------------------------------    Mg Level....  12 months..  magnesium................  Not Found    Overdue    [employer] Embedded Care Due Messages. Reference number: [id number].   10/25/2023 9:25:06 PM CDT

## 2023-10-26 NOTE — TELEPHONE ENCOUNTER
Provider Staff:  Action required for this patient     Please see care gap opportunities below in Care Due Message.    Thanks!  [facility]     Appointments      Date Provider   Last Visit   10/19/2023 Jeffrey Garibay MD   Next Visit   1/12/2024 Jeffrey Garibay MD     Refill Decision Note   Evelina Pinon  is requesting a refill authorization.  Brief Assessment and Rationale for Refill:  Approve     Medication Therapy Plan:         Comments:     Note composed:10:17 PM 10/25/2023

## 2023-10-27 NOTE — PROGRESS NOTES
Subjective:      Patient ID: Evelina Pinon is a 78 y.o. female.    Chief Complaint: Disease Management    HPI    Rheumatologic History:     - Diagnosis/es:   - Chorioretinitis and panuveitis OS: Per Dr Alonzo note (2023):  Punched out lesions in the peripheral retina, thinning of outer retinal layers and macula.  Chorioretinal lesions appear chronic and are not actively leaking on FA.  Inflammation is improving.  Uveitis is mostly posterior and involving the choroid and retina.   - Sarcoidosis diagnosed by LN biopsy 2021   - Pulmonary HTN   - Cutaneous lupus:  In , she presented with psoriasiform plaques on the shoulders, anterior proximal thigh, and superior thoracic spine and was treated with topical betamethasone.  Shave biopsy showed interface dermatitis.   - Gout  - Social History: Former smoker (quit 30-40 years ago), occasional alcohol intake  - Gyn History:     - Family History: adopted   - Positive serologies: ACE (138), +HLA B27, +CARMINE 1:320 nuclear and nucleolar, +SSA   - Negative serologies: HLA A29, [place], RNP, SSA, SSB  - Pathology:   - Chest station 4L LN biopsy (2021) noncaseating granulomas present   - shave biopsy left medial upper back (2020) interface dermatitis  - Infectious screening labs: Negative RPR, QuantiFERON (2023)  - Imaging:   - TTE (2021): EF 55%, CVP 8, PASP 63 mmHg  - RHC (2021) Right atrial pressure 13/10/8 mmHg  Right ventricular pressure 42/5/14 mm Hg  Pulmonary artery pressure 42/17/26 mm Hg  Pulmonary capillary wedge 16/22/18 mm Hg  Cardiac output 4.36  - CT chest (2022) Diffuse shotty lymph nodes are identified throughout the mediastinum.  None of which appear to be pathologically enlarged.  Questionable right hilar fullness is identified. Scarring of the lung bases with traction bronchiectasis.  No evidence for honeycombing.  Focal fibrosis is identified in the right lower lobe.  No area of consolidation.  Multiple miliary pulmonary  "nodules identified throughout the lung parenchyma greater on the right than left.   - Previous Treatments:    - Prednisone 60mg per Ophthalmology  - Current Treatments:    - Allopurinol 200mg per PCP   - Colchicine per PCP  Interval History:   This is a 78-year-old woman who is a poor historian with history of hypertension, osteoporosis untreated per patient preference, gout on allopurinol 200mg and colchicine, cutaneous lupus previously on topical betamethasone (biopsy proven interface dermatitis 2020), biopsy proven sarcoidosis involving the hilar LN with positive ACE and hypercalcemia, pulmonary hypertension, chorioretinitis and uveitis OS s/p short course of prednisone 60mg.     In July 2023, she developed blurring of vision in her left eye. She was seen by Ophthalmology who diagnosed her with chorioretinitis and uveitis and started her on a short course of prednisone 60mg. Currently, she reports a rash on her back, alopecia, and Raynaud's. Her vision has improved.     The patient denies fevers oral/nasal ulcers, sicca symptoms, photosensitivity, joint pain/swelling, pleuritic chest pain, shortness of breath, cough, abdominal pain, diarrhea, bloody stool, weakness, and numbness/tingling.    Objective:   BP (!) 153/71   Pulse (!) 47   Ht 5' 4" (1.626 m)   Wt 62 kg (136 lb 11 oz)   BMI 23.46 kg/m²   Physical Exam   Constitutional: normal appearance.   + Kyphosis, wearing a back brace   HENT:   Head: Normocephalic and atraumatic.   Cardiovascular: Normal rate, regular rhythm and normal heart sounds.   Pulmonary/Chest: Effort normal and breath sounds normal.   Musculoskeletal:      Comments: Degenerative changes  No synovitis, dactylitis, enthesitis, effusions     Neurological: She is alert.   Skin:   Faint erythematous rash on right lower back       No data to display      [facility] labs independently reviewed by me (8/01/2023)  CBC WNL      Assessment:     1. Cutaneous lupus erythematosus    2. Uveitis    3. " History of sarcoidosis    4. Pulmonary hypertension    5. Chorioretinitis, unspecified laterality      This is a 78-year-old woman who is a poor historian with history of hypertension, osteoporosis untreated per patient preference, gout on allopurinol 200mg and colchicine, cutaneous lupus previously on topical betamethasone (biopsy proven interface dermatitis 2020), biopsy proven sarcoidosis involving the hilar LN with positive ACE and hypercalcemia, pulmonary hypertension, chorioretinitis and uveitis OS s/p short course of prednisone 60mg. In July 2023, she developed blurring of vision in her left eye. She was seen by Ophthalmology who diagnosed her with chorioretinitis and uveitis and started her on a short course of prednisone 60mg. Currently, she reports a rash on her back, alopecia, and Raynaud's. Her vision has improved. Rheum ROS is otherwise negative. Physical examination shows kyphosis, degenerative changes, and a faint red rash on the lower back. I suspect that her sarcoidosis has recurred but she will need work up for systemic lupus. Consider starting methotrexate at follow up.     Plan:     Problem List Items Addressed This Visit          Derm    Cutaneous lupus erythematosus - Primary (Chronic)    Relevant Orders    Anti-DNA Ab, Double-Stranded    C3 Complement    C4 Complement    CBC Auto Differential    Comprehensive Metabolic Panel    Urinalysis    Sedimentation rate    Protein/Creatinine Ratio, Urine    C-Reactive Protein    Rheumatoid Factor    Cyclic Citrullinated Peptide Antibody, IgG    DRVVT    Cardiolipin antibody    Beta-2 Glycoprotein Abs (IgA, IgG, IgM)    CK    Aldolase    Lysozyme (Muramidase), Plasma    Direct antiglobulin test    HIV 1/2 Ag/Ab (4th Gen)    Hepatitis B surface antigen    HBcAB    Hepatitis B surface antibody    Hepatitis C antibody    CARMINE Screen w/Reflex    Sjogrens syndrome-A extractable nuclear antibody    Complement, Total    CT Chest Without Contrast        Cardiac/Vascular    Pulmonary hypertension (Chronic)    Relevant Orders    CT Chest Without Contrast    Echo    IN OFFICE EKG 12-LEAD (to Muse)     Other Visit Diagnoses       Uveitis        Relevant Orders    Anti-DNA Ab, Double-Stranded    C3 Complement    C4 Complement    CBC Auto Differential    Comprehensive Metabolic Panel    Urinalysis    Sedimentation rate    Protein/Creatinine Ratio, Urine    C-Reactive Protein    Rheumatoid Factor    Cyclic Citrullinated Peptide Antibody, IgG    DRVVT    Cardiolipin antibody    Beta-2 Glycoprotein Abs (IgA, IgG, IgM)    CK    Aldolase    Lysozyme (Muramidase), Plasma    Direct antiglobulin test    HIV 1/2 Ag/Ab (4th Gen)    Hepatitis B surface antigen    HBcAB    Hepatitis B surface antibody    Hepatitis C antibody    CARMINE Screen w/Reflex    Sjogrens syndrome-A extractable nuclear antibody    Complement, Total    Ambulatory referral/consult to Ophthalmology    History of sarcoidosis        Relevant Orders    Anti-DNA Ab, Double-Stranded    C3 Complement    C4 Complement    CBC Auto Differential    Comprehensive Metabolic Panel    Urinalysis    Sedimentation rate    Protein/Creatinine Ratio, Urine    C-Reactive Protein    Rheumatoid Factor    Cyclic Citrullinated Peptide Antibody, IgG    DRVVT    Cardiolipin antibody    Beta-2 Glycoprotein Abs (IgA, IgG, IgM)    CK    Aldolase    Lysozyme (Muramidase), Plasma    Direct antiglobulin test    HIV 1/2 Ag/Ab (4th Gen)    Hepatitis B surface antigen    HBcAB    Hepatitis B surface antibody    Hepatitis C antibody    CARMINE Screen w/Reflex    Sjogrens syndrome-A extractable nuclear antibody    Complement, Total    CT Chest Without Contrast    Echo    IN OFFICE EKG 12-LEAD (to Muse)    Chorioretinitis, unspecified laterality        Relevant Orders    Ambulatory referral/consult to Ophthalmology          - Lupus labs  - Pre-DMARDs  - Patient requests transfer to a different ophthalmologist  - Repeat TTE, CT chest, EKG  - Continue follow  up with Pulm (Dr Ye)  - Will eventually need a repeat DEXA    Follow up in 2 weeks    70 minutes of total time spent on the encounter, which includes face to face time and non-face to face time preparing to see the patient (eg, review of tests), Obtaining and/or reviewing separately obtained history, Documenting clinical information in the electronic or other health record, Independently interpreting results (not separately reported) and communicating results to the patient/family/caregiver, or Care coordination (not separately reported).     This note was prepared with [employer] Direct voice recognition transcription software. Garbled syntax, mangled pronouns, and other bizarre constructions may be attributed to that software system       Mary Ford M.D.  Rheumatology Dept  [place], [place]

## 2023-10-30 ENCOUNTER — OFFICE VISIT (OUTPATIENT)
Dept: RHEUMATOLOGY | Facility: CLINIC | Age: 78
End: 2023-10-30
Payer: MEDICARE

## 2023-10-30 ENCOUNTER — TELEPHONE (OUTPATIENT)
Dept: RHEUMATOLOGY | Facility: CLINIC | Age: 78
End: 2023-10-30

## 2023-10-30 VITALS
DIASTOLIC BLOOD PRESSURE: 71 MMHG | SYSTOLIC BLOOD PRESSURE: 153 MMHG | HEART RATE: 47 BPM | WEIGHT: 136.69 LBS | BODY MASS INDEX: 23.34 KG/M2 | HEIGHT: 64 IN

## 2023-10-30 DIAGNOSIS — L93.2 CUTANEOUS LUPUS ERYTHEMATOSUS: Primary | Chronic | ICD-10-CM

## 2023-10-30 DIAGNOSIS — I27.20 PULMONARY HYPERTENSION: ICD-10-CM

## 2023-10-30 DIAGNOSIS — Z86.2 HISTORY OF SARCOIDOSIS: ICD-10-CM

## 2023-10-30 DIAGNOSIS — H20.9 UVEITIS: ICD-10-CM

## 2023-10-30 DIAGNOSIS — H30.90 CHORIORETINITIS, UNSPECIFIED LATERALITY: ICD-10-CM

## 2023-10-30 PROCEDURE — 1126F PR PAIN SEVERITY QUANTIFIED, NO PAIN PRESENT: ICD-10-PCS | Mod: HCNC,CPTII,S$GLB, | Performed by: STUDENT IN AN ORGANIZED HEALTH CARE EDUCATION/TRAINING PROGRAM

## 2023-10-30 PROCEDURE — 1126F AMNT PAIN NOTED NONE PRSNT: CPT | Mod: HCNC,CPTII,S$GLB, | Performed by: STUDENT IN AN ORGANIZED HEALTH CARE EDUCATION/TRAINING PROGRAM

## 2023-10-30 PROCEDURE — 3288F FALL RISK ASSESSMENT DOCD: CPT | Mod: HCNC,CPTII,S$GLB, | Performed by: STUDENT IN AN ORGANIZED HEALTH CARE EDUCATION/TRAINING PROGRAM

## 2023-10-30 PROCEDURE — 99999 PR PBB SHADOW E&M-EST. PATIENT-LVL V: ICD-10-PCS | Mod: PBBFAC,HCNC,, | Performed by: STUDENT IN AN ORGANIZED HEALTH CARE EDUCATION/TRAINING PROGRAM

## 2023-10-30 PROCEDURE — 3078F PR MOST RECENT DIASTOLIC BLOOD PRESSURE < 80 MM HG: ICD-10-PCS | Mod: HCNC,CPTII,S$GLB, | Performed by: STUDENT IN AN ORGANIZED HEALTH CARE EDUCATION/TRAINING PROGRAM

## 2023-10-30 PROCEDURE — 1159F PR MEDICATION LIST DOCUMENTED IN MEDICAL RECORD: ICD-10-PCS | Mod: HCNC,CPTII,S$GLB, | Performed by: STUDENT IN AN ORGANIZED HEALTH CARE EDUCATION/TRAINING PROGRAM

## 2023-10-30 PROCEDURE — 3077F PR MOST RECENT SYSTOLIC BLOOD PRESSURE >= 140 MM HG: ICD-10-PCS | Mod: HCNC,CPTII,S$GLB, | Performed by: STUDENT IN AN ORGANIZED HEALTH CARE EDUCATION/TRAINING PROGRAM

## 2023-10-30 PROCEDURE — 99205 PR OFFICE/OUTPT VISIT, NEW, LEVL V, 60-74 MIN: ICD-10-PCS | Mod: HCNC,S$GLB,, | Performed by: STUDENT IN AN ORGANIZED HEALTH CARE EDUCATION/TRAINING PROGRAM

## 2023-10-30 PROCEDURE — 1101F PT FALLS ASSESS-DOCD LE1/YR: CPT | Mod: HCNC,CPTII,S$GLB, | Performed by: STUDENT IN AN ORGANIZED HEALTH CARE EDUCATION/TRAINING PROGRAM

## 2023-10-30 PROCEDURE — 1160F RVW MEDS BY RX/DR IN RCRD: CPT | Mod: HCNC,CPTII,S$GLB, | Performed by: STUDENT IN AN ORGANIZED HEALTH CARE EDUCATION/TRAINING PROGRAM

## 2023-10-30 PROCEDURE — 1159F MED LIST DOCD IN RCRD: CPT | Mod: HCNC,CPTII,S$GLB, | Performed by: STUDENT IN AN ORGANIZED HEALTH CARE EDUCATION/TRAINING PROGRAM

## 2023-10-30 PROCEDURE — 99999 PR PBB SHADOW E&M-EST. PATIENT-LVL V: CPT | Mod: PBBFAC,HCNC,, | Performed by: STUDENT IN AN ORGANIZED HEALTH CARE EDUCATION/TRAINING PROGRAM

## 2023-10-30 PROCEDURE — 99205 OFFICE O/P NEW HI 60 MIN: CPT | Mod: HCNC,S$GLB,, | Performed by: STUDENT IN AN ORGANIZED HEALTH CARE EDUCATION/TRAINING PROGRAM

## 2023-10-30 PROCEDURE — 1160F PR REVIEW ALL MEDS BY PRESCRIBER/CLIN PHARMACIST DOCUMENTED: ICD-10-PCS | Mod: HCNC,CPTII,S$GLB, | Performed by: STUDENT IN AN ORGANIZED HEALTH CARE EDUCATION/TRAINING PROGRAM

## 2023-10-30 PROCEDURE — 3077F SYST BP >= 140 MM HG: CPT | Mod: HCNC,CPTII,S$GLB, | Performed by: STUDENT IN AN ORGANIZED HEALTH CARE EDUCATION/TRAINING PROGRAM

## 2023-10-30 PROCEDURE — 3288F PR FALLS RISK ASSESSMENT DOCUMENTED: ICD-10-PCS | Mod: HCNC,CPTII,S$GLB, | Performed by: STUDENT IN AN ORGANIZED HEALTH CARE EDUCATION/TRAINING PROGRAM

## 2023-10-30 PROCEDURE — 1101F PR PT FALLS ASSESS DOC 0-1 FALLS W/OUT INJ PAST YR: ICD-10-PCS | Mod: HCNC,CPTII,S$GLB, | Performed by: STUDENT IN AN ORGANIZED HEALTH CARE EDUCATION/TRAINING PROGRAM

## 2023-10-30 PROCEDURE — 3078F DIAST BP <80 MM HG: CPT | Mod: HCNC,CPTII,S$GLB, | Performed by: STUDENT IN AN ORGANIZED HEALTH CARE EDUCATION/TRAINING PROGRAM

## 2023-10-30 ASSESSMENT — ROUTINE ASSESSMENT OF PATIENT INDEX DATA (RAPID3)
FATIGUE SCORE: 0
MDHAQ FUNCTION SCORE: 0
PSYCHOLOGICAL DISTRESS SCORE: 1.1
PATIENT GLOBAL ASSESSMENT SCORE: 2.5
PAIN SCORE: 0
TOTAL RAPID3 SCORE: 0.83

## 2023-10-30 NOTE — TELEPHONE ENCOUNTER
Good morning, can you please schedule Dr. Justin's patient for a ECHO and EKG.  We are unable to schedule. Patient's MRN :[id number], Evelina Pinon.  Thank you and have a great day.

## 2023-10-30 NOTE — TELEPHONE ENCOUNTER
We do not see pts for ECHO and EKGs. I'm sorry, we only do EMG testing and nerve conduction studies in our clinic.   Marti BAILEY

## 2023-11-01 ENCOUNTER — LAB VISIT (OUTPATIENT)
Dept: LAB | Facility: HOSPITAL | Age: 78
End: 2023-11-01
Attending: STUDENT IN AN ORGANIZED HEALTH CARE EDUCATION/TRAINING PROGRAM
Payer: MEDICARE

## 2023-11-01 DIAGNOSIS — H20.9 UVEITIS: ICD-10-CM

## 2023-11-01 DIAGNOSIS — Z86.2 HISTORY OF SARCOIDOSIS: ICD-10-CM

## 2023-11-01 DIAGNOSIS — L93.2 CUTANEOUS LUPUS ERYTHEMATOSUS: Chronic | ICD-10-CM

## 2023-11-01 LAB
ALBUMIN SERPL BCP-MCNC: 3.7 G/DL (ref 3.5–5.2)
ALP SERPL-CCNC: 123 U/L (ref 55–135)
ALT SERPL W/O P-5'-P-CCNC: 7 U/L (ref 10–44)
ANION GAP SERPL CALC-SCNC: 11 MMOL/L (ref 8–16)
AST SERPL-CCNC: 15 U/L (ref 10–40)
BASOPHILS # BLD AUTO: 0.04 K/UL (ref 0–0.2)
BASOPHILS NFR BLD: 0.5 % (ref 0–1.9)
BILIRUB SERPL-MCNC: 0.6 MG/DL (ref 0.1–1)
BUN SERPL-MCNC: 15 MG/DL (ref 8–23)
C3 SERPL-MCNC: 130 MG/DL (ref 50–180)
C4 SERPL-MCNC: 33 MG/DL (ref 11–44)
CALCIUM SERPL-MCNC: 9.1 MG/DL (ref 8.7–10.5)
CCP AB SER IA-ACNC: <0.5 U/ML
CHLORIDE SERPL-SCNC: 110 MMOL/L (ref 95–110)
CK SERPL-CCNC: 59 U/L (ref 20–180)
CO2 SERPL-SCNC: 21 MMOL/L (ref 23–29)
CREAT SERPL-MCNC: 0.9 MG/DL (ref 0.5–1.4)
CRP SERPL-MCNC: 2.8 MG/L (ref 0–8.2)
DAT IGG-SP REAG RBC-IMP: NORMAL
DIFFERENTIAL METHOD: ABNORMAL
EOSINOPHIL # BLD AUTO: 0.3 K/UL (ref 0–0.5)
EOSINOPHIL NFR BLD: 3.6 % (ref 0–8)
ERYTHROCYTE [DISTWIDTH] IN BLOOD BY AUTOMATED COUNT: 15 % (ref 11.5–14.5)
ERYTHROCYTE [SEDIMENTATION RATE] IN BLOOD BY PHOTOMETRIC METHOD: 9 MM/HR (ref 0–36)
EST. GFR  (NO RACE VARIABLE): >60 ML/MIN/1.73 M^2
GLUCOSE SERPL-MCNC: 84 MG/DL (ref 70–110)
HBV CORE AB SERPL QL IA: NORMAL
HBV SURFACE AB SER-ACNC: <3 MIU/ML
HBV SURFACE AB SER-ACNC: NORMAL M[IU]/ML
HBV SURFACE AG SERPL QL IA: NORMAL
HCT VFR BLD AUTO: 37.1 % (ref 37–48.5)
HCV AB SERPL QL IA: NORMAL
HGB BLD-MCNC: 11.7 G/DL (ref 12–16)
HIV 1+2 AB+HIV1 P24 AG SERPL QL IA: NORMAL
IMM GRANULOCYTES # BLD AUTO: 0.03 K/UL (ref 0–0.04)
IMM GRANULOCYTES NFR BLD AUTO: 0.4 % (ref 0–0.5)
LYMPHOCYTES # BLD AUTO: 1.7 K/UL (ref 1–4.8)
LYMPHOCYTES NFR BLD: 22.9 % (ref 18–48)
MCH RBC QN AUTO: 31.3 PG (ref 27–31)
MCHC RBC AUTO-ENTMCNC: 31.5 G/DL (ref 32–36)
MCV RBC AUTO: 99 FL (ref 82–98)
MONOCYTES # BLD AUTO: 0.6 K/UL (ref 0.3–1)
MONOCYTES NFR BLD: 7.8 % (ref 4–15)
NEUTROPHILS # BLD AUTO: 4.8 K/UL (ref 1.8–7.7)
NEUTROPHILS NFR BLD: 64.8 % (ref 38–73)
NRBC BLD-RTO: 0 /100 WBC
PLATELET # BLD AUTO: 293 K/UL (ref 150–450)
PMV BLD AUTO: 10.7 FL (ref 9.2–12.9)
POTASSIUM SERPL-SCNC: 3.8 MMOL/L (ref 3.5–5.1)
PROT SERPL-MCNC: 6.1 G/DL (ref 6–8.4)
RBC # BLD AUTO: 3.74 M/UL (ref 4–5.4)
RHEUMATOID FACT SERPL-ACNC: <13 IU/ML (ref 0–15)
SODIUM SERPL-SCNC: 142 MMOL/L (ref 136–145)
WBC # BLD AUTO: 7.46 K/UL (ref 3.9–12.7)

## 2023-11-01 PROCEDURE — 36415 COLL VENOUS BLD VENIPUNCTURE: CPT | Mod: HCNC,PO | Performed by: STUDENT IN AN ORGANIZED HEALTH CARE EDUCATION/TRAINING PROGRAM

## 2023-11-01 PROCEDURE — 85730 THROMBOPLASTIN TIME PARTIAL: CPT | Mod: HCNC | Performed by: STUDENT IN AN ORGANIZED HEALTH CARE EDUCATION/TRAINING PROGRAM

## 2023-11-01 PROCEDURE — 86038 ANTINUCLEAR ANTIBODIES: CPT | Mod: HCNC | Performed by: STUDENT IN AN ORGANIZED HEALTH CARE EDUCATION/TRAINING PROGRAM

## 2023-11-01 PROCEDURE — 86146 BETA-2 GLYCOPROTEIN ANTIBODY: CPT | Mod: HCNC | Performed by: STUDENT IN AN ORGANIZED HEALTH CARE EDUCATION/TRAINING PROGRAM

## 2023-11-01 PROCEDURE — 85025 COMPLETE CBC W/AUTO DIFF WBC: CPT | Mod: HCNC | Performed by: STUDENT IN AN ORGANIZED HEALTH CARE EDUCATION/TRAINING PROGRAM

## 2023-11-01 PROCEDURE — 80053 COMPREHEN METABOLIC PANEL: CPT | Mod: HCNC | Performed by: STUDENT IN AN ORGANIZED HEALTH CARE EDUCATION/TRAINING PROGRAM

## 2023-11-01 PROCEDURE — 86706 HEP B SURFACE ANTIBODY: CPT | Mod: 91,HCNC | Performed by: STUDENT IN AN ORGANIZED HEALTH CARE EDUCATION/TRAINING PROGRAM

## 2023-11-01 PROCEDURE — 86200 CCP ANTIBODY: CPT | Mod: HCNC | Performed by: STUDENT IN AN ORGANIZED HEALTH CARE EDUCATION/TRAINING PROGRAM

## 2023-11-01 PROCEDURE — 86039 ANTINUCLEAR ANTIBODIES (ANA): CPT | Mod: HCNC | Performed by: STUDENT IN AN ORGANIZED HEALTH CARE EDUCATION/TRAINING PROGRAM

## 2023-11-01 PROCEDURE — 81000 URINALYSIS NONAUTO W/SCOPE: CPT | Mod: HCNC,PO | Performed by: STUDENT IN AN ORGANIZED HEALTH CARE EDUCATION/TRAINING PROGRAM

## 2023-11-01 PROCEDURE — 85549 MURAMIDASE: CPT | Mod: HCNC | Performed by: STUDENT IN AN ORGANIZED HEALTH CARE EDUCATION/TRAINING PROGRAM

## 2023-11-01 PROCEDURE — 86880 COOMBS TEST DIRECT: CPT | Mod: HCNC | Performed by: STUDENT IN AN ORGANIZED HEALTH CARE EDUCATION/TRAINING PROGRAM

## 2023-11-01 PROCEDURE — 82550 ASSAY OF CK (CPK): CPT | Mod: HCNC | Performed by: STUDENT IN AN ORGANIZED HEALTH CARE EDUCATION/TRAINING PROGRAM

## 2023-11-01 PROCEDURE — 86803 HEPATITIS C AB TEST: CPT | Mod: HCNC | Performed by: STUDENT IN AN ORGANIZED HEALTH CARE EDUCATION/TRAINING PROGRAM

## 2023-11-01 PROCEDURE — 86147 CARDIOLIPIN ANTIBODY EA IG: CPT | Mod: HCNC | Performed by: STUDENT IN AN ORGANIZED HEALTH CARE EDUCATION/TRAINING PROGRAM

## 2023-11-01 PROCEDURE — 85613 RUSSELL VIPER VENOM DILUTED: CPT | Mod: HCNC | Performed by: STUDENT IN AN ORGANIZED HEALTH CARE EDUCATION/TRAINING PROGRAM

## 2023-11-01 PROCEDURE — 86235 NUCLEAR ANTIGEN ANTIBODY: CPT | Mod: 59,HCNC | Performed by: STUDENT IN AN ORGANIZED HEALTH CARE EDUCATION/TRAINING PROGRAM

## 2023-11-01 PROCEDURE — 86162 COMPLEMENT TOTAL (CH50): CPT | Mod: HCNC | Performed by: STUDENT IN AN ORGANIZED HEALTH CARE EDUCATION/TRAINING PROGRAM

## 2023-11-01 PROCEDURE — 86431 RHEUMATOID FACTOR QUANT: CPT | Mod: HCNC | Performed by: STUDENT IN AN ORGANIZED HEALTH CARE EDUCATION/TRAINING PROGRAM

## 2023-11-01 PROCEDURE — 86225 DNA ANTIBODY NATIVE: CPT | Mod: HCNC | Performed by: STUDENT IN AN ORGANIZED HEALTH CARE EDUCATION/TRAINING PROGRAM

## 2023-11-01 PROCEDURE — 86140 C-REACTIVE PROTEIN: CPT | Mod: HCNC | Performed by: STUDENT IN AN ORGANIZED HEALTH CARE EDUCATION/TRAINING PROGRAM

## 2023-11-01 PROCEDURE — 87389 HIV-1 AG W/HIV-1&-2 AB AG IA: CPT | Mod: HCNC | Performed by: STUDENT IN AN ORGANIZED HEALTH CARE EDUCATION/TRAINING PROGRAM

## 2023-11-01 PROCEDURE — 82570 ASSAY OF URINE CREATININE: CPT | Mod: HCNC | Performed by: STUDENT IN AN ORGANIZED HEALTH CARE EDUCATION/TRAINING PROGRAM

## 2023-11-01 PROCEDURE — 82085 ASSAY OF ALDOLASE: CPT | Mod: HCNC | Performed by: STUDENT IN AN ORGANIZED HEALTH CARE EDUCATION/TRAINING PROGRAM

## 2023-11-01 PROCEDURE — 86160 COMPLEMENT ANTIGEN: CPT | Mod: HCNC | Performed by: STUDENT IN AN ORGANIZED HEALTH CARE EDUCATION/TRAINING PROGRAM

## 2023-11-01 PROCEDURE — 87340 HEPATITIS B SURFACE AG IA: CPT | Mod: HCNC | Performed by: STUDENT IN AN ORGANIZED HEALTH CARE EDUCATION/TRAINING PROGRAM

## 2023-11-01 PROCEDURE — 86160 COMPLEMENT ANTIGEN: CPT | Mod: 59,HCNC | Performed by: STUDENT IN AN ORGANIZED HEALTH CARE EDUCATION/TRAINING PROGRAM

## 2023-11-01 PROCEDURE — 86704 HEP B CORE ANTIBODY TOTAL: CPT | Mod: HCNC | Performed by: STUDENT IN AN ORGANIZED HEALTH CARE EDUCATION/TRAINING PROGRAM

## 2023-11-01 PROCEDURE — 86235 NUCLEAR ANTIGEN ANTIBODY: CPT | Mod: HCNC | Performed by: STUDENT IN AN ORGANIZED HEALTH CARE EDUCATION/TRAINING PROGRAM

## 2023-11-01 PROCEDURE — 85652 RBC SED RATE AUTOMATED: CPT | Mod: HCNC | Performed by: STUDENT IN AN ORGANIZED HEALTH CARE EDUCATION/TRAINING PROGRAM

## 2023-11-02 ENCOUNTER — CLINICAL SUPPORT (OUTPATIENT)
Dept: PRIMARY CARE CLINIC | Facility: CLINIC | Age: 78
End: 2023-11-02
Payer: MEDICARE

## 2023-11-02 DIAGNOSIS — F43.9 TRAUMA AND STRESSOR-RELATED DISORDER: ICD-10-CM

## 2023-11-02 DIAGNOSIS — F41.1 GAD (GENERALIZED ANXIETY DISORDER): Primary | ICD-10-CM

## 2023-11-02 LAB
BACTERIA #/AREA URNS HPF: ABNORMAL /HPF
BILIRUB UR QL STRIP: NEGATIVE
CLARITY UR: CLEAR
COLOR UR: YELLOW
CREAT UR-MCNC: 31 MG/DL (ref 15–325)
DSDNA AB SER-ACNC: NORMAL [IU]/ML
GLUCOSE UR QL STRIP: NEGATIVE
HGB UR QL STRIP: NEGATIVE
KETONES UR QL STRIP: NEGATIVE
LEUKOCYTE ESTERASE UR QL STRIP: NEGATIVE
MICROSCOPIC COMMENT: ABNORMAL
NITRITE UR QL STRIP: POSITIVE
PH UR STRIP: 6 [PH] (ref 5–8)
PROT UR QL STRIP: NEGATIVE
PROT UR-MCNC: 8 MG/DL (ref 0–15)
PROT/CREAT UR: 0.26 MG/G{CREAT} (ref 0–0.2)
SP GR UR STRIP: 1.01 (ref 1–1.03)
URN SPEC COLLECT METH UR: ABNORMAL
WBC #/AREA URNS HPF: 1 /HPF (ref 0–5)

## 2023-11-02 PROCEDURE — 99499 UNLISTED E&M SERVICE: CPT | Mod: HCNC,S$GLB,, | Performed by: SOCIAL WORKER

## 2023-11-02 PROCEDURE — 99499 NO LOS: ICD-10-PCS | Mod: HCNC,S$GLB,, | Performed by: SOCIAL WORKER
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
## 2023-11-03 LAB
ALDOLASE SERPL-CCNC: 3.4 U/L (ref 1.2–7.6)
ANA PATTERN 1: NORMAL
ANA PATTERN 2: NORMAL
ANA SER QL IF: POSITIVE
ANA TITER 2: NORMAL
ANA TITR SER IF: NORMAL {TITER}
ANTI-SSA ANTIBODY: 0.07 RATIO (ref 0–0.99)
ANTI-SSA INTERPRETATION: NEGATIVE

## 2023-11-05 LAB
APTT IMM NP PPP: ABNORMAL SEC (ref 32–48)
APTT P HEP NEUT PPP: ABNORMAL SEC (ref 32–48)
CONFIRM APTT STACLOT: ABNORMAL
DRVVT SCREEN TO CONFIRM RATIO: ABNORMAL RATIO
LA 3 SCREEN W REFLEX-IMP: ABNORMAL
LA APTT+DRVVT+PT W REFLEX PPP: ABNORMAL
MIXING DRVVT: ABNORMAL SEC (ref 33–44)
PROTHROMBIN TIME: 13.3 SEC (ref 12–15.5)
REPTILASE TIME: ABNORMAL SEC
SCREEN APTT: 40 SEC (ref 32–48)
SCREEN DRVVT: 31 SEC (ref 33–44)
THROMBIN TIME: ABNORMAL SEC (ref 14.7–19.5)

## 2023-11-06 LAB
ANTI SM ANTIBODY: 0.07 RATIO (ref 0–0.99)
ANTI SM/RNP ANTIBODY: 0.07 RATIO (ref 0–0.99)
ANTI-SM INTERPRETATION: NEGATIVE
ANTI-SM/RNP INTERPRETATION: NEGATIVE
ANTI-SSA ANTIBODY: 0.07 RATIO (ref 0–0.99)
ANTI-SSA INTERPRETATION: NEGATIVE
ANTI-SSB ANTIBODY: 0.04 RATIO (ref 0–0.99)
ANTI-SSB INTERPRETATION: NEGATIVE
B2 GLYCOPROT1 IGA SER QL: 0.5 U/ML
B2 GLYCOPROT1 IGG SER QL: <0.8 U/ML
B2 GLYCOPROT1 IGM SER QL: 3.2 U/ML
CARDIOLIPIN IGG SER IA-ACNC: <9.4 GPL (ref 0–14.99)
CARDIOLIPIN IGM SER IA-ACNC: <9.4 MPL (ref 0–12.49)
CH50 SERPL-ACNC: 67 U/ML (ref 42–95)
DSDNA AB SER-ACNC: NORMAL [IU]/ML
LYSOZYME SERPL-MCNC: 8.8 MCG/ML (ref 2.6–6)

## 2023-11-10 ENCOUNTER — CLINICAL SUPPORT (OUTPATIENT)
Dept: PRIMARY CARE CLINIC | Facility: CLINIC | Age: 78
End: 2023-11-10
Payer: MEDICARE

## 2023-11-10 DIAGNOSIS — F40.240 CLAUSTROPHOBIA: ICD-10-CM

## 2023-11-10 DIAGNOSIS — F41.1 GAD (GENERALIZED ANXIETY DISORDER): Primary | ICD-10-CM

## 2023-11-10 DIAGNOSIS — F43.9 TRAUMA AND STRESSOR-RELATED DISORDER: ICD-10-CM

## 2023-11-10 PROCEDURE — 99499 NO LOS: ICD-10-PCS | Mod: HCNC,S$GLB,, | Performed by: SOCIAL WORKER

## 2023-11-10 PROCEDURE — 99499 UNLISTED E&M SERVICE: CPT | Mod: HCNC,S$GLB,, | Performed by: SOCIAL WORKER
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
## 2023-11-11 ENCOUNTER — HOSPITAL ENCOUNTER (OUTPATIENT)
Dept: RADIOLOGY | Facility: HOSPITAL | Age: 78
Discharge: HOME | End: 2023-11-11
Attending: STUDENT IN AN ORGANIZED HEALTH CARE EDUCATION/TRAINING PROGRAM
Payer: MEDICARE

## 2023-11-11 DIAGNOSIS — L93.2 CUTANEOUS LUPUS ERYTHEMATOSUS: ICD-10-CM

## 2023-11-11 DIAGNOSIS — I27.20 PULMONARY HYPERTENSION: ICD-10-CM

## 2023-11-11 DIAGNOSIS — Z86.2 HISTORY OF SARCOIDOSIS: ICD-10-CM

## 2023-11-11 PROCEDURE — 71250 CT THORAX DX C-: CPT | Mod: TC,HCNC,PO

## 2023-11-11 PROCEDURE — 71250 CT THORAX DX C-: CPT | Mod: 26,HCNC,, | Performed by: RADIOLOGY

## 2023-11-11 PROCEDURE — 71250 CT CHEST WITHOUT CONTRAST: ICD-10-PCS | Mod: 26,HCNC,, | Performed by: RADIOLOGY

## 2023-11-16 ENCOUNTER — CLINICAL SUPPORT (OUTPATIENT)
Dept: PRIMARY CARE CLINIC | Facility: CLINIC | Age: 78
End: 2023-11-16
Payer: MEDICARE

## 2023-11-16 DIAGNOSIS — F43.9 TRAUMA AND STRESSOR-RELATED DISORDER: ICD-10-CM

## 2023-11-16 DIAGNOSIS — F40.240 CLAUSTROPHOBIA: ICD-10-CM

## 2023-11-16 DIAGNOSIS — F41.1 GAD (GENERALIZED ANXIETY DISORDER): Primary | ICD-10-CM

## 2023-11-16 PROCEDURE — 99499 UNLISTED E&M SERVICE: CPT | Mod: HCNC,S$GLB,, | Performed by: SOCIAL WORKER

## 2023-11-16 PROCEDURE — 99499 NO LOS: ICD-10-PCS | Mod: HCNC,S$GLB,, | Performed by: SOCIAL WORKER
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
## 2023-11-20 ENCOUNTER — CLINICAL SUPPORT (OUTPATIENT)
Dept: PRIMARY CARE CLINIC | Facility: CLINIC | Age: 78
End: 2023-11-20
Payer: MEDICARE

## 2023-11-20 DIAGNOSIS — F41.1 GAD (GENERALIZED ANXIETY DISORDER): Primary | ICD-10-CM

## 2023-11-20 DIAGNOSIS — F43.9 TRAUMA AND STRESSOR-RELATED DISORDER: ICD-10-CM

## 2023-11-20 PROCEDURE — 99499 UNLISTED E&M SERVICE: CPT | Mod: HCNC,S$GLB,, | Performed by: SOCIAL WORKER

## 2023-11-20 PROCEDURE — 99499 NO LOS: ICD-10-PCS | Mod: HCNC,S$GLB,, | Performed by: SOCIAL WORKER

## 2023-11-20 NOTE — PROGRESS NOTES
Subjective:      Patient ID: Evelina Pinon is a 78 y.o. female.    Chief Complaint: Disease Management    HPI    Rheumatologic History:      - Diagnosis/es:              - Chorioretinitis and panuveitis OS: Per Dr Alonzo note (2023):  Punched out lesions in the peripheral retina, thinning of outer retinal layers and macula.  Chorioretinal lesions appear chronic and are not actively leaking on FA.  Inflammation is improving.  Uveitis is mostly posterior and involving the choroid and retina.              - Sarcoidosis diagnosed by LN biopsy 2021 involving the lymph nodes, lungs, and eyes              - Pulmonary HTN              - Cutaneous lupus:  In , she presented with psoriasiform plaques on the shoulders, anterior proximal thigh, and superior thoracic spine and was treated with topical betamethasone.  Shave biopsy showed interface dermatitis.              - Gout  - Social History: Former smoker (quit 30-40 years ago), occasional alcohol intake  - Gyn History:     - Family History: adopted   - Positive serologies: ACE (138), +HLA B27, +CARMINE 1:320 nuclear and nucleolar, +SSA, + lysozyme (8.8)  - Negative serologies: HLA A29, dsDNA, Smith, RNP, SSA, SSB, CORBY, B2G, cardiolipin, DRVVT, RF, CCP  - Pathology:              - Chest station 4L LN biopsy (2021) noncaseating granulomas present              - shave biopsy left medial upper back (2020) interface dermatitis  - Infectious screening labs: Negative RPR, QuantiFERON (2023), hepatitis-B, C, and HIV (2023)  - Imaging:              - TTE (2021): EF 55%, CVP 8, PASP 63 mmHg  - RHC (2021) Right atrial pressure 13/10/8 mmHg  Right ventricular pressure 42/5/14 mm Hg  Pulmonary artery pressure 42/17/26 mm Hg  Pulmonary capillary wedge 16/22/18 mm Hg  Cardiac output 4.36  - CT chest (10/2023) Similar extent of diffuse reticulonodular disease in the lungs and mediastinal adenopathy.  Atypical infection, pneumonitis, sarcoidosis,  "inhalational disease, neoplasm are some differential considerations.   - Previous Treatments:               - Prednisone 60mg per Ophthalmology  - Current Treatments:               - Allopurinol 200mg per PCP              - Colchicine per PCP  Interval History:   Her vision is better and she is now off steroids. Her breathing is stable and she denies joint pain and swelling.     Objective:   BP (!) 181/88   Pulse (!) 54   Ht 5' 4" (1.626 m)   Wt 60 kg (132 lb 4.4 oz)   BMI 22.71 kg/m²   Physical Exam   Constitutional: normal appearance.   HENT:   Head: Normocephalic and atraumatic.   Cardiovascular: Normal rate, regular rhythm and normal heart sounds.   Pulmonary/Chest: Effort normal and breath sounds normal.   Musculoskeletal:      Comments: No synovitis, dactylitis, enthesitis, effusions     Neurological: She is alert.   Skin: Skin is warm and dry. No rash noted.   No skin thickening, telangiectasias, calcinosis, psoriasiform lesions, lupoid lesions         No data to display     Labs independently reviewed by me (11/01/2023)  CBC HGB 11.7, WBC, PLT WNL   CMP Cr and LFTs WNL  CBC and aldolase WNL   ESR, CRP WNL   Complements WNL   UA 1 WBC    Assessment:     1. Sarcoidosis    2. Uveitis    3. High risk medication use    4. Drug-induced immunodeficiency    5. Cutaneous lupus erythematosus      This is a 78-year-old woman who is a poor historian with history of hypertension, osteoporosis untreated per patient preference, gout on allopurinol 200mg and colchicine, cutaneous lupus previously on topical betamethasone (biopsy proven interface dermatitis 2020), biopsy proven sarcoidosis involving the hilar LN with positive ACE and hypercalcemia, pulmonary hypertension, chorioretinitis and uveitis OS s/p short course of prednisone 60mg.  I do not find evidence of active systemic lupus, and suspect that it is her sarcoidosis that is causing her uveitis.  Start MTX.     Plan:     Problem List Items Addressed This Visit  "         Derm    Cutaneous lupus erythematosus (Chronic)     Other Visit Diagnoses       Sarcoidosis    -  Primary    Relevant Medications    methotrexate 2.5 MG Tab    folic acid (FOLVITE) 1 MG tablet    Other Relevant Orders    CBC Auto Differential    Comprehensive Metabolic Panel    Sedimentation rate    C-Reactive Protein    Uveitis        Relevant Medications    methotrexate 2.5 MG Tab    folic acid (FOLVITE) 1 MG tablet    Other Relevant Orders    Ambulatory referral/consult to Ophthalmology    CBC Auto Differential    Comprehensive Metabolic Panel    Sedimentation rate    C-Reactive Protein    High risk medication use        Relevant Orders    CBC Auto Differential    Comprehensive Metabolic Panel    Sedimentation rate    C-Reactive Protein    Drug-induced immunodeficiency        Relevant Orders    CBC Auto Differential    Comprehensive Metabolic Panel    Sedimentation rate    C-Reactive Protein          - Start methotrexate 15mg weekly plus folic acid. I discussed potential side effects including infection, blood count abnormalities, liver/kidney abnormalities, pneumonitis, and GI upset. I discussed that methotrexate should be taken only once a week and should be taken with daily folic acid to prevent medication toxicity. I discussed that monthly monitoring will be necessary with each dose increase, and that monitoring will transition to every 3 months once on a stable dose. Hold for infection. The ACR patient information sheet on methotrexate was provided for additional information.  - CBC, CMP, ESR, CRP in 4 weeks, then every 12 weeks  - Avoid AZA as patient is on allopurinol   - Pre-DMARDs due 11/2024  - Patient requests transfer to a different ophthalmologist  - Repeat TTE, EKG  - Continue follow up with Pulm (Dr Ye)  - Will eventually need a repeat DEXA    Follow up in 3 months    30 minutes of total time spent on the encounter, which includes face to face time and non-face to face time preparing to  see the patient (eg, review of tests), Obtaining and/or reviewing separately obtained history, Documenting clinical information in the electronic or other health record, Independently interpreting results (not separately reported) and communicating results to the patient/family/caregiver, or Care coordination (not separately reported).     This note was prepared with [employer] Direct voice recognition transcription software. Garbled syntax, mangled pronouns, and other bizarre constructions may be attributed to that software system       Mary Ford M.D.  Rheumatology Dept  [place], [place]
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
## 2023-11-21 ENCOUNTER — OFFICE VISIT (OUTPATIENT)
Dept: RHEUMATOLOGY | Facility: CLINIC | Age: 78
End: 2023-11-21
Payer: MEDICARE

## 2023-11-21 VITALS
BODY MASS INDEX: 22.58 KG/M2 | HEIGHT: 64 IN | HEART RATE: 54 BPM | SYSTOLIC BLOOD PRESSURE: 181 MMHG | DIASTOLIC BLOOD PRESSURE: 88 MMHG | WEIGHT: 132.25 LBS

## 2023-11-21 DIAGNOSIS — Z79.899 HIGH RISK MEDICATION USE: ICD-10-CM

## 2023-11-21 DIAGNOSIS — H20.9 UVEITIS: ICD-10-CM

## 2023-11-21 DIAGNOSIS — Z79.899 DRUG-INDUCED IMMUNODEFICIENCY: ICD-10-CM

## 2023-11-21 DIAGNOSIS — Z86.2 HISTORY OF SARCOIDOSIS: Primary | ICD-10-CM

## 2023-11-21 DIAGNOSIS — L93.2 CUTANEOUS LUPUS ERYTHEMATOSUS: ICD-10-CM

## 2023-11-21 DIAGNOSIS — D84.821 DRUG-INDUCED IMMUNODEFICIENCY: ICD-10-CM

## 2023-11-21 PROCEDURE — 3077F PR MOST RECENT SYSTOLIC BLOOD PRESSURE >= 140 MM HG: ICD-10-PCS | Mod: HCNC,CPTII,S$GLB, | Performed by: STUDENT IN AN ORGANIZED HEALTH CARE EDUCATION/TRAINING PROGRAM

## 2023-11-21 PROCEDURE — 3288F FALL RISK ASSESSMENT DOCD: CPT | Mod: HCNC,CPTII,S$GLB, | Performed by: STUDENT IN AN ORGANIZED HEALTH CARE EDUCATION/TRAINING PROGRAM

## 2023-11-21 PROCEDURE — 1160F RVW MEDS BY RX/DR IN RCRD: CPT | Mod: HCNC,CPTII,S$GLB, | Performed by: STUDENT IN AN ORGANIZED HEALTH CARE EDUCATION/TRAINING PROGRAM

## 2023-11-21 PROCEDURE — 1101F PT FALLS ASSESS-DOCD LE1/YR: CPT | Mod: HCNC,CPTII,S$GLB, | Performed by: STUDENT IN AN ORGANIZED HEALTH CARE EDUCATION/TRAINING PROGRAM

## 2023-11-21 PROCEDURE — 99999 PR PBB SHADOW E&M-EST. PATIENT-LVL V: CPT | Mod: PBBFAC,HCNC,, | Performed by: STUDENT IN AN ORGANIZED HEALTH CARE EDUCATION/TRAINING PROGRAM

## 2023-11-21 PROCEDURE — 1160F PR REVIEW ALL MEDS BY PRESCRIBER/CLIN PHARMACIST DOCUMENTED: ICD-10-PCS | Mod: HCNC,CPTII,S$GLB, | Performed by: STUDENT IN AN ORGANIZED HEALTH CARE EDUCATION/TRAINING PROGRAM

## 2023-11-21 PROCEDURE — 1159F MED LIST DOCD IN RCRD: CPT | Mod: HCNC,CPTII,S$GLB, | Performed by: STUDENT IN AN ORGANIZED HEALTH CARE EDUCATION/TRAINING PROGRAM

## 2023-11-21 PROCEDURE — 3288F PR FALLS RISK ASSESSMENT DOCUMENTED: ICD-10-PCS | Mod: HCNC,CPTII,S$GLB, | Performed by: STUDENT IN AN ORGANIZED HEALTH CARE EDUCATION/TRAINING PROGRAM

## 2023-11-21 PROCEDURE — 99215 OFFICE O/P EST HI 40 MIN: CPT | Mod: HCNC,S$GLB,, | Performed by: STUDENT IN AN ORGANIZED HEALTH CARE EDUCATION/TRAINING PROGRAM

## 2023-11-21 PROCEDURE — 1101F PR PT FALLS ASSESS DOC 0-1 FALLS W/OUT INJ PAST YR: ICD-10-PCS | Mod: HCNC,CPTII,S$GLB, | Performed by: STUDENT IN AN ORGANIZED HEALTH CARE EDUCATION/TRAINING PROGRAM

## 2023-11-21 PROCEDURE — 99215 PR OFFICE/OUTPT VISIT, EST, LEVL V, 40-54 MIN: ICD-10-PCS | Mod: HCNC,S$GLB,, | Performed by: STUDENT IN AN ORGANIZED HEALTH CARE EDUCATION/TRAINING PROGRAM

## 2023-11-21 PROCEDURE — 1159F PR MEDICATION LIST DOCUMENTED IN MEDICAL RECORD: ICD-10-PCS | Mod: HCNC,CPTII,S$GLB, | Performed by: STUDENT IN AN ORGANIZED HEALTH CARE EDUCATION/TRAINING PROGRAM

## 2023-11-21 PROCEDURE — 1126F AMNT PAIN NOTED NONE PRSNT: CPT | Mod: HCNC,CPTII,S$GLB, | Performed by: STUDENT IN AN ORGANIZED HEALTH CARE EDUCATION/TRAINING PROGRAM

## 2023-11-21 PROCEDURE — 3079F DIAST BP 80-89 MM HG: CPT | Mod: HCNC,CPTII,S$GLB, | Performed by: STUDENT IN AN ORGANIZED HEALTH CARE EDUCATION/TRAINING PROGRAM

## 2023-11-21 PROCEDURE — 3079F PR MOST RECENT DIASTOLIC BLOOD PRESSURE 80-89 MM HG: ICD-10-PCS | Mod: HCNC,CPTII,S$GLB, | Performed by: STUDENT IN AN ORGANIZED HEALTH CARE EDUCATION/TRAINING PROGRAM

## 2023-11-21 PROCEDURE — 1126F PR PAIN SEVERITY QUANTIFIED, NO PAIN PRESENT: ICD-10-PCS | Mod: HCNC,CPTII,S$GLB, | Performed by: STUDENT IN AN ORGANIZED HEALTH CARE EDUCATION/TRAINING PROGRAM

## 2023-11-21 PROCEDURE — 3077F SYST BP >= 140 MM HG: CPT | Mod: HCNC,CPTII,S$GLB, | Performed by: STUDENT IN AN ORGANIZED HEALTH CARE EDUCATION/TRAINING PROGRAM

## 2023-11-21 PROCEDURE — 99999 PR PBB SHADOW E&M-EST. PATIENT-LVL V: ICD-10-PCS | Mod: PBBFAC,HCNC,, | Performed by: STUDENT IN AN ORGANIZED HEALTH CARE EDUCATION/TRAINING PROGRAM

## 2023-11-21 RX ORDER — METHOTREXATE 2.5 MG/1
15 TABLET ORAL
Qty: 72 TABLET | Refills: 1 | Status: SHIPPED | OUTPATIENT
Start: 2023-11-21 | End: 2024-03-05 | Stop reason: SDUPTHER

## 2023-11-21 RX ORDER — LORAZEPAM 0.5 MG/1
0.5 TABLET ORAL 2 TIMES DAILY
Qty: 60 TABLET | Refills: 1 | Status: SHIPPED | OUTPATIENT
Start: 2023-11-21 | End: 2024-03-08 | Stop reason: SDUPTHER

## 2023-11-21 RX ORDER — FOLIC ACID 1 MG/1
1 TABLET ORAL DAILY
Qty: 90 TABLET | Refills: 3 | Status: SHIPPED | OUTPATIENT
Start: 2023-11-21 | End: 2024-03-05 | Stop reason: SDUPTHER

## 2023-11-21 ASSESSMENT — ROUTINE ASSESSMENT OF PATIENT INDEX DATA (RAPID3)
PSYCHOLOGICAL DISTRESS SCORE: 1.1
PAIN SCORE: 0
TOTAL RAPID3 SCORE: 1.55
MDHAQ FUNCTION SCORE: 0.8
PATIENT GLOBAL ASSESSMENT SCORE: 2
FATIGUE SCORE: 0

## 2023-11-21 NOTE — PATIENT INSTRUCTIONS
I suspect that your sarcoidosis is causing your eye inflammation.  You will need to start a maintenance medication to control this.  Please start the following medications:  1. Methotrexate 15 mg ONCE A WEEK  2. Folic acid 1mg DAILY     You will need repeat labs in 1 month to monitor your response.  I would like to see you back in clinic in 3 months.  Please let me know if you have any issues with the medication.

## 2023-11-22 ENCOUNTER — TELEPHONE (OUTPATIENT)
Dept: RHEUMATOLOGY | Facility: CLINIC | Age: 78
End: 2023-11-22
Payer: MEDICARE

## 2023-11-22 NOTE — TELEPHONE ENCOUNTER
Good afternoon, can you help schedule Dr. Justin's patient for a TTE/echo appt.  We are unable to schedule.  MRN #[id number], Evelina Stchaya Pinon.  Can you please give patient a call to schedule her appt.  Thanks and have a great Thanksgiving.

## 2023-11-22 NOTE — TELEPHONE ENCOUNTER
Good afternoon, can you help assist with scheduling Dr. Justin's patient for an ophthalmology appt.  We are unable to schedule. MRN #[id number], Evelina Stchaya Pinon.  Please contact patient to schedule appt.  Thanks and have a great Thanksgiving.

## 2023-11-22 NOTE — TELEPHONE ENCOUNTER
Good afternoon, can you help schedule Dr. Justin's patient for an EKG.  We are unable to schedule.  MRN #[id number], Evelina Gallardo Kathrin.  Can you please contact patient to schedule appt.  Thank you and have a great Thanksgiving.

## 2023-11-22 NOTE — TELEPHONE ENCOUNTER
Good afternoon, can you help schedule Dr. Justin's patient for a TTE/echo appt.  We are unable to schedule.  MRN #[id number], Evelina Pinon.  Please contact patient to schedule.  Thanks and have a great Thanksgiving.

## 2023-11-29 ENCOUNTER — CLINICAL SUPPORT (OUTPATIENT)
Dept: PRIMARY CARE CLINIC | Facility: CLINIC | Age: 78
End: 2023-11-29
Payer: MEDICARE

## 2023-11-29 DIAGNOSIS — F40.240 CLAUSTROPHOBIA: ICD-10-CM

## 2023-11-29 DIAGNOSIS — F43.9 TRAUMA AND STRESSOR-RELATED DISORDER: Primary | ICD-10-CM

## 2023-11-29 PROCEDURE — 99499 NO LOS: ICD-10-PCS | Mod: HCNC,S$GLB,, | Performed by: SOCIAL WORKER

## 2023-11-29 PROCEDURE — 99499 UNLISTED E&M SERVICE: CPT | Mod: HCNC,S$GLB,, | Performed by: SOCIAL WORKER
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
## 2023-11-30 ENCOUNTER — TELEPHONE (OUTPATIENT)
Dept: PRIMARY CARE CLINIC | Facility: CLINIC | Age: 78
End: 2023-11-30
Payer: MEDICARE

## 2023-11-30 NOTE — TELEPHONE ENCOUNTER
----- Message from [place] sent at 11/30/2023  8:57 AM CST -----  Regarding: sooner appt  Contact: Patient  Type:  Sooner Appointment Request    Caller is requesting a sooner appointment.  Caller declined first available appointment listed below.  Caller will not accept being placed on the waitlist and is requesting a message be sent to doctor.    Name of Caller:  Patient   When is the first available appointment?  None available   Symptoms:  bed cough/light green mucus   Would the patient rather a call back or a response via [facility]? Call back   Best Call Back Number:  [phone]  Additional Information:  Thanks

## 2023-11-30 NOTE — TELEPHONE ENCOUNTER
Spoke with pt, she reports that she has been coughing, has a drip in her throat, has a productive cough with green sputum, denies body aches and chills currently, had chills last night, does not have a thermometer.  Pt reports that Mucinex and Zyrtec does not agree with her and that it makes her feel weird.  Pt reports that she has pressure in her ears.  Pt requested an appt for tomorrow.  Pt scheduled tomorrow at 1100.

## 2023-12-01 ENCOUNTER — OFFICE VISIT (OUTPATIENT)
Dept: PRIMARY CARE CLINIC | Facility: CLINIC | Age: 78
End: 2023-12-01
Payer: MEDICARE

## 2023-12-01 VITALS
BODY MASS INDEX: 23.04 KG/M2 | WEIGHT: 134.94 LBS | RESPIRATION RATE: 18 BRPM | TEMPERATURE: 98 F | OXYGEN SATURATION: 100 % | SYSTOLIC BLOOD PRESSURE: 130 MMHG | HEIGHT: 64 IN | DIASTOLIC BLOOD PRESSURE: 70 MMHG | HEART RATE: 60 BPM

## 2023-12-01 DIAGNOSIS — J32.9 SINUSITIS, UNSPECIFIED CHRONICITY, UNSPECIFIED LOCATION: Primary | ICD-10-CM

## 2023-12-01 DIAGNOSIS — R26.89 BALANCE PROBLEM: ICD-10-CM

## 2023-12-01 PROCEDURE — 99999 PR PBB SHADOW E&M-EST. PATIENT-LVL V: ICD-10-PCS | Mod: PBBFAC,HCNC,, | Performed by: FAMILY MEDICINE

## 2023-12-01 PROCEDURE — 1126F AMNT PAIN NOTED NONE PRSNT: CPT | Mod: HCNC,CPTII,S$GLB, | Performed by: FAMILY MEDICINE

## 2023-12-01 PROCEDURE — 3075F PR MOST RECENT SYSTOLIC BLOOD PRESS GE 130-139MM HG: ICD-10-PCS | Mod: HCNC,CPTII,S$GLB, | Performed by: FAMILY MEDICINE

## 2023-12-01 PROCEDURE — 99999 PR PBB SHADOW E&M-EST. PATIENT-LVL V: CPT | Mod: PBBFAC,HCNC,, | Performed by: FAMILY MEDICINE

## 2023-12-01 PROCEDURE — 3078F DIAST BP <80 MM HG: CPT | Mod: HCNC,CPTII,S$GLB, | Performed by: FAMILY MEDICINE

## 2023-12-01 PROCEDURE — 3078F PR MOST RECENT DIASTOLIC BLOOD PRESSURE < 80 MM HG: ICD-10-PCS | Mod: HCNC,CPTII,S$GLB, | Performed by: FAMILY MEDICINE

## 2023-12-01 PROCEDURE — 1101F PT FALLS ASSESS-DOCD LE1/YR: CPT | Mod: HCNC,CPTII,S$GLB, | Performed by: FAMILY MEDICINE

## 2023-12-01 PROCEDURE — 1159F PR MEDICATION LIST DOCUMENTED IN MEDICAL RECORD: ICD-10-PCS | Mod: HCNC,CPTII,S$GLB, | Performed by: FAMILY MEDICINE

## 2023-12-01 PROCEDURE — 1101F PR PT FALLS ASSESS DOC 0-1 FALLS W/OUT INJ PAST YR: ICD-10-PCS | Mod: HCNC,CPTII,S$GLB, | Performed by: FAMILY MEDICINE

## 2023-12-01 PROCEDURE — 1160F PR REVIEW ALL MEDS BY PRESCRIBER/CLIN PHARMACIST DOCUMENTED: ICD-10-PCS | Mod: HCNC,CPTII,S$GLB, | Performed by: FAMILY MEDICINE

## 2023-12-01 PROCEDURE — 1160F RVW MEDS BY RX/DR IN RCRD: CPT | Mod: HCNC,CPTII,S$GLB, | Performed by: FAMILY MEDICINE

## 2023-12-01 PROCEDURE — 1159F MED LIST DOCD IN RCRD: CPT | Mod: HCNC,CPTII,S$GLB, | Performed by: FAMILY MEDICINE

## 2023-12-01 PROCEDURE — 1126F PR PAIN SEVERITY QUANTIFIED, NO PAIN PRESENT: ICD-10-PCS | Mod: HCNC,CPTII,S$GLB, | Performed by: FAMILY MEDICINE

## 2023-12-01 PROCEDURE — 99214 PR OFFICE/OUTPT VISIT, EST, LEVL IV, 30-39 MIN: ICD-10-PCS | Mod: HCNC,S$GLB,, | Performed by: FAMILY MEDICINE

## 2023-12-01 PROCEDURE — 3288F PR FALLS RISK ASSESSMENT DOCUMENTED: ICD-10-PCS | Mod: HCNC,CPTII,S$GLB, | Performed by: FAMILY MEDICINE

## 2023-12-01 PROCEDURE — 1158F ADVNC CARE PLAN TLK DOCD: CPT | Mod: HCNC,CPTII,S$GLB, | Performed by: FAMILY MEDICINE

## 2023-12-01 PROCEDURE — 99214 OFFICE O/P EST MOD 30 MIN: CPT | Mod: HCNC,S$GLB,, | Performed by: FAMILY MEDICINE

## 2023-12-01 PROCEDURE — 3288F FALL RISK ASSESSMENT DOCD: CPT | Mod: HCNC,CPTII,S$GLB, | Performed by: FAMILY MEDICINE

## 2023-12-01 PROCEDURE — 3075F SYST BP GE 130 - 139MM HG: CPT | Mod: HCNC,CPTII,S$GLB, | Performed by: FAMILY MEDICINE

## 2023-12-01 PROCEDURE — 1158F PR ADVANCE CARE PLANNING DISCUSS DOCUMENTED IN MEDICAL RECORD: ICD-10-PCS | Mod: HCNC,CPTII,S$GLB, | Performed by: FAMILY MEDICINE

## 2023-12-01 RX ORDER — FLUTICASONE PROPIONATE 50 MCG
2 SPRAY, SUSPENSION (ML) NASAL DAILY
Qty: 16 G | Refills: 3 | Status: SHIPPED | OUTPATIENT
Start: 2023-12-01

## 2023-12-01 RX ORDER — DOXYCYCLINE 100 MG/1
100 CAPSULE ORAL 2 TIMES DAILY
Qty: 20 CAPSULE | Refills: 0 | Status: ON HOLD | OUTPATIENT
Start: 2023-12-01 | End: 2023-12-20 | Stop reason: HOSPADM

## 2023-12-01 NOTE — PROGRESS NOTES
Primary Care Provider Appointment   [facility] [place]       Patient ID: Evelina Pinon is a 78 y.o. female.    ASSESSMENT/PLAN by Problem List:    1. Sinusitis, unspecified chronicity, unspecified location  -     doxycycline (MONODOX) 100 MG capsule; Take 1 capsule (100 mg total) by mouth 2 (two) times daily.  Dispense: 20 capsule; Refill: 0  -     fluticasone propionate (FLONASE) 50 mcg/actuation nasal spray; 2 sprays (100 mcg total) by Each Nostril route once daily.  Dispense: 16 g; Refill: 3    2. Balance problem  -     Ambulatory referral/consult to Physical/Occupational Therapy; Future; Expected date: 12/08/2023         Advance Care Planning     Date: 12/01/2023    Living Will  Power of   I initiated the process of voluntary advance care planning today and explained the importance of this process to the patient.  I introduced the concept of advance directives to the patient, as well. Then the patient received detailed information about the importance of designating a Health Care Power of  (HCPOA). She was also instructed to communicate with this person about their wishes for future healthcare, should she become sick and lose decision-making capacity. The patient has not previously appointed a HCPOA. After our discussion, the patient has decided to complete a HCPOA and has appointed her significant other, health care agent:   [place]      Patient was given information and counseled regarding advanced care planning including living will and healthcare power of .  I encouraged the patient to review the information, discuss with their family, complete when ready or return with additional questions if needed.               Subjective:     Chief Complaint   Patient presents with    Cough    Sore Throat     I have reviewed the information entered by the ancillary staff regarding the chief complaint as well as the related history.    HPI    Patient is  a/an 78 y.o.  female     Urgent care visit today.  Started feeling badly over a week ago.  Symptoms gradually worsening.  Congestion, scratchy throat, mucous went from clear to thick to dark now green, left ear pain, sinus pressure on left  Chills this am, not sure about fever    For complete problem list, past medical history, surgical history, social history, etc., see appropriate section in the electronic medical record    Review of Systems   Constitutional:  Positive for chills. Negative for fever and unexpected weight change.   HENT:  Positive for congestion, ear pain, postnasal drip, sinus pressure, sinus pain and sore throat. Negative for ear discharge.    Respiratory:  Negative for shortness of breath and wheezing.    Cardiovascular: Negative.    Gastrointestinal: Negative.    Genitourinary: Negative.    Musculoskeletal:  Positive for gait problem.       Objective     Physical Exam  Vitals reviewed.   Constitutional:       General: She is not in acute distress.     Appearance: She is well-developed. She is not diaphoretic.   HENT:      Head: Normocephalic and atraumatic.      Right Ear: Tympanic membrane, ear canal and external ear normal. There is no impacted cerumen.      Left Ear: Tympanic membrane, ear canal and external ear normal. There is no impacted cerumen.      Nose: Mucosal edema and congestion present.      Left Sinus: Maxillary sinus tenderness and frontal sinus tenderness present.      Mouth/Throat:      Pharynx: No oropharyngeal exudate or posterior oropharyngeal erythema.      Tonsils: No tonsillar abscesses.   Eyes:      General: No scleral icterus.        Right eye: No discharge.         Left eye: No discharge.      Conjunctiva/sclera: Conjunctivae normal.      Pupils: Pupils are equal, round, and reactive to light.   Cardiovascular:      Rate and Rhythm: Normal rate and regular rhythm.      Heart sounds: Normal heart sounds. No murmur heard.  Pulmonary:      Effort: Pulmonary effort is  "normal. No respiratory distress.      Breath sounds: Normal breath sounds. No stridor. No wheezing.   Musculoskeletal:      Cervical back: Normal range of motion and neck supple.   Lymphadenopathy:      Cervical: No cervical adenopathy.   Skin:     General: Skin is dry.      Findings: No rash.   Neurological:      Mental Status: She is alert and oriented to person, place, and time.   Psychiatric:         Behavior: Behavior normal.       Vitals:    12/01/23 1048   BP: 130/70   BP Location: Right arm   Patient Position: Sitting   BP Method: Large (Manual)   Pulse: 60   Resp: 18   Temp: 98.3 °F (36.8 °C)   TempSrc: Oral   SpO2: 100%   Weight: 61.2 kg (134 lb 14.7 oz)   Height: 5' 4" (1.626 m)           THIS DOCUMENT WAS MADE IN PART WITH VOICE RECOGNITION SOFTWARE.  OCCASIONALLY THIS SOFTWARE WILL MISINTERPRET WORDS OR PHRASES.    "

## 2023-12-06 RX ORDER — CLOPIDOGREL BISULFATE 75 MG/1
75 TABLET ORAL
Qty: 90 TABLET | Refills: 3 | Status: SHIPPED | OUTPATIENT
Start: 2023-12-06

## 2023-12-14 ENCOUNTER — CLINICAL SUPPORT (OUTPATIENT)
Dept: PRIMARY CARE CLINIC | Facility: CLINIC | Age: 78
End: 2023-12-14
Payer: MEDICARE

## 2023-12-14 DIAGNOSIS — F43.9 TRAUMA AND STRESSOR-RELATED DISORDER: Primary | ICD-10-CM

## 2023-12-14 DIAGNOSIS — F41.1 GAD (GENERALIZED ANXIETY DISORDER): ICD-10-CM

## 2023-12-14 PROCEDURE — 99499 NO LOS: ICD-10-PCS | Mod: HCNC,S$GLB,, | Performed by: SOCIAL WORKER

## 2023-12-14 PROCEDURE — 99499 UNLISTED E&M SERVICE: CPT | Mod: HCNC,S$GLB,, | Performed by: SOCIAL WORKER
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
## 2023-12-18 PROBLEM — S72.001A CLOSED DISPLACED FRACTURE OF RIGHT FEMORAL NECK: Status: ACTIVE | Noted: 2023-12-18

## 2023-12-18 PROBLEM — I50.32 CHRONIC DIASTOLIC CONGESTIVE HEART FAILURE: Status: ACTIVE | Noted: 2023-12-18

## 2023-12-18 RX ORDER — ALLOPURINOL 100 MG/1
TABLET ORAL
Qty: 180 TABLET | Refills: 2 | Status: SHIPPED | OUTPATIENT
Start: 2023-12-18

## 2023-12-18 NOTE — TELEPHONE ENCOUNTER
No care due was identified.  [facility] Embedded Care Due Messages. Reference number: [id number].   12/17/2023 11:13:08 AM CST

## 2023-12-18 NOTE — TELEPHONE ENCOUNTER
No care due was identified.  [facility] Embedded Care Due Messages. Reference number: [id number].   12/18/2023 3:23:34 PM CST

## 2023-12-19 ENCOUNTER — TELEPHONE (OUTPATIENT)
Dept: RHEUMATOLOGY | Facility: CLINIC | Age: 78
End: 2023-12-19
Payer: MEDICARE

## 2023-12-19 NOTE — TELEPHONE ENCOUNTER
Canceled February appt, will call us back when she gets out of the hospital due to breaking her hip. States understanding.

## 2023-12-20 ENCOUNTER — TELEPHONE (OUTPATIENT)
Dept: PRIMARY CARE CLINIC | Facility: CLINIC | Age: 78
End: 2023-12-20
Payer: MEDICARE

## 2023-12-20 NOTE — TELEPHONE ENCOUNTER
----- Message from Leticia Cordon sent at 12/20/2023  3:20 PM CST -----  Contact: Pt  [phone]  Pt is being discharged from [facility] on 12/20/23 and needs a 1 week f/u appt.     Please call

## 2023-12-21 ENCOUNTER — SOCIAL WORK (OUTPATIENT)
Dept: PRIMARY CARE CLINIC | Facility: CLINIC | Age: 78
End: 2023-12-21
Payer: MEDICARE

## 2023-12-21 ENCOUNTER — TELEPHONE (OUTPATIENT)
Dept: PRIMARY CARE CLINIC | Facility: CLINIC | Age: 78
End: 2023-12-21

## 2023-12-21 DIAGNOSIS — F43.9 TRAUMA AND STRESSOR-RELATED DISORDER: ICD-10-CM

## 2023-12-21 DIAGNOSIS — F41.1 GAD (GENERALIZED ANXIETY DISORDER): Primary | ICD-10-CM

## 2023-12-21 PROCEDURE — 99387 INIT PM E/M NEW PAT 65+ YRS: CPT | Mod: S$GLB,,, | Performed by: SOCIAL WORKER

## 2023-12-21 PROCEDURE — G0180 MD CERTIFICATION HHA PATIENT: HCPCS | Mod: ,,, | Performed by: ORTHOPAEDIC SURGERY

## 2023-12-21 PROCEDURE — 99387 PR PREVENTIVE VISIT,NEW,65 & OVER: ICD-10-PCS | Mod: S$GLB,,, | Performed by: SOCIAL WORKER

## 2023-12-21 NOTE — PROGRESS NOTES
Clinician met 1:1 with Psych PA to review case and provide recommendations for continued treatment. No reported medication changes. It was suggested to ask the patient about the impact her past has on her currently. And assess for any memory issues with MOCA Test.

## 2023-12-21 NOTE — TELEPHONE ENCOUNTER
Pt was in the hospital secondary to broken bone, required surgery, and hip replacement. Clinician called pt to check on her progress, and determine if she wants to change to virtual visits.     She said she is dealing well. She denied need for virtual visits.

## 2023-12-27 ENCOUNTER — OFFICE VISIT (OUTPATIENT)
Dept: PRIMARY CARE CLINIC | Facility: CLINIC | Age: 78
End: 2023-12-27
Payer: MEDICARE

## 2023-12-27 VITALS
RESPIRATION RATE: 16 BRPM | HEART RATE: 62 BPM | SYSTOLIC BLOOD PRESSURE: 130 MMHG | OXYGEN SATURATION: 95 % | HEIGHT: 64 IN | BODY MASS INDEX: 23.03 KG/M2 | WEIGHT: 134.88 LBS | DIASTOLIC BLOOD PRESSURE: 66 MMHG

## 2023-12-27 DIAGNOSIS — M81.0 AGE RELATED OSTEOPOROSIS, UNSPECIFIED PATHOLOGICAL FRACTURE PRESENCE: ICD-10-CM

## 2023-12-27 DIAGNOSIS — S72.001A CLOSED DISPLACED FRACTURE OF RIGHT FEMORAL NECK: ICD-10-CM

## 2023-12-27 DIAGNOSIS — I10 ESSENTIAL HYPERTENSION: Primary | Chronic | ICD-10-CM

## 2023-12-27 DIAGNOSIS — Z78.0 POSTMENOPAUSAL: ICD-10-CM

## 2023-12-27 PROBLEM — I25.9 CHEST PAIN DUE TO MYOCARDIAL ISCHEMIA: Status: RESOLVED | Noted: 2020-08-20 | Resolved: 2023-12-27

## 2023-12-27 PROBLEM — R07.2 PRECORDIAL PAIN: Status: RESOLVED | Noted: 2020-08-24 | Resolved: 2023-12-27

## 2023-12-27 PROCEDURE — 99999 PR PBB SHADOW E&M-EST. PATIENT-LVL V: ICD-10-PCS | Mod: PBBFAC,HCNC,, | Performed by: FAMILY MEDICINE

## 2023-12-27 PROCEDURE — 3078F DIAST BP <80 MM HG: CPT | Mod: HCNC,CPTII,S$GLB, | Performed by: FAMILY MEDICINE

## 2023-12-27 PROCEDURE — 1100F PTFALLS ASSESS-DOCD GE2>/YR: CPT | Mod: HCNC,CPTII,S$GLB, | Performed by: FAMILY MEDICINE

## 2023-12-27 PROCEDURE — 1111F DSCHRG MED/CURRENT MED MERGE: CPT | Mod: HCNC,CPTII,S$GLB, | Performed by: FAMILY MEDICINE

## 2023-12-27 PROCEDURE — 3078F PR MOST RECENT DIASTOLIC BLOOD PRESSURE < 80 MM HG: ICD-10-PCS | Mod: HCNC,CPTII,S$GLB, | Performed by: FAMILY MEDICINE

## 2023-12-27 PROCEDURE — 3075F SYST BP GE 130 - 139MM HG: CPT | Mod: HCNC,CPTII,S$GLB, | Performed by: FAMILY MEDICINE

## 2023-12-27 PROCEDURE — 1111F PR DISCHARGE MEDS RECONCILED W/ CURRENT OUTPATIENT MED LIST: ICD-10-PCS | Mod: HCNC,CPTII,S$GLB, | Performed by: FAMILY MEDICINE

## 2023-12-27 PROCEDURE — 99214 OFFICE O/P EST MOD 30 MIN: CPT | Mod: HCNC,S$GLB,, | Performed by: FAMILY MEDICINE

## 2023-12-27 PROCEDURE — 99999 PR PBB SHADOW E&M-EST. PATIENT-LVL V: CPT | Mod: PBBFAC,HCNC,, | Performed by: FAMILY MEDICINE

## 2023-12-27 PROCEDURE — 1159F MED LIST DOCD IN RCRD: CPT | Mod: HCNC,CPTII,S$GLB, | Performed by: FAMILY MEDICINE

## 2023-12-27 PROCEDURE — 3288F FALL RISK ASSESSMENT DOCD: CPT | Mod: HCNC,CPTII,S$GLB, | Performed by: FAMILY MEDICINE

## 2023-12-27 PROCEDURE — 1160F RVW MEDS BY RX/DR IN RCRD: CPT | Mod: HCNC,CPTII,S$GLB, | Performed by: FAMILY MEDICINE

## 2023-12-27 PROCEDURE — 1100F PR PT FALLS ASSESS DOC 2+ FALLS/FALL W/INJURY/YR: ICD-10-PCS | Mod: HCNC,CPTII,S$GLB, | Performed by: FAMILY MEDICINE

## 2023-12-27 PROCEDURE — 3075F PR MOST RECENT SYSTOLIC BLOOD PRESS GE 130-139MM HG: ICD-10-PCS | Mod: HCNC,CPTII,S$GLB, | Performed by: FAMILY MEDICINE

## 2023-12-27 PROCEDURE — 1125F PR PAIN SEVERITY QUANTIFIED, PAIN PRESENT: ICD-10-PCS | Mod: HCNC,CPTII,S$GLB, | Performed by: FAMILY MEDICINE

## 2023-12-27 PROCEDURE — 1159F PR MEDICATION LIST DOCUMENTED IN MEDICAL RECORD: ICD-10-PCS | Mod: HCNC,CPTII,S$GLB, | Performed by: FAMILY MEDICINE

## 2023-12-27 PROCEDURE — 1160F PR REVIEW ALL MEDS BY PRESCRIBER/CLIN PHARMACIST DOCUMENTED: ICD-10-PCS | Mod: HCNC,CPTII,S$GLB, | Performed by: FAMILY MEDICINE

## 2023-12-27 PROCEDURE — 3288F PR FALLS RISK ASSESSMENT DOCUMENTED: ICD-10-PCS | Mod: HCNC,CPTII,S$GLB, | Performed by: FAMILY MEDICINE

## 2023-12-27 PROCEDURE — 99214 PR OFFICE/OUTPT VISIT, EST, LEVL IV, 30-39 MIN: ICD-10-PCS | Mod: HCNC,S$GLB,, | Performed by: FAMILY MEDICINE

## 2023-12-27 PROCEDURE — 1125F AMNT PAIN NOTED PAIN PRSNT: CPT | Mod: HCNC,CPTII,S$GLB, | Performed by: FAMILY MEDICINE

## 2023-12-27 NOTE — ASSESSMENT & PLAN NOTE
Patient has been reluctant for previous screening secondary to anxiety regarding most health preventative recommendations.  However advised her based on this fragility fracture we can be certain she has osteoporosis.  I still recommend a DEXA scan to establish a baseline.  Discussed various treatment options.  She is leaning toward Prolia.  Will give her a few more weeks to recover and proceed with PT, get the baseline DEXA scan and plan to order Prolia at her follow-up next month.

## 2023-12-27 NOTE — PROGRESS NOTES
[facility] Provider Appointment   [facility]       Patient ID: Evelina Pinon is a 78 y.o. female.    ASSESSMENT/PLAN by Problem List:    1. Essential hypertension  Assessment & Plan:  Blood pressure has been elevated due to recent injury.  Prior numbers were very good so continue current medications but monitor at home.        2. Postmenopausal  -     DXA Bone Density Axial Skeleton 1 or more sites; Future; Expected date: 12/27/2023    3. Closed displaced fracture of right femoral neck  Assessment & Plan:  Surgery went well.  She chose to go home with home health rather than more intensive inpatient rehab.  I strongly encouraged her to cooperate with home health, PT and family's encouragement.  She agrees to do so.      4. Age related osteoporosis, unspecified pathological fracture presence  Assessment & Plan:  Patient has been reluctant for previous screening secondary to anxiety regarding most health preventative recommendations.  However advised her based on this fragility fracture we can be certain she has osteoporosis.  I still recommend a DEXA scan to establish a baseline.  Discussed various treatment options.  She is leaning toward Prolia.  Will give her a few more weeks to recover and proceed with PT, get the baseline DEXA scan and plan to order Prolia at her follow-up next month.           Follow Up:  Four weeks             Subjective:     Chief Complaint   Patient presents with    Hospital Follow Up     I have reviewed the information entered by the ancillary staff regarding the chief complaint as well as the related history.    HPI    Patient is a/an 78 y.o.  female     Hospital follow-up.  Patient fell at home getting up to use the restroom in the evening.  Suffered right hip fracture.  Status post partial hip replacement.  She does report some soreness and discomfort but states not severe enough for her to take her medications.  She is participating in  "home physical therapy but encouraged to continue to do so and progress.  She was here with her daughter who is visiting from out of town.    For complete problem list, past medical history, surgical history, social history, etc., see appropriate section in the electronic medical record    Review of Systems   Constitutional:  Positive for activity change.   Respiratory: Negative.     Cardiovascular: Negative.    Gastrointestinal: Negative.    Genitourinary: Negative.    Musculoskeletal:  Positive for arthralgias and gait problem.       Objective     Physical Exam  Vitals reviewed.   Constitutional:       General: She is not in acute distress.     Appearance: She is well-developed. She is not ill-appearing or toxic-appearing.      Comments: She was brought back in her wheelchair   Eyes:      General: No scleral icterus.     Conjunctiva/sclera: Conjunctivae normal.   Cardiovascular:      Rate and Rhythm: Normal rate and regular rhythm.      Heart sounds: Normal heart sounds. No murmur heard.     Comments: No ankle edema  Pulmonary:      Effort: Pulmonary effort is normal. No respiratory distress.      Breath sounds: Normal breath sounds.   Skin:     General: Skin is dry.      Findings: No rash.   Neurological:      Mental Status: She is alert and oriented to person, place, and time.   Psychiatric:         Behavior: Behavior normal.       Vitals:    12/27/23 1446 12/27/23 1523   BP: (!) 154/62 130/66   BP Location: Left arm    Patient Position: Sitting    BP Method: Medium (Manual)    Pulse: 62    Resp: 16    SpO2: 95%    Weight: 61.2 kg (134 lb 13.6 oz)    Height: 5' 4" (1.626 m)            THIS DOCUMENT WAS MADE IN PART WITH VOICE RECOGNITION SOFTWARE.  OCCASIONALLY THIS SOFTWARE WILL MISINTERPRET WORDS OR PHRASES.  "

## 2023-12-27 NOTE — ASSESSMENT & PLAN NOTE
Surgery went well.  She chose to go home with home health rather than more intensive inpatient rehab.  I strongly encouraged her to cooperate with home health, PT and family's encouragement.  She agrees to do so.

## 2023-12-27 NOTE — ASSESSMENT & PLAN NOTE
Blood pressure has been elevated due to recent injury.  Prior numbers were very good so continue current medications but monitor at home.

## 2023-12-29 DIAGNOSIS — S72.001A CLOSED FRACTURE OF NECK OF RIGHT FEMUR, INITIAL ENCOUNTER: Primary | ICD-10-CM

## 2024-01-02 ENCOUNTER — CLINICAL SUPPORT (OUTPATIENT)
Dept: PRIMARY CARE CLINIC | Facility: CLINIC | Age: 79
End: 2024-01-02
Payer: MEDICARE

## 2024-01-02 DIAGNOSIS — F41.1 GAD (GENERALIZED ANXIETY DISORDER): Primary | ICD-10-CM

## 2024-01-02 DIAGNOSIS — F43.9 TRAUMA AND STRESSOR-RELATED DISORDER: ICD-10-CM

## 2024-01-02 PROCEDURE — 99499 UNLISTED E&M SERVICE: CPT | Mod: HCNC,S$GLB,, | Performed by: SOCIAL WORKER
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
## 2024-01-03 ENCOUNTER — TELEPHONE (OUTPATIENT)
Dept: RHEUMATOLOGY | Facility: CLINIC | Age: 79
End: 2024-01-03
Payer: MEDICARE

## 2024-01-03 NOTE — TELEPHONE ENCOUNTER
Left message to schedule F/U appt  ----- Message from Cori Hagan DO sent at 1/3/2024 10:26 AM CST -----  Contact: Miguel Angel    ----- Message -----  From: Sonia Giraldo  Sent: 12/22/2023   9:42 AM CST  To: Mary Ford MD    Pts  states he has not idea why or who cancelled his wives appt for 2/15/2024 but he wants a call back to get her alayna at [phone] and thanks

## 2024-01-09 ENCOUNTER — OFFICE VISIT (OUTPATIENT)
Dept: ORTHOPEDICS | Facility: CLINIC | Age: 79
End: 2024-01-09
Payer: MEDICARE

## 2024-01-09 ENCOUNTER — HOSPITAL ENCOUNTER (OUTPATIENT)
Dept: RADIOLOGY | Facility: HOSPITAL | Age: 79
Discharge: HOME | End: 2024-01-09
Attending: ORTHOPAEDIC SURGERY
Payer: MEDICARE

## 2024-01-09 VITALS — HEIGHT: 64 IN | WEIGHT: 134 LBS | BODY MASS INDEX: 22.88 KG/M2

## 2024-01-09 DIAGNOSIS — S72.001A CLOSED FRACTURE OF NECK OF RIGHT FEMUR, INITIAL ENCOUNTER: Primary | ICD-10-CM

## 2024-01-09 DIAGNOSIS — S72.001A CLOSED FRACTURE OF NECK OF RIGHT FEMUR, INITIAL ENCOUNTER: ICD-10-CM

## 2024-01-09 PROCEDURE — 1159F MED LIST DOCD IN RCRD: CPT | Mod: HCNC,CPTII,S$GLB, | Performed by: ORTHOPAEDIC SURGERY

## 2024-01-09 PROCEDURE — 1125F AMNT PAIN NOTED PAIN PRSNT: CPT | Mod: HCNC,CPTII,S$GLB, | Performed by: ORTHOPAEDIC SURGERY

## 2024-01-09 PROCEDURE — 3288F FALL RISK ASSESSMENT DOCD: CPT | Mod: HCNC,CPTII,S$GLB, | Performed by: ORTHOPAEDIC SURGERY

## 2024-01-09 PROCEDURE — 99999 PR PBB SHADOW E&M-EST. PATIENT-LVL III: CPT | Mod: PBBFAC,HCNC,, | Performed by: ORTHOPAEDIC SURGERY

## 2024-01-09 PROCEDURE — 73502 X-RAY EXAM HIP UNI 2-3 VIEWS: CPT | Mod: 26,HCNC,RT, | Performed by: RADIOLOGY

## 2024-01-09 PROCEDURE — 1160F RVW MEDS BY RX/DR IN RCRD: CPT | Mod: HCNC,CPTII,S$GLB, | Performed by: ORTHOPAEDIC SURGERY

## 2024-01-09 PROCEDURE — 1100F PTFALLS ASSESS-DOCD GE2>/YR: CPT | Mod: HCNC,CPTII,S$GLB, | Performed by: ORTHOPAEDIC SURGERY

## 2024-01-09 PROCEDURE — 99024 POSTOP FOLLOW-UP VISIT: CPT | Mod: HCNC,S$GLB,, | Performed by: ORTHOPAEDIC SURGERY

## 2024-01-09 PROCEDURE — 73502 X-RAY EXAM HIP UNI 2-3 VIEWS: CPT | Mod: TC,HCNC,PO,RT

## 2024-01-09 RX ORDER — METHOCARBAMOL 750 MG/1
750 TABLET, FILM COATED ORAL 4 TIMES DAILY PRN
Qty: 44 TABLET | Refills: 3 | Status: SHIPPED | OUTPATIENT
Start: 2024-01-09

## 2024-01-09 NOTE — PROGRESS NOTES
Chief Complaint   Patient presents with    Right Hip - Post-op Evaluation       HPI:  78 y.o. female returns to clinic today status post righ hip hemiarthroplasty 2 weeks ago. Pain is now gone. Patient is compliant most of the time with restrictions.     right Hip Exam   Tenderness   The patient is experiencing no tenderness.   Incision healed    Range of Motion   The patient has normal right hip ROM.    Muscle Strength   Flexion: 4/5     Other   Erythema: absent  Sensation: normal  Pulse: present    X-rays were performed today, personally reviewed by me and findings discussed with the patient.  2 views of the right hip show implants intact with no evidence of loosening    There are no diagnoses linked to this encounter.    Begin outpatient PT. Strict posterior hip precautions. RTC 6 weeks Julisa.

## 2024-01-11 ENCOUNTER — CLINICAL SUPPORT (OUTPATIENT)
Dept: PRIMARY CARE CLINIC | Facility: CLINIC | Age: 79
End: 2024-01-11
Payer: MEDICARE

## 2024-01-11 DIAGNOSIS — F41.1 GAD (GENERALIZED ANXIETY DISORDER): ICD-10-CM

## 2024-01-11 DIAGNOSIS — F43.9 TRAUMA AND STRESSOR-RELATED DISORDER: Primary | ICD-10-CM

## 2024-01-11 PROCEDURE — 99499 UNLISTED E&M SERVICE: CPT | Mod: HCNC,S$GLB,, | Performed by: SOCIAL WORKER
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
## 2024-01-17 ENCOUNTER — TELEPHONE (OUTPATIENT)
Dept: PRIMARY CARE CLINIC | Facility: CLINIC | Age: 79
End: 2024-01-17
Payer: MEDICARE

## 2024-01-17 ENCOUNTER — OFFICE VISIT (OUTPATIENT)
Dept: PRIMARY CARE CLINIC | Facility: CLINIC | Age: 79
End: 2024-01-17
Payer: MEDICARE

## 2024-01-17 VITALS — TEMPERATURE: 98 F | SYSTOLIC BLOOD PRESSURE: 158 MMHG | DIASTOLIC BLOOD PRESSURE: 82 MMHG | OXYGEN SATURATION: 97 %

## 2024-01-17 DIAGNOSIS — M10.9 GOUT, UNSPECIFIED CAUSE, UNSPECIFIED CHRONICITY, UNSPECIFIED SITE: Primary | ICD-10-CM

## 2024-01-17 PROCEDURE — 1159F MED LIST DOCD IN RCRD: CPT | Mod: HCNC,CPTII,S$GLB, | Performed by: FAMILY MEDICINE

## 2024-01-17 PROCEDURE — 1160F RVW MEDS BY RX/DR IN RCRD: CPT | Mod: HCNC,CPTII,S$GLB, | Performed by: FAMILY MEDICINE

## 2024-01-17 PROCEDURE — 96372 THER/PROPH/DIAG INJ SC/IM: CPT | Mod: HCNC,S$GLB,, | Performed by: FAMILY MEDICINE

## 2024-01-17 PROCEDURE — 3077F SYST BP >= 140 MM HG: CPT | Mod: HCNC,CPTII,S$GLB, | Performed by: FAMILY MEDICINE

## 2024-01-17 PROCEDURE — 1125F AMNT PAIN NOTED PAIN PRSNT: CPT | Mod: HCNC,CPTII,S$GLB, | Performed by: FAMILY MEDICINE

## 2024-01-17 PROCEDURE — 99999 PR PBB SHADOW E&M-EST. PATIENT-LVL III: CPT | Mod: PBBFAC,HCNC,, | Performed by: FAMILY MEDICINE

## 2024-01-17 PROCEDURE — 1101F PT FALLS ASSESS-DOCD LE1/YR: CPT | Mod: HCNC,CPTII,S$GLB, | Performed by: FAMILY MEDICINE

## 2024-01-17 PROCEDURE — 3288F FALL RISK ASSESSMENT DOCD: CPT | Mod: HCNC,CPTII,S$GLB, | Performed by: FAMILY MEDICINE

## 2024-01-17 PROCEDURE — 99214 OFFICE O/P EST MOD 30 MIN: CPT | Mod: HCNC,25,S$GLB, | Performed by: FAMILY MEDICINE

## 2024-01-17 PROCEDURE — 3079F DIAST BP 80-89 MM HG: CPT | Mod: HCNC,CPTII,S$GLB, | Performed by: FAMILY MEDICINE

## 2024-01-17 PROCEDURE — 1111F DSCHRG MED/CURRENT MED MERGE: CPT | Mod: HCNC,CPTII,S$GLB, | Performed by: FAMILY MEDICINE

## 2024-01-17 RX ORDER — KETOROLAC TROMETHAMINE 30 MG/ML
30 INJECTION, SOLUTION INTRAMUSCULAR; INTRAVENOUS
Status: COMPLETED | OUTPATIENT
Start: 2024-01-17 | End: 2024-01-17

## 2024-01-17 RX ORDER — COLCHICINE 0.6 MG/1
TABLET ORAL
Qty: 30 TABLET | Refills: 1 | Status: SHIPPED | OUTPATIENT
Start: 2024-01-17

## 2024-01-17 RX ADMIN — KETOROLAC TROMETHAMINE 30 MG: 30 INJECTION, SOLUTION INTRAMUSCULAR; INTRAVENOUS at 11:01

## 2024-01-17 NOTE — TELEPHONE ENCOUNTER
----- Message from Diana Olivier sent at 1/17/2024  8:49 AM CST -----  Regarding: same day appointment  [phone] - call back ; needing to see  today if possible for gout on her right foot and she is so much pain. I offered the 11:40 that I am showing available but unable to sched her it says blocked no access      Diana

## 2024-01-17 NOTE — TELEPHONE ENCOUNTER
Okay to schedule her at the 1140 but tell her to come now and we will do our best to work her in among other patients.

## 2024-01-17 NOTE — TELEPHONE ENCOUNTER
Pt states the pain in her foot started hurting yesterday she couldn't sleep. Want to be seen to get a shot and medicine.

## 2024-01-17 NOTE — PROGRESS NOTES
[facility] Care Provider Appointment   [facility]       Patient ID: Evelina Pinon is a 78 y.o. female.    ASSESSMENT/PLAN by Problem List:    1. Gout, unspecified cause, unspecified chronicity, unspecified site  -     Uric Acid; Future; Expected date: 01/17/2024  -     X-Ray Foot Complete Right; Future; Expected date: 01/17/2024  -     colchicine (COLCRYS) 0.6 mg tablet; Take 2 tablets at 1st sign of a gout attack.  Then continue once a day for the next 5-7 days as needed until improved.  Dispense: 30 tablet; Refill: 1  -     ketorolac injection 30 mg       Possible gout flare.  No recent injury to explain foot pain.  Will offer a one time dose of Toradol 30 mg.  Then begin colchicine, will go low-dose just want a day for about a week.  I do not want to induce diarrhea given her difficulty already has getting up to the bathroom with her foot and hip.  If symptoms do not resolve quickly then proceed with x-ray.  Also reestablish uric acid level to see if adjustments are needed with the allopurinol.  Of course she should refocus on a low purine diet.    We also briefly discussed the need to consider medication for osteoporosis.  She is still recovering from her hip fracture.  Although I feel that she would benefit greatly from a bisphosphonate going forward.  Will address this in more detail at her scheduled follow-up in a few weeks.  Unless she addresses that with Rheumatology prior    We will re-evaluate her blood pressure when she returns in a few weeks after this acute episode.    Follow Up:  2-3 weeks as scheduled    Subjective:     Chief Complaint   Patient presents with    Foot Pain     Pt states she starting having pain in her right foot.      I have reviewed the information entered by the ancillary staff regarding the chief complaint as well as the related history.    Foot Pain  Associated symptoms include arthralgias. Pertinent negatives include no chills or fever.        Patient is a/an 78 y.o.  female     Urgent care visit today.  She suspects gout.  She has not had a flare-up in awhile but states the day after having a shrimp salad she began having sudden onset of pain in her right foot mid to proximal medially.  She reports no recent injury here with the foot.  Last fall was when she had her hip fracture several weeks ago.  She states she took Tylenol and this did help some.  She states she continues to take her allopurinol as prescribed.  She does not recall the last time she had a gout attack before this one.    For complete problem list, past medical history, surgical history, social history, etc., see appropriate section in the electronic medical record    Review of Systems   Constitutional:  Positive for activity change. Negative for chills and fever.   Respiratory: Negative.     Cardiovascular: Negative.    Gastrointestinal: Negative.    Musculoskeletal:  Positive for arthralgias and gait problem.       Objective     Physical Exam  Vitals reviewed.   Constitutional:       General: She is not in acute distress.     Appearance: She is well-developed. She is not diaphoretic.   HENT:      Head: Normocephalic and atraumatic.   Eyes:      General: No scleral icterus.     Conjunctiva/sclera: Conjunctivae normal.   Pulmonary:      Effort: Pulmonary effort is normal. No respiratory distress.   Musculoskeletal:      Comments: There is mild soft tissue swelling and tenderness over the medial tarsal metatarsal region.  Perhaps very mild redness but minimal.  She does have multiple chronic toe deformities but no pain there today   Neurological:      Mental Status: She is alert and oriented to person, place, and time.      Comments: She was brought back in a wheelchair.   Psychiatric:         Behavior: Behavior normal.       Vitals:    01/17/24 1038   BP: (!) 158/82   BP Location: Left arm   Patient Position: Sitting   BP Method: Medium (Manual)   Temp: 98.2 °F (36.8 °C)   TempSrc:  Oral   SpO2: 97%           THIS DOCUMENT WAS MADE IN PART WITH VOICE RECOGNITION SOFTWARE.  OCCASIONALLY THIS SOFTWARE WILL MISINTERPRET WORDS OR PHRASES.

## 2024-01-17 NOTE — PROGRESS NOTES
"1120 Identified pt by name and date of birth, discussed toradol injection, pt verbalized understanding. Administered sterile technique to left hip area, aseptic technique, 22g 1 1/2" needle. Pt tolerated well and remained in clinic for 15 min.   "

## 2024-01-22 ENCOUNTER — CLINICAL SUPPORT (OUTPATIENT)
Dept: PRIMARY CARE CLINIC | Facility: CLINIC | Age: 79
End: 2024-01-22
Payer: MEDICARE

## 2024-01-22 ENCOUNTER — EXTERNAL HOME HEALTH (OUTPATIENT)
Dept: HOME HEALTH SERVICES | Facility: HOSPITAL | Age: 79
End: 2024-01-22
Payer: MEDICARE

## 2024-01-22 DIAGNOSIS — F41.1 GAD (GENERALIZED ANXIETY DISORDER): Primary | ICD-10-CM

## 2024-01-22 DIAGNOSIS — F43.9 TRAUMA AND STRESSOR-RELATED DISORDER: ICD-10-CM

## 2024-01-22 PROCEDURE — 99499 UNLISTED E&M SERVICE: CPT | Mod: HCNC,S$GLB,, | Performed by: SOCIAL WORKER
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
## 2024-02-01 ENCOUNTER — CLINICAL SUPPORT (OUTPATIENT)
Dept: PRIMARY CARE CLINIC | Facility: CLINIC | Age: 79
End: 2024-02-01
Payer: MEDICARE

## 2024-02-01 DIAGNOSIS — F43.9 TRAUMA AND STRESSOR-RELATED DISORDER: Primary | ICD-10-CM

## 2024-02-01 DIAGNOSIS — F41.1 GAD (GENERALIZED ANXIETY DISORDER): ICD-10-CM

## 2024-02-01 PROCEDURE — 99499 UNLISTED E&M SERVICE: CPT | Mod: HCNC,S$GLB,, | Performed by: SOCIAL WORKER
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
## 2024-02-08 ENCOUNTER — CLINICAL SUPPORT (OUTPATIENT)
Dept: PRIMARY CARE CLINIC | Facility: CLINIC | Age: 79
End: 2024-02-08
Payer: MEDICARE

## 2024-02-08 DIAGNOSIS — F43.9 TRAUMA AND STRESSOR-RELATED DISORDER: Primary | ICD-10-CM

## 2024-02-08 DIAGNOSIS — F41.1 GAD (GENERALIZED ANXIETY DISORDER): ICD-10-CM

## 2024-02-08 PROCEDURE — 99499 UNLISTED E&M SERVICE: CPT | Mod: HCNC,S$GLB,, | Performed by: SOCIAL WORKER
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
## 2024-02-12 ENCOUNTER — HOSPITAL ENCOUNTER (OUTPATIENT)
Dept: RADIOLOGY | Facility: HOSPITAL | Age: 79
Discharge: HOME | End: 2024-02-12
Attending: FAMILY MEDICINE
Payer: MEDICARE

## 2024-02-12 DIAGNOSIS — Z78.0 POSTMENOPAUSAL: ICD-10-CM

## 2024-02-12 PROCEDURE — 77080 DXA BONE DENSITY AXIAL: CPT | Mod: TC,HCNC,PO

## 2024-02-12 PROCEDURE — 77080 DXA BONE DENSITY AXIAL: CPT | Mod: 26,HCNC,, | Performed by: RADIOLOGY

## 2024-02-14 ENCOUNTER — TELEPHONE (OUTPATIENT)
Dept: PRIMARY CARE CLINIC | Facility: CLINIC | Age: 79
End: 2024-02-14
Payer: MEDICARE

## 2024-02-15 ENCOUNTER — CLINICAL SUPPORT (OUTPATIENT)
Dept: PRIMARY CARE CLINIC | Facility: CLINIC | Age: 79
End: 2024-02-15
Payer: MEDICARE

## 2024-02-15 DIAGNOSIS — F41.1 GAD (GENERALIZED ANXIETY DISORDER): Primary | ICD-10-CM

## 2024-02-15 DIAGNOSIS — F43.9 TRAUMA AND STRESSOR-RELATED DISORDER: ICD-10-CM

## 2024-02-15 PROCEDURE — 99499 UNLISTED E&M SERVICE: CPT | Mod: HCNC,S$GLB,, | Performed by: SOCIAL WORKER
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
## 2024-02-22 ENCOUNTER — DOCUMENT SCAN (OUTPATIENT)
Dept: HOME HEALTH SERVICES | Facility: HOSPITAL | Age: 79
End: 2024-02-22
Payer: MEDICARE

## 2024-02-22 ENCOUNTER — CLINICAL SUPPORT (OUTPATIENT)
Dept: PRIMARY CARE CLINIC | Facility: CLINIC | Age: 79
End: 2024-02-22
Payer: MEDICARE

## 2024-02-22 DIAGNOSIS — F41.1 GAD (GENERALIZED ANXIETY DISORDER): ICD-10-CM

## 2024-02-22 DIAGNOSIS — F43.9 TRAUMA AND STRESSOR-RELATED DISORDER: Primary | ICD-10-CM

## 2024-02-22 PROCEDURE — 99499 UNLISTED E&M SERVICE: CPT | Mod: HCNC,S$GLB,, | Performed by: SOCIAL WORKER
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
## 2024-02-26 DIAGNOSIS — S72.001A CLOSED FRACTURE OF NECK OF RIGHT FEMUR, INITIAL ENCOUNTER: Primary | ICD-10-CM

## 2024-02-28 ENCOUNTER — CLINICAL SUPPORT (OUTPATIENT)
Dept: PRIMARY CARE CLINIC | Facility: CLINIC | Age: 79
End: 2024-02-28
Payer: MEDICARE

## 2024-02-28 DIAGNOSIS — F40.240 CLAUSTROPHOBIA: ICD-10-CM

## 2024-02-28 DIAGNOSIS — F43.9 TRAUMA AND STRESSOR-RELATED DISORDER: Primary | ICD-10-CM

## 2024-02-28 PROCEDURE — 99499 UNLISTED E&M SERVICE: CPT | Mod: HCNC,S$GLB,, | Performed by: SOCIAL WORKER
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
## 2024-02-29 ENCOUNTER — OFFICE VISIT (OUTPATIENT)
Dept: ORTHOPEDICS | Facility: CLINIC | Age: 79
End: 2024-02-29
Payer: MEDICARE

## 2024-02-29 ENCOUNTER — HOSPITAL ENCOUNTER (OUTPATIENT)
Dept: RADIOLOGY | Facility: HOSPITAL | Age: 79
Discharge: HOME | End: 2024-02-29
Attending: ORTHOPAEDIC SURGERY
Payer: MEDICARE

## 2024-02-29 VITALS — BODY MASS INDEX: 22.88 KG/M2 | WEIGHT: 134 LBS | HEIGHT: 64 IN

## 2024-02-29 DIAGNOSIS — S72.001A CLOSED FRACTURE OF NECK OF RIGHT FEMUR, INITIAL ENCOUNTER: ICD-10-CM

## 2024-02-29 DIAGNOSIS — S72.001A CLOSED FRACTURE OF NECK OF RIGHT FEMUR, INITIAL ENCOUNTER: Primary | ICD-10-CM

## 2024-02-29 PROCEDURE — 1160F RVW MEDS BY RX/DR IN RCRD: CPT | Mod: HCNC,CPTII,S$GLB, | Performed by: ORTHOPAEDIC SURGERY

## 2024-02-29 PROCEDURE — 3288F FALL RISK ASSESSMENT DOCD: CPT | Mod: HCNC,CPTII,S$GLB, | Performed by: ORTHOPAEDIC SURGERY

## 2024-02-29 PROCEDURE — 99999 PR PBB SHADOW E&M-EST. PATIENT-LVL III: CPT | Mod: PBBFAC,HCNC,, | Performed by: ORTHOPAEDIC SURGERY

## 2024-02-29 PROCEDURE — 99024 POSTOP FOLLOW-UP VISIT: CPT | Mod: HCNC,S$GLB,, | Performed by: ORTHOPAEDIC SURGERY

## 2024-02-29 PROCEDURE — 1159F MED LIST DOCD IN RCRD: CPT | Mod: HCNC,CPTII,S$GLB, | Performed by: ORTHOPAEDIC SURGERY

## 2024-02-29 PROCEDURE — 73502 X-RAY EXAM HIP UNI 2-3 VIEWS: CPT | Mod: TC,HCNC,PO,RT

## 2024-02-29 PROCEDURE — 1101F PT FALLS ASSESS-DOCD LE1/YR: CPT | Mod: HCNC,CPTII,S$GLB, | Performed by: ORTHOPAEDIC SURGERY

## 2024-02-29 PROCEDURE — 73502 X-RAY EXAM HIP UNI 2-3 VIEWS: CPT | Mod: 26,HCNC,RT, | Performed by: RADIOLOGY

## 2024-02-29 PROCEDURE — 1125F AMNT PAIN NOTED PAIN PRSNT: CPT | Mod: HCNC,CPTII,S$GLB, | Performed by: ORTHOPAEDIC SURGERY

## 2024-03-04 NOTE — PROGRESS NOTES
Subjective:      Patient ID: Evelina Pinon is a 78 y.o. female.    Chief Complaint: Disease Management    HPI    Rheumatologic History:      - Diagnosis/es:              - Chorioretinitis and panuveitis OS: Per Dr Alonzo note (2023):  Punched out lesions in the peripheral retina, thinning of outer retinal layers and macula.  Chorioretinal lesions appear chronic and are not actively leaking on FA.  Inflammation is improving.  Uveitis is mostly posterior and involving the choroid and retina.              - Sarcoidosis diagnosed by LN biopsy 2021 involving the lymph nodes, lungs, and eyes              - Pulmonary HTN              - Cutaneous lupus:  In , she presented with psoriasiform plaques on the shoulders, anterior proximal thigh, and superior thoracic spine and was treated with topical betamethasone.  Shave biopsy showed interface dermatitis.              - Gout   - Osteoporosis with recent right femoral fracture  - Social History: Former smoker (quit 30-40 years ago), occasional alcohol intake  - Gyn History:     - Family History: adopted   - Positive serologies: ACE (138), +HLA B27, +CARMINE 1:320 nuclear and nucleolar, +SSA, + lysozyme (8.8)  - Negative serologies: HLA A29, dsDNA, Smith, RNP, SSA, SSB, CORBY, B2G, cardiolipin, DRVVT, RF, CCP  - Pathology:              - Chest station 4L LN biopsy (2021) noncaseating granulomas present              - shave biopsy left medial upper back (2020) interface dermatitis  - Infectious screening labs: Negative RPR, QuantiFERON (2023), hepatitis-B, C, and HIV (2023)  - Imaging:   - EKG () Sinus bradycardia with Premature ventricular complexes or Fusion complexes   Right bundle branch block               - TTE (2021): EF 55%, CVP 8, PASP 63 mmHg  - RHC (2021) Right atrial pressure 13/10/8 mmHg  Right ventricular pressure 42/5/14 mm Hg  Pulmonary artery pressure 42/17/26 mm Hg  Pulmonary capillary wedge 16/22/18 mm Hg  Cardiac  "output 4.36  - CT chest (10/2023) Similar extent of diffuse reticulonodular disease in the lungs and mediastinal adenopathy.  Atypical infection, pneumonitis, sarcoidosis, inhalational disease, neoplasm are some differential considerations.   - DEXA (2/2024) osteoporosis  - Previous Treatments:               - Prednisone 60mg per Ophthalmology  - Current Treatments:               - Allopurinol 200mg per PCP              - Colchicine per PCP   - MTX 15mg plus folic acid daily   - Reclast: requesting PA  Interval History:     Objective:   BP (!) 150/73   Pulse 72   Ht 5' 4" (1.626 m)   BMI 23.00 kg/m²   Physical Exam   Constitutional: normal appearance.   HENT:   Head: Normocephalic and atraumatic.   Cardiovascular: Normal rate, regular rhythm and normal heart sounds.   Pulmonary/Chest: Effort normal and breath sounds normal.   Musculoskeletal:      Comments: No synovitis, dactylitis, enthesitis, effusions     Neurological: She is alert.   Skin: Skin is warm and dry. No rash noted.   No skin thickening, telangiectasias, calcinosis, psoriasiform lesions, lupoid lesions         No data to display     Assessment:     1. Sarcoidosis    2. Uveitis    3. High risk medication use    4. Drug-induced immunodeficiency    5. Cutaneous lupus erythematosus    6. Chorioretinitis, unspecified laterality    7. Pulmonary hypertension      This is a 78-year-old woman who is a poor historian with history of hypertension, osteoporosis untreated per patient preference, gout on allopurinol 200mg and colchicine, cutaneous lupus previously on topical betamethasone (biopsy proven interface dermatitis 2020), biopsy proven sarcoidosis involving the hilar LN with positive ACE and hypercalcemia, pulmonary hypertension, chorioretinitis and uveitis OS s/p short course of prednisone 60mg and now on MTX 15mg weekly plus folic acid daily.    Plan:     Problem List Items Addressed This Visit          Derm    Cutaneous lupus erythematosus (Chronic) "    Relevant Orders    CBC Auto Differential    Comprehensive Metabolic Panel    Sedimentation rate    C-Reactive Protein       Cardiac/Vascular    Pulmonary hypertension (Chronic)    Relevant Orders    CBC Auto Differential    Comprehensive Metabolic Panel    Sedimentation rate    C-Reactive Protein     Other Visit Diagnoses       Sarcoidosis    -  Primary    Relevant Medications    methotrexate 2.5 MG Tab    folic acid (FOLVITE) 1 MG tablet    Other Relevant Orders    CBC Auto Differential    Comprehensive Metabolic Panel    Sedimentation rate    C-Reactive Protein    Uveitis        Relevant Medications    methotrexate 2.5 MG Tab    folic acid (FOLVITE) 1 MG tablet    Other Relevant Orders    CBC Auto Differential    Comprehensive Metabolic Panel    Sedimentation rate    C-Reactive Protein    High risk medication use        Relevant Orders    CBC Auto Differential    Comprehensive Metabolic Panel    Sedimentation rate    C-Reactive Protein    Drug-induced immunodeficiency        Relevant Orders    CBC Auto Differential    Comprehensive Metabolic Panel    Sedimentation rate    C-Reactive Protein    Chorioretinitis, unspecified laterality        Relevant Orders    CBC Auto Differential    Comprehensive Metabolic Panel    Sedimentation rate    C-Reactive Protein          - methotrexate 15mg weekly plus folic acid  - CBC, CMP, ESR, CRP every 12 weeks  - Avoid AZA as patient is on allopurinol   - Pre-DMARDs due 11/2024  - Patient requests transfer to a different ophthalmologist  - RBB present since 2020, is cardiac MRI necessary? Refer to Cardiology  - Continue follow up with Pulm (Dr Ye)    2.) Osteoporosis: T-score -3.1 in the left femoral neck with recent right femoral fracture  - Request PA for Reclast. I discussed potential side effects including osteonecrosis of the jaw should the patient undergo an invasive dental procedure on this medication, and atypical femoral fractures.     Follow up in 3 months    30  minutes of total time spent on the encounter, which includes face to face time and non-face to face time preparing to see the patient (eg, review of tests), Obtaining and/or reviewing separately obtained history, Documenting clinical information in the electronic or other health record, Independently interpreting results (not separately reported) and communicating results to the patient/family/caregiver, or Care coordination (not separately reported).     This note was prepared with [employer] voice recognition transcription software. Garbled syntax, mangled pronouns, and other bizarre constructions may be attributed to that software system       Mary Ford M.D.  Rheumatology Dept  [place], [place]

## 2024-03-05 ENCOUNTER — OFFICE VISIT (OUTPATIENT)
Dept: RHEUMATOLOGY | Facility: CLINIC | Age: 79
End: 2024-03-05
Payer: MEDICARE

## 2024-03-05 VITALS
HEART RATE: 72 BPM | HEIGHT: 64 IN | SYSTOLIC BLOOD PRESSURE: 150 MMHG | BODY MASS INDEX: 23 KG/M2 | DIASTOLIC BLOOD PRESSURE: 73 MMHG

## 2024-03-05 DIAGNOSIS — H30.90 CHORIORETINITIS, UNSPECIFIED LATERALITY: ICD-10-CM

## 2024-03-05 DIAGNOSIS — D84.821 DRUG-INDUCED IMMUNODEFICIENCY: ICD-10-CM

## 2024-03-05 DIAGNOSIS — Z79.899 HIGH RISK MEDICATION USE: ICD-10-CM

## 2024-03-05 DIAGNOSIS — H20.9 UVEITIS: ICD-10-CM

## 2024-03-05 DIAGNOSIS — Z79.899 DRUG-INDUCED IMMUNODEFICIENCY: ICD-10-CM

## 2024-03-05 DIAGNOSIS — Z86.2 HISTORY OF SARCOIDOSIS: Primary | ICD-10-CM

## 2024-03-05 DIAGNOSIS — I27.20 PULMONARY HYPERTENSION: ICD-10-CM

## 2024-03-05 DIAGNOSIS — L93.2 CUTANEOUS LUPUS ERYTHEMATOSUS: ICD-10-CM

## 2024-03-05 PROCEDURE — 1101F PT FALLS ASSESS-DOCD LE1/YR: CPT | Mod: HCNC,CPTII,S$GLB, | Performed by: STUDENT IN AN ORGANIZED HEALTH CARE EDUCATION/TRAINING PROGRAM

## 2024-03-05 PROCEDURE — 3078F DIAST BP <80 MM HG: CPT | Mod: HCNC,CPTII,S$GLB, | Performed by: STUDENT IN AN ORGANIZED HEALTH CARE EDUCATION/TRAINING PROGRAM

## 2024-03-05 PROCEDURE — 99215 OFFICE O/P EST HI 40 MIN: CPT | Mod: HCNC,S$GLB,, | Performed by: STUDENT IN AN ORGANIZED HEALTH CARE EDUCATION/TRAINING PROGRAM

## 2024-03-05 PROCEDURE — 3288F FALL RISK ASSESSMENT DOCD: CPT | Mod: HCNC,CPTII,S$GLB, | Performed by: STUDENT IN AN ORGANIZED HEALTH CARE EDUCATION/TRAINING PROGRAM

## 2024-03-05 PROCEDURE — 3077F SYST BP >= 140 MM HG: CPT | Mod: HCNC,CPTII,S$GLB, | Performed by: STUDENT IN AN ORGANIZED HEALTH CARE EDUCATION/TRAINING PROGRAM

## 2024-03-05 PROCEDURE — 1126F AMNT PAIN NOTED NONE PRSNT: CPT | Mod: HCNC,CPTII,S$GLB, | Performed by: STUDENT IN AN ORGANIZED HEALTH CARE EDUCATION/TRAINING PROGRAM

## 2024-03-05 PROCEDURE — 99999 PR PBB SHADOW E&M-EST. PATIENT-LVL III: CPT | Mod: PBBFAC,HCNC,, | Performed by: STUDENT IN AN ORGANIZED HEALTH CARE EDUCATION/TRAINING PROGRAM

## 2024-03-05 PROCEDURE — 1159F MED LIST DOCD IN RCRD: CPT | Mod: HCNC,CPTII,S$GLB, | Performed by: STUDENT IN AN ORGANIZED HEALTH CARE EDUCATION/TRAINING PROGRAM

## 2024-03-05 RX ORDER — ACETAMINOPHEN 325 MG/1
650 TABLET ORAL
Status: CANCELLED | OUTPATIENT
Start: 2024-03-05

## 2024-03-05 RX ORDER — FOLIC ACID 1 MG/1
1 TABLET ORAL DAILY
Qty: 90 TABLET | Refills: 3 | Status: SHIPPED | OUTPATIENT
Start: 2024-03-05

## 2024-03-05 RX ORDER — METHOTREXATE 2.5 MG/1
15 TABLET ORAL
Qty: 72 TABLET | Refills: 1 | Status: SHIPPED | OUTPATIENT
Start: 2024-03-05 | End: 2024-04-24 | Stop reason: SDUPTHER

## 2024-03-05 RX ORDER — HEPARIN 100 UNIT/ML
500 SYRINGE INTRAVENOUS
Status: CANCELLED | OUTPATIENT
Start: 2024-03-05

## 2024-03-05 RX ORDER — ZOLEDRONIC ACID 5 MG/100ML
5 INJECTION, SOLUTION INTRAVENOUS
Status: CANCELLED | OUTPATIENT
Start: 2024-03-05

## 2024-03-05 RX ORDER — SODIUM CHLORIDE 0.9 % (FLUSH) 0.9 %
10 SYRINGE (ML) INJECTION
Status: CANCELLED | OUTPATIENT
Start: 2024-03-05

## 2024-03-05 NOTE — PROGRESS NOTES
Chief Complaint   Patient presents with    Right Hip - Post-op Evaluation       HPI:  78 y.o. female returns to clinic today status post right hip hemiarthroplasty 8 weeks ago. Pain is now gone. Patient is compliant most of the time with restrictions.     right Hip Exam   Tenderness   The patient is experiencing no tenderness.   Incision healed    Range of Motion   The patient has normal right hip ROM.    Muscle Strength   Flexion: 4/5     Other   Erythema: absent  Sensation: normal  Pulse: present    X-rays were performed today, personally reviewed by me and findings discussed with the patient.  2 views of the right hip show implants intact with no evidence of loosening    Closed fracture of neck of right femur, initial encounter        Continue as tolerated. RTC 6 months

## 2024-03-06 ENCOUNTER — PATIENT MESSAGE (OUTPATIENT)
Dept: INFUSION THERAPY | Facility: HOSPITAL | Age: 79
End: 2024-03-06
Payer: MEDICARE

## 2024-03-07 ENCOUNTER — TELEPHONE (OUTPATIENT)
Dept: RHEUMATOLOGY | Facility: CLINIC | Age: 79
End: 2024-03-07
Payer: MEDICARE

## 2024-03-07 NOTE — TELEPHONE ENCOUNTER
OBDULIO RUTLEDGE                To From Date Type Message   Lexie Brannon, PharmD Mary Ford MD 3/5/2024 Send Plan Please follow up on Reclast. Thank you

## 2024-03-08 ENCOUNTER — CLINICAL SUPPORT (OUTPATIENT)
Dept: PRIMARY CARE CLINIC | Facility: CLINIC | Age: 79
End: 2024-03-08
Payer: MEDICARE

## 2024-03-08 ENCOUNTER — OFFICE VISIT (OUTPATIENT)
Dept: PRIMARY CARE CLINIC | Facility: CLINIC | Age: 79
End: 2024-03-08
Payer: MEDICARE

## 2024-03-08 VITALS
BODY MASS INDEX: 23.27 KG/M2 | TEMPERATURE: 98 F | DIASTOLIC BLOOD PRESSURE: 70 MMHG | SYSTOLIC BLOOD PRESSURE: 130 MMHG | HEART RATE: 60 BPM | RESPIRATION RATE: 18 BRPM | OXYGEN SATURATION: 100 % | WEIGHT: 135.56 LBS

## 2024-03-08 DIAGNOSIS — N18.31 STAGE 3A CHRONIC KIDNEY DISEASE: ICD-10-CM

## 2024-03-08 DIAGNOSIS — M32.9 PERSONAL HISTORY OF SYSTEMIC LUPUS ERYTHEMATOSUS (SLE): ICD-10-CM

## 2024-03-08 DIAGNOSIS — M81.0 AGE-RELATED OSTEOPOROSIS WITHOUT CURRENT PATHOLOGICAL FRACTURE: ICD-10-CM

## 2024-03-08 DIAGNOSIS — F41.1 GAD (GENERALIZED ANXIETY DISORDER): ICD-10-CM

## 2024-03-08 DIAGNOSIS — F43.9 TRAUMA AND STRESSOR-RELATED DISORDER: Primary | ICD-10-CM

## 2024-03-08 DIAGNOSIS — D86.0 SARCOIDOSIS OF LUNG: ICD-10-CM

## 2024-03-08 DIAGNOSIS — I10 ESSENTIAL HYPERTENSION: Primary | ICD-10-CM

## 2024-03-08 DIAGNOSIS — I70.0 AORTIC ATHEROSCLEROSIS: ICD-10-CM

## 2024-03-08 PROCEDURE — 3078F DIAST BP <80 MM HG: CPT | Mod: HCNC,CPTII,S$GLB, | Performed by: FAMILY MEDICINE

## 2024-03-08 PROCEDURE — 3075F SYST BP GE 130 - 139MM HG: CPT | Mod: HCNC,CPTII,S$GLB, | Performed by: FAMILY MEDICINE

## 2024-03-08 PROCEDURE — 1160F RVW MEDS BY RX/DR IN RCRD: CPT | Mod: HCNC,CPTII,S$GLB, | Performed by: FAMILY MEDICINE

## 2024-03-08 PROCEDURE — 1125F AMNT PAIN NOTED PAIN PRSNT: CPT | Mod: HCNC,CPTII,S$GLB, | Performed by: FAMILY MEDICINE

## 2024-03-08 PROCEDURE — 99214 OFFICE O/P EST MOD 30 MIN: CPT | Mod: HCNC,S$GLB,, | Performed by: FAMILY MEDICINE

## 2024-03-08 PROCEDURE — 99499 UNLISTED E&M SERVICE: CPT | Mod: HCNC,S$GLB,, | Performed by: SOCIAL WORKER

## 2024-03-08 PROCEDURE — 1101F PT FALLS ASSESS-DOCD LE1/YR: CPT | Mod: HCNC,CPTII,S$GLB, | Performed by: FAMILY MEDICINE

## 2024-03-08 PROCEDURE — 3288F FALL RISK ASSESSMENT DOCD: CPT | Mod: HCNC,CPTII,S$GLB, | Performed by: FAMILY MEDICINE

## 2024-03-08 PROCEDURE — 99999 PR PBB SHADOW E&M-EST. PATIENT-LVL IV: CPT | Mod: PBBFAC,HCNC,, | Performed by: FAMILY MEDICINE

## 2024-03-08 PROCEDURE — 1159F MED LIST DOCD IN RCRD: CPT | Mod: HCNC,CPTII,S$GLB, | Performed by: FAMILY MEDICINE

## 2024-03-08 RX ORDER — LORAZEPAM 0.5 MG/1
0.5 TABLET ORAL NIGHTLY
Qty: 30 TABLET | Refills: 3 | Status: SHIPPED | OUTPATIENT
Start: 2024-03-08 | End: 2024-04-30
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
## 2024-03-08 NOTE — PROGRESS NOTES
Primary Care Provider Appointment   [facility]       Patient ID: Evelina Pinon is a 78 y.o. female.    ASSESSMENT/PLAN by Problem List:    1. Essential hypertension  Assessment & Plan:  Stable and satisfactory.  Continue current medications.    Orders:  -     Lipid Panel; Future; Expected date: 03/08/2024  -     Comprehensive Metabolic Panel; Future; Expected date: 03/08/2024    2. Aortic atherosclerosis  Assessment & Plan:  Noted on previous imaging.  Clinically stable.  Continue to work on risk factor reduction including continuing her statin medication, adequate blood pressure control.  Recommend Mediterranean diet and more exercise.    Orders:  -     Lipid Panel; Future; Expected date: 03/08/2024  -     Comprehensive Metabolic Panel; Future; Expected date: 03/08/2024    3. MADINA (generalized anxiety disorder)  Assessment & Plan:  Fairly stable.  She continues to work with therapy.  Recommend minimizing lorazepam usage.      4. Personal history of systemic lupus erythematosus (SLE)  Assessment & Plan:  Patient is following with Rheumatology.  She appears stable.      5. Sarcoidosis of lung  Assessment & Plan:  Clinically stable.  She does follow with pulmonology and Rheumatology      6. Stage 3a chronic kidney disease  Assessment & Plan:  Clinically stable.  No NSAID usage.  We will continue to monitor.      Other orders  -     LORazepam (ATIVAN) 0.5 MG tablet; Take 1 tablet (0.5 mg total) by mouth every evening. as needed for anxiety.  Dispense: 30 tablet; Refill: 3         Follow Up:  Three months    Subjective:     Chief Complaint   Patient presents with    Follow-up     I have reviewed the information entered by the ancillary staff regarding the chief complaint as well as the related history.    HPI    Patient is a/an 78 y.o.  female     Routine follow-up.  See above for all details.  Has reclast scheduled      For complete problem list, past medical history,  surgical history, social history, etc., see appropriate section in the electronic medical record    Review of Systems   Constitutional:  Negative for chills and fever.   HENT: Negative.     Respiratory: Negative.     Cardiovascular: Negative.    Gastrointestinal: Negative.    Musculoskeletal:  Positive for arthralgias and gait problem.       Objective     Physical Exam  Vitals reviewed.   Constitutional:       General: She is not in acute distress.     Appearance: She is well-developed. She is not diaphoretic.   HENT:      Head: Normocephalic and atraumatic.   Eyes:      General: No scleral icterus.  Cardiovascular:      Rate and Rhythm: Normal rate and regular rhythm.   Pulmonary:      Effort: Pulmonary effort is normal. No respiratory distress.   Neurological:      Mental Status: She is alert and oriented to person, place, and time.      Comments: Ambulatory   Psychiatric:         Mood and Affect: Mood normal.         Behavior: Behavior normal.       Vitals:    03/08/24 1417   BP: 130/70   BP Location: Right arm   Patient Position: Sitting   BP Method: Medium (Manual)   Pulse: 60   Resp: 18   Temp: 98.2 °F (36.8 °C)   TempSrc: Oral   SpO2: 100%   Weight: 61.5 kg (135 lb 9.3 oz)           THIS DOCUMENT WAS MADE IN PART WITH VOICE RECOGNITION SOFTWARE.  OCCASIONALLY THIS SOFTWARE WILL MISINTERPRET WORDS OR PHRASES.

## 2024-03-12 ENCOUNTER — INFUSION (OUTPATIENT)
Dept: INFUSION THERAPY | Facility: HOSPITAL | Age: 79
End: 2024-03-12
Attending: STUDENT IN AN ORGANIZED HEALTH CARE EDUCATION/TRAINING PROGRAM
Payer: MEDICARE

## 2024-03-12 VITALS
BODY MASS INDEX: 23.64 KG/M2 | RESPIRATION RATE: 18 BRPM | SYSTOLIC BLOOD PRESSURE: 136 MMHG | DIASTOLIC BLOOD PRESSURE: 79 MMHG | HEIGHT: 64 IN | HEART RATE: 68 BPM | TEMPERATURE: 98 F | WEIGHT: 138.44 LBS

## 2024-03-12 DIAGNOSIS — S72.001A CLOSED DISPLACED FRACTURE OF RIGHT FEMORAL NECK: Primary | ICD-10-CM

## 2024-03-12 DIAGNOSIS — M80.00XD AGE-RELATED OSTEOPOROSIS WITH CURRENT PATHOLOGICAL FRACTURE WITH ROUTINE HEALING, SUBSEQUENT ENCOUNTER: ICD-10-CM

## 2024-03-12 PROCEDURE — 25000003 PHARM REV CODE 250: Mod: HCNC,PN | Performed by: STUDENT IN AN ORGANIZED HEALTH CARE EDUCATION/TRAINING PROGRAM

## 2024-03-12 PROCEDURE — 96365 THER/PROPH/DIAG IV INF INIT: CPT | Mod: HCNC,PN

## 2024-03-12 PROCEDURE — 63600175 PHARM REV CODE 636 W HCPCS: Mod: HCNC,PN | Performed by: STUDENT IN AN ORGANIZED HEALTH CARE EDUCATION/TRAINING PROGRAM

## 2024-03-12 RX ORDER — SODIUM CHLORIDE 0.9 % (FLUSH) 0.9 %
10 SYRINGE (ML) INJECTION
OUTPATIENT
Start: 2024-03-12

## 2024-03-12 RX ORDER — ZOLEDRONIC ACID 5 MG/100ML
5 INJECTION, SOLUTION INTRAVENOUS
OUTPATIENT
Start: 2024-03-12

## 2024-03-12 RX ORDER — ACETAMINOPHEN 325 MG/1
650 TABLET ORAL
Status: COMPLETED | OUTPATIENT
Start: 2024-03-12 | End: 2024-03-12

## 2024-03-12 RX ORDER — ACETAMINOPHEN 325 MG/1
650 TABLET ORAL
OUTPATIENT
Start: 2024-03-12

## 2024-03-12 RX ORDER — ZOLEDRONIC ACID 5 MG/100ML
5 INJECTION, SOLUTION INTRAVENOUS
Status: COMPLETED | OUTPATIENT
Start: 2024-03-12 | End: 2024-03-12

## 2024-03-12 RX ORDER — HEPARIN 100 UNIT/ML
500 SYRINGE INTRAVENOUS
OUTPATIENT
Start: 2024-03-12

## 2024-03-12 RX ORDER — SODIUM CHLORIDE 0.9 % (FLUSH) 0.9 %
10 SYRINGE (ML) INJECTION
Status: DISCONTINUED | OUTPATIENT
Start: 2024-03-12 | End: 2024-03-12 | Stop reason: HOSPADM

## 2024-03-12 RX ADMIN — ZOLEDRONIC ACID 5 MG: 0.05 INJECTION, SOLUTION INTRAVENOUS at 03:03

## 2024-03-12 RX ADMIN — SODIUM CHLORIDE: 9 INJECTION, SOLUTION INTRAVENOUS at 03:03

## 2024-03-12 RX ADMIN — ACETAMINOPHEN 650 MG: 325 TABLET, FILM COATED ORAL at 03:03

## 2024-03-12 NOTE — PLAN OF CARE
Problem: Adult Inpatient Plan of Care  Goal: Plan of Care Review  Outcome: Ongoing, Progressing  Flowsheets (Taken 3/12/2024 1610)  Plan of Care Reviewed With:   patient   spouse  Goal: Patient-Specific Goal (Individualized)  Outcome: Ongoing, Progressing  Flowsheets (Taken 3/12/2024 1610)  Anxieties, Fears or Concerns: scared of needles  Individualized Care Needs: recliner/warm blanket/snacks/education  Goal: Absence of Hospital-Acquired Illness or Injury  Outcome: Ongoing, Progressing  Goal: Optimal Comfort and Wellbeing  Outcome: Ongoing, Progressing  Goal: Readiness for Transition of Care  Outcome: Ongoing, Progressing     Problem: Fatigue  Goal: Improved Activity Tolerance  Outcome: Ongoing, Progressing  Intervention: Promote Improved Energy  Flowsheets (Taken 3/12/2024 1610)  Fatigue Management:   activity schedule adjusted   activity assistance provided   frequent rest breaks encouraged   fatigue-related activity identified   paced activity encouraged  Sleep/Rest Enhancement:   noise level reduced   reading promoted   regular sleep/rest pattern promoted   relaxation techniques promoted   room darkened   family presence promoted  Activity Management:   Ambulated -L4   Ambulated to bathroom - L4   Ambulated in morse - L4   Walk with assistive devise and /or staff member - L3   Up in stretcher chair - L1   Pt tolerated Reclast well today. NAD/no concerns voiced. Reviewed follow-up appointments. All questions were answered, pushed downstairs for discharge by .

## 2024-03-14 ENCOUNTER — CLINICAL SUPPORT (OUTPATIENT)
Dept: PRIMARY CARE CLINIC | Facility: CLINIC | Age: 79
End: 2024-03-14
Payer: MEDICARE

## 2024-03-14 DIAGNOSIS — F40.240 CLAUSTROPHOBIA: ICD-10-CM

## 2024-03-14 DIAGNOSIS — F43.9 TRAUMA AND STRESSOR-RELATED DISORDER: Primary | ICD-10-CM

## 2024-03-14 DIAGNOSIS — F41.1 GAD (GENERALIZED ANXIETY DISORDER): ICD-10-CM

## 2024-03-14 PROCEDURE — 99499 UNLISTED E&M SERVICE: CPT | Mod: HCNC,S$GLB,, | Performed by: SOCIAL WORKER
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
## 2024-03-15 ENCOUNTER — TELEPHONE (OUTPATIENT)
Dept: PRIMARY CARE CLINIC | Facility: CLINIC | Age: 79
End: 2024-03-15
Payer: MEDICARE

## 2024-03-15 ENCOUNTER — OFFICE VISIT (OUTPATIENT)
Dept: PRIMARY CARE CLINIC | Facility: CLINIC | Age: 79
End: 2024-03-15
Payer: MEDICARE

## 2024-03-15 VITALS
RESPIRATION RATE: 18 BRPM | DIASTOLIC BLOOD PRESSURE: 72 MMHG | TEMPERATURE: 99 F | SYSTOLIC BLOOD PRESSURE: 136 MMHG | HEART RATE: 63 BPM

## 2024-03-15 DIAGNOSIS — K30 INDIGESTION: Primary | ICD-10-CM

## 2024-03-15 PROCEDURE — 1125F AMNT PAIN NOTED PAIN PRSNT: CPT | Mod: HCNC,CPTII,S$GLB, | Performed by: PHYSICIAN ASSISTANT

## 2024-03-15 PROCEDURE — 3078F DIAST BP <80 MM HG: CPT | Mod: HCNC,CPTII,S$GLB, | Performed by: PHYSICIAN ASSISTANT

## 2024-03-15 PROCEDURE — 99215 OFFICE O/P EST HI 40 MIN: CPT | Mod: HCNC,S$GLB,, | Performed by: PHYSICIAN ASSISTANT

## 2024-03-15 PROCEDURE — 99999 PR PBB SHADOW E&M-EST. PATIENT-LVL IV: CPT | Mod: PBBFAC,HCNC,, | Performed by: PHYSICIAN ASSISTANT

## 2024-03-15 PROCEDURE — 3288F FALL RISK ASSESSMENT DOCD: CPT | Mod: HCNC,CPTII,S$GLB, | Performed by: PHYSICIAN ASSISTANT

## 2024-03-15 PROCEDURE — 1159F MED LIST DOCD IN RCRD: CPT | Mod: HCNC,CPTII,S$GLB, | Performed by: PHYSICIAN ASSISTANT

## 2024-03-15 PROCEDURE — 3075F SYST BP GE 130 - 139MM HG: CPT | Mod: HCNC,CPTII,S$GLB, | Performed by: PHYSICIAN ASSISTANT

## 2024-03-15 PROCEDURE — 1101F PT FALLS ASSESS-DOCD LE1/YR: CPT | Mod: HCNC,CPTII,S$GLB, | Performed by: PHYSICIAN ASSISTANT

## 2024-03-15 RX ORDER — SIMETHICONE 80 MG
80 TABLET,CHEWABLE ORAL EVERY 6 HOURS PRN
Qty: 28 TABLET | Refills: 0 | Status: CANCELLED | OUTPATIENT
Start: 2024-03-15 | End: 2024-03-22

## 2024-03-15 NOTE — PROGRESS NOTES
Subjective:      Patient ID: Evelina Pinon is a 78 y.o. female.    Vitals:  oral temperature is 98.5 °F (36.9 °C). Her blood pressure is 136/72 and her pulse is 63. Her respiration is 18.     Chief Complaint: Gastroesophageal Reflux    78-year-old female who is new to me but established to the clinic presents for evaluation of epigastric pain after eating that began about 4 days ago, although patient is a poor historian.  She can not recall if she has been taking her Protonix or not.  States that after she eats she feels like she needs to burp and has feeling of discomfort in lower epigastric region.  She denies any associated fever, chills, nausea, vomiting, diarrhea, dark tarry stools or bright red blood in the stools.  Reports some mild constipation.  Denies any difficulty swallowing.          Constitution: Negative for chills and fever.   Cardiovascular:  Negative for chest pain, palpitations and sob on exertion.   Gastrointestinal:  Positive for constipation and heartburn. Negative for nausea, vomiting, diarrhea, bright red blood in stool and dark colored stools.      Objective:     Physical Exam   Constitutional: She does not appear ill. No distress.   HENT:   Head: Normocephalic and atraumatic.   Ears:   Right Ear: External ear normal.   Left Ear: External ear normal.   Eyes: Conjunctivae are normal. Right eye exhibits no discharge. Left eye exhibits no discharge. Extraocular movement intact   Cardiovascular: Normal rate, regular rhythm and normal heart sounds.   No murmur heard.  Pulmonary/Chest: Effort normal and breath sounds normal. She has no wheezes. She has no rhonchi. She has no rales.   Abdominal: Normal appearance and bowel sounds are normal. She exhibits no distension. There is no abdominal tenderness.   Musculoskeletal: Normal range of motion.         General: Normal range of motion.   Neurological: no focal deficit. She is alert.   Skin: Skin is warm, dry and not pale. jaundice  Psychiatric:  Her behavior is normal. Mood, judgment and thought content normal.   Nursing note and vitals reviewed.      Assessment:     1. 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 The stenosis was traversed. There was disruption of the        stricture with passage of the scope.     Patient is a poor historian.  She can not recall if she has been taking her Protonix or not.  Advised to check her medications at home that she is taking this medication.  Also make sure that she is taking this medication 1st thing in the morning on an empty stomach 30 minutes before eating or drinking coffee.  Advised may add Pepcid AC daily for the next 7 days to be taken at night.  MiraLax once daily to help with bowel movements.  We also discussed at length low FODMAP diet to reduce any gas that may be causing discomfort.  Discussed notify me in 1 week's time if her symptoms have improved but not may need to refer to GI for upper endoscopy.  Of course discussed any severe pain, profuse vomiting, fever or blood in the stool will need to go to ED prior to follow-up.  Patient voices understanding and agrees with plan.        My total time spent on this encounter was 54 minutes which included  the following activities: preparing to see the patient, performing a medically appropriate and/or evaluation, counseling and educating the patient and family/caregiver, ordering medications, tests, or procedures, referring and communicating with other healthcare providers, documenting clinical information in the electronic or other health record. This time is independent and non-overlapping.

## 2024-03-15 NOTE — TELEPHONE ENCOUNTER
----- Message from Peri Echols sent at 3/15/2024 10:05 AM CDT -----  Contact: Patient  Type:  Needs Medical Advice    Who Called: Patient    Would the patient rather a call back or a response via [facility]? Call    Best Call Back Number: [phone] (home) [phone] (work)    Additional Information: patient is requesting a call from a nurse

## 2024-03-15 NOTE — TELEPHONE ENCOUNTER
Pt states she wants to see if she can get in to see CLAUDINE To for gas pains in her stomach and chest every time she eats. Pt states this has been going on since yesterday. Pt has been put on schedule to see PA Zuleika for 11:20am today.

## 2024-03-16 NOTE — ASSESSMENT & PLAN NOTE
Noted on previous imaging.  Clinically stable.  Continue to work on risk factor reduction including continuing her statin medication, adequate blood pressure control.  Recommend Mediterranean diet and more exercise.

## 2024-03-18 ENCOUNTER — TELEPHONE (OUTPATIENT)
Dept: PRIMARY CARE CLINIC | Facility: CLINIC | Age: 79
End: 2024-03-18
Payer: MEDICARE

## 2024-03-18 NOTE — TELEPHONE ENCOUNTER
Pt has a conflict at scheduled appt time on 3/21/2024 at 3:00. Clinician rescheduled pt to Wednesday, 3/20/2024 at 3:00. Attempt was made to contact pt with this information. There was no answer at [phone], and voice mail was left asking her to return the call.

## 2024-03-20 ENCOUNTER — CLINICAL SUPPORT (OUTPATIENT)
Dept: PRIMARY CARE CLINIC | Facility: CLINIC | Age: 79
End: 2024-03-20
Payer: MEDICARE

## 2024-03-20 DIAGNOSIS — F41.1 GAD (GENERALIZED ANXIETY DISORDER): ICD-10-CM

## 2024-03-20 DIAGNOSIS — F43.9 TRAUMA AND STRESSOR-RELATED DISORDER: Primary | ICD-10-CM

## 2024-03-20 PROCEDURE — 99499 UNLISTED E&M SERVICE: CPT | Mod: HCNC,S$GLB,, | Performed by: SOCIAL WORKER
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
## 2024-03-27 ENCOUNTER — OFFICE VISIT (OUTPATIENT)
Dept: OPTOMETRY | Facility: CLINIC | Age: 79
End: 2024-03-27
Payer: MEDICARE

## 2024-03-27 DIAGNOSIS — H31.102: ICD-10-CM

## 2024-03-27 DIAGNOSIS — H25.11 NUCLEAR SCLEROSIS, RIGHT: ICD-10-CM

## 2024-03-27 DIAGNOSIS — Z96.1 PSEUDOPHAKIA, LEFT EYE: ICD-10-CM

## 2024-03-27 DIAGNOSIS — H40.013 OAG (OPEN ANGLE GLAUCOMA) SUSPECT, LOW RISK, BILATERAL: ICD-10-CM

## 2024-03-27 DIAGNOSIS — H30.92 CHORIORETINITIS OF LEFT EYE: ICD-10-CM

## 2024-03-27 DIAGNOSIS — D86.9 SARCOIDOSIS: Primary | ICD-10-CM

## 2024-03-27 PROCEDURE — 1126F AMNT PAIN NOTED NONE PRSNT: CPT | Mod: HCNC,CPTII,S$GLB, | Performed by: OPTOMETRIST

## 2024-03-27 PROCEDURE — 3288F FALL RISK ASSESSMENT DOCD: CPT | Mod: HCNC,CPTII,S$GLB, | Performed by: OPTOMETRIST

## 2024-03-27 PROCEDURE — 92134 CPTRZ OPH DX IMG PST SGM RTA: CPT | Mod: HCNC,S$GLB,, | Performed by: OPTOMETRIST

## 2024-03-27 PROCEDURE — 1160F RVW MEDS BY RX/DR IN RCRD: CPT | Mod: HCNC,CPTII,S$GLB, | Performed by: OPTOMETRIST

## 2024-03-27 PROCEDURE — 99999 PR PBB SHADOW E&M-EST. PATIENT-LVL III: CPT | Mod: PBBFAC,HCNC,, | Performed by: OPTOMETRIST

## 2024-03-27 PROCEDURE — 1159F MED LIST DOCD IN RCRD: CPT | Mod: HCNC,CPTII,S$GLB, | Performed by: OPTOMETRIST

## 2024-03-27 PROCEDURE — 1100F PTFALLS ASSESS-DOCD GE2>/YR: CPT | Mod: HCNC,CPTII,S$GLB, | Performed by: OPTOMETRIST

## 2024-03-27 PROCEDURE — 99204 OFFICE O/P NEW MOD 45 MIN: CPT | Mod: HCNC,S$GLB,, | Performed by: OPTOMETRIST

## 2024-03-27 NOTE — LETTER
March 27, 2024        Mary Ford MD  [address] [facility] [place] [address]             [facility]  [address] [place] [address]  Phone: [phone]  Fax: [phone]   Patient: Evelina Pinon   MR Number: [id number]   YOB: 1945   Date of Visit: 3/27/2024       Dear Dr. Ford:    Thank you for referring Evelina Pinon to me for evaluation. Attached you will find relevant portions of my assessment and plan of care.    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   I'm referring for retina consult to Dr Shepard in [place].     If you have questions, please do not hesitate to call me. I look forward to following Evelina Pinon along with you.    Sincerely,      JAYRO Mathis, OD            CC  No Recipients    Enclosure

## 2024-03-27 NOTE — Clinical Note
Hi, Please call to schedule at [place] for retina consult --OS presumed inactive sarcoid retinitis, and macular atrophy --not urgent, but 3-4 weeks?  Thanks in advance, Dr DE LA GARZA

## 2024-03-27 NOTE — PROGRESS NOTES
HPI    Routine eye exam-dle-7/31/23    Pt had cataract sx OS last summer 7/23. Was told she has bleeding and   referred to retina specialist, then to rheumatology. Retina doctor said   she had inflammation causing blurred va. Has lupus and sarcoidosis- taking   methotrexate. Denies any flashes or floaters. Wearing readers.     Hemoglobin A1C       Date                     Value               Ref Range             Status                08/20/2020               5.4                 0.0 - 5.6 %           Final              Comment:    Reference Interval:  5.0 - 5.6 Normal   5.7 - 6.4 High Risk   > 6.5   Diabetic    Hgb A1c results are standardized based on the (NGSP) National     Glycohemoglobin Standardization Program.    Hemoglobin A1C levels are   related to mean serum/plasma glucose   during the preceding 2-3 months.         ----------    Last edited by Lynn Ansari on 3/27/2024  3:04 PM.            Assessment /Plan     For exam results, see Encounter Report.    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

## 2024-03-28 ENCOUNTER — CLINICAL SUPPORT (OUTPATIENT)
Dept: PRIMARY CARE CLINIC | Facility: CLINIC | Age: 79
End: 2024-03-28
Payer: MEDICARE

## 2024-03-28 DIAGNOSIS — F43.9 TRAUMA AND STRESSOR-RELATED DISORDER: Primary | ICD-10-CM

## 2024-03-28 DIAGNOSIS — F41.1 GAD (GENERALIZED ANXIETY DISORDER): ICD-10-CM

## 2024-03-28 PROCEDURE — 99499 UNLISTED E&M SERVICE: CPT | Mod: HCNC,S$GLB,, | Performed by: SOCIAL WORKER

## 2024-03-28 NOTE — PATIENT INSTRUCTIONS
Using the Amsler Grid  If you are at risk for vision loss, you may be told to check your eyesight regularly using the Amsler grid. Below is the grid and instructions for using it.         The Amsler grid helps you track changes in your vision.    How to Use the Amsler Grid  Use the grid in a well-lighted area.  Wear glasses or contact lenses if you usually wear them.  Hold the grid at your normal reading distance (about 16 inches).  Cover your left eye.  With your right eye, look at the dot in the center of the grid.  While looking at the dot, notice if any of the lines look wavy, if any lines disappear, or if the boxes change shape.  Write down on a piece of paper any vision changes from the last time you used the grid.  Now repeat the exercise, this time covering your right eye.  Call your doctor right away if you notice any vision changes.  How Often Should I Check My Vision?  Use the Amsler grid as often as your eye doctor suggests. Keep the grid where youll remember to use it. Call your eye doctor right away if you notice any changes with your eyesight. This includes if your vision improves.  © 5522-5514 [facility], [address] [place] [address]. All rights reserved. This information is not intended as a substitute for professional medical care. Always follow your healthcare professional's instructions.FLASHES / FLOATERS / POSTERIOR VITREOUS DETACHMENT    Call the clinic if you have any further changes in symptoms.  Including:  Increased numbers of floaters or flashing lights, dimness or darkness that moves through or stays constant in your vision, or any pain in the eye (s).    You may sometimes see small specks or clouds moving in your field of vision.  They are called FLOATERS.  You can often see them when looking at a plain background, like a blank wall or blue kyara.  Floaters are actually tiny clumps of gel or cells inside the VITREOUS, the clear jelly-like fluid that fills the inside of  your eye.    While these objects look like they are in front of your eye, they are actually floating inside.  What you see are the shadows they cast on the RETINA, the nerve layer at the back of the eye that senses light and allows you to see.      POSTERIOR VITREOUS DETACHMENT    The appearance of new floaters may be alarming.  If you suddenly develop new floaters, you should contact your eye care professional  right away.    The retina can tear if the shrinking vitreous pulls away from the wall of the eye.  This sometimes causes a small amount of bleeding in the eye that may appear as new floaters.    A torn retina is always a serious problem, since it can lead to a retinal detachment.  You should see your eye care professional as soon as possible if:    even one new floater appears suddenly;  you see sudden flashes of light;  you notice other symptoms, like the loss of side vision, or a curtain closes down in your vision        POSTERIOR VITREOUS DETACHMENT is more common for people who:    are nearsighted;  have had cataract surgery;  have had YAG laser surgery of the eye;  have had inflammation inside the eye;  are over age 60.      While some floaters may remain visible, many of them will fade over time and become less noticeable.  Even if you've had some floaters for years, you should have your eyes checked as soon as possible if you notice new ones.    FLASHING LIGHTS    When the vitreous gel rubs or pulls on the retina, you may see what look like flashing lights or lightning streaks.  These flashes can appear off and on for several weeks or months.      Some people experience flashes of light that appear as jagged lines or heat waves in both eyes, lasting 10-20 minutes.  These flashes are caused by a spasm of blood vessels in the brain, which is called a migraine.    If a headache follows these flashes, it's called a migraine headache.  If   no headache occurs, these flashes are called Ophthalmic or  Ocular Migraine.
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
## 2024-03-28 NOTE — PROGRESS NOTES
"HPI    Routine eye exam-dle-7/31/23    Pt had cataract sx OS last summer 7/23. Was told she has bleeding and   referred to retina specialist, then to rheumatology. Retina doctor said   she had inflammation causing blurred va. Has lupus and sarcoidosis- taking   methotrexate. Denies any flashes or floaters. Wearing readers.     Hemoglobin A1C       Date                     Value               Ref Range             Status                08/20/2020               5.4                 0.0 - 5.6 %           Final              Comment:    Reference Interval:  5.0 - 5.6 Normal   5.7 - 6.4 High Risk   > 6.5   Diabetic    Hgb A1c results are standardized based on the (NGSP) National     Glycohemoglobin Standardization Program.    Hemoglobin A1C levels are   related to mean serum/plasma glucose   during the preceding 2-3 months.         ----------  Patient history   CE w/ pciol 7/2023  Retina consult w/ Dr Alonzo 7/31/23 --w/ "unknown" cause of retinitis   Rheum consult 10/30/23 --dx w/ sarcoid retinitis --started methotrexate   Noted gross vision decrease OS has "slowly improved"   Last edited by JYARO Mathis, OD on 3/28/2024  7:48 AM.            Assessment /Plan     For exam results, see Encounter Report.    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 OCT mac 3/2024   OD early dry changes   OS Macular atrophy w/ focal atrophic scarring     4.   Large " c/d, low normal IOP, neg fhx glaucoma --may need consult Dr Falcon following retina   5.   OD--Vis sig NS catarcts, OS. Not ready for consult, gave info, cautions driving especially at night.  6.   Stable OS     Message if further changes   Advised retina consult ---prefers [facility]

## 2024-03-30 NOTE — TELEPHONE ENCOUNTER
Care Due:                  Date            Visit Type   Department     Provider  --------------------------------------------------------------------------------                                ESTABLISHED                  Jeffrey Leon  Last Visit: 03-      PATIENT      None Found     [facility]                              ESTABLISHED                  Jeffrey Leon  Next Visit: 06-      PATIENT      None Found     [facility]ay                                                            Last  Test          Frequency    Reason                     Performed    Due Date  --------------------------------------------------------------------------------    Lipid Panel.  12 months..  atorvastatin.............  09- 08-    Mg Level....  12 months..  magnesium................  Not Found    Overdue    Uric Acid...  12 months..  allopurinoL, colchicine..  09- 08-    Health [facility] Embedded Care Due Messages. Reference number: [id number].   3/30/2024 12:49:38 PM CDT

## 2024-04-01 RX ORDER — LOSARTAN POTASSIUM 100 MG/1
100 TABLET ORAL
Qty: 90 TABLET | Refills: 3 | Status: SHIPPED | OUTPATIENT
Start: 2024-04-01

## 2024-04-01 NOTE — TELEPHONE ENCOUNTER
Refill Decision Note   Evelina Pinon  is requesting a refill authorization.  Brief Assessment and Rationale for Refill:  Approve     Medication Therapy Plan:  FLOS      Comments:     Note composed:7:34 AM 04/01/2024

## 2024-04-02 RX ORDER — LANOLIN ALCOHOL/MO/W.PET/CERES
400 CREAM (GRAM) TOPICAL DAILY
Qty: 90 TABLET | Refills: 1 | Status: SHIPPED | OUTPATIENT
Start: 2024-04-02

## 2024-04-02 NOTE — TELEPHONE ENCOUNTER
No care due was identified.  Health [facility] Embedded Care Due Messages. Reference number: [id number].   4/02/2024 9:42:02 AM CDT

## 2024-04-04 ENCOUNTER — CLINICAL SUPPORT (OUTPATIENT)
Dept: PRIMARY CARE CLINIC | Facility: CLINIC | Age: 79
End: 2024-04-04
Payer: MEDICARE

## 2024-04-04 DIAGNOSIS — F43.9 TRAUMA AND STRESSOR-RELATED DISORDER: Primary | ICD-10-CM

## 2024-04-04 DIAGNOSIS — F41.1 GAD (GENERALIZED ANXIETY DISORDER): ICD-10-CM

## 2024-04-04 PROCEDURE — 99499 UNLISTED E&M SERVICE: CPT | Mod: HCNC,S$GLB,, | Performed by: SOCIAL WORKER
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
## 2024-04-11 ENCOUNTER — CLINICAL SUPPORT (OUTPATIENT)
Dept: PRIMARY CARE CLINIC | Facility: CLINIC | Age: 79
End: 2024-04-11
Payer: MEDICARE

## 2024-04-11 DIAGNOSIS — F43.9 TRAUMA AND STRESSOR-RELATED DISORDER: ICD-10-CM

## 2024-04-11 DIAGNOSIS — F41.1 GAD (GENERALIZED ANXIETY DISORDER): Primary | ICD-10-CM

## 2024-04-11 PROCEDURE — 99499 UNLISTED E&M SERVICE: CPT | Mod: HCNC,S$GLB,, | Performed by: SOCIAL WORKER
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
## 2024-04-11 NOTE — TELEPHONE ENCOUNTER
----- Message from Acacia Mcknight sent at 8/14/2023 12:35 PM CDT -----  Contact: [facility] [phone]  Patient is been seen at the [facility] today and they need the latest office notes please fax to [phone], patient is scheduled to be seen today, please advise and call them back         no

## 2024-04-12 ENCOUNTER — TELEPHONE (OUTPATIENT)
Dept: PRIMARY CARE CLINIC | Facility: CLINIC | Age: 79
End: 2024-04-12
Payer: MEDICARE

## 2024-04-12 DIAGNOSIS — F43.9 TRAUMA AND STRESSOR-RELATED DISORDER: Primary | ICD-10-CM

## 2024-04-12 NOTE — TELEPHONE ENCOUNTER
Patient returned call from clinician. Clinician called pt to follow up after session yesterday. Session yesterday was a little in tense, and clinician was concerned about triggering nightmares and/or flashbacks. She reported feeling down last night, but feeling much better today. She was encouraged to understand the negative messages she received in the past  influence how she sees herself and the world, but does not define her. She stated she felt better today getting out and spending time with friends. She thanked clinician for checking on her. She is scheduled to return on 4/16/202 at 3:30.

## 2024-04-16 ENCOUNTER — CLINICAL SUPPORT (OUTPATIENT)
Dept: PRIMARY CARE CLINIC | Facility: CLINIC | Age: 79
End: 2024-04-16
Payer: MEDICARE

## 2024-04-16 DIAGNOSIS — F41.1 GAD (GENERALIZED ANXIETY DISORDER): ICD-10-CM

## 2024-04-16 DIAGNOSIS — F43.9 TRAUMA AND STRESSOR-RELATED DISORDER: Primary | ICD-10-CM

## 2024-04-16 PROCEDURE — 99499 UNLISTED E&M SERVICE: CPT | Mod: HCNC,S$GLB,, | Performed by: SOCIAL WORKER
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
## 2024-04-24 ENCOUNTER — OFFICE VISIT (OUTPATIENT)
Dept: OPHTHALMOLOGY | Facility: CLINIC | Age: 79
End: 2024-04-24
Payer: MEDICARE

## 2024-04-24 DIAGNOSIS — H31.102: ICD-10-CM

## 2024-04-24 DIAGNOSIS — Z96.1 PSEUDOPHAKIA, LEFT EYE: ICD-10-CM

## 2024-04-24 DIAGNOSIS — H25.11 AGE-RELATED NUCLEAR CATARACT, RIGHT: ICD-10-CM

## 2024-04-24 DIAGNOSIS — Z86.2 HISTORY OF SARCOIDOSIS: ICD-10-CM

## 2024-04-24 DIAGNOSIS — H35.373 EPIRETINAL MEMBRANE (ERM) OF BOTH EYES: ICD-10-CM

## 2024-04-24 DIAGNOSIS — H20.9 UVEITIS: ICD-10-CM

## 2024-04-24 DIAGNOSIS — H44.112 PANUVEITIS, LEFT: Primary | ICD-10-CM

## 2024-04-24 DIAGNOSIS — D86.0 SARCOIDOSIS OF LUNG: Chronic | ICD-10-CM

## 2024-04-24 DIAGNOSIS — H30.92 CHORIORETINITIS OF LEFT EYE: ICD-10-CM

## 2024-04-24 PROBLEM — H44.113 PANUVEITIS, BILATERAL: Status: ACTIVE | Noted: 2024-04-24

## 2024-04-24 PROCEDURE — 99999 PR PBB SHADOW E&M-EST. PATIENT-LVL III: CPT | Mod: PBBFAC,HCNC,, | Performed by: OPHTHALMOLOGY

## 2024-04-24 PROCEDURE — 1159F MED LIST DOCD IN RCRD: CPT | Mod: HCNC,CPTII,S$GLB, | Performed by: OPHTHALMOLOGY

## 2024-04-24 PROCEDURE — 92134 CPTRZ OPH DX IMG PST SGM RTA: CPT | Mod: HCNC,S$GLB,, | Performed by: OPHTHALMOLOGY

## 2024-04-24 PROCEDURE — G2211 COMPLEX E/M VISIT ADD ON: HCPCS | Mod: HCNC,S$GLB,, | Performed by: OPHTHALMOLOGY

## 2024-04-24 PROCEDURE — 1126F AMNT PAIN NOTED NONE PRSNT: CPT | Mod: HCNC,CPTII,S$GLB, | Performed by: OPHTHALMOLOGY

## 2024-04-24 PROCEDURE — 1100F PTFALLS ASSESS-DOCD GE2>/YR: CPT | Mod: HCNC,CPTII,S$GLB, | Performed by: OPHTHALMOLOGY

## 2024-04-24 PROCEDURE — 3288F FALL RISK ASSESSMENT DOCD: CPT | Mod: HCNC,CPTII,S$GLB, | Performed by: OPHTHALMOLOGY

## 2024-04-24 PROCEDURE — 1160F RVW MEDS BY RX/DR IN RCRD: CPT | Mod: HCNC,CPTII,S$GLB, | Performed by: OPHTHALMOLOGY

## 2024-04-24 PROCEDURE — 99204 OFFICE O/P NEW MOD 45 MIN: CPT | Mod: HCNC,S$GLB,, | Performed by: OPHTHALMOLOGY

## 2024-04-24 RX ORDER — METHOTREXATE 2.5 MG/1
15 TABLET ORAL
Qty: 72 TABLET | Refills: 3 | Status: SHIPPED | OUTPATIENT
Start: 2024-04-24 | End: 2024-10-21

## 2024-04-24 NOTE — PROGRESS NOTES
HPI    Pt presents for retina eval OU     States no current ocular complaints.     No eye meds     Last edited by Neris Pérez on 4/24/2024  2:09 PM.     HPI    Pt presents for retina eval OU     States no current ocular complaints.     No eye meds     Last edited by Neris Pérez on 4/24/2024  2:09 PM.         A/P    ICD-10-CM ICD-9-CM   1. 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 Sarcoidosis of lung  D86.0 135     517.8   3. Epiretinal membrane (ERM) of both eyes  H35.373 362.56   4. Age-related nuclear cataract, right  H25.11 366.16   5. Pseudophakia, left eye  Z96.1 V43.1   6. Chorioretinitis of left eye  H30.92 363.20   7. 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 Panuveitis, left  2. Sarcoidosis of lung  F/b Rheum - Recent notes reviewed    Pt here with      Prev was managed by outside doctor and was told she had inflammation in left eye, has had poor vision for long time    Exam notable for vitreous debris and CR scarring in periphery and disorganization of retina layers on OCT for left eye. OD appears quiet today. Has hx of sarcoid, on MTX currently     Plan: appears inactive today, discussed findings at length and of remote inflammation having caused damage to left eye. Monitor for now, will recheck in 3-4 mo to ensure OD does not have involvement while on MTX. May benefit from future wide field FA    Visit today is associated with current or anticipated ongoing medical care related to this patients single serious condition/complex condition (panuveitis left eye)     3. Epiretinal membrane (ERM) of both eyes  Mild ERM OU, NVS  Plan: Observation    4. Age-related nuclear cataract, right  Borderline VS NS OD  Plan: pt wishes to wait on sx for now, monitor    5. Pseudophakia, left eye  Good lens position  Plan: Observation     RTC 3-4 mo DFE/OCTm OU, monitor inflammation OU         I saw and examined the patient and reviewed in detail the findings documented. The  final examination findings, image interpretations which have been independently interpreted, and plan as documented in the record represent my personal judgment and conclusions.    Tyrese Shepard MD  Vitreoretinal Surgery   [facility]

## 2024-04-25 ENCOUNTER — CLINICAL SUPPORT (OUTPATIENT)
Dept: PRIMARY CARE CLINIC | Facility: CLINIC | Age: 79
End: 2024-04-25
Payer: MEDICARE

## 2024-04-25 DIAGNOSIS — F41.1 GAD (GENERALIZED ANXIETY DISORDER): ICD-10-CM

## 2024-04-25 DIAGNOSIS — F43.9 TRAUMA AND STRESSOR-RELATED DISORDER: Primary | ICD-10-CM

## 2024-04-25 PROCEDURE — 99499 UNLISTED E&M SERVICE: CPT | Mod: HCNC,S$GLB,, | Performed by: SOCIAL WORKER
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
## 2024-04-30 RX ORDER — LORAZEPAM 0.5 MG/1
0.5 TABLET ORAL NIGHTLY PRN
Qty: 30 TABLET | Refills: 1 | Status: SHIPPED | OUTPATIENT
Start: 2024-04-30 | End: 2024-06-11 | Stop reason: SDUPTHER

## 2024-05-01 ENCOUNTER — TELEPHONE (OUTPATIENT)
Dept: PRIMARY CARE CLINIC | Facility: CLINIC | Age: 79
End: 2024-05-01
Payer: MEDICARE

## 2024-05-01 ENCOUNTER — TELEPHONE (OUTPATIENT)
Dept: PRIMARY CARE CLINIC | Facility: CLINIC | Age: 79
End: 2024-05-01

## 2024-05-01 RX ORDER — KETOCONAZOLE 20 MG/G
CREAM TOPICAL DAILY
Qty: 60 G | Refills: 3 | Status: SHIPPED | OUTPATIENT
Start: 2024-05-01

## 2024-05-01 NOTE — TELEPHONE ENCOUNTER
Pt called and reports that she suspects that she has a yeast infection in her arm pits.  Pt reports that it started on one side and is now on the other side as well.  She also reports that she thinks it is now on her fingers.      Please advise.

## 2024-05-01 NOTE — TELEPHONE ENCOUNTER
----- Message from Diana Olivier sent at 5/1/2024  1:32 PM CDT -----  Regarding: appointment access  [phone] -call back ; need to see  for possibly yeast infection under her right arm pit started on her left side (had a little itchiness)    Diana

## 2024-05-01 NOTE — TELEPHONE ENCOUNTER
Spoke with pt, informed her of Rx to p/u.  Pt reports that rash has started to spread down toward her elbow.  Pt scheduled for appt tomorrow at 0920.

## 2024-05-01 NOTE — TELEPHONE ENCOUNTER
----- Message from Diana Olivier sent at 5/1/2024  1:35 PM CDT -----  Regarding: appointment access  [phone]-call back ; need to see  for possibly yeast infection under her right arm pit started on her left side (had a little itchiness)     Diana

## 2024-05-01 NOTE — TELEPHONE ENCOUNTER
I will send in a prescription for Nizoral cream.  If symptoms are mild she can try this 1st.  If there is fever or rapidly spreading symptoms then we do need to see her

## 2024-05-02 ENCOUNTER — OFFICE VISIT (OUTPATIENT)
Dept: PRIMARY CARE CLINIC | Facility: CLINIC | Age: 79
End: 2024-05-02
Payer: MEDICARE

## 2024-05-02 ENCOUNTER — CLINICAL SUPPORT (OUTPATIENT)
Dept: PRIMARY CARE CLINIC | Facility: CLINIC | Age: 79
End: 2024-05-02
Payer: MEDICARE

## 2024-05-02 VITALS
WEIGHT: 132.25 LBS | BODY MASS INDEX: 22.71 KG/M2 | DIASTOLIC BLOOD PRESSURE: 68 MMHG | SYSTOLIC BLOOD PRESSURE: 136 MMHG | TEMPERATURE: 98 F | RESPIRATION RATE: 18 BRPM | OXYGEN SATURATION: 100 %

## 2024-05-02 DIAGNOSIS — R21 RASH: Primary | ICD-10-CM

## 2024-05-02 DIAGNOSIS — F41.1 GAD (GENERALIZED ANXIETY DISORDER): ICD-10-CM

## 2024-05-02 DIAGNOSIS — F43.9 TRAUMA AND STRESSOR-RELATED DISORDER: Primary | ICD-10-CM

## 2024-05-02 PROCEDURE — 3078F DIAST BP <80 MM HG: CPT | Mod: HCNC,CPTII,S$GLB, | Performed by: FAMILY MEDICINE

## 2024-05-02 PROCEDURE — 1126F AMNT PAIN NOTED NONE PRSNT: CPT | Mod: HCNC,CPTII,S$GLB, | Performed by: FAMILY MEDICINE

## 2024-05-02 PROCEDURE — 99499 UNLISTED E&M SERVICE: CPT | Mod: HCNC,S$GLB,, | Performed by: SOCIAL WORKER

## 2024-05-02 PROCEDURE — 99999 PR PBB SHADOW E&M-EST. PATIENT-LVL IV: CPT | Mod: PBBFAC,HCNC,, | Performed by: FAMILY MEDICINE

## 2024-05-02 PROCEDURE — 1160F RVW MEDS BY RX/DR IN RCRD: CPT | Mod: HCNC,CPTII,S$GLB, | Performed by: FAMILY MEDICINE

## 2024-05-02 PROCEDURE — 99213 OFFICE O/P EST LOW 20 MIN: CPT | Mod: HCNC,S$GLB,, | Performed by: FAMILY MEDICINE

## 2024-05-02 PROCEDURE — 1159F MED LIST DOCD IN RCRD: CPT | Mod: HCNC,CPTII,S$GLB, | Performed by: FAMILY MEDICINE

## 2024-05-02 PROCEDURE — 3288F FALL RISK ASSESSMENT DOCD: CPT | Mod: HCNC,CPTII,S$GLB, | Performed by: FAMILY MEDICINE

## 2024-05-02 PROCEDURE — 1101F PT FALLS ASSESS-DOCD LE1/YR: CPT | Mod: HCNC,CPTII,S$GLB, | Performed by: FAMILY MEDICINE

## 2024-05-02 PROCEDURE — 3075F SYST BP GE 130 - 139MM HG: CPT | Mod: HCNC,CPTII,S$GLB, | Performed by: FAMILY MEDICINE

## 2024-05-02 RX ORDER — FLUCONAZOLE 150 MG/1
150 TABLET ORAL DAILY
Qty: 1 TABLET | Refills: 1 | Status: SHIPPED | OUTPATIENT
Start: 2024-05-02 | End: 2024-05-03

## 2024-05-02 RX ORDER — TRIAMCINOLONE ACETONIDE 1 MG/G
CREAM TOPICAL 2 TIMES DAILY
Qty: 80 G | Refills: 5 | Status: SHIPPED | OUTPATIENT
Start: 2024-05-02

## 2024-05-02 NOTE — PROGRESS NOTES
Primary Care Provider Appointment   [facility], [place]       Patient ID: Evelina Pinon is a 78 y.o. female.    ASSESSMENT/PLAN by Problem List:    1. Rash    Other orders  -     fluconazole (DIFLUCAN) 150 MG Tab; Take 1 tablet (150 mg total) by mouth once daily. for 1 day  Dispense: 1 tablet; Refill: 1  -     triamcinolone acetonide 0.1% (KENALOG) 0.1 % cream; Apply topically 2 (two) times daily.  Dispense: 80 g; Refill: 5     Patient has a pruritic rash in both axillas.  There is some mild erythema a few small papules, no pustules no scaling no pus or drainage.  She does report some improvement since starting ketoconazole last night but still is scratching quite a bit so I will give her a one time dose of Diflucan and then she may use triamcinolone p.r.n..  If symptoms do not resolve over the next few days she will let us know.  I also instructed on appropriate cleanliness, using a gentle non drying soap such as Dove daily and reporting if not improving later this week.    Follow Up:  Keep regular follow-up as scheduled in about one month    Subjective:     Chief Complaint   Patient presents with    Rash     I have reviewed the information entered by the ancillary staff regarding the chief complaint as well as the related history.    Rash  Pertinent negatives include no fever.       Patient is a/an 78 y.o.  female     Itchy rash started in left armpit, now in right.  Started a few days ago.  She did start ketoconazole cream yesterday evening which seem to help some but still rather bothersome no fever, no chills, no drainage.    For complete problem list, past medical history, surgical history, social history, etc., see appropriate section in the electronic medical record    Review of Systems   Constitutional:  Negative for chills and fever.   Respiratory: Negative.     Cardiovascular: Negative.    Gastrointestinal: Negative.    Genitourinary: Negative.    Skin:  Positive for  rash.       Objective     Physical Exam  Vitals reviewed.   Constitutional:       General: She is not in acute distress.     Appearance: She is well-developed. She is not diaphoretic.   HENT:      Head: Normocephalic and atraumatic.   Eyes:      General: No scleral icterus.  Pulmonary:      Effort: Pulmonary effort is normal. No respiratory distress.   Skin:     Comments: Mild slightly erythematous rash few small papules both axilla.  No adenopathy.  There is no drainage.  There is no crusting no pustules no vesicles.   Neurological:      Mental Status: She is alert and oriented to person, place, and time.   Psychiatric:         Mood and Affect: Mood normal.         Behavior: Behavior normal.       Vitals:    05/02/24 0919   BP: 136/68   BP Location: Right arm   Patient Position: Sitting   BP Method: Medium (Manual)   Resp: 18   Temp: 97.9 °F (36.6 °C)   TempSrc: Oral   SpO2: 100%   Weight: 60 kg (132 lb 4.4 oz)           THIS DOCUMENT WAS MADE IN PART WITH VOICE RECOGNITION SOFTWARE.  OCCASIONALLY THIS SOFTWARE WILL MISINTERPRET WORDS OR PHRASES.
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
## 2024-05-09 ENCOUNTER — CLINICAL SUPPORT (OUTPATIENT)
Dept: PRIMARY CARE CLINIC | Facility: CLINIC | Age: 79
End: 2024-05-09
Payer: MEDICARE

## 2024-05-09 DIAGNOSIS — F43.9 TRAUMA AND STRESSOR-RELATED DISORDER: Primary | ICD-10-CM

## 2024-05-09 DIAGNOSIS — Z65.8 PSYCHOSOCIAL STRESSORS: ICD-10-CM

## 2024-05-09 PROCEDURE — 99499 UNLISTED E&M SERVICE: CPT | Mod: HCNC,S$GLB,, | Performed by: SOCIAL WORKER
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
## 2024-05-10 ENCOUNTER — TELEPHONE (OUTPATIENT)
Dept: PRIMARY CARE CLINIC | Facility: CLINIC | Age: 79
End: 2024-05-10
Payer: MEDICARE

## 2024-05-10 RX ORDER — FLUCONAZOLE 150 MG/1
150 TABLET ORAL DAILY
Qty: 1 TABLET | Refills: 1 | Status: SHIPPED | OUTPATIENT
Start: 2024-05-10 | End: 2024-05-11

## 2024-05-10 NOTE — TELEPHONE ENCOUNTER
I sent in a another dose of Diflucan.  She should use the ketoconazole cream regularly.  She should also cleaned daily as I instructed her.      Regarding the tick.  Most tick bites will leave by an inflammatory reaction that can last days or sometimes even a few weeks.  Just watch closely for sign of infection or developing rash, fever, adenopathy, or other concern.  Make sure she is up-to-date with tetanus shots.

## 2024-05-10 NOTE — TELEPHONE ENCOUNTER
Spoke with pt, discussed your message in its entirety, pt verbalized understanding.  Informed pt to call us back if her tick bit becomes infected or worsens, she verbalized understanding.  Pt is UTD on Tetanus.

## 2024-05-10 NOTE — TELEPHONE ENCOUNTER
"Pt reports that the rash beneath her arms had gotten better, but now it is spreading from the middle of her arm to the left of her arm near her breast.  "It's itching like crazy."  Pt reports that she found a tick on her arm yesterday that she suspects was in her wig.  Pt denies spending time outside due to her hip.  Pt's  was able to remove the head and now pt reports that she has a small lump on the back of her ear where the tick was removed.  Pt is inquiring if she should take another dose of diflucan to treat her rash.  Please advise.   "

## 2024-05-10 NOTE — TELEPHONE ENCOUNTER
----- Message from Shanon Young sent at 5/10/2024 11:05 AM CDT -----  Type: Needs Medical Advice  Who Called:  pt  Best Call Back Number: [phone]    Additional Information: Pt is calling the office needs rto be seen today had    tic on back on ear/terrible itching under arm.Please call back and advise.

## 2024-05-10 NOTE — TELEPHONE ENCOUNTER
----- Message from Keely De Anda sent at 5/10/2024 10:46 AM CDT -----  Type: Needs Medical Advice  Who Called:  pt     Best Call Back Number: [phone] (home) [phone] (work)    Additional Information: pt requesting call back in regards to finding a tick in the back of her ear and wants to know if she needs to be seen please advise

## 2024-05-16 ENCOUNTER — CLINICAL SUPPORT (OUTPATIENT)
Dept: PRIMARY CARE CLINIC | Facility: CLINIC | Age: 79
End: 2024-05-16
Payer: MEDICARE

## 2024-05-16 DIAGNOSIS — F43.9 TRAUMA AND STRESSOR-RELATED DISORDER: Primary | ICD-10-CM

## 2024-05-16 DIAGNOSIS — Z65.8 PSYCHOSOCIAL STRESSORS: ICD-10-CM

## 2024-05-16 DIAGNOSIS — F41.1 GAD (GENERALIZED ANXIETY DISORDER): ICD-10-CM

## 2024-05-16 PROCEDURE — 99499 UNLISTED E&M SERVICE: CPT | Mod: HCNC,S$GLB,, | Performed by: SOCIAL WORKER
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
## 2024-05-20 ENCOUNTER — TELEPHONE (OUTPATIENT)
Dept: ORTHOPEDICS | Facility: CLINIC | Age: 79
End: 2024-05-20
Payer: MEDICARE

## 2024-05-20 DIAGNOSIS — S72.001A CLOSED FRACTURE OF NECK OF RIGHT FEMUR, INITIAL ENCOUNTER: Primary | ICD-10-CM

## 2024-05-20 NOTE — TELEPHONE ENCOUNTER
----- Message from Evelina BLACKYarelis WhiteRuma, LPN sent at 5/17/2024  2:38 PM CDT -----  Regarding: FW: POST OP PT - PATIENT REQUEST INTERNAL ORDER  Does Dr. Ortiz want to see her again? Her surgery was in December lol.  ----- Message -----  From: Andra Alexander  Sent: 5/17/2024   2:31 PM CDT  To: Diana Leon Staff  Subject: POST OP PT - PATIENT REQUEST INTERNAL ORDER      Good afternoon,     This patient's  came into the clinic yesterday wanting to schedule Mrs. Hoyt for Post-Op Physical Therapy ordered by Dr. Ortiz.  There is an external referral that was created on 1/9/24 for the patient to go to [facility].  He stated that she did not want to go there.  Can you please create an internal referral for this patient to be seen at [facility]?     Mr. Pinon called in today to see if the new order had been placed and asked that I follow up on it for them.        Thanks,      Andra Alexander   ____________________________   Access Navigator   [facility]   p: [phone]    f: [phone]   [email or web address]

## 2024-05-23 ENCOUNTER — CLINICAL SUPPORT (OUTPATIENT)
Dept: PRIMARY CARE CLINIC | Facility: CLINIC | Age: 79
End: 2024-05-23
Payer: MEDICARE

## 2024-05-23 DIAGNOSIS — F43.9 TRAUMA AND STRESSOR-RELATED DISORDER: Primary | ICD-10-CM

## 2024-05-23 DIAGNOSIS — Z65.8 PSYCHOSOCIAL STRESSORS: ICD-10-CM

## 2024-05-23 PROCEDURE — 99499 UNLISTED E&M SERVICE: CPT | Mod: HCNC,S$GLB,, | Performed by: SOCIAL WORKER
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
## 2024-05-30 ENCOUNTER — CLINICAL SUPPORT (OUTPATIENT)
Dept: PRIMARY CARE CLINIC | Facility: CLINIC | Age: 79
End: 2024-05-30
Payer: MEDICARE

## 2024-05-30 DIAGNOSIS — Z65.8 PSYCHOSOCIAL STRESSORS: ICD-10-CM

## 2024-05-30 DIAGNOSIS — F43.9 TRAUMA AND STRESSOR-RELATED DISORDER: Primary | ICD-10-CM

## 2024-05-30 DIAGNOSIS — F41.1 GAD (GENERALIZED ANXIETY DISORDER): ICD-10-CM

## 2024-05-30 PROCEDURE — 99499 UNLISTED E&M SERVICE: CPT | Mod: HCNC,S$GLB,, | Performed by: SOCIAL WORKER
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
## 2024-05-31 ENCOUNTER — CLINICAL SUPPORT (OUTPATIENT)
Dept: REHABILITATION | Facility: HOSPITAL | Age: 79
End: 2024-05-31
Attending: ORTHOPAEDIC SURGERY
Payer: MEDICARE

## 2024-05-31 DIAGNOSIS — S72.001A CLOSED FRACTURE OF NECK OF RIGHT FEMUR, INITIAL ENCOUNTER: ICD-10-CM

## 2024-05-31 PROCEDURE — 97112 NEUROMUSCULAR REEDUCATION: CPT | Mod: HCNC,PO | Performed by: PHYSICAL THERAPIST

## 2024-05-31 PROCEDURE — 97161 PT EVAL LOW COMPLEX 20 MIN: CPT | Mod: HCNC,PO | Performed by: PHYSICAL THERAPIST

## 2024-05-31 NOTE — PLAN OF CARE
[facility]   Physical Therapy Initial Evaluation      Name: Evelina Pinon  [facility] Number: [id number]    Therapy Diagnosis:   Encounter Diagnosis   Name Primary?    Closed fracture of neck of right femur, initial encounter         Physician: Edward Ortiz MD    Physician Orders: PT Eval and Treat   Medical Diagnosis from Referral:   Closed fracture of neck of right femur, initial encounter  Evaluation Date: 5/31/2024  Authorization Period Expiration: 05/20/25  Plan of Care Expiration: 07/26/2024  Progress Note Due: 06/28/2024  Visit # / Visits authorized: 1   FOTO: 1/3    Precautions: Standard and blood thinners     Time In: 1200  Time Out: 1300  Total Appointment Time (timed & untimed codes): 60 minutes    Subjective     Date of onset: 12/18/2023    History of current condition - Evelina reports: having right hip pain with a fall prior to 12/18/2023. Patient recalls having HH and has been walking in the house with walker seldomly. Patient reported weakness of the right leg and motivated on getting better.    Falls: x 1 prior to sx date 12/18/2023    Imaging: x-ray: 2/26/2024  Postsurgical changes of right hip hemiarthroplasty.  The hardware is intact.  No perihardware lucency to suggest malalignment or loosening.  The left femoral head is well seated within the acetabulum.  Unchanged sclerotic lesion within the left femoral head.  No acute, displaced fracture or aggressive osseous abnormality.  Pubic symphysis is not widened.  Sacroiliac joints are symmetric.     Impression:     Postsurgical changes of right hip marija arthroplasty.  Prior Therapy:  for 2 weeks,  Social History:  lives with their spouse, one-story  Occupation: Retired  Prior Level of Function: independent  Current Level of Function: modified independent, increase time noted with home mangement    Pain:  Current 3/10, worst 7/10, best 1/10   Location: right hip   Description: Aching, Dull, Tight, and  "Variable  Aggravating Factors: Sitting and Bending  Easing Factors:  change in position    Patients goals: To decrease pain to right hip, improve leg strength, and be able to walk longer periods independently with walker     Medical History:   Past Medical History:   Diagnosis Date    ALLERGIC RHINITIS 2/22/2012    Alopecia     Anticoagulant long-term use     Anxiety 2/22/2012    Dyslipidemia     Fx patella     Left    Gout 2/22/2012    HTN (hypertension) 2/22/2012    TIA (transient ischemic attack) 2/22/2012       Surgical History:   Evelina Pinon  has a past surgical history that includes Hysterectomy; Patella fracture surgery (Left, 2013); Left heart catheterization (N/A, 08/25/2020); Coronary angiography (N/A, 08/25/2020); Esophagogastroduodenoscopy (N/A, 05/26/2021); Endoscopic ultrasound of upper gastrointestinal tract (Left, 05/26/2021); Right heart catheterization (Right, 07/09/2021); Hemiarthroplasty of hip (Right, 12/18/2023); and Cataract extraction (Left, 07/2023).    Medications:   Evelina has a current medication list which includes the following prescription(s): allopurinol, amlodipine, atorvastatin, clopidogrel, colchicine, difluprednate, fluticasone propionate, folic acid, inv allopurinol, ketoconazole, lorazepam, losartan, magnesium oxide, methocarbamol, methotrexate, metoprolol succinate, pantoprazole, psyllium husk, and triamcinolone acetonide 0.1%.    Allergies:   Review of patient's allergies indicates:   Allergen Reactions    Augmentin [amoxicillin-pot clavulanate] Rash    Penicillins Hives    Opioids - morphine analogues      Itchy, redness, mild sob,, "feels funny"        Objective    Posture:  Seated: kyphosis    AROM Right Left Pain/Dysfunction   Hip Flexion: 100 degrees 110 degrees    Hip ABD:  degrees  degrees    Hip ER, 90°  flex:  degrees  degrees    Hip IR, 90°  flex:  degrees  degrees    Knee Extension:  degrees  degrees    Knee Flexion:  degrees  degrees         Right Left " Comment   Hip Flexion: 4/5 5/5    Hip ABD: 4-/5 4/5    Hip ADD: 4+/5 5/5    Hip Extension: 4-/5 4/5    Knee Ext: 4/5 5/5    Knee Flex: 4+/5 5/5        Joint Mobility: (hypo, hyper, normal): right hip hypo      Special Test:  Hip:  [place] -       Evaluation [unfilled]   Timed Up and Go 27.25 sec  < 20 sec safe for independent transfers, < 30 sec safe for dependent transfers/assist required   Self Selected Walking Speed  m/sec (6m/s)   Fast Walking Speed DNT        Evaluation [unfilled]   30 second Chair Rise  (adults > 59 y/o)  completed  arms   5 times sit-stand  (adults 18-65 y/o) 41.49 seconds  >12 sec= fall risk for general elderly  >16 sec= fall risk for Parkinson's disease  >10 sec= balance/vestibular dysfunction (<59 y/o)  >14.2 sec= balance/vestibular dysfunction (>59 y/o)  >12 sec= fall risk for CVA     Balance:  Static: fair, SBA    Transfers:   Modified independent, increase time noted with sit to stands, supine to sit    Gait: Patient ambulated with RW 10ft SBA.  Gait deviations: decreased leighann, decrease RLE step length, forward lean    Primary Measure Score Range Intake Score Score Interpretation  Lower Extremity Functional Scale 0 - 80 37.0 Lower Score = Greater Disability      Treatment     Total Treatment time (time-based codes) separate from Evaluation: 8 minutes     Evelina received the treatments listed below:      neuromuscular re-education activities to improve: Kinesthetic, Sense, Proprioception, Posture, and right hip for 8 minutes. The following activities were included:  Supine: glut sets, PPT, bridge and hip abd-isometrics with belt  Sit to stands  Scapular retraction/extension for posture    Patient Education and Home Exercises     Education provided:   - yes    Written Home Exercises Provided: yes. Exercises were reviewed and Evelina was able to demonstrate them prior to the end of the session.  Evelina demonstrated good  understanding of the education provided. See EMR under Patient  Instructions for exercises provided during therapy sessions.    Assessment     Evelina is a 78 y.o. female referred to outpatient Physical Therapy with a medical diagnosis of Closed fracture of neck of right femur, initial encounter. Patient presents with right hip pain, gait abnormality, right hip/knee weakness, decreased mobility, impaired transfers.    Problem List: pain, decreased ROM, decreased flexibility, decreased strength, decreased balance and stability, decreased motor control, antalgic gait, inability to participate fully in vocation pursuits, and decreased ability to fully participate in recreational/sports related activities.    Patient prognosis is Good.   Patient will benefit from skilled outpatient Physical Therapy to address the deficits stated above and in the chart below, provide patient /family education, and to maximize patientt's level of independence.     Plan of care discussed with patient: Yes  Patient's spiritual, cultural and educational needs considered and patient is agreeable to the plan of care and goals as stated below:     Anticipated Barriers for therapy: none    Medical Necessity is demonstrated by the following  History  Co-morbidities and personal factors that may impact the plan of care Co-morbidities:   advanced age, history of CVA, and HTN    Personal Factors:   no deficits     moderate   Examination  Body Structures and Functions, activity limitations and participation restrictions that may impact the plan of care Body Regions:   back  lower extremities  trunk    Body Systems:    ROM  strength  gross coordinated movement  balance  gait  transfers  transitions  motor control    Participation Restrictions:   Home management  Community ambulation    Activity limitations:   Learning and applying knowledge  no deficits    General Tasks and Commands  no deficits    Communication  no deficits    Mobility  lifting and carrying objects  walking    Self care  no deficits    Domestic  "Life  shopping  cooking  doing house work (cleaning house, washing dishes, laundry)  assisting others    Interactions/Relationships  no deficits    Life Areas  no deficits    Community and Social Life  no deficits         high   Clinical Presentation stable and uncomplicated low   Decision Making/ Complexity Score: low     Goals:  Short Term Goals: 4 weeks   Independent with HEP with < 3/10 pain to right hip.   Improve right hip/knee MMT 1/2 grade for ambulatory tasks.  Improve TUG to <= 20" with AD for mobility purposes.  Improve chair rise test >/= 5 times < 40"    Long Term Goals: 8 weeks   Independent with HEP with no painful limitations.  Demonstrate right hip/knee MMT > 4/5 for ambulatory purposes.  Improve TUG to < 20" for home management purposes.  Ambulate > 150 ft mod-I for home management purposes.      Plan     Plan of care Certification: 5/31/2024 to 07/26/2024.    Outpatient Physical Therapy 2 times weekly for 8 weeks to include the following interventions: Gait Training, Manual Therapy, Moist Heat/ Ice, Neuromuscular Re-ed, Patient Education, Therapeutic Activities, and Therapeutic Exercise.         "

## 2024-05-31 NOTE — PATIENT INSTRUCTIONS
[email or web address]-[email or web address]         HOME EXERCISE PROGRAM  Created by Everton Hutchins  May 31st, 2024  View videos at www.HEP.video        SCAPULAR RETRACTIONS    Move your shoulder blades back and down. Hold, relax and repeat. Repeat 10 Times   Hold 2 Seconds   Complete 2 Sets   Perform 2 Times a Day          GLUTE SET - SUPINE    While lying on your back, squeeze your buttocks and hold. Repeat. Repeat 10 Times   Hold 2 Seconds   Complete 2 Sets   Perform 2 Times a Day          PELVIC TILT - SUPINE    Lie on your back with your knees bent. Next, arch your low back and then flatten it repeatedly. Your pelvis should tilt forward and back during the movement. Move through a comfortable range of motion.    Video # [id number] Repeat 10 Times   Hold 2 Seconds   Complete 2 Sets   Perform 2 Times a Day          Bridge    Lie on your back with your knees bent and feet flat on the mat. Then, press up leaving your feet and shoulders on the table. Repeat 10 Times   Hold 2 Seconds   Complete 2 Sets   Perform 2 Times a Day          SUPINE HIP ABDUCTION - ISOMETRIC    Wrap a belt or tie a sheet around your knees. While lying on your back attempt to pull apart your knees and hold. Repeat 10 Times   Hold 2 Seconds   Complete 2 Sets   Perform 2 Times a Day

## 2024-06-03 ENCOUNTER — CLINICAL SUPPORT (OUTPATIENT)
Dept: REHABILITATION | Facility: HOSPITAL | Age: 79
End: 2024-06-03
Payer: MEDICARE

## 2024-06-03 DIAGNOSIS — S72.001A CLOSED FRACTURE OF NECK OF RIGHT FEMUR, INITIAL ENCOUNTER: Primary | ICD-10-CM

## 2024-06-03 PROCEDURE — 97112 NEUROMUSCULAR REEDUCATION: CPT | Mod: HCNC,PO,CQ

## 2024-06-03 PROCEDURE — 97110 THERAPEUTIC EXERCISES: CPT | Mod: HCNC,PO,CQ

## 2024-06-03 NOTE — PROGRESS NOTES
"Physical Therapy Daily Treatment Note     Name: Evelina Pinon  Clinic Number: [id number]    Therapy Diagnosis:   Encounter Diagnosis   Name Primary?    Closed fracture of neck of right femur, initial encounter      Physician: Edward Ortiz MD    Visit Date: 6/3/2024    Physician Orders: PT Eval and Treat   Medical Diagnosis from Referral:   Closed fracture of neck of right femur, initial encounter  Evaluation Date: 5/31/2024  Authorization Period Expiration: 05/20/25  Plan of Care Expiration: 07/26/2024  Progress Note Due: 06/28/2024  Visit # / Visits authorized: 1/20  FOTO: 1/3    Time In: 11:00 am  Time Out: 11:46 am  Total Billable Time: 23 minutes 1:1    Precautions:  Standard and blood thinners     Subjective     Pt reports: a R hip pain level of 2/10 mainly with weight bearing activities.    She was compliant with home exercise program.  Response to previous treatment: First follow up  Functional change: TBD    Pain: 2/10  Location: right hip     Objective     Objective measures displayed on progress notes unless otherwise noted      Treatment      Evelina received therapeutic exercises to develop strength, endurance, ROM, flexibility, posture and core stabilization for 10 minutes including:    Nu step level 1 x 5 minutes  LTR x 20   DKTC feet on orange swiss ball x 20     Evelina participated in neuromuscular re-education activities to improve: Balance, Coordination, Proprioception and Posture for 36 minutes. The following activities were included:   Glute set 3 x 10 5" hold  Pelvic tilts x 20 5" hold  Supine hip add (ball squeeze) x 30  Bridges 2 x 10 (minimal lift off)   Supine clamshells RTB x 30  SLR 2 x 10  Standing TA activation x 15 3" hold  Mini squat 2 x 10      Home Exercises Provided and Patient Education Provided     Education provided:   - HEP review    Written Home Exercises Provided: Patient instructed to cont prior HEP.  Exercises were reviewed and Evelina was able to demonstrate them prior to the " "end of the session.  Evelina demonstrated good  understanding of the education provided.     See EMR under Patient Instructions for exercises provided prior visit.    Assessment     Evelina returned for her first follow up visit noting mild R hip pain especially with weight bearing activities. Treatment began with HEP review with additional LE functional strengthening exercises. She tolerated treatment fair with no increase in R hip pain but demonstrated  weakness and general fatigue. Pt required frequent rest breaks in between sets. Will monitor and attempt to progress per pt's tolerance.    Evelina Is progressing well towards her goals.   Pt prognosis is Good.     Pt will continue to benefit from skilled outpatient physical therapy to address the deficits listed in the problem list box on initial evaluation, provide pt/family education and to maximize pt's level of independence in the home and community environment.     Pt's spiritual, cultural and educational needs considered and pt agreeable to plan of care and goals.     Anticipated barriers to physical therapy: None    Goals:   Short Term Goals: 4 weeks   Independent with HEP with < 3/10 pain to right hip.   Improve right hip/knee MMT 1/2 grade for ambulatory tasks.  Improve TUG to <= 20" with AD for mobility purposes.  Improve chair rise test >/= 5 times < 40"     Long Term Goals: 8 weeks   Independent with HEP with no painful limitations.  Demonstrate right hip/knee MMT > 4/5 for ambulatory purposes.  Improve TUG to < 20" for home management purposes.  Ambulate > 150 ft mod-I for home management purposes.       Plan     Plan of care Certification: 5/31/2024 to 07/26/2024.     Outpatient Physical Therapy 2 times weekly for 8 weeks to include the following interventions: Gait Training, Manual Therapy, Moist Heat/ Ice, Neuromuscular Re-ed, Patient Education, Therapeutic Activities, and Therapeutic Exercise.     Victor Hugo Yates, PTA     "

## 2024-06-04 ENCOUNTER — LAB VISIT (OUTPATIENT)
Dept: LAB | Facility: HOSPITAL | Age: 79
End: 2024-06-04
Attending: FAMILY MEDICINE
Payer: MEDICARE

## 2024-06-04 DIAGNOSIS — I70.0 AORTIC ATHEROSCLEROSIS: ICD-10-CM

## 2024-06-04 DIAGNOSIS — I10 ESSENTIAL HYPERTENSION: ICD-10-CM

## 2024-06-04 DIAGNOSIS — M10.9 GOUT, UNSPECIFIED CAUSE, UNSPECIFIED CHRONICITY, UNSPECIFIED SITE: ICD-10-CM

## 2024-06-04 LAB
ALBUMIN SERPL BCP-MCNC: 3.4 G/DL (ref 3.5–5.2)
ALP SERPL-CCNC: 128 U/L (ref 55–135)
ALT SERPL W/O P-5'-P-CCNC: 17 U/L (ref 10–44)
ANION GAP SERPL CALC-SCNC: 9 MMOL/L (ref 8–16)
AST SERPL-CCNC: 21 U/L (ref 10–40)
BILIRUB SERPL-MCNC: 0.5 MG/DL (ref 0.1–1)
BUN SERPL-MCNC: 24 MG/DL (ref 8–23)
CALCIUM SERPL-MCNC: 8.8 MG/DL (ref 8.7–10.5)
CHLORIDE SERPL-SCNC: 114 MMOL/L (ref 95–110)
CHOLEST SERPL-MCNC: 140 MG/DL (ref 120–199)
CHOLEST/HDLC SERPL: 3.6 {RATIO} (ref 2–5)
CO2 SERPL-SCNC: 18 MMOL/L (ref 23–29)
CREAT SERPL-MCNC: 1.4 MG/DL (ref 0.5–1.4)
EST. GFR  (NO RACE VARIABLE): 38.3 ML/MIN/1.73 M^2
GLUCOSE SERPL-MCNC: 82 MG/DL (ref 70–110)
HDLC SERPL-MCNC: 39 MG/DL (ref 40–75)
HDLC SERPL: 27.9 % (ref 20–50)
LDLC SERPL CALC-MCNC: 74 MG/DL (ref 63–159)
NONHDLC SERPL-MCNC: 101 MG/DL
POTASSIUM SERPL-SCNC: 4.9 MMOL/L (ref 3.5–5.1)
PROT SERPL-MCNC: 6.2 G/DL (ref 6–8.4)
SODIUM SERPL-SCNC: 141 MMOL/L (ref 136–145)
TRIGL SERPL-MCNC: 135 MG/DL (ref 30–150)
URATE SERPL-MCNC: 3.5 MG/DL (ref 2.4–5.7)

## 2024-06-04 PROCEDURE — 80061 LIPID PANEL: CPT | Mod: HCNC | Performed by: FAMILY MEDICINE

## 2024-06-04 PROCEDURE — 80053 COMPREHEN METABOLIC PANEL: CPT | Mod: HCNC | Performed by: FAMILY MEDICINE

## 2024-06-04 PROCEDURE — 36415 COLL VENOUS BLD VENIPUNCTURE: CPT | Mod: HCNC,PO | Performed by: FAMILY MEDICINE

## 2024-06-04 PROCEDURE — 84550 ASSAY OF BLOOD/URIC ACID: CPT | Mod: HCNC | Performed by: FAMILY MEDICINE

## 2024-06-04 NOTE — PROGRESS NOTES
Subjective:      Patient ID: Evelina Pinon is a 78 y.o. female.    Chief Complaint: Disease Management    HPI    Rheumatologic History:      - Diagnosis/es:              - Chorioretinitis and panuveitis OS: Per Dr Alonzo note (2023):  Punched out lesions in the peripheral retina, thinning of outer retinal layers and macula.  Chorioretinal lesions appear chronic and are not actively leaking on FA.  Inflammation is improving.  Uveitis is mostly posterior and involving the choroid and retina.              - Sarcoidosis diagnosed by LN biopsy 2021 involving the lymph nodes, lungs, and eyes              - Pulmonary HTN              - Cutaneous lupus:  In , she presented with psoriasiform plaques on the shoulders, anterior proximal thigh, and superior thoracic spine and was treated with topical betamethasone.  Shave biopsy showed interface dermatitis.              - Gout              - Osteoporosis with recent right femoral fracture  - Social History: Former smoker (quit 30-40 years ago), occasional alcohol intake  - Gyn History:     - Family History: adopted   - Positive serologies: ACE (138), +HLA B27, +CARMINE 1:320 nuclear and nucleolar, +SSA, + lysozyme (8.8)  - Negative serologies: HLA A29, dsDNA, Smith, RNP, SSA, SSB, CORBY, B2G, cardiolipin, DRVVT, RF, CCP  - Pathology:              - Chest station 4L LN biopsy (2021) noncaseating granulomas present              - shave biopsy left medial upper back (2020) interface dermatitis  - Infectious screening labs: Negative RPR, QuantiFERON (2023), hepatitis-B, C, and HIV (2023)  - Imaging:              - EKG () Sinus bradycardia with Premature ventricular complexes or Fusion complexes   Right bundle branch block               - TTE (2021): EF 55%, CVP 8, PASP 63 mmHg  - RHC (2021) Right atrial pressure 13/10/8 mmHg  Right ventricular pressure 42/5/14 mm Hg  Pulmonary artery pressure 42/17/26 mm Hg  Pulmonary capillary wedge  "16/22/18 mm Hg  Cardiac output 4.36  - CT chest (10/2023) Similar extent of diffuse reticulonodular disease in the lungs and mediastinal adenopathy.  Atypical infection, pneumonitis, sarcoidosis, inhalational disease, neoplasm are some differential considerations.   - DEXA (2/2024) osteoporosis  - Previous Treatments:               - Prednisone 60mg per Ophthalmology  - Current Treatments:               - Allopurinol 200mg per PCP              - Colchicine per PCP              - MTX 15mg plus folic acid daily              - Reclast (3/12/24- )  Interval History:   She is doing well and denies joint pain, swelling, vision changes, and chest pain.     Objective:   /67   Pulse (!) 56   Ht 5' 4" (1.626 m)   Wt 60.8 kg (134 lb 0.6 oz)   BMI 23.01 kg/m²   Physical Exam   Constitutional: normal appearance.   HENT:   Head: Normocephalic and atraumatic.   Cardiovascular: Normal rate, regular rhythm and normal heart sounds.   Pulmonary/Chest: Effort normal and breath sounds normal.   Musculoskeletal:      Comments: No synovitis, dactylitis, enthesitis, effusions     Neurological: She is alert.   Skin: Skin is warm and dry. No rash noted.   No skin thickening, telangiectasias, calcinosis, psoriasiform lesions, lupoid lesions         No data to display     Labs independently reviewed by me (6/4/24)  Cr 1.4 <- 1, GFR 38.3    Assessment:     1. Uveitis    2. History of sarcoidosis    3. High risk medication use    4. Drug-induced immunodeficiency      This is a 78-year-old woman who is a poor historian with history of hypertension, osteoporosis on Reclast, gout on allopurinol 200mg and colchicine, cutaneous lupus previously on topical betamethasone (biopsy proven interface dermatitis 2020), biopsy proven sarcoidosis involving the hilar LN with positive ACE and hypercalcemia, pulmonary hypertension, chorioretinitis and uveitis OS s/p short course of prednisone 60mg and now on MTX 15mg weekly plus folic acid daily. She is " doing well and denies joint pain, swelling, vision changes, and chest pain. Continue current medication.    Plan:     Problem List Items Addressed This Visit    None  Visit Diagnoses       Uveitis    -  Primary    Relevant Orders    CBC Auto Differential    Comprehensive Metabolic Panel    Sedimentation rate    C-Reactive Protein    Urinalysis    Protein/Creatinine Ratio, Urine    History of sarcoidosis        Relevant Orders    CBC Auto Differential    Comprehensive Metabolic Panel    Sedimentation rate    C-Reactive Protein    Urinalysis    Protein/Creatinine Ratio, Urine    High risk medication use        Relevant Orders    CBC Auto Differential    Comprehensive Metabolic Panel    Sedimentation rate    C-Reactive Protein    Urinalysis    Protein/Creatinine Ratio, Urine    Drug-induced immunodeficiency        Relevant Orders    CBC Auto Differential    Comprehensive Metabolic Panel    Sedimentation rate    C-Reactive Protein    Urinalysis    Protein/Creatinine Ratio, Urine          1.) Sarcoidosis  - methotrexate 15mg weekly plus folic acid  - CBC, CMP, ESR, CRP every 12 weeks  - Avoid AZA as patient is on allopurinol   - Pre-DMARDs due 11/2024  - Patient requests transfer to a different ophthalmologist  - RBB present since 2020, is cardiac MRI necessary? Patient to discuss with Dr Snow  - Continue follow up with Pulm (Dr Ye)     2.) Osteoporosis: T-score -3.1 in the left femoral neck with recent right femoral fracture  - Reclast (3/12/2024- )    Follow up in 6 months    30 minutes of total time spent on the encounter, which includes face to face time and non-face to face time preparing to see the patient (eg, review of tests), Obtaining and/or reviewing separately obtained history, Documenting clinical information in the electronic or other health record, Independently interpreting results (not separately reported) and communicating results to the patient/family/caregiver, or Care coordination (not  separately reported).     This note was prepared with [employer] Direct voice recognition transcription software. Garbled syntax, mangled pronouns, and other bizarre constructions may be attributed to that software system       Mary Ford M.D.  Rheumatology Dept  [place], [place]

## 2024-06-05 ENCOUNTER — CLINICAL SUPPORT (OUTPATIENT)
Dept: REHABILITATION | Facility: HOSPITAL | Age: 79
End: 2024-06-05
Payer: MEDICARE

## 2024-06-05 ENCOUNTER — OFFICE VISIT (OUTPATIENT)
Dept: RHEUMATOLOGY | Facility: CLINIC | Age: 79
End: 2024-06-05
Payer: MEDICARE

## 2024-06-05 VITALS
SYSTOLIC BLOOD PRESSURE: 105 MMHG | BODY MASS INDEX: 22.89 KG/M2 | HEART RATE: 56 BPM | DIASTOLIC BLOOD PRESSURE: 67 MMHG | WEIGHT: 134.06 LBS | HEIGHT: 64 IN

## 2024-06-05 DIAGNOSIS — D84.821 DRUG-INDUCED IMMUNODEFICIENCY: ICD-10-CM

## 2024-06-05 DIAGNOSIS — H20.9 UVEITIS: Primary | ICD-10-CM

## 2024-06-05 DIAGNOSIS — R29.898 HIP WEAKNESS: Primary | ICD-10-CM

## 2024-06-05 DIAGNOSIS — R26.89 DECREASED MOBILITY: ICD-10-CM

## 2024-06-05 DIAGNOSIS — Z79.899 DRUG-INDUCED IMMUNODEFICIENCY: ICD-10-CM

## 2024-06-05 DIAGNOSIS — Z79.899 HIGH RISK MEDICATION USE: ICD-10-CM

## 2024-06-05 DIAGNOSIS — Z86.2 HISTORY OF SARCOIDOSIS: ICD-10-CM

## 2024-06-05 PROCEDURE — 1160F RVW MEDS BY RX/DR IN RCRD: CPT | Mod: HCNC,CPTII,S$GLB, | Performed by: STUDENT IN AN ORGANIZED HEALTH CARE EDUCATION/TRAINING PROGRAM

## 2024-06-05 PROCEDURE — 97110 THERAPEUTIC EXERCISES: CPT | Mod: HCNC,PO | Performed by: PHYSICAL THERAPIST

## 2024-06-05 PROCEDURE — 3074F SYST BP LT 130 MM HG: CPT | Mod: HCNC,CPTII,S$GLB, | Performed by: STUDENT IN AN ORGANIZED HEALTH CARE EDUCATION/TRAINING PROGRAM

## 2024-06-05 PROCEDURE — 3078F DIAST BP <80 MM HG: CPT | Mod: HCNC,CPTII,S$GLB, | Performed by: STUDENT IN AN ORGANIZED HEALTH CARE EDUCATION/TRAINING PROGRAM

## 2024-06-05 PROCEDURE — 1100F PTFALLS ASSESS-DOCD GE2>/YR: CPT | Mod: HCNC,CPTII,S$GLB, | Performed by: STUDENT IN AN ORGANIZED HEALTH CARE EDUCATION/TRAINING PROGRAM

## 2024-06-05 PROCEDURE — 3288F FALL RISK ASSESSMENT DOCD: CPT | Mod: HCNC,CPTII,S$GLB, | Performed by: STUDENT IN AN ORGANIZED HEALTH CARE EDUCATION/TRAINING PROGRAM

## 2024-06-05 PROCEDURE — 1125F AMNT PAIN NOTED PAIN PRSNT: CPT | Mod: HCNC,CPTII,S$GLB, | Performed by: STUDENT IN AN ORGANIZED HEALTH CARE EDUCATION/TRAINING PROGRAM

## 2024-06-05 PROCEDURE — 99999 PR PBB SHADOW E&M-EST. PATIENT-LVL III: CPT | Mod: PBBFAC,HCNC,, | Performed by: STUDENT IN AN ORGANIZED HEALTH CARE EDUCATION/TRAINING PROGRAM

## 2024-06-05 PROCEDURE — 97112 NEUROMUSCULAR REEDUCATION: CPT | Mod: HCNC,PO | Performed by: PHYSICAL THERAPIST

## 2024-06-05 PROCEDURE — 1159F MED LIST DOCD IN RCRD: CPT | Mod: HCNC,CPTII,S$GLB, | Performed by: STUDENT IN AN ORGANIZED HEALTH CARE EDUCATION/TRAINING PROGRAM

## 2024-06-05 PROCEDURE — 99215 OFFICE O/P EST HI 40 MIN: CPT | Mod: HCNC,S$GLB,, | Performed by: STUDENT IN AN ORGANIZED HEALTH CARE EDUCATION/TRAINING PROGRAM

## 2024-06-05 ASSESSMENT — ROUTINE ASSESSMENT OF PATIENT INDEX DATA (RAPID3)
TOTAL RAPID3 SCORE: 4
MDHAQ FUNCTION SCORE: 1.2
FATIGUE SCORE: 1.1
PAIN SCORE: 3
PATIENT GLOBAL ASSESSMENT SCORE: 5
PSYCHOLOGICAL DISTRESS SCORE: 2.2

## 2024-06-05 NOTE — PROGRESS NOTES
"Physical Therapy Daily Treatment Note     Name: Evelina Pinon  Clinic Number: [id number]    Therapy Diagnosis:   Encounter Diagnosis   Name Primary?    Closed fracture of neck of right femur, initial encounter      Physician: Edward Ortiz MD    Visit Date: 6/5/2024    Physician Orders: PT Eval and Treat   Medical Diagnosis from Referral:   Closed fracture of neck of right femur, initial encounter  Evaluation Date: 5/31/2024  Authorization Period Expiration: 05/20/25  Plan of Care Expiration: 07/26/2024  Progress Note Due: 06/28/2024  Visit # / Visits authorized: 2/20  FOTO: 1/3    Time In: 1100  Time Out: 1200  Total Billable Time: 53    Precautions:  Standard and blood thinners     Subjective     Pt reports: not walking in the house like she should with the walker. Patient motivated though on getting her legs stronger. No falls noted.    She was compliant with home exercise program.  Response to previous treatment: First follow up  Functional change: TBD    Pain: 2/10  Location: right hip     Objective     Objective measures displayed on progress notes unless otherwise noted      Treatment      Evelina received therapeutic exercises to develop strength, endurance, ROM, flexibility, posture and core stabilization for 10 minutes including:    Nu step level 1 x 5 minutes,low intensity  LTR x 20   DKTC feet on orange swiss ball x 20     Evelina participated in neuromuscular re-education activities to improve: Balance, Coordination, Proprioception and Posture for 43 minutes. The following activities were included:    Glute set 3 x 10 5" hold  Pelvic tilts x 20 5" hold  Supine hip add (ball squeeze) x 30  Bridges 2 x 10 (minimal lift off)   Supine clamshells GTB x 30  Supine: ball squeezes x 20  SLR 2 x 10  Standing TA activation x 15 3" hold  Mini squat 2 x 10    Sit to stands with clinician assistance/CGA x 10    SAQ: 2# 3/10    Gait: with walker mod-I, SBA > 50 ft for loading, proprioception and strengthening " "purposes.    Home Exercises Provided and Patient Education Provided     Education provided:   - HEP review    Written Home Exercises Provided: Patient instructed to cont prior HEP.  Exercises were reviewed and Evelina was able to demonstrate them prior to the end of the session.  Evelina demonstrated good  understanding of the education provided.     See EMR under Patient Instructions for exercises provided prior visit.    Assessment     Evelina required cueing on posture in seated ex and standing as well. Patient noted with improvement after reps and demonstrated fait to good tolerance with NMR progression. No adverse effects.    Evelina Is progressing well towards her goals.   Pt prognosis is Good.     Pt will continue to benefit from skilled outpatient physical therapy to address the deficits listed in the problem list box on initial evaluation, provide pt/family education and to maximize pt's level of independence in the home and community environment.     Pt's spiritual, cultural and educational needs considered and pt agreeable to plan of care and goals.     Anticipated barriers to physical therapy: None    Goals:   Short Term Goals: 4 weeks   Independent with HEP with < 3/10 pain to right hip.   Improve right hip/knee MMT 1/2 grade for ambulatory tasks.  Improve TUG to <= 20" with AD for mobility purposes.  Improve chair rise test >/= 5 times < 40"     Long Term Goals: 8 weeks   Independent with HEP with no painful limitations.  Demonstrate right hip/knee MMT > 4/5 for ambulatory purposes.  Improve TUG to < 20" for home management purposes.  Ambulate > 150 ft mod-I for home management purposes.       Plan     Plan of care Certification: 5/31/2024 to 07/26/2024.     Outpatient Physical Therapy 2 times weekly for 8 weeks to include the following interventions: Gait Training, Manual Therapy, Moist Heat/ Ice, Neuromuscular Re-ed, Patient Education, Therapeutic Activities, and Therapeutic Exercise.     Everton Hutchins, PT "

## 2024-06-06 ENCOUNTER — CLINICAL SUPPORT (OUTPATIENT)
Dept: PRIMARY CARE CLINIC | Facility: CLINIC | Age: 79
End: 2024-06-06
Payer: MEDICARE

## 2024-06-06 DIAGNOSIS — Z65.8 PSYCHOSOCIAL STRESSORS: ICD-10-CM

## 2024-06-06 DIAGNOSIS — F41.1 GAD (GENERALIZED ANXIETY DISORDER): ICD-10-CM

## 2024-06-06 DIAGNOSIS — F43.9 TRAUMA AND STRESSOR-RELATED DISORDER: Primary | ICD-10-CM

## 2024-06-06 PROCEDURE — 99499 UNLISTED E&M SERVICE: CPT | Mod: HCNC,S$GLB,, | Performed by: SOCIAL WORKER
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
## 2024-06-07 ENCOUNTER — CLINICAL SUPPORT (OUTPATIENT)
Dept: REHABILITATION | Facility: HOSPITAL | Age: 79
End: 2024-06-07
Payer: MEDICARE

## 2024-06-07 DIAGNOSIS — R26.89 DECREASED MOBILITY: ICD-10-CM

## 2024-06-07 DIAGNOSIS — R29.898 HIP WEAKNESS: Primary | ICD-10-CM

## 2024-06-07 PROCEDURE — 97110 THERAPEUTIC EXERCISES: CPT | Mod: HCNC,PO | Performed by: PHYSICAL THERAPIST

## 2024-06-07 PROCEDURE — 97112 NEUROMUSCULAR REEDUCATION: CPT | Mod: HCNC,PO | Performed by: PHYSICAL THERAPIST

## 2024-06-07 NOTE — PROGRESS NOTES
"Physical Therapy Daily Treatment Note     Name: Evelina Pinon  Clinic Number: [id number]    Therapy Diagnosis:   Encounter Diagnosis   Name Primary?    Closed fracture of neck of right femur, initial encounter      Physician: Edward Ortiz MD    Visit Date: 6/7/2024    Physician Orders: PT Eval and Treat   Medical Diagnosis from Referral:   Closed fracture of neck of right femur, initial encounter  Evaluation Date: 5/31/2024  Authorization Period Expiration: 05/20/25  Plan of Care Expiration: 07/26/2024  Progress Note Due: 06/28/2024  Visit # / Visits authorized: 3/20  FOTO: 1/3    Time In: 1055  Time Out: 1155  Total Billable Time: 30 min    Precautions:  Standard and blood thinners     Subjective     Pt reports: no walking enough and right hip soreness noted. No falls noted.    She was not compliant with home exercise program.  Response to previous treatment: First follow up  Functional change: TBD    Pain: 2/10  Location: right hip     Objective     Objective measures displayed on progress notes unless otherwise noted      Treatment      Evelina received therapeutic exercises to develop strength, endurance, ROM, flexibility, posture and core stabilization for 10 minutes including:    Nu step level 1 x 5 minutes,low intensity  LTR x 20   DKTC feet on orange swiss ball x 20     Evelina participated in neuromuscular re-education activities to improve: Balance, Coordination, Proprioception and Posture for 43 minutes. The following activities were included: (20 min one on one)    Glute set 3 x 10 5" hold  Pelvic tilts x 20 5" hold  Supine hip add (ball squeeze) x 30  Bridges 3 x 10  Modified single leg bridge over bolster 2/10 each  Supine clamshells GTB x 30  SLR 2 x 10  Standing TA activation x 15 3" hold  Mini squat 2 x 10  LAQ 3# 2/10 each  Standing: hip abduction 2/10 each  Standing: HR x 20 with UE support    Sit to stands with clinician assistance/CGA x 10    SAQ: 2# 3/10    Gait: with walker mod-I, SBA > 100 ft " "for loading, proprioception and strengthening purposes.    Home Exercises Provided and Patient Education Provided     Education provided:   - HEP review    Written Home Exercises Provided: Patient instructed to cont prior HEP.  Exercises were reviewed and Evelina was able to demonstrate them prior to the end of the session.  Evelina demonstrated good  understanding of the education provided.     See EMR under Patient Instructions for exercises provided prior visit.    Assessment     Evelina was educated on the importance with standing posture, loading the RLE for weight-bearing acceptance. Patient required cueing and tactile input on NMR, core and hip strengthening. Patient ambulated further today SBA to CGA with turning noted. No adverse effects.    Evelina Is progressing well towards her goals.   Pt prognosis is Good.     Pt will continue to benefit from skilled outpatient physical therapy to address the deficits listed in the problem list box on initial evaluation, provide pt/family education and to maximize pt's level of independence in the home and community environment.     Pt's spiritual, cultural and educational needs considered and pt agreeable to plan of care and goals.     Anticipated barriers to physical therapy: None    Goals:   Short Term Goals: 4 weeks   Independent with HEP with < 3/10 pain to right hip.   Improve right hip/knee MMT 1/2 grade for ambulatory tasks.  Improve TUG to <= 20" with AD for mobility purposes.  Improve chair rise test >/= 5 times < 40"     Long Term Goals: 8 weeks   Independent with HEP with no painful limitations.  Demonstrate right hip/knee MMT > 4/5 for ambulatory purposes.  Improve TUG to < 20" for home management purposes.  Ambulate > 150 ft mod-I for home management purposes.       Plan     Plan of care Certification: 5/31/2024 to 07/26/2024.     Outpatient Physical Therapy 2 times weekly for 8 weeks to include the following interventions: Gait Training, Manual Therapy, Moist Heat/ " Ice, Neuromuscular Re-ed, Patient Education, Therapeutic Activities, and Therapeutic Exercise.     Everton Hutchins, PT

## 2024-06-07 NOTE — PROGRESS NOTES
"Physical Therapy Daily Treatment Note     Name: Evelina Pinon  Clinic Number: [id number]    Therapy Diagnosis:   Encounter Diagnosis   Name Primary?    Closed fracture of neck of right femur, initial encounter      Physician: Edward Ortiz MD    Visit Date: 6/7/2024    Physician Orders: PT Eval and Treat   Medical Diagnosis from Referral:   Closed fracture of neck of right femur, initial encounter  Evaluation Date: 5/31/2024  Authorization Period Expiration: 05/20/25  Plan of Care Expiration: 07/26/2024  Progress Note Due: 06/28/2024  Visit # / Visits authorized: 2/20  FOTO: 1/3    Time In: 1100  Time Out: 1200  Total Billable Time: 53    Precautions:  Standard and blood thinners     Subjective     Pt reports: not walking in the house like she should with the walker. Patient motivated though on getting her legs stronger. No falls noted.    She was compliant with home exercise program.  Response to previous treatment: First follow up  Functional change: TBD    Pain: 2/10  Location: right hip     Objective     Objective measures displayed on progress notes unless otherwise noted      Treatment      Evelina received therapeutic exercises to develop strength, endurance, ROM, flexibility, posture and core stabilization for 10 minutes including:    Nu step level 1 x 5 minutes,low intensity  LTR x 20   DKTC feet on orange swiss ball x 20     Evelina participated in neuromuscular re-education activities to improve: Balance, Coordination, Proprioception and Posture for 43 minutes. The following activities were included:    Glute set 3 x 10 5" hold  Pelvic tilts x 20 5" hold  Supine hip add (ball squeeze) x 30  Bridges 2 x 10 (minimal lift off)   Supine clamshells GTB x 30  Supine: ball squeezes x 20  SLR 2 x 10  Standing TA activation x 15 3" hold  Mini squat 2 x 10    Sit to stands with clinician assistance/CGA x 10    SAQ: 2# 3/10    Gait: with walker mod-I, SBA > 50 ft for loading, proprioception and strengthening " "purposes.    Home Exercises Provided and Patient Education Provided     Education provided:   - HEP review    Written Home Exercises Provided: Patient instructed to cont prior HEP.  Exercises were reviewed and Evelian was able to demonstrate them prior to the end of the session.  Evelina demonstrated good  understanding of the education provided.     See EMR under Patient Instructions for exercises provided prior visit.    Assessment     Evelina required cueing on posture in seated ex and standing as well. Patient noted with improvement after reps and demonstrated fait to good tolerance with NMR progression. No adverse effects.    Evelina Is progressing well towards her goals.   Pt prognosis is Good.     Pt will continue to benefit from skilled outpatient physical therapy to address the deficits listed in the problem list box on initial evaluation, provide pt/family education and to maximize pt's level of independence in the home and community environment.     Pt's spiritual, cultural and educational needs considered and pt agreeable to plan of care and goals.     Anticipated barriers to physical therapy: None    Goals:   Short Term Goals: 4 weeks   Independent with HEP with < 3/10 pain to right hip.   Improve right hip/knee MMT 1/2 grade for ambulatory tasks.  Improve TUG to <= 20" with AD for mobility purposes.  Improve chair rise test >/= 5 times < 40"     Long Term Goals: 8 weeks   Independent with HEP with no painful limitations.  Demonstrate right hip/knee MMT > 4/5 for ambulatory purposes.  Improve TUG to < 20" for home management purposes.  Ambulate > 150 ft mod-I for home management purposes.       Plan     Plan of care Certification: 5/31/2024 to 07/26/2024.     Outpatient Physical Therapy 2 times weekly for 8 weeks to include the following interventions: Gait Training, Manual Therapy, Moist Heat/ Ice, Neuromuscular Re-ed, Patient Education, Therapeutic Activities, and Therapeutic Exercise.     Everton Hutchins, PT "

## 2024-06-10 ENCOUNTER — CLINICAL SUPPORT (OUTPATIENT)
Dept: REHABILITATION | Facility: HOSPITAL | Age: 79
End: 2024-06-10
Payer: MEDICARE

## 2024-06-10 DIAGNOSIS — S72.001A CLOSED FRACTURE OF NECK OF RIGHT FEMUR, INITIAL ENCOUNTER: ICD-10-CM

## 2024-06-10 DIAGNOSIS — R29.898 HIP WEAKNESS: Primary | ICD-10-CM

## 2024-06-10 PROCEDURE — 97112 NEUROMUSCULAR REEDUCATION: CPT | Mod: HCNC,PO,CQ

## 2024-06-10 NOTE — PROGRESS NOTES
"Physical Therapy Daily Treatment Note     Name: Evelina Pinon  Clinic Number: [id number]    Therapy Diagnosis:   Encounter Diagnosis   Name Primary?    Closed fracture of neck of right femur, initial encounter      Physician: Edward Ortiz MD    Visit Date: 6/10/2024    Physician Orders: PT Eval and Treat   Medical Diagnosis from Referral:   Closed fracture of neck of right femur, initial encounter  Evaluation Date: 5/31/2024  Authorization Period Expiration: 05/20/25  Plan of Care Expiration: 07/26/2024  Progress Note Due: 06/28/2024  Visit # / Visits authorized: 4/20  FOTO: 1/3    Time In: 9:01  Time Out: 9:55  Total Billable Time: 30 min    Precautions:  Standard and blood thinners     Subjective     Pt reports:having less hip pain.     She was not compliant with home exercise program.  Response to previous treatment: First follow up  Functional change: TBD    Pain: 2/10  Location: right hip     Objective     Objective measures displayed on progress notes unless otherwise noted      Treatment      Evelina received therapeutic exercises to develop strength, endurance, ROM, flexibility, posture and core stabilization for 15 minutes including:    Nu step level 1 x 5 minutes,low intensity  LTR x 20   DKTC feet on orange swiss ball x 20     Evelina participated in neuromuscular re-education activities to improve: Balance, Coordination, Proprioception and Posture for 40 minutes. The following activities were included: (20 min one on one)    Glute set 3 x 10 5" hold  Pelvic tilts x 20 5" hold  Supine hip add (ball squeeze) x 30  Bridges 3 x 10 R TB  Modified single leg bridge over bolster 2/10 each  Supine clamshells GTB x 30  SLR 2 x 10  Standing TA activation x 15 3" hold  Mini squat 2 x 10  LAQ 3# 2/10 each  Standing: hip abduction 2/10 each  Standing: HR x 20 with UE support    Sit to stands with clinician assistance/CGA x 10    SAQ: 2# 3/10    Gait: with walker mod-I, SBA > 100 ft for loading, proprioception and " "strengthening purposes.    Home Exercises Provided and Patient Education Provided     Education provided:   - HEP review    Written Home Exercises Provided: Patient instructed to cont prior HEP.  Exercises were reviewed and Evelina was able to demonstrate them prior to the end of the session.  Evelina demonstrated good  understanding of the education provided.     See EMR under Patient Instructions for exercises provided prior visit.    Assessment     Evelina tolerated neuromuscular therex with no c/o hip pain. Patient required cueing and tactile input on NMR, core and hip strengthening. Patient required encouragement to ambulate today due to pt fatigued. She was able to perform 100ft with minimal of fatigue. No adverse effects. Educated her to amb more at home around the house to increase endurance and strength. She stated she will try.    Evelina Is progressing well towards her goals.   Pt prognosis is Good.     Pt will continue to benefit from skilled outpatient physical therapy to address the deficits listed in the problem list box on initial evaluation, provide pt/family education and to maximize pt's level of independence in the home and community environment.     Pt's spiritual, cultural and educational needs considered and pt agreeable to plan of care and goals.     Anticipated barriers to physical therapy: None    Goals:   Short Term Goals: 4 weeks   Independent with HEP with < 3/10 pain to right hip.   Improve right hip/knee MMT 1/2 grade for ambulatory tasks.  Improve TUG to <= 20" with AD for mobility purposes.  Improve chair rise test >/= 5 times < 40"     Long Term Goals: 8 weeks   Independent with HEP with no painful limitations.  Demonstrate right hip/knee MMT > 4/5 for ambulatory purposes.  Improve TUG to < 20" for home management purposes.  Ambulate > 150 ft mod-I for home management purposes.       Plan     Plan of care Certification: 5/31/2024 to 07/26/2024.     Outpatient Physical Therapy 2 times weekly for " 8 weeks to include the following interventions: Gait Training, Manual Therapy, Moist Heat/ Ice, Neuromuscular Re-ed, Patient Education, Therapeutic Activities, and Therapeutic Exercise.     Rhona Mathis, PTA

## 2024-06-11 ENCOUNTER — OFFICE VISIT (OUTPATIENT)
Dept: PRIMARY CARE CLINIC | Facility: CLINIC | Age: 79
End: 2024-06-11
Payer: MEDICARE

## 2024-06-11 VITALS
WEIGHT: 135.13 LBS | SYSTOLIC BLOOD PRESSURE: 128 MMHG | DIASTOLIC BLOOD PRESSURE: 68 MMHG | TEMPERATURE: 98 F | OXYGEN SATURATION: 98 % | RESPIRATION RATE: 18 BRPM | BODY MASS INDEX: 23.2 KG/M2

## 2024-06-11 DIAGNOSIS — N18.31 STAGE 3A CHRONIC KIDNEY DISEASE: ICD-10-CM

## 2024-06-11 DIAGNOSIS — F41.9 ANXIETY: Primary | Chronic | ICD-10-CM

## 2024-06-11 PROCEDURE — 3288F FALL RISK ASSESSMENT DOCD: CPT | Mod: HCNC,CPTII,S$GLB, | Performed by: FAMILY MEDICINE

## 2024-06-11 PROCEDURE — 99999 PR PBB SHADOW E&M-EST. PATIENT-LVL IV: CPT | Mod: PBBFAC,HCNC,, | Performed by: FAMILY MEDICINE

## 2024-06-11 PROCEDURE — 3074F SYST BP LT 130 MM HG: CPT | Mod: HCNC,CPTII,S$GLB, | Performed by: FAMILY MEDICINE

## 2024-06-11 PROCEDURE — 1160F RVW MEDS BY RX/DR IN RCRD: CPT | Mod: HCNC,CPTII,S$GLB, | Performed by: FAMILY MEDICINE

## 2024-06-11 PROCEDURE — 3078F DIAST BP <80 MM HG: CPT | Mod: HCNC,CPTII,S$GLB, | Performed by: FAMILY MEDICINE

## 2024-06-11 PROCEDURE — 99214 OFFICE O/P EST MOD 30 MIN: CPT | Mod: HCNC,S$GLB,, | Performed by: FAMILY MEDICINE

## 2024-06-11 PROCEDURE — 1158F ADVNC CARE PLAN TLK DOCD: CPT | Mod: HCNC,CPTII,S$GLB, | Performed by: FAMILY MEDICINE

## 2024-06-11 PROCEDURE — 1159F MED LIST DOCD IN RCRD: CPT | Mod: HCNC,CPTII,S$GLB, | Performed by: FAMILY MEDICINE

## 2024-06-11 PROCEDURE — 1125F AMNT PAIN NOTED PAIN PRSNT: CPT | Mod: HCNC,CPTII,S$GLB, | Performed by: FAMILY MEDICINE

## 2024-06-11 PROCEDURE — 1101F PT FALLS ASSESS-DOCD LE1/YR: CPT | Mod: HCNC,CPTII,S$GLB, | Performed by: FAMILY MEDICINE

## 2024-06-11 RX ORDER — LORAZEPAM 0.5 MG/1
0.5 TABLET ORAL NIGHTLY PRN
Qty: 30 TABLET | Refills: 2 | Status: SHIPPED | OUTPATIENT
Start: 2024-06-11

## 2024-06-11 RX ORDER — METOPROLOL SUCCINATE 100 MG/1
100 TABLET, EXTENDED RELEASE ORAL DAILY
Qty: 90 TABLET | Refills: 3 | Status: SHIPPED | OUTPATIENT
Start: 2024-06-11

## 2024-06-11 RX ORDER — ALLOPURINOL 100 MG/1
200 TABLET ORAL DAILY
Qty: 180 TABLET | Refills: 3 | Status: SHIPPED | OUTPATIENT
Start: 2024-06-11

## 2024-06-12 ENCOUNTER — CLINICAL SUPPORT (OUTPATIENT)
Dept: REHABILITATION | Facility: HOSPITAL | Age: 79
End: 2024-06-12
Payer: MEDICARE

## 2024-06-12 DIAGNOSIS — R26.89 DECREASED MOBILITY: ICD-10-CM

## 2024-06-12 DIAGNOSIS — R29.898 HIP WEAKNESS: Primary | ICD-10-CM

## 2024-06-12 PROCEDURE — 97112 NEUROMUSCULAR REEDUCATION: CPT | Mod: HCNC,PO,CQ

## 2024-06-12 PROCEDURE — 97110 THERAPEUTIC EXERCISES: CPT | Mod: HCNC,PO,CQ

## 2024-06-12 NOTE — ASSESSMENT & PLAN NOTE
Chronic longstanding anxiety as discussed.  She does continue with cognitive behavioral therapy and has shown some progress.  She still relies on alprazolam and has been relying on this for many years but she has recently reduced her dosage.  She actually requested to go back to twice a day but I declined, discussed reasons why and discussed that she is showing signs of improvement and I want her to continue to minimize this and continue with therapy with our LCSW.  She agrees.

## 2024-06-12 NOTE — PROGRESS NOTES
"Physical Therapy Daily Treatment Note     Name: Evelina Pinon  Clinic Number: [id number]    Therapy Diagnosis:   Encounter Diagnosis   Name Primary?    Closed fracture of neck of right femur, initial encounter      Physician: Edward Ortiz MD    Visit Date: 6/12/2024    Physician Orders: PT Eval and Treat   Medical Diagnosis from Referral:   Closed fracture of neck of right femur, initial encounter  Evaluation Date: 5/31/2024  Authorization Period Expiration: 05/20/25  Plan of Care Expiration: 07/26/2024  Progress Note Due: 06/28/2024  Visit # / Visits authorized: 5/20  FOTO: 1/3    Time In: 3:00 pm  Time Out: 3:50 pm  Total Billable Time: 25 min 1:1    Precautions:  Standard and blood thinners     Subjective     Pt reports: Continued R hip pain levels with pt stating she has good days with very little pain and days where the pain comes back.     She was not compliant with home exercise program.  Response to previous treatment: First follow up  Functional change: TBD    Pain: 2/10  Location: right hip     Objective     Objective measures displayed on progress notes unless otherwise noted      Treatment      Evelina received therapeutic exercises to develop strength, endurance, ROM, flexibility, posture and core stabilization for 10 minutes including:    Nu step level 1 x 5 minutes,low intensity  LTR x 20   DKTC feet on orange swiss ball x 20     Evelina participated in neuromuscular re-education activities to improve: Balance, Coordination, Proprioception and Posture for 40 minutes. The following activities were included: (20 min one on one)    Glute set 3 x 10 5" hold  Pelvic tilts x 20 5" hold  Supine hip add (ball squeeze) x 30  Bridges 3 x 10 R TB  Modified single leg bridge over bolster 2/10 each  Supine clamshells GTB x 30  SLR 2 x 10  Standing TA activation x 15 3" hold  Mini squat 2 x 10  LAQ 3# 2/10 each  Standing: hip abduction 2/10 each  Standing: HR x 20 with UE support  Sit to stands with clinician " "assistance/CGA x 10    Not performed   SAQ: 2# 3/10    Gait: with walker mod-I, SBA > 100 ft for loading, proprioception and strengthening purposes.-NP    Home Exercises Provided and Patient Education Provided     Education provided:   - HEP review    Written Home Exercises Provided: Patient instructed to cont prior HEP.  Exercises were reviewed and Evelina was able to demonstrate them prior to the end of the session.  Evelina demonstrated good  understanding of the education provided.     See EMR under Patient Instructions for exercises provided prior visit.    Assessment   Evelina tolerated continuation of functional global LE strengthening exercises well with no increase in pain but required frequent encouragement to complete tasks. Will monitor and attempt to progress per pt's tolerance.    Evelina Is progressing well towards her goals.   Pt prognosis is Good.     Pt will continue to benefit from skilled outpatient physical therapy to address the deficits listed in the problem list box on initial evaluation, provide pt/family education and to maximize pt's level of independence in the home and community environment.     Pt's spiritual, cultural and educational needs considered and pt agreeable to plan of care and goals.     Anticipated barriers to physical therapy: None    Goals:   Short Term Goals: 4 weeks   Independent with HEP with < 3/10 pain to right hip.   Improve right hip/knee MMT 1/2 grade for ambulatory tasks.  Improve TUG to <= 20" with AD for mobility purposes.  Improve chair rise test >/= 5 times < 40"     Long Term Goals: 8 weeks   Independent with HEP with no painful limitations.  Demonstrate right hip/knee MMT > 4/5 for ambulatory purposes.  Improve TUG to < 20" for home management purposes.  Ambulate > 150 ft mod-I for home management purposes.       Plan     Plan of care Certification: 5/31/2024 to 07/26/2024.     Outpatient Physical Therapy 2 times weekly for 8 weeks to include the following " interventions: Gait Training, Manual Therapy, Moist Heat/ Ice, Neuromuscular Re-ed, Patient Education, Therapeutic Activities, and Therapeutic Exercise.     Victor Hugo Yates, PTA

## 2024-06-13 ENCOUNTER — CLINICAL SUPPORT (OUTPATIENT)
Dept: PRIMARY CARE CLINIC | Facility: CLINIC | Age: 79
End: 2024-06-13
Payer: MEDICARE

## 2024-06-13 DIAGNOSIS — Z65.8 PSYCHOSOCIAL STRESSORS: ICD-10-CM

## 2024-06-13 DIAGNOSIS — F43.9 TRAUMA AND STRESSOR-RELATED DISORDER: Primary | ICD-10-CM

## 2024-06-13 DIAGNOSIS — F41.1 GAD (GENERALIZED ANXIETY DISORDER): ICD-10-CM

## 2024-06-13 PROCEDURE — 99499 UNLISTED E&M SERVICE: CPT | Mod: HCNC,S$GLB,, | Performed by: SOCIAL WORKER
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
## 2024-06-14 ENCOUNTER — CLINICAL SUPPORT (OUTPATIENT)
Dept: REHABILITATION | Facility: HOSPITAL | Age: 79
End: 2024-06-14
Payer: MEDICARE

## 2024-06-14 DIAGNOSIS — R29.898 HIP WEAKNESS: Primary | ICD-10-CM

## 2024-06-14 DIAGNOSIS — R26.89 DECREASED MOBILITY: ICD-10-CM

## 2024-06-14 PROCEDURE — 97112 NEUROMUSCULAR REEDUCATION: CPT | Mod: HCNC,PO,CQ

## 2024-06-14 PROCEDURE — 97110 THERAPEUTIC EXERCISES: CPT | Mod: HCNC,PO,CQ

## 2024-06-14 NOTE — PROGRESS NOTES
"Physical Therapy Daily Treatment Note     Name: Evelina Pinon  Clinic Number: [id number]    Therapy Diagnosis:   Encounter Diagnosis   Name Primary?    Closed fracture of neck of right femur, initial encounter      Physician: Edward Ortiz MD    Visit Date: 6/14/2024    Physician Orders: PT Eval and Treat   Medical Diagnosis from Referral:   Closed fracture of neck of right femur, initial encounter  Evaluation Date: 5/31/2024  Authorization Period Expiration: 05/20/25  Plan of Care Expiration: 07/26/2024  Progress Note Due: 06/28/2024  Visit # / Visits authorized: 6/20  FOTO: 1/3    Time In: 2:30 pm  Time Out: 3:16 pm  Total Billable Time: 23 min 1:1    Precautions:  Standard and blood thinners     Subjective     Pt reports: Continued R hip pain levels. Pt continues to require encouragement to fully participate in PT.    She was not compliant with home exercise program.  Response to previous treatment: First follow up  Functional change: TBD    Pain: 2/10  Location: right hip     Objective     Objective measures displayed on progress notes unless otherwise noted      Treatment      Evelina received therapeutic exercises to develop strength, endurance, ROM, flexibility, posture and core stabilization for 10 minutes including:    Nu step level 1 x 5 minutes,low intensity  LTR x 20   DKTC feet on orange swiss ball x 20     Evelina participated in neuromuscular re-education activities to improve: Balance, Coordination, Proprioception and Posture for 35 minutes. The following activities were included: (20 min one on one)    Glute set 3 x 10 5" hold  Pelvic tilts x 20 5" hold  Supine hip add (ball squeeze) x 30  Bridges 3 x 10 R TB  Modified single leg bridge over bolster 2/10 each  Supine clamshells GTB x 30  SLR 2 x 10  Standing TA activation x 15 3" hold  Mini squat 2 x 10-NP  LAQ 3# 2/10 each  Standing: hip abduction 2/10 each-NP  Standing: HR x 20 with UE support  Sit to stands  x 10    Not performed   SAQ: 2# " "3/10    Gait: with walker mod-I, SBA > 100 ft for loading, proprioception and strengthening purposes.-NP    Home Exercises Provided and Patient Education Provided     Education provided:   - HEP review    Written Home Exercises Provided: Patient instructed to cont prior HEP.  Exercises were reviewed and Evelina was able to demonstrate them prior to the end of the session.  Evelina demonstrated good  understanding of the education provided.     See EMR under Patient Instructions for exercises provided prior visit.    Assessment   Evelina tolerated continuation of functional global LE strengthening exercises fair with no increase in pain but required frequent encouragement to complete tasks. Pt requested to leave early from today's visit due to fatigue. Will monitor and attempt to progress per pt's tolerance.    Evelina Is progressing well towards her goals.   Pt prognosis is Good.     Pt will continue to benefit from skilled outpatient physical therapy to address the deficits listed in the problem list box on initial evaluation, provide pt/family education and to maximize pt's level of independence in the home and community environment.     Pt's spiritual, cultural and educational needs considered and pt agreeable to plan of care and goals.     Anticipated barriers to physical therapy: None    Goals:   Short Term Goals: 4 weeks   Independent with HEP with < 3/10 pain to right hip.   Improve right hip/knee MMT 1/2 grade for ambulatory tasks.  Improve TUG to <= 20" with AD for mobility purposes.  Improve chair rise test >/= 5 times < 40"     Long Term Goals: 8 weeks   Independent with HEP with no painful limitations.  Demonstrate right hip/knee MMT > 4/5 for ambulatory purposes.  Improve TUG to < 20" for home management purposes.  Ambulate > 150 ft mod-I for home management purposes.       Plan     Plan of care Certification: 5/31/2024 to 07/26/2024.     Outpatient Physical Therapy 2 times weekly for 8 weeks to include the " following interventions: Gait Training, Manual Therapy, Moist Heat/ Ice, Neuromuscular Re-ed, Patient Education, Therapeutic Activities, and Therapeutic Exercise.     Victor Hugo Yates, PTA

## 2024-06-19 ENCOUNTER — CLINICAL SUPPORT (OUTPATIENT)
Dept: REHABILITATION | Facility: HOSPITAL | Age: 79
End: 2024-06-19
Payer: MEDICARE

## 2024-06-19 DIAGNOSIS — R26.89 DECREASED MOBILITY: ICD-10-CM

## 2024-06-19 DIAGNOSIS — R29.898 HIP WEAKNESS: Primary | ICD-10-CM

## 2024-06-19 PROCEDURE — 97112 NEUROMUSCULAR REEDUCATION: CPT | Mod: KX,HCNC,PO | Performed by: PHYSICAL THERAPIST

## 2024-06-19 PROCEDURE — 97110 THERAPEUTIC EXERCISES: CPT | Mod: KX,HCNC,PO | Performed by: PHYSICAL THERAPIST

## 2024-06-19 NOTE — PROGRESS NOTES
"Physical Therapy Daily Treatment Note     Name: Evelina Pinon  Clinic Number: [id number]    Therapy Diagnosis:   Encounter Diagnosis   Name Primary?    Closed fracture of neck of right femur, initial encounter      Physician: Edward Ortiz MD    Visit Date: 6/19/2024    Physician Orders: PT Eval and Treat   Medical Diagnosis from Referral:   Closed fracture of neck of right femur, initial encounter  Evaluation Date: 5/31/2024  Authorization Period Expiration: 05/20/25  Plan of Care Expiration: 07/26/2024  Progress Note Due: 06/28/2024  Visit # / Visits authorized: 7/20  FOTO: 1/3    Time In: 1500  Time Out: 1600  Total Billable Time:  30 min    Precautions:  Standard and blood thinners     Subjective     Pt reports: no new s/s. Right hip soreness, stiffness noted. Patient report on sitting for long periods of time and not getting up enough and not performing HEP as well.    She was not compliant with home exercise program.  Response to previous treatment: First follow up  Functional change: TBD    Pain: 2/10  Location: right hip     Objective     Objective measures displayed on progress notes unless otherwise noted      Treatment      Evelina received therapeutic exercises to develop strength, endurance, ROM, flexibility, posture and core stabilization for 10 minutes including:    Nu step level 1 x 5 minutes,low intensity  LTR x 20   DKTC feet on orange swiss ball x 20     Evelina participated in neuromuscular re-education activities to improve: Balance, Coordination, Proprioception and Posture for 35 minutes. The following activities were included: (20 min one on one)    Glute set 3 x 10 5" hold  Pelvic tilts x 20 5" hold  Supine hip add (ball squeeze) x 30  Bridges 3 x 10 R TB  Modified single leg bridge over bolster 2/10 each  Supine clamshells GTB x 30  SLR 2 x 10  Standing TA activation x 15 3" hold  Mini squat 2 x 10-NP  LAQ 3# 2/10 each  Standing: hip abduction 2/10 each-NP  Standing: HR x 20 with UE " "support    Sit to stands off plinth at chair height x 10    Not performed   SAQ: 3# 3/10    Gait: with walker mod-I, SBA > 100 ft for loading, proprioception and strengthening purposes.  Patient ambulated 75 ft hand-held assist for mobility purposes.    Home Exercises Provided and Patient Education Provided     Education provided:   - HEP review    Written Home Exercises Provided: Patient instructed to cont prior HEP.  Exercises were reviewed and Evelina was able to demonstrate them prior to the end of the session.  Evelina demonstrated good  understanding of the education provided.     See EMR under Patient Instructions for exercises provided prior visit.    Assessment   Evelina was given cueing on posture with sit to stands and redirection on standing neutral vs forward bent position. Patient required tactile input on hip/core work. Fair to good tolerance with NMR progression with no adverse effects.    Evelina Is progressing well towards her goals.   Pt prognosis is Good.     Pt will continue to benefit from skilled outpatient physical therapy to address the deficits listed in the problem list box on initial evaluation, provide pt/family education and to maximize pt's level of independence in the home and community environment.     Pt's spiritual, cultural and educational needs considered and pt agreeable to plan of care and goals.     Anticipated barriers to physical therapy: None    Goals:   Short Term Goals: 4 weeks   Independent with HEP with < 3/10 pain to right hip.   Improve right hip/knee MMT 1/2 grade for ambulatory tasks.  Improve TUG to <= 20" with AD for mobility purposes.  Improve chair rise test >/= 5 times < 40"     Long Term Goals: 8 weeks   Independent with HEP with no painful limitations.  Demonstrate right hip/knee MMT > 4/5 for ambulatory purposes.  Improve TUG to < 20" for home management purposes.  Ambulate > 150 ft mod-I for home management purposes.       Plan     Plan of care Certification: 5/31/2024 " to 07/26/2024.     Outpatient Physical Therapy 2 times weekly for 8 weeks to include the following interventions: Gait Training, Manual Therapy, Moist Heat/ Ice, Neuromuscular Re-ed, Patient Education, Therapeutic Activities, and Therapeutic Exercise.     Everton Hutchins, PT

## 2024-06-20 ENCOUNTER — CLINICAL SUPPORT (OUTPATIENT)
Dept: PRIMARY CARE CLINIC | Facility: CLINIC | Age: 79
End: 2024-06-20
Payer: MEDICARE

## 2024-06-20 DIAGNOSIS — F41.1 GAD (GENERALIZED ANXIETY DISORDER): ICD-10-CM

## 2024-06-20 DIAGNOSIS — Z65.8 PSYCHOSOCIAL STRESSORS: Primary | ICD-10-CM

## 2024-06-20 PROCEDURE — 99499 UNLISTED E&M SERVICE: CPT | Mod: HCNC,S$GLB,, | Performed by: SOCIAL WORKER
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
## 2024-06-21 ENCOUNTER — CLINICAL SUPPORT (OUTPATIENT)
Dept: REHABILITATION | Facility: HOSPITAL | Age: 79
End: 2024-06-21
Payer: MEDICARE

## 2024-06-21 DIAGNOSIS — R26.89 DECREASED MOBILITY: ICD-10-CM

## 2024-06-21 DIAGNOSIS — R29.898 HIP WEAKNESS: Primary | ICD-10-CM

## 2024-06-21 PROCEDURE — 97112 NEUROMUSCULAR REEDUCATION: CPT | Mod: HCNC,PO,CQ

## 2024-06-21 NOTE — PROGRESS NOTES
"Physical Therapy Daily Treatment Note     Name: Evelina Pinon  Clinic Number: [id number]    Therapy Diagnosis:   Encounter Diagnosis   Name Primary?    Closed fracture of neck of right femur, initial encounter      Physician: Edward Ortiz MD    Visit Date: 6/21/2024    Physician Orders: PT Eval and Treat   Medical Diagnosis from Referral:   Closed fracture of neck of right femur, initial encounter  Evaluation Date: 5/31/2024  Authorization Period Expiration: 05/20/25  Plan of Care Expiration: 07/26/2024  Progress Note Due: 06/28/2024  Visit # / Visits authorized: 8/20  FOTO: 1/3    Time In: 2:30 pm  Time Out: 3:20 pm  Total Billable Time:  25 min 1:1    Precautions:  Standard and blood thinners     Subjective     Pt reports: no new symptoms. She continues to require encouragement to complete most PT exercises.    She was not compliant with home exercise program.  Response to previous treatment: no adverse effects  Functional change: TBD    Pain: 2/10  Location: right hip     Objective     Objective measures displayed on progress notes unless otherwise noted      Treatment      Evelina received therapeutic exercises to develop strength, endurance, ROM, flexibility, posture and core stabilization for 10 minutes including:    Nu step level 1 x 5 minutes,low intensity  LTR x 20   DKTC feet on orange swiss ball x 20     Evelina participated in neuromuscular re-education activities to improve: Balance, Coordination, Proprioception and Posture for 40 minutes. The following activities were included: (20 min one on one)    Glute set 3 x 10 5" hold  Pelvic tilts x 20 5" hold  Supine hip add (ball squeeze) x 30  Bridges 3 x 10 R TB  Modified single leg bridge over bolster 2/10 each  Supine clamshells GTB x 30  SLR 2 x 10  Standing TA activation x 15 3" hold  LAQ 3# 2/10 each  Standing: HR x 20 with UE support  Sit to stands off plinth at chair height x 10    Not performed   Mini squat 2 x 10-NP  SAQ: 3# 3/10  Standing: hip " "abduction 2/10 each-NP    Gait: with walker mod-I, SBA > 100 ft for loading, proprioception and strengthening purposes.  Patient ambulated 75 ft hand-held assist for mobility purposes.    Home Exercises Provided and Patient Education Provided     Education provided:   - HEP review    Written Home Exercises Provided: Patient instructed to cont prior HEP.  Exercises were reviewed and Evelina was able to demonstrate them prior to the end of the session.  Evelina demonstrated good  understanding of the education provided.     See EMR under Patient Instructions for exercises provided prior visit.    Assessment   Continued with functional LE strengthening and tolerated fair with pt requiring max cueing to ensure proper form and encouragement to continue performing exercises. Will attempt to progress per pt's tolerance.    Evelina Is progressing well towards her goals.   Pt prognosis is Good.     Pt will continue to benefit from skilled outpatient physical therapy to address the deficits listed in the problem list box on initial evaluation, provide pt/family education and to maximize pt's level of independence in the home and community environment.     Pt's spiritual, cultural and educational needs considered and pt agreeable to plan of care and goals.     Anticipated barriers to physical therapy: None    Goals:   Short Term Goals: 4 weeks   Independent with HEP with < 3/10 pain to right hip.   Improve right hip/knee MMT 1/2 grade for ambulatory tasks.  Improve TUG to <= 20" with AD for mobility purposes.  Improve chair rise test >/= 5 times < 40"     Long Term Goals: 8 weeks   Independent with HEP with no painful limitations.  Demonstrate right hip/knee MMT > 4/5 for ambulatory purposes.  Improve TUG to < 20" for home management purposes.  Ambulate > 150 ft mod-I for home management purposes.       Plan     Plan of care Certification: 5/31/2024 to 07/26/2024.     Outpatient Physical Therapy 2 times weekly for 8 weeks to include " the following interventions: Gait Training, Manual Therapy, Moist Heat/ Ice, Neuromuscular Re-ed, Patient Education, Therapeutic Activities, and Therapeutic Exercise.     Victor Hugo Yates, PTA

## 2024-06-24 ENCOUNTER — CLINICAL SUPPORT (OUTPATIENT)
Dept: REHABILITATION | Facility: HOSPITAL | Age: 79
End: 2024-06-24
Payer: MEDICARE

## 2024-06-24 DIAGNOSIS — R26.89 DECREASED MOBILITY: ICD-10-CM

## 2024-06-24 DIAGNOSIS — R29.898 HIP WEAKNESS: Primary | ICD-10-CM

## 2024-06-24 PROCEDURE — 97112 NEUROMUSCULAR REEDUCATION: CPT | Mod: KX,HCNC,PO | Performed by: PHYSICAL THERAPIST

## 2024-06-24 PROCEDURE — 97110 THERAPEUTIC EXERCISES: CPT | Mod: KX,HCNC,PO | Performed by: PHYSICAL THERAPIST

## 2024-06-24 NOTE — PROGRESS NOTES
"Physical Therapy Daily Treatment Note     Name: Evelina Pinon  Clinic Number: [id number]    Therapy Diagnosis:   Encounter Diagnosis   Name Primary?    Closed fracture of neck of right femur, initial encounter      Physician: Edward Ortiz MD    Visit Date: 6/24/2024    Physician Orders: PT Eval and Treat   Medical Diagnosis from Referral:   Closed fracture of neck of right femur, initial encounter  Evaluation Date: 5/31/2024  Authorization Period Expiration: 05/20/25  Plan of Care Expiration: 07/26/2024  Progress Note Due: 06/28/2024  Visit # / Visits authorized: 8/20  FOTO: 1/3    Time In: 1445  Time Out: 1545  Total Billable Time:  45    Precautions:  Standard and blood thinners     Subjective     Pt reports: no falls over the weekend. Patient reported [place] to eat. Patient reported no walking though.    She was not compliant with home exercise program.  Response to previous treatment: no adverse effects  Functional change: TBD    Pain: 2/10  Location: right hip     Objective     Objective measures displayed on progress notes unless otherwise noted    Treatment      Evelina received therapeutic exercises to develop strength, endurance, ROM, flexibility, posture and core stabilization for 10 minutes including: (5 min)    Nu step level 1 x 5 minutes,low intensity  LTR x 20   DKTC feet on orange swiss ball x 20     Evelina participated in neuromuscular re-education activities to improve: Balance, Coordination, Proprioception and Posture for 40 minutes. The following activities were included:     Glute set 3 x 10 5" hold  Pelvic tilts x 20 5" hold  Supine hip add (ball squeeze) x 30  Bridges 3 x 10 R TB  Modified single leg bridge over bolster 2/10 each  Supine clamshells GTB x 30  SLR 2 x 10  Standing TA activation x 15 3" hold  LAQ 3# 2/10 each  Standing: HR x 20 with UE support  Sit to stands off plinth at chair height x 10, progressed to chair with UE support x 5    Not performed " "  Mini squat 2 x 10-NP  SAQ: 3# 3/10  Standing: hip abduction 2/10 each-NP    Gait: with walker mod-I, SBA > 100 ft for loading, proprioception and strengthening purposes.  Patient ambulated 75 ft hand-held assist for mobility purposes.    Home Exercises Provided and Patient Education Provided     Education provided:   - HEP review    Written Home Exercises Provided: Patient instructed to cont prior HEP.  Exercises were reviewed and Evelina was able to demonstrate them prior to the end of the session.  Evelina demonstrated good  understanding of the education provided.     See EMR under Patient Instructions for exercises provided prior visit.    Assessment   Patient was given cueing on sit to stands with COG over CLAU noted.  Patient ambulated with RW mod-I with improvement in upright posture.  Fatigue noted. No adverse effects.    Evelian Is progressing well towards her goals.   Pt prognosis is Good.     Pt will continue to benefit from skilled outpatient physical therapy to address the deficits listed in the problem list box on initial evaluation, provide pt/family education and to maximize pt's level of independence in the home and community environment.     Pt's spiritual, cultural and educational needs considered and pt agreeable to plan of care and goals.     Anticipated barriers to physical therapy: None    Goals:   Short Term Goals: 4 weeks   Independent with HEP with < 3/10 pain to right hip.   Improve right hip/knee MMT 1/2 grade for ambulatory tasks.  Improve TUG to <= 20" with AD for mobility purposes.  Improve chair rise test >/= 5 times < 40"     Long Term Goals: 8 weeks   Independent with HEP with no painful limitations.  Demonstrate right hip/knee MMT > 4/5 for ambulatory purposes.  Improve TUG to < 20" for home management purposes.  Ambulate > 150 ft mod-I for home management purposes.       Plan     Plan of care Certification: 5/31/2024 to 07/26/2024.     Outpatient Physical Therapy 2 times weekly for 8 " weeks to include the following interventions: Gait Training, Manual Therapy, Moist Heat/ Ice, Neuromuscular Re-ed, Patient Education, Therapeutic Activities, and Therapeutic Exercise.     Everton Hutchins, PT

## 2024-06-26 ENCOUNTER — CLINICAL SUPPORT (OUTPATIENT)
Dept: REHABILITATION | Facility: HOSPITAL | Age: 79
End: 2024-06-26
Payer: MEDICARE

## 2024-06-26 DIAGNOSIS — R29.898 HIP WEAKNESS: Primary | ICD-10-CM

## 2024-06-26 DIAGNOSIS — R26.89 DECREASED MOBILITY: ICD-10-CM

## 2024-06-26 PROCEDURE — 97110 THERAPEUTIC EXERCISES: CPT | Mod: HCNC,PO

## 2024-06-26 PROCEDURE — 97112 NEUROMUSCULAR REEDUCATION: CPT | Mod: HCNC,PO

## 2024-06-26 NOTE — PROGRESS NOTES
"Physical Therapy Daily Treatment Note     Name: Evelina Pinon  Clinic Number: [id number]    Therapy Diagnosis:   Encounter Diagnosis   Name Primary?    Closed fracture of neck of right femur, initial encounter      Physician: Edward Ortiz MD    Visit Date: 6/26/2024    Physician Orders: PT Eval and Treat   Medical Diagnosis from Referral:   Closed fracture of neck of right femur, initial encounter  Evaluation Date: 5/31/2024  Authorization Period Expiration: 05/20/25  Plan of Care Expiration: 07/26/2024  Progress Note Due: 06/28/2024  Visit # / Visits authorized: 10/20  FOTO: 1/3    Time In: 1300  Time Out: 1355  Total Billable Time:  55    Precautions:  Standard and blood thinners     Subjective     Pt reports: no falls as of late and feels therapy is tremendously helping at this time    She was not compliant with home exercise program.  Response to previous treatment: no adverse effects  Functional change: TBD    Pain: 2/10  Location: right hip     Objective     Objective measures displayed on progress notes unless otherwise noted    Treatment      Evelina received therapeutic exercises to develop strength, endurance, ROM, flexibility, posture and core stabilization for 10 minutes including: (5 min)    Nu step level 1 x 5 minutes,low intensity  LTR x 20   DKTC feet on orange swiss ball x 20     Evelina participated in neuromuscular re-education activities to improve: Balance, Coordination, Proprioception and Posture for 45 minutes. The following activities were included:     Glute set 3 x 10 5" hold  Pelvic tilts x 20 5" hold  Supine hip add (ball squeeze) x 30  Bridges 3 x 10 R TB  Modified single leg bridge over bolster 2/10 each  Supine clamshells GTB x 30  SLR 2 x 10  Standing TA activation x 15 3" hold  LAQ 3# 2/10 each  Standing: HR x 20 with UE support  Sit to stands off plinth at chair height x 10, progressed to chair with UE support x 5    Not performed   Mini squat 2 x 10-NP  SAQ: 3# 3/10  Standing: hip " "abduction 2/10 each-NP    Gait: with walker mod-I, SBA > 100 ft for loading, proprioception and strengthening purposes.  Patient ambulated 75 ft hand-held assist for mobility purposes.    Home Exercises Provided and Patient Education Provided     Education provided:   - HEP review    Written Home Exercises Provided: Patient instructed to cont prior HEP.  Exercises were reviewed and Evelina was able to demonstrate them prior to the end of the session.  Evelina demonstrated good  understanding of the education provided.     See EMR under Patient Instructions for exercises provided prior visit.    Assessment   Pt continues to require heavy cueing with sit to stand due to dynamic B knee valgus to this date. Overall, pt able to tolerate mat-level exercises well but with early onset of fatigue with SLR activities. Patient will continue to benefit from loading and progressions to tolerance going forward to improve overall function.    Evelina Is progressing well towards her goals.   Pt prognosis is Good.     Pt will continue to benefit from skilled outpatient physical therapy to address the deficits listed in the problem list box on initial evaluation, provide pt/family education and to maximize pt's level of independence in the home and community environment.     Pt's spiritual, cultural and educational needs considered and pt agreeable to plan of care and goals.     Anticipated barriers to physical therapy: None    Goals:   Short Term Goals: 4 weeks   Independent with HEP with < 3/10 pain to right hip.   Improve right hip/knee MMT 1/2 grade for ambulatory tasks.  Improve TUG to <= 20" with AD for mobility purposes.  Improve chair rise test >/= 5 times < 40"     Long Term Goals: 8 weeks   Independent with HEP with no painful limitations.  Demonstrate right hip/knee MMT > 4/5 for ambulatory purposes.  Improve TUG to < 20" for home management purposes.  Ambulate > 150 ft mod-I for home management purposes.       Plan     Plan of " care Certification: 5/31/2024 to 07/26/2024.     Outpatient Physical Therapy 2 times weekly for 8 weeks to include the following interventions: Gait Training, Manual Therapy, Moist Heat/ Ice, Neuromuscular Re-ed, Patient Education, Therapeutic Activities, and Therapeutic Exercise.     Sean Mei, PT

## 2024-06-27 ENCOUNTER — CLINICAL SUPPORT (OUTPATIENT)
Dept: PRIMARY CARE CLINIC | Facility: CLINIC | Age: 79
End: 2024-06-27
Payer: MEDICARE

## 2024-06-27 DIAGNOSIS — F43.9 TRAUMA AND STRESSOR-RELATED DISORDER: ICD-10-CM

## 2024-06-27 DIAGNOSIS — F41.1 GAD (GENERALIZED ANXIETY DISORDER): ICD-10-CM

## 2024-06-27 DIAGNOSIS — Z65.8 PSYCHOSOCIAL STRESSORS: Primary | ICD-10-CM

## 2024-06-27 PROCEDURE — 99499 UNLISTED E&M SERVICE: CPT | Mod: HCNC,S$GLB,, | Performed by: SOCIAL WORKER
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
## 2024-06-28 ENCOUNTER — CLINICAL SUPPORT (OUTPATIENT)
Dept: REHABILITATION | Facility: HOSPITAL | Age: 79
End: 2024-06-28
Payer: MEDICARE

## 2024-06-28 DIAGNOSIS — R26.89 DECREASED MOBILITY: ICD-10-CM

## 2024-06-28 DIAGNOSIS — R29.898 HIP WEAKNESS: Primary | ICD-10-CM

## 2024-06-28 PROCEDURE — 97112 NEUROMUSCULAR REEDUCATION: CPT | Mod: HCNC,PO,CQ

## 2024-06-28 PROCEDURE — 97116 GAIT TRAINING THERAPY: CPT | Mod: HCNC,PO,CQ

## 2024-06-28 PROCEDURE — 97110 THERAPEUTIC EXERCISES: CPT | Mod: HCNC,PO,CQ

## 2024-06-28 NOTE — PROGRESS NOTES
"Physical Therapy Daily Treatment Note     Name: Evelina Pinon  Clinic Number: [id number]    Therapy Diagnosis:   Encounter Diagnosis   Name Primary?    Closed fracture of neck of right femur, initial encounter      Physician: Edward Ortiz MD    Visit Date: 6/28/2024    Physician Orders: PT Eval and Treat   Medical Diagnosis from Referral:   Closed fracture of neck of right femur, initial encounter  Evaluation Date: 5/31/2024  Authorization Period Expiration: 05/20/25  Plan of Care Expiration: 07/26/2024  Progress Note Due: 06/28/2024  Visit # / Visits authorized: 11/20  FOTO: 1/3    Time In: 2:30 pm  Time Out: 3:23 pm  Total Billable Time:  53 minutes    Precautions:  Standard and blood thinners     Subjective     Pt reports: increased L knee pain that started following the car door hitting her knee while attempting to get into the car earlier this week. Pt states "I don't know if I could all of the exercises today because of this knee".    She was not compliant with home exercise program.  Response to previous treatment: no adverse effects  Functional change: TBD    Pain: 3/10 L knee  Location: right hip     Objective     Objective measures displayed on progress notes unless otherwise noted    Treatment      Evelina received therapeutic exercises to develop strength, endurance, ROM, flexibility, posture and core stabilization for 10 minutes including:     Nu step level 1 x 5 minutes,low intensity  LTR x 20   DKTC feet on orange swiss ball x 20     Evelina participated in neuromuscular re-education activities to improve: Balance, Coordination, Proprioception and Posture for 33 minutes. The following activities were included:     Glute set 3 x 10 5" hold  Pelvic tilts x 20 5" hold  Supine hip add (ball squeeze) x 30  Bridges 3 x 10 R TB  Modified single leg bridge over bolster 2/10 each  Supine clamshells GTB x 30  SLR 2 x 10  Standing TA activation x 15 3" hold  LAQ 3# 2/10 each  Standing: HR x 20 with UE " "support  Sit to stands off plinth at chair height x 10, progressed to chair with UE support x 5    Not performed   Mini squat 2 x 10-NP  SAQ: 3# 3/10  Standing: hip abduction 2/10 each-NP    Gait Training for 10 minutes: with walker mod-I, SBA > 100 ft for loading, proprioception and strengthening purposes.  Patient ambulated 75 ft hand-held assist for mobility purposes.    Home Exercises Provided and Patient Education Provided     Education provided:   - HEP review    Written Home Exercises Provided: Patient instructed to cont prior HEP.  Exercises were reviewed and Evelina was able to demonstrate them prior to the end of the session.  Evelina demonstrated good  understanding of the education provided.     See EMR under Patient Instructions for exercises provided prior visit.    Assessment   Global functional LE strengthening exercises continued with standing exercises reincorporated into treatment. Evelina demonstrated improved walking tolerance as she was able to ambulate with CGA and rolling walker 153 feet today. Will continue to progress per pt's tolerance.    Evelina Is progressing well towards her goals.   Pt prognosis is Good.     Pt will continue to benefit from skilled outpatient physical therapy to address the deficits listed in the problem list box on initial evaluation, provide pt/family education and to maximize pt's level of independence in the home and community environment.     Pt's spiritual, cultural and educational needs considered and pt agreeable to plan of care and goals.     Anticipated barriers to physical therapy: None    Goals:   Short Term Goals: 4 weeks   Independent with HEP with < 3/10 pain to right hip.   Improve right hip/knee MMT 1/2 grade for ambulatory tasks.  Improve TUG to <= 20" with AD for mobility purposes.  Improve chair rise test >/= 5 times < 40"     Long Term Goals: 8 weeks   Independent with HEP with no painful limitations.  Demonstrate right hip/knee MMT > 4/5 for ambulatory " "purposes.  Improve TUG to < 20" for home management purposes.  Ambulate > 150 ft mod-I for home management purposes.       Plan     Plan of care Certification: 5/31/2024 to 07/26/2024.     Outpatient Physical Therapy 2 times weekly for 8 weeks to include the following interventions: Gait Training, Manual Therapy, Moist Heat/ Ice, Neuromuscular Re-ed, Patient Education, Therapeutic Activities, and Therapeutic Exercise.     Victor Hugo Yates, PTA     "

## 2024-07-01 ENCOUNTER — CLINICAL SUPPORT (OUTPATIENT)
Dept: REHABILITATION | Facility: HOSPITAL | Age: 79
End: 2024-07-01
Payer: MEDICARE

## 2024-07-01 DIAGNOSIS — R29.898 HIP WEAKNESS: Primary | ICD-10-CM

## 2024-07-01 DIAGNOSIS — R26.89 DECREASED MOBILITY: ICD-10-CM

## 2024-07-01 PROCEDURE — 97116 GAIT TRAINING THERAPY: CPT | Mod: HCNC,PO,CQ

## 2024-07-01 PROCEDURE — 97112 NEUROMUSCULAR REEDUCATION: CPT | Mod: HCNC,PO,CQ

## 2024-07-01 PROCEDURE — 97110 THERAPEUTIC EXERCISES: CPT | Mod: HCNC,PO,CQ

## 2024-07-01 NOTE — PROGRESS NOTES
"Physical Therapy Daily Treatment Note     Name: Evelina Pinon  Clinic Number: [id number]    Therapy Diagnosis:   Encounter Diagnosis   Name Primary?    Closed fracture of neck of right femur, initial encounter      Physician: Edward Ortiz MD    Visit Date: 7/1/2024    Physician Orders: PT Eval and Treat   Medical Diagnosis from Referral:   Closed fracture of neck of right femur, initial encounter  Evaluation Date: 5/31/2024  Authorization Period Expiration: 05/20/25  Plan of Care Expiration: 07/26/2024  Progress Note Due: 06/28/2024  Visit # / Visits authorized: 12/20  FOTO: 2/3    Time In: 1500  Time Out: 1555  Total Billable Time:  53 minutes    Precautions:  Standard and blood thinners     Subjective     Pt reports: her hip is feeling better but her stomach is upset today. She was not compliant with home exercise program.  Response to previous treatment: no adverse effects  Functional change: TBD    Pain: 0/10  Location: right hip     Objective     Objective measures displayed on progress notes unless otherwise noted    Treatment      Evelina received therapeutic exercises to develop strength, endurance, ROM, flexibility, posture and core stabilization for 10 minutes including:   Nu step level 1 x 5 minutes,low intensity  LTR x 20   DKTC feet on orange swiss ball x 20     Evelina participated in neuromuscular re-education activities to improve: Balance, Coordination, Proprioception and Posture for 33 minutes. The following activities were included:   Glute set 3 x 10 5" hold  Pelvic tilts x 20 5" hold  Supine hip add (ball squeeze) x 30  Bridges 3 x 10 Red TB  Modified single leg bridge over bolster 2/10 each  Supine clamshells Green TB x 30  SLR 2 x 10  Standing TA activation x 15 3" hold  LAQ 3# 2/10 each  Standing: HR x 20 with UE support  Sit to stands off plinth at chair height x 10, progressed to chair with UE support x 5    Not performed   Mini squat 2 x 10-NP  SAQ: 3# 3/10  Standing: hip abduction 2/10 " "each-NP    Gait Training for 10 minutes: with walker mod-I, SBA > 100 ft for loading, proprioception and strengthening purposes.  Patient ambulated 75 ft hand-held assist for mobility purposes. Static standing for 2-3 minutes while waiting for transportation    Home Exercises Provided and Patient Education Provided     Education provided:   - HEP review    Written Home Exercises Provided: Patient instructed to cont prior HEP.  Exercises were reviewed and Evelina was able to demonstrate them prior to the end of the session.  Evelina demonstrated good  understanding of the education provided.     See EMR under Patient Instructions for exercises provided prior visit.    Assessment     Tactile cues for correction of  valgus collapse with sit to stands. Patient able to perform supine and standing LE strengthening exercises without complaints of hip pain. Good tolerance to walking from gym to lobby and static standing for 2-3 minutes while waiting for ride. Patient subjectively reporting improvements in (R) hip pain since starting PT.     Evelina Is progressing well towards her goals.   Pt prognosis is Good.     Pt will continue to benefit from skilled outpatient physical therapy to address the deficits listed in the problem list box on initial evaluation, provide pt/family education and to maximize pt's level of independence in the home and community environment.     Pt's spiritual, cultural and educational needs considered and pt agreeable to plan of care and goals.     Anticipated barriers to physical therapy: None    Goals:   Short Term Goals: 4 weeks   Independent with HEP with < 3/10 pain to right hip.   Improve right hip/knee MMT 1/2 grade for ambulatory tasks.  Improve TUG to <= 20" with AD for mobility purposes.  Improve chair rise test >/= 5 times < 40"     Long Term Goals: 8 weeks   Independent with HEP with no painful limitations.  Demonstrate right hip/knee MMT > 4/5 for ambulatory purposes.  Improve TUG to < 20" for " home management purposes.  Ambulate > 150 ft mod-I for home management purposes.       Plan     Plan of care Certification: 5/31/2024 to 07/26/2024.     Outpatient Physical Therapy 2 times weekly for 8 weeks to include the following interventions: Gait Training, Manual Therapy, Moist Heat/ Ice, Neuromuscular Re-ed, Patient Education, Therapeutic Activities, and Therapeutic Exercise.     Amberly Samayoa, PTA

## 2024-07-01 NOTE — PROGRESS NOTES
"Physical Therapy Daily Treatment Note     Name: Evelina Pinon  Clinic Number: [id number]    Therapy Diagnosis:   Encounter Diagnosis   Name Primary?    Closed fracture of neck of right femur, initial encounter      Physician: Edward Ortiz MD    Visit Date: 7/1/2024    Physician Orders: PT Eval and Treat   Medical Diagnosis from Referral:   Closed fracture of neck of right femur, initial encounter  Evaluation Date: 5/31/2024  Authorization Period Expiration: 05/20/25  Plan of Care Expiration: 07/26/2024  Progress Note Due: 06/28/2024  Visit # / Visits authorized: 12/20  FOTO: 1/3    Time In: *** pm  Time Out: *** pm  Total Billable Time:  ** minutes    Precautions:  Standard and blood thinners     Subjective     Pt reports: increased L knee pain that started following the car door hitting her knee while attempting to get into the car earlier this week. Pt states "I don't know if I could all of the exercises today because of this knee".    She was not compliant with home exercise program.  Response to previous treatment: no adverse effects  Functional change: TBD    Pain: 3/10 L knee  Location: right hip     Objective     Objective measures displayed on progress notes unless otherwise noted    Treatment      Evelina received therapeutic exercises to develop strength, endurance, ROM, flexibility, posture and core stabilization for 10 minutes including:     Nu step level 1 x 5 minutes,low intensity  LTR x 20   DKTC feet on orange swiss ball x 20     Evelina participated in neuromuscular re-education activities to improve: Balance, Coordination, Proprioception and Posture for 33 minutes. The following activities were included:     Glute set 3 x 10 5" hold  Pelvic tilts x 20 5" hold  Supine hip add (ball squeeze) x 30  Bridges 3 x 10 R TB  Modified single leg bridge over bolster 2/10 each  Supine clamshells GTB x 30  SLR 2 x 10  Standing TA activation x 15 3" hold  LAQ 3# 2/10 each  Standing: HR x 20 with UE support  Sit " "to stands off plinth at chair height x 10, progressed to chair with UE support x 5    Not performed   Mini squat 2 x 10-NP  SAQ: 3# 3/10  Standing: hip abduction 2/10 each-NP    Gait Training for 10 minutes: with walker mod-I, SBA > 100 ft for loading, proprioception and strengthening purposes.  Patient ambulated 75 ft hand-held assist for mobility purposes.    Home Exercises Provided and Patient Education Provided     Education provided:   - HEP review    Written Home Exercises Provided: Patient instructed to cont prior HEP.  Exercises were reviewed and Evelina was able to demonstrate them prior to the end of the session.  Evelina demonstrated good  understanding of the education provided.     See EMR under Patient Instructions for exercises provided prior visit.    Assessment   Global functional LE strengthening exercises continued with standing exercises reincorporated into treatment. Evelina demonstrated improved walking tolerance as she was able to ambulate with CGA and rolling walker 153 feet today. Will continue to progress per pt's tolerance.    Evelina Is progressing well towards her goals.   Pt prognosis is Good.     Pt will continue to benefit from skilled outpatient physical therapy to address the deficits listed in the problem list box on initial evaluation, provide pt/family education and to maximize pt's level of independence in the home and community environment.     Pt's spiritual, cultural and educational needs considered and pt agreeable to plan of care and goals.     Anticipated barriers to physical therapy: None    Goals:   Short Term Goals: 4 weeks   Independent with HEP with < 3/10 pain to right hip.   Improve right hip/knee MMT 1/2 grade for ambulatory tasks.  Improve TUG to <= 20" with AD for mobility purposes.  Improve chair rise test >/= 5 times < 40"     Long Term Goals: 8 weeks   Independent with HEP with no painful limitations.  Demonstrate right hip/knee MMT > 4/5 for ambulatory purposes.  Improve " "TUG to < 20" for home management purposes.  Ambulate > 150 ft mod-I for home management purposes.       Plan     Plan of care Certification: 5/31/2024 to 07/26/2024.     Outpatient Physical Therapy 2 times weekly for 8 weeks to include the following interventions: Gait Training, Manual Therapy, Moist Heat/ Ice, Neuromuscular Re-ed, Patient Education, Therapeutic Activities, and Therapeutic Exercise.     Victor Hugo Yates, PTA       "

## 2024-07-03 ENCOUNTER — CLINICAL SUPPORT (OUTPATIENT)
Dept: REHABILITATION | Facility: HOSPITAL | Age: 79
End: 2024-07-03
Payer: MEDICARE

## 2024-07-03 ENCOUNTER — CLINICAL SUPPORT (OUTPATIENT)
Dept: PRIMARY CARE CLINIC | Facility: CLINIC | Age: 79
End: 2024-07-03
Payer: MEDICARE

## 2024-07-03 DIAGNOSIS — R26.89 DECREASED MOBILITY: ICD-10-CM

## 2024-07-03 DIAGNOSIS — F41.1 GAD (GENERALIZED ANXIETY DISORDER): ICD-10-CM

## 2024-07-03 DIAGNOSIS — Z65.8 PSYCHOSOCIAL STRESSORS: Primary | ICD-10-CM

## 2024-07-03 DIAGNOSIS — R29.898 HIP WEAKNESS: Primary | ICD-10-CM

## 2024-07-03 DIAGNOSIS — F43.9 TRAUMA AND STRESSOR-RELATED DISORDER: ICD-10-CM

## 2024-07-03 PROCEDURE — 97110 THERAPEUTIC EXERCISES: CPT | Mod: KX,HCNC,PO,CQ

## 2024-07-03 PROCEDURE — 97112 NEUROMUSCULAR REEDUCATION: CPT | Mod: KX,HCNC,PO,CQ

## 2024-07-03 NOTE — PROGRESS NOTES
"Physical Therapy Daily Treatment Note     Name: Evelina Pinon  Clinic Number: [id number]    Therapy Diagnosis:   Encounter Diagnosis   Name Primary?    Closed fracture of neck of right femur, initial encounter      Physician: Edward Ortiz MD    Visit Date: 7/3/2024    Physician Orders: PT Eval and Treat   Medical Diagnosis from Referral:   Closed fracture of neck of right femur, initial encounter  Evaluation Date: 5/31/2024  Authorization Period Expiration: 05/20/25  Plan of Care Expiration: 07/26/2024  Progress Note Due: 06/28/2024  Visit # / Visits authorized: 13/20  FOTO: 2/3    Time In: *** pm  Time Out: *** pm  Total Billable Time:  ** minutes    Precautions:  Standard and blood thinners     Subjective     Pt reports: her hip is feeling better but her stomach is upset today. She was not compliant with home exercise program.  Response to previous treatment: no adverse effects  Functional change: TBD    Pain: 0/10  Location: right hip     Objective     Objective measures displayed on progress notes unless otherwise noted    Treatment      Evelina received therapeutic exercises to develop strength, endurance, ROM, flexibility, posture and core stabilization for 10 minutes including:   Nu step level 1 x 5 minutes,low intensity  LTR x 20   DKTC feet on orange swiss ball x 20     Evelina participated in neuromuscular re-education activities to improve: Balance, Coordination, Proprioception and Posture for 33 minutes. The following activities were included:   Glute set 3 x 10 5" hold  Pelvic tilts x 20 5" hold  Supine hip add (ball squeeze) x 30  Bridges 3 x 10 Red TB  Modified single leg bridge over bolster 2/10 each  Supine clamshells Green TB x 30  SLR 2 x 10  Standing TA activation x 15 3" hold  LAQ 3# 2/10 each  Standing: HR x 20 with UE support  Sit to stands off plinth at chair height x 10, progressed to chair with UE support x 5    Not performed   Mini squat 2 x 10-NP  SAQ: 3# 3/10  Standing: hip abduction " "2/10 each-NP    Gait Training for 10 minutes: with walker mod-I, SBA > 100 ft for loading, proprioception and strengthening purposes.  Patient ambulated 75 ft hand-held assist for mobility purposes. Static standing for 2-3 minutes while waiting for transportation    Home Exercises Provided and Patient Education Provided     Education provided:   - HEP review    Written Home Exercises Provided: Patient instructed to cont prior HEP.  Exercises were reviewed and Evelina was able to demonstrate them prior to the end of the session.  Evelina demonstrated good  understanding of the education provided.     See EMR under Patient Instructions for exercises provided prior visit.    Assessment     Tactile cues for correction of  valgus collapse with sit to stands. Patient able to perform supine and standing LE strengthening exercises without complaints of hip pain. Good tolerance to walking from gym to lobby and static standing for 2-3 minutes while waiting for ride. Patient subjectively reporting improvements in (R) hip pain since starting PT.     Evelina Is progressing well towards her goals.   Pt prognosis is Good.     Pt will continue to benefit from skilled outpatient physical therapy to address the deficits listed in the problem list box on initial evaluation, provide pt/family education and to maximize pt's level of independence in the home and community environment.     Pt's spiritual, cultural and educational needs considered and pt agreeable to plan of care and goals.     Anticipated barriers to physical therapy: None    Goals:   Short Term Goals: 4 weeks   Independent with HEP with < 3/10 pain to right hip.   Improve right hip/knee MMT 1/2 grade for ambulatory tasks.  Improve TUG to <= 20" with AD for mobility purposes.  Improve chair rise test >/= 5 times < 40"     Long Term Goals: 8 weeks   Independent with HEP with no painful limitations.  Demonstrate right hip/knee MMT > 4/5 for ambulatory purposes.  Improve TUG to < 20" " for home management purposes.  Ambulate > 150 ft mod-I for home management purposes.       Plan     Plan of care Certification: 5/31/2024 to 07/26/2024.     Outpatient Physical Therapy 2 times weekly for 8 weeks to include the following interventions: Gait Training, Manual Therapy, Moist Heat/ Ice, Neuromuscular Re-ed, Patient Education, Therapeutic Activities, and Therapeutic Exercise.     Victor Hugo Yates, PTA
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
## 2024-07-03 NOTE — PROGRESS NOTES
"Physical Therapy Daily Treatment Note     Name: Evelina Pinon  Clinic Number: [id number]    Therapy Diagnosis:   Encounter Diagnosis   Name Primary?    Closed fracture of neck of right femur, initial encounter      Physician: Edward Ortiz MD    Visit Date: 7/3/2024    Physician Orders: PT Eval and Treat   Medical Diagnosis from Referral:   Closed fracture of neck of right femur, initial encounter  Evaluation Date: 5/31/2024  Authorization Period Expiration: 05/20/25  Plan of Care Expiration: 07/26/2024  Progress Note Due: 06/28/2024  Visit # / Visits authorized: 13/20  FOTO: 2/3    Time In: 1:03 PM  Time Out: 2:00 PM  Total Billable Time:  57 minutes    Precautions:  Standard and blood thinners     Subjective     Pt reports: she is w/o complaint upon arrival today and states that she feels therapy is making a difference.   She was not compliant with home exercise program.  Response to previous treatment: no adverse effects  Functional change: TBD    Pain: 0/10  Location: right hip     Objective     Objective measures displayed on progress notes unless otherwise noted    Treatment      Evelina received therapeutic exercises to develop strength, endurance, ROM, flexibility, posture and core stabilization for 15 minutes including:   Nu step level 1 x 8 minutes,low intensity  LTR x 20   DKTC feet on orange swiss ball x 20     Evelina participated in neuromuscular re-education activities to improve: Balance, Coordination, Proprioception and Posture for 42 minutes. The following activities were included:   Glute set 3 x 10 5" hold  Pelvic tilts x 20 5" hold  Supine hip add (ball squeeze) x 30  Bridges 3 x 10 Red TB  Modified single leg bridge over bolster 2/10 each  Supine clamshells Green TB x 30  SLR 2 x 10  Standing TA activation x 15 3" hold  LAQ 3# 2/10 each  Standing: HR x 20 with UE support  Sit to stands off plinth at chair height x 10, progressed to chair with UE support x 5  Mini squat 2 x 10    Not performed " "  SAQ: 3# 3/10  Standing: hip abduction 2/10 each-NP    Gait Training for 00 minutes: with walker mod-I, SBA > 100 ft for loading, proprioception and strengthening purposes.  Patient ambulated 75 ft hand-held assist for mobility purposes. Static standing for 2-3 minutes while waiting for transportation    Home Exercises Provided and Patient Education Provided     Education provided:   - HEP review    Written Home Exercises Provided: Patient instructed to cont prior HEP.  Exercises were reviewed and Evelina was able to demonstrate them prior to the end of the session.  Evelina demonstrated good  understanding of the education provided.     See EMR under Patient Instructions for exercises provided prior visit.    Assessment     Evelina yaneth today's tx with ther ex and neuromuscular re-ed fair due to continued c/o fatigue throughout tx. She is reluctant to participate in activities and requires mod encouragement and VCs for effort. Tactile cues for correction of  valgus collapse with sit to stands and bridging. Good tolerance to walking from gym to lobby w/o AD and static standing with exs. Patient subjectively reporting improvements in (R) hip pain since starting PT.     Evelina Is progressing well towards her goals.   Pt prognosis is Good.     Pt will continue to benefit from skilled outpatient physical therapy to address the deficits listed in the problem list box on initial evaluation, provide pt/family education and to maximize pt's level of independence in the home and community environment.     Pt's spiritual, cultural and educational needs considered and pt agreeable to plan of care and goals.     Anticipated barriers to physical therapy: None    Goals:   Short Term Goals: 4 weeks   Independent with HEP with < 3/10 pain to right hip.   Improve right hip/knee MMT 1/2 grade for ambulatory tasks.  Improve TUG to <= 20" with AD for mobility purposes.  Improve chair rise test >/= 5 times < 40"     Long Term Goals: 8 weeks " "  Independent with HEP with no painful limitations.  Demonstrate right hip/knee MMT > 4/5 for ambulatory purposes.  Improve TUG to < 20" for home management purposes.  Ambulate > 150 ft mod-I for home management purposes.       Plan     Plan of care Certification: 5/31/2024 to 07/26/2024.     Outpatient Physical Therapy 2 times weekly for 8 weeks to include the following interventions: Gait Training, Manual Therapy, Moist Heat/ Ice, Neuromuscular Re-ed, Patient Education, Therapeutic Activities, and Therapeutic Exercise.     Gary Moore, PTA         "

## 2024-07-08 ENCOUNTER — CLINICAL SUPPORT (OUTPATIENT)
Dept: REHABILITATION | Facility: HOSPITAL | Age: 79
End: 2024-07-08
Payer: MEDICARE

## 2024-07-08 DIAGNOSIS — R26.89 DECREASED MOBILITY: ICD-10-CM

## 2024-07-08 DIAGNOSIS — R29.898 HIP WEAKNESS: Primary | ICD-10-CM

## 2024-07-08 PROCEDURE — 97110 THERAPEUTIC EXERCISES: CPT | Mod: HCNC,PO,CQ

## 2024-07-08 PROCEDURE — 97112 NEUROMUSCULAR REEDUCATION: CPT | Mod: HCNC,PO,CQ

## 2024-07-08 NOTE — PROGRESS NOTES
"Physical Therapy Daily Treatment Note     Name: Evelina Pinon  Clinic Number: [id number]    Therapy Diagnosis:   Encounter Diagnosis   Name Primary?    Closed fracture of neck of right femur, initial encounter      Physician: Edward Ortiz MD    Visit Date: 7/8/2024    Physician Orders: PT Eval and Treat   Medical Diagnosis from Referral:   Closed fracture of neck of right femur, initial encounter  Evaluation Date: 5/31/2024  Authorization Period Expiration: 05/20/25  Plan of Care Expiration: 07/26/2024  Progress Note Due: 06/28/2024  Visit # / Visits authorized: 14/20  FOTO: 2/3    Time In: 3:00 PM  Time Out: 3:45 PM  Total Billable Time:  45 minutes    Precautions:  Standard and blood thinners     Subjective     Pt reports: "I'm seeing a big difference, I don't have to use the walker as much"..    She was not compliant with home exercise program.  Response to previous treatment: no adverse effects  Functional change: TBD    Pain: 0/10  Location: right hip     Objective     Objective measures displayed on progress notes unless otherwise noted    Treatment      Evelina received therapeutic exercises to develop strength, endurance, ROM, flexibility, posture and core stabilization for 15 minutes including:   Nu step level 1 x 5 minutes,low intensity  LTR x 20   DKTC feet on orange swiss ball x 20     Evelina participated in neuromuscular re-education activities to improve: Balance, Coordination, Proprioception and Posture for 30 minutes. The following activities were included:   Glute set 3 x 10 5" hold  Pelvic tilts x 20 5" hold  Supine hip add (ball squeeze) x 30  Bridges 3 x 10 Green TB  Modified single leg bridge over bolster 2/10 each-NP  Supine clamshells Green TB x 30  SLR 2 x 10  Standing TA activation x 15 3" hold  LAQ 3# 2/10 each  Standing: HR x 20 with UE support  Sit to stands off plinth at chair height x 10  Mini squat 2 x 10    Not performed   SAQ: 3# 3/10  Standing: hip abduction 2/10 each-NP    Gait " "Training for 00 minutes: with walker mod-I, SBA > 100 ft for loading, proprioception and strengthening purposes.  Patient ambulated 75 ft hand-held assist for mobility purposes. Static standing for 2-3 minutes while waiting for transportation    Home Exercises Provided and Patient Education Provided     Education provided:   - HEP review    Written Home Exercises Provided: Patient instructed to cont prior HEP.  Exercises were reviewed and Evelina was able to demonstrate them prior to the end of the session.  Evelina demonstrated good  understanding of the education provided.     See EMR under Patient Instructions for exercises provided prior visit.    Assessment   Evelina continues to subjectively report improvements in global LE functional strength and decrease in need in AD with home ambulation. She continues to be reluctant to participate in many activities, requiring extensive verbal cueing and encouragement. Pt requested to leave early today due to general and muscular fatigue after 45 minutes of exercise. Will monitor and attempt to progress per pt's tolerance.    Evelina Is progressing well towards her goals.   Pt prognosis is Good.     Pt will continue to benefit from skilled outpatient physical therapy to address the deficits listed in the problem list box on initial evaluation, provide pt/family education and to maximize pt's level of independence in the home and community environment.     Pt's spiritual, cultural and educational needs considered and pt agreeable to plan of care and goals.     Anticipated barriers to physical therapy: None    Goals:   Short Term Goals: 4 weeks   Independent with HEP with < 3/10 pain to right hip.   Improve right hip/knee MMT 1/2 grade for ambulatory tasks.  Improve TUG to <= 20" with AD for mobility purposes.  Improve chair rise test >/= 5 times < 40"     Long Term Goals: 8 weeks   Independent with HEP with no painful limitations.  Demonstrate right hip/knee MMT > 4/5 for ambulatory " "purposes.  Improve TUG to < 20" for home management purposes.  Ambulate > 150 ft mod-I for home management purposes.       Plan     Plan of care Certification: 5/31/2024 to 07/26/2024.     Outpatient Physical Therapy 2 times weekly for 8 weeks to include the following interventions: Gait Training, Manual Therapy, Moist Heat/ Ice, Neuromuscular Re-ed, Patient Education, Therapeutic Activities, and Therapeutic Exercise.     Victor Hugo Yates, PTA     "
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
## 2024-07-10 ENCOUNTER — CLINICAL SUPPORT (OUTPATIENT)
Dept: PRIMARY CARE CLINIC | Facility: CLINIC | Age: 79
End: 2024-07-10
Payer: MEDICARE

## 2024-07-10 ENCOUNTER — CLINICAL SUPPORT (OUTPATIENT)
Dept: REHABILITATION | Facility: HOSPITAL | Age: 79
End: 2024-07-10
Payer: MEDICARE

## 2024-07-10 DIAGNOSIS — R29.898 HIP WEAKNESS: Primary | ICD-10-CM

## 2024-07-10 DIAGNOSIS — F43.9 TRAUMA AND STRESSOR-RELATED DISORDER: ICD-10-CM

## 2024-07-10 DIAGNOSIS — Z65.8 PSYCHOSOCIAL STRESSORS: Primary | ICD-10-CM

## 2024-07-10 DIAGNOSIS — F41.1 GAD (GENERALIZED ANXIETY DISORDER): ICD-10-CM

## 2024-07-10 DIAGNOSIS — R26.89 DECREASED MOBILITY: ICD-10-CM

## 2024-07-10 PROCEDURE — 97110 THERAPEUTIC EXERCISES: CPT | Mod: HCNC,PO,CQ

## 2024-07-10 PROCEDURE — 97116 GAIT TRAINING THERAPY: CPT | Mod: HCNC,PO,CQ

## 2024-07-10 PROCEDURE — 97112 NEUROMUSCULAR REEDUCATION: CPT | Mod: HCNC,PO,CQ

## 2024-07-10 PROCEDURE — 99499 UNLISTED E&M SERVICE: CPT | Mod: HCNC,S$GLB,, | Performed by: SOCIAL WORKER

## 2024-07-10 NOTE — PROGRESS NOTES
"Physical Therapy Daily Treatment Note     Name: Evelina Pinon  Clinic Number: [id number]    Therapy Diagnosis:   Encounter Diagnosis   Name Primary?    Closed fracture of neck of right femur, initial encounter      Physician: Edward Ortiz MD    Visit Date: 7/10/2024    Physician Orders: PT Eval and Treat   Medical Diagnosis from Referral:   Closed fracture of neck of right femur, initial encounter  Evaluation Date: 5/31/2024  Authorization Period Expiration: 05/20/25  Plan of Care Expiration: 07/26/2024  Progress Note Due: 06/28/2024  Visit # / Visits authorized: 15/20  FOTO: 2/3    Time In: 3:00 PM  Time Out: 3:50 PM  Total Billable Time:  50 minutes    Precautions:  Standard and blood thinners     Subjective     Pt reports: she has been getting around the house easier.    She was not compliant with home exercise program.  Response to previous treatment: no adverse effects  Functional change: TBD    Pain: 0/10  Location: right hip     Objective     Objective measures displayed on progress notes unless otherwise noted    Treatment      Evelina received therapeutic exercises to develop strength, endurance, ROM, flexibility, posture and core stabilization for 10 minutes including:   Nu step level 1 x 5 minutes,low intensity  LTR x 20   DKTC feet on orange swiss ball x 20   Supine hip flexor stretch 3 x 30"     Evelina participated in neuromuscular re-education activities to improve: Balance, Coordination, Proprioception and Posture for 30 minutes. The following activities were included:   Glute set 3 x 10 5" hold-NP  Pelvic tilts x 20 5" hold  Supine hip add (ball squeeze) x 30  Bridges 3 x 10 Green TB  Supine clamshells Green TB x 30  SLR 2 x 10  Standing TA activation x 15 3" hold  LAQ 3# 2/10 each  Standing: HR x 20 with UE support  Standing: hip abduction 2/10 each  Sit to stands off plinth at chair height x 10  Mini squat 2 x 10    Not performed   Modified single leg bridge over bolster 2/10 each-NP  SAQ: 3# " "3/10      Gait Training for 10 minutes: with walker mod-I, SBA > 170 ft for loading, proprioception and strengthening purposes.      Home Exercises Provided and Patient Education Provided     Education provided:   - HEP review    Written Home Exercises Provided: Patient instructed to cont prior HEP.  Exercises were reviewed and Evelina was able to demonstrate them prior to the end of the session.  Evelina demonstrated good  understanding of the education provided.     See EMR under Patient Instructions for exercises provided prior visit.    Assessment   Evelina continues to subjectively report improvements in global LE functional strength and decrease in need in AD with home ambulation. Evelina was less reluctant to participate in PT today, requiring fewer verbal cueing and encouragement to complete tasks. Evelina demonstrated improved walking tolerance as she was able to ambulate 170 feet with RW before requiring a seated rest break. Will monitor and attempt to progress per pt's tolerance.    Evelina Is progressing well towards her goals.   Pt prognosis is Good.     Pt will continue to benefit from skilled outpatient physical therapy to address the deficits listed in the problem list box on initial evaluation, provide pt/family education and to maximize pt's level of independence in the home and community environment.     Pt's spiritual, cultural and educational needs considered and pt agreeable to plan of care and goals.     Anticipated barriers to physical therapy: None    Goals:   Short Term Goals: 4 weeks   Independent with HEP with < 3/10 pain to right hip.   Improve right hip/knee MMT 1/2 grade for ambulatory tasks.  Improve TUG to <= 20" with AD for mobility purposes.  Improve chair rise test >/= 5 times < 40"     Long Term Goals: 8 weeks   Independent with HEP with no painful limitations.  Demonstrate right hip/knee MMT > 4/5 for ambulatory purposes.  Improve TUG to < 20" for home management purposes.  Ambulate > 150 ft " mod-I for home management purposes.       Plan     Plan of care Certification: 5/31/2024 to 07/26/2024.     Outpatient Physical Therapy 2 times weekly for 8 weeks to include the following interventions: Gait Training, Manual Therapy, Moist Heat/ Ice, Neuromuscular Re-ed, Patient Education, Therapeutic Activities, and Therapeutic Exercise.     Victor Hugo Yates, PTA
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
## 2024-07-12 ENCOUNTER — CLINICAL SUPPORT (OUTPATIENT)
Dept: REHABILITATION | Facility: HOSPITAL | Age: 79
End: 2024-07-12
Payer: MEDICARE

## 2024-07-12 ENCOUNTER — DOCUMENTATION ONLY (OUTPATIENT)
Dept: REHABILITATION | Facility: HOSPITAL | Age: 79
End: 2024-07-12
Payer: MEDICARE

## 2024-07-12 DIAGNOSIS — R29.898 HIP WEAKNESS: Primary | ICD-10-CM

## 2024-07-12 DIAGNOSIS — R26.89 DECREASED MOBILITY: ICD-10-CM

## 2024-07-12 PROCEDURE — 97110 THERAPEUTIC EXERCISES: CPT | Mod: HCNC,PO | Performed by: PHYSICAL THERAPIST

## 2024-07-12 PROCEDURE — 97112 NEUROMUSCULAR REEDUCATION: CPT | Mod: HCNC,PO | Performed by: PHYSICAL THERAPIST

## 2024-07-12 PROCEDURE — 97116 GAIT TRAINING THERAPY: CPT | Mod: HCNC,PO | Performed by: PHYSICAL THERAPIST

## 2024-07-12 NOTE — PROGRESS NOTES
PT/PTA met face to face to discuss pt's treatment plan and progress towards established goals. Pt will be seen by a physical therapist minimally every 6th visit or every 30 days.    Please see Updated Plan of Care dated 07/12/2024 for changes and updated goals.    Everton Hutchins, PT

## 2024-07-12 NOTE — PROGRESS NOTES
Physical Therapy Daily Treatment Note     Name: Evelina Pinon  Clinic Number: [id number]    Therapy Diagnosis:   Encounter Diagnosis   Name Primary?    Closed fracture of neck of right femur, initial encounter      Physician: Edwrad Ortiz MD    Visit Date: 7/12/2024    Physician Orders: PT Eval and Treat   Medical Diagnosis from Referral:   Closed fracture of neck of right femur, initial encounter  Evaluation Date: 5/31/2024  Authorization Period Expiration: 05/20/25  Plan of Care Expiration: 07/26/2024  Progress Note Due: 06/28/2024  Visit # / Visits authorized: 16/20  FOTO: 2/3    Time In: 1100  Time Out: 1200  Total Billable Time:  53 minutes    Precautions:  Standard and blood thinners     Subjective     Pt reports: walking around the house with less hip pain. No falls noted. Patient noted on stopping therapy in 2 weeks due to feeling better and will continue with HEP.    She was not compliant with home exercise program.  Response to previous treatment: no adverse effects  Functional change:  walking longer periods in the house.    Pain: 0/10  Location: right hip     Objective     Objective measures displayed on progress notes unless otherwise noted          Right Left Comment   Hip Flexion: 4/5 5/5     Hip ABD: 4-/5 4/5     Hip ADD: 4+/5 5/5     Hip Extension: 4-/5 4/5     Knee Ext: 4/5 5/5     Knee Flex: 4+/5 5/5           Joint Mobility: (hypo, hyper, normal): right hip hypo        Special Test:  Hip:  [place] -          Evaluation [unfilled]   Timed Up and Go 27.25 sec  < 20 sec safe for independent transfers, < 30 sec safe for dependent transfers/assist required   Self Selected Walking Speed  m/sec (6m/s)   Fast Walking Speed DNT       Treatment      Evelina received therapeutic exercises to develop strength, endurance, ROM, flexibility, posture and core stabilization for 15 minutes including:     Nu step level 1 x 5 minutes,low intensity  LTR x 20   DKTC feet on orange swiss ball x 20   Supine hip  "flexor stretch 3 x 30"     Evelina participated in neuromuscular re-education activities to improve: Balance, Coordination, Proprioception and Posture for 30 minutes. The following activities were included:     Glute set 3 x 10 5" hold-NP  Pelvic tilts x 20 5" hold  Supine hip add (ball squeeze) x 30  Bridges 3 x 10 Green TB  Supine clamshells Green TB x 30  SLR 2 x 10  Standing TA activation x 15 3" hold  LAQ 3# 2/10 each  Standing: HR x 20 with UE support  Standing: hip abduction 2/10 each  Sit to stands off plinth at chair height x 10  Mini squat 2 x 10    LAQ: 4# 2/10 each    TUG: test  CRT:     Gait Training for 8 minutes: with walker mod-I, SBA/CGA > 170 ft for loading, proprioception and strengthening purposes.      Home Exercises Provided and Patient Education Provided     Education provided:   - HEP review    Written Home Exercises Provided: Patient instructed to cont prior HEP.  Exercises were reviewed and Evelina was able to demonstrate them prior to the end of the session.  Evelina demonstrated good  understanding of the education provided.     See EMR under Patient Instructions for exercises provided prior visit.    Assessment   Evelina reported right hip feels better today with movement noted. Patient ambulated with SBA/CGA noted > 100 ft with increased time with double leg support for turns. Patient demonstrated TUG under 30" with increased visual dependence to the floor. Cueing on standing posture with neutral being the focus. No adverse effects.    Evelina Is progressing well towards her goals.   Pt prognosis is Good.     Pt will continue to benefit from skilled outpatient physical therapy to address the deficits listed in the problem list box on initial evaluation, provide pt/family education and to maximize pt's level of independence in the home and community environment.     Pt's spiritual, cultural and educational needs considered and pt agreeable to plan of care and goals.     Anticipated barriers to physical " "therapy: None    Goals:   Short Term Goals: 4 weeks   Independent with HEP with < 3/10 pain to right hip. Met  Improve right hip/knee MMT 1/2 grade for ambulatory tasks. Met  Improve TUG to <= 20" with AD for mobility purposes. Not Met  Improve chair rise test >/= 5 times < 40" Not Met     Long Term Goals: 8 weeks   Independent with HEP with no painful limitations.  Demonstrate right hip/knee MMT > 4/5 for ambulatory purposes.  Improve TUG to < 20" for home management purposes.  Ambulate > 150 ft mod-I for home management purposes.       Plan     Plan of care Certification: 5/31/2024 to 07/26/2024.     Outpatient Physical Therapy 2 times weekly for 8 weeks to include the following interventions: Gait Training, Manual Therapy, Moist Heat/ Ice, Neuromuscular Re-ed, Patient Education, Therapeutic Activities, and Therapeutic Exercise.     Everton Hutchins, PT         "

## 2024-07-12 NOTE — PROGRESS NOTES
"Physical Therapy Daily Treatment Note     Name: Evelina Pinon  Clinic Number: [id number]    Therapy Diagnosis:   Encounter Diagnosis   Name Primary?    Closed fracture of neck of right femur, initial encounter      Physician: Edward Ortiz MD    Visit Date: 7/12/2024    Physician Orders: PT Eval and Treat   Medical Diagnosis from Referral:   Closed fracture of neck of right femur, initial encounter  Evaluation Date: 5/31/2024  Authorization Period Expiration: 05/20/25  Plan of Care Expiration: 07/26/2024  Progress Note Due: 06/28/2024  Visit # / Visits authorized: 15/20  FOTO: 2/3    Time In: 3:00 PM  Time Out: 3:50 PM  Total Billable Time:  50 minutes    Precautions:  Standard and blood thinners     Subjective     Pt reports: she has been getting around the house easier.    She was not compliant with home exercise program.  Response to previous treatment: no adverse effects  Functional change: TBD    Pain: 0/10  Location: right hip     Objective     Objective measures displayed on progress notes unless otherwise noted    Treatment      Evelina received therapeutic exercises to develop strength, endurance, ROM, flexibility, posture and core stabilization for 10 minutes including:   Nu step level 1 x 5 minutes,low intensity  LTR x 20   DKTC feet on orange swiss ball x 20   Supine hip flexor stretch 3 x 30"     Evelina participated in neuromuscular re-education activities to improve: Balance, Coordination, Proprioception and Posture for 30 minutes. The following activities were included:   Glute set 3 x 10 5" hold-NP  Pelvic tilts x 20 5" hold  Supine hip add (ball squeeze) x 30  Bridges 3 x 10 Green TB  Supine clamshells Green TB x 30  SLR 2 x 10  Standing TA activation x 15 3" hold  LAQ 3# 2/10 each  Standing: HR x 20 with UE support  Standing: hip abduction 2/10 each  Sit to stands off plinth at chair height x 10  Mini squat 2 x 10    Not performed   Modified single leg bridge over bolster 2/10 each-NP  SAQ: 3# " "3/10      Gait Training for 10 minutes: with walker mod-I, SBA > 170 ft for loading, proprioception and strengthening purposes.      Home Exercises Provided and Patient Education Provided     Education provided:   - HEP review    Written Home Exercises Provided: Patient instructed to cont prior HEP.  Exercises were reviewed and Evelina was able to demonstrate them prior to the end of the session.  Evelina demonstrated good  understanding of the education provided.     See EMR under Patient Instructions for exercises provided prior visit.    Assessment   Evelina continues to subjectively report improvements in global LE functional strength and decrease in need in AD with home ambulation. Evelina was less reluctant to participate in PT today, requiring fewer verbal cueing and encouragement to complete tasks. Evelina demonstrated improved walking tolerance as she was able to ambulate 170 feet with RW before requiring a seated rest break. Will monitor and attempt to progress per pt's tolerance.    Evelina Is progressing well towards her goals.   Pt prognosis is Good.     Pt will continue to benefit from skilled outpatient physical therapy to address the deficits listed in the problem list box on initial evaluation, provide pt/family education and to maximize pt's level of independence in the home and community environment.     Pt's spiritual, cultural and educational needs considered and pt agreeable to plan of care and goals.     Anticipated barriers to physical therapy: None    Goals:   Short Term Goals: 4 weeks   Independent with HEP with < 3/10 pain to right hip.   Improve right hip/knee MMT 1/2 grade for ambulatory tasks.  Improve TUG to <= 20" with AD for mobility purposes.  Improve chair rise test >/= 5 times < 40"     Long Term Goals: 8 weeks   Independent with HEP with no painful limitations.  Demonstrate right hip/knee MMT > 4/5 for ambulatory purposes.  Improve TUG to < 20" for home management purposes.  Ambulate > 150 ft " mod-I for home management purposes.       Plan     Plan of care Certification: 5/31/2024 to 07/26/2024.     Outpatient Physical Therapy 2 times weekly for 8 weeks to include the following interventions: Gait Training, Manual Therapy, Moist Heat/ Ice, Neuromuscular Re-ed, Patient Education, Therapeutic Activities, and Therapeutic Exercise.     Everton Hutchins, PT

## 2024-07-15 ENCOUNTER — CLINICAL SUPPORT (OUTPATIENT)
Dept: REHABILITATION | Facility: HOSPITAL | Age: 79
End: 2024-07-15
Payer: MEDICARE

## 2024-07-15 DIAGNOSIS — R29.898 HIP WEAKNESS: Primary | ICD-10-CM

## 2024-07-15 DIAGNOSIS — R26.89 DECREASED MOBILITY: ICD-10-CM

## 2024-07-15 PROCEDURE — 97110 THERAPEUTIC EXERCISES: CPT | Mod: HCNC,PO,CQ

## 2024-07-15 PROCEDURE — 97112 NEUROMUSCULAR REEDUCATION: CPT | Mod: HCNC,PO,CQ

## 2024-07-15 NOTE — PROGRESS NOTES
"Physical Therapy Daily Treatment Note     Name: Evelina Pinon  Clinic Number: [id number]    Therapy Diagnosis:   Encounter Diagnosis   Name Primary?    Closed fracture of neck of right femur, initial encounter      Physician: Edward Ortiz MD    Visit Date: 7/15/2024    Physician Orders: PT Eval and Treat   Medical Diagnosis from Referral:   Closed fracture of neck of right femur, initial encounter  Evaluation Date: 5/31/2024  Authorization Period Expiration: 05/20/25  Plan of Care Expiration: 07/26/2024  Progress Note Due: 06/28/2024  Visit # / Visits authorized: 17/20  FOTO: 2/3    Time In: 3:55 pm  Time Out: 4:41 pm  Total Billable Time:  23 minutes 1:1    Precautions:  Standard and blood thinners     Subjective     Pt reports: no R hip pain and continues to note improvements in functional LE strength.    She was not compliant with home exercise program.  Response to previous treatment: no adverse effects  Functional change:  walking longer periods in the house.    Pain: 0/10  Location: right hip     Objective     Objective measures displayed on progress notes unless otherwise noted          Right Left Comment   Hip Flexion: 4/5 5/5     Hip ABD: 4-/5 4/5     Hip ADD: 4+/5 5/5     Hip Extension: 4-/5 4/5     Knee Ext: 4/5 5/5     Knee Flex: 4+/5 5/5           Joint Mobility: (hypo, hyper, normal): right hip hypo        Special Test:  Hip:  [place] -          Evaluation [unfilled]   Timed Up and Go 27.25 sec  < 20 sec safe for independent transfers, < 30 sec safe for dependent transfers/assist required   Self Selected Walking Speed  m/sec (6m/s)   Fast Walking Speed DNT       Treatment      Evelina received therapeutic exercises to develop strength, endurance, ROM, flexibility, posture and core stabilization for 15 minutes including:     Nu step level 1 x 5 minutes,low intensity  LTR x 20   DKTC feet on orange swiss ball x 20   Supine hip flexor stretch 3 x 30"     Evelina participated in neuromuscular re-education " "activities to improve: Balance, Coordination, Proprioception and Posture for 31 minutes. The following activities were included:     Glute set 3 x 10 5" hold-NP  Pelvic tilts x 20 5" hold  Supine hip add (ball squeeze) x 30  Bridges 3 x 10 Green TB  Supine clamshells Green TB x 30  SLR 2 x 10  Standing TA activation x 15 3" hold-NP  Standing: HR x 20 with UE support  Standing: hip abduction 2/10 each-NP  Sit to stands off plinth at chair height x 10  Mini squat 2 x 10    LAQ: 4# 2/10 each      Gait Training for 00 minutes: with walker mod-I, SBA/CGA > 170 ft for loading, proprioception and strengthening purposes.      Home Exercises Provided and Patient Education Provided     Education provided:   - HEP review    Written Home Exercises Provided: Patient instructed to cont prior HEP.  Exercises were reviewed and Evelina was able to demonstrate them prior to the end of the session.  Evelina demonstrated good  understanding of the education provided.     See EMR under Patient Instructions for exercises provided prior visit.    Assessment   Evelina continues to report no overall pain and improved functional LE strength. Extensive cueing and encouragement still required for completion of exercises. She tolerated continuation of functional global LE strengthening fair, reporting increase in muscular and general fatigue. Pt was only able to tolerate 46 minutes of exercise before requesting to leave.    Evelina Is progressing well towards her goals.   Pt prognosis is Good.     Pt will continue to benefit from skilled outpatient physical therapy to address the deficits listed in the problem list box on initial evaluation, provide pt/family education and to maximize pt's level of independence in the home and community environment.     Pt's spiritual, cultural and educational needs considered and pt agreeable to plan of care and goals.     Anticipated barriers to physical therapy: None    Goals:   Short Term Goals: 4 weeks   Independent " "with HEP with < 3/10 pain to right hip. Met  Improve right hip/knee MMT 1/2 grade for ambulatory tasks. Met  Improve TUG to <= 20" with AD for mobility purposes. Not Met  Improve chair rise test >/= 5 times < 40" Not Met     Long Term Goals: 8 weeks   Independent with HEP with no painful limitations.  Demonstrate right hip/knee MMT > 4/5 for ambulatory purposes.  Improve TUG to < 20" for home management purposes.  Ambulate > 150 ft mod-I for home management purposes.       Plan     Plan of care Certification: 5/31/2024 to 07/26/2024.     Outpatient Physical Therapy 2 times weekly for 8 weeks to include the following interventions: Gait Training, Manual Therapy, Moist Heat/ Ice, Neuromuscular Re-ed, Patient Education, Therapeutic Activities, and Therapeutic Exercise.     Victor Hugo Yates, PTA   "

## 2024-07-19 ENCOUNTER — TELEPHONE (OUTPATIENT)
Dept: PRIMARY CARE CLINIC | Facility: CLINIC | Age: 79
End: 2024-07-19
Payer: MEDICARE

## 2024-07-19 NOTE — TELEPHONE ENCOUNTER
Clinician returned call to pt. She canceled appt for 7/18, and called to reschedule. She already has an appt 7/25 at 3:00. There was no answer and voice mail was left informing her of the appt on 7/25.

## 2024-07-24 ENCOUNTER — OFFICE VISIT (OUTPATIENT)
Dept: OPHTHALMOLOGY | Facility: CLINIC | Age: 79
End: 2024-07-24
Payer: MEDICARE

## 2024-07-24 DIAGNOSIS — H25.11 AGE-RELATED NUCLEAR CATARACT, RIGHT: ICD-10-CM

## 2024-07-24 DIAGNOSIS — H44.112 PANUVEITIS, LEFT: Primary | ICD-10-CM

## 2024-07-24 DIAGNOSIS — Z96.1 PSEUDOPHAKIA, LEFT EYE: ICD-10-CM

## 2024-07-24 DIAGNOSIS — D86.0 SARCOIDOSIS OF LUNG: ICD-10-CM

## 2024-07-24 DIAGNOSIS — H35.373 EPIRETINAL MEMBRANE (ERM) OF BOTH EYES: ICD-10-CM

## 2024-07-24 PROCEDURE — 1159F MED LIST DOCD IN RCRD: CPT | Mod: HCNC,CPTII,S$GLB, | Performed by: OPHTHALMOLOGY

## 2024-07-24 PROCEDURE — 1160F RVW MEDS BY RX/DR IN RCRD: CPT | Mod: HCNC,CPTII,S$GLB, | Performed by: OPHTHALMOLOGY

## 2024-07-24 PROCEDURE — 92202 OPSCPY EXTND ON/MAC DRAW: CPT | Mod: HCNC,S$GLB,, | Performed by: OPHTHALMOLOGY

## 2024-07-24 PROCEDURE — 1126F AMNT PAIN NOTED NONE PRSNT: CPT | Mod: HCNC,CPTII,S$GLB, | Performed by: OPHTHALMOLOGY

## 2024-07-24 PROCEDURE — 99999 PR PBB SHADOW E&M-EST. PATIENT-LVL III: CPT | Mod: PBBFAC,HCNC,, | Performed by: OPHTHALMOLOGY

## 2024-07-24 PROCEDURE — 92134 CPTRZ OPH DX IMG PST SGM RTA: CPT | Mod: HCNC,S$GLB,, | Performed by: OPHTHALMOLOGY

## 2024-07-24 PROCEDURE — 99213 OFFICE O/P EST LOW 20 MIN: CPT | Mod: HCNC,S$GLB,, | Performed by: OPHTHALMOLOGY

## 2024-07-24 PROCEDURE — G2211 COMPLEX E/M VISIT ADD ON: HCPCS | Mod: HCNC,S$GLB,, | Performed by: OPHTHALMOLOGY

## 2024-07-24 NOTE — PROGRESS NOTES
HPI    DLS: 4/24/2024- 3m DFE     Pt states no new complaints. Denies pain/ FOL/ floaters.     No gtts.     Vision is improving OS       Last edited by Bettye Tai on 7/24/2024  2:53 PM.           A/P    ICD-10-CM ICD-9-CM   1. 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 Sarcoidosis of lung  D86.0 135     517.8   3. Epiretinal membrane (ERM) of both eyes  H35.373 362.56   4. Age-related nuclear cataract, right  H25.11 366.16   5. Pseudophakia, left eye  Z96.1 V43.1       1. Panuveitis, left  2. Sarcoidosis of lung  F/b Rheum - Recent notes reviewed    Currently on MTX    Exam notable for stable vitreous debris and CR scarring in periphery and disorganization of retina layers on OCT for left eye. OD appears quiet today. Has hx of sarcoid     Plan: appears inactive today, Monitor for now, will recheck in 6 mo to ensure OD does not have involvement while on MTX    Visit today is associated with current or anticipated ongoing medical care related to this patients single serious condition/complex condition (panuveitis left eye)     3. Epiretinal membrane (ERM) of both eyes  Mild ERM OU, NVS  Plan: Observation    4. Age-related nuclear cataract, right  Borderline VS NS OD  Plan: Observation , Update Mrx prn     5. Pseudophakia, left eye  Good lens position  Plan: Observation     RTC 6 mo DFE/OCTm OU, monitor inflammation OU         I saw and examined the patient and reviewed in detail the findings documented. The final examination findings, image interpretations which have been independently interpreted, and plan as documented in the record represent my personal judgment and conclusions.    Tyrese Shepard MD  Vitreoretinal Surgery   [facility]

## 2024-07-25 ENCOUNTER — CLINICAL SUPPORT (OUTPATIENT)
Dept: PRIMARY CARE CLINIC | Facility: CLINIC | Age: 79
End: 2024-07-25
Payer: MEDICARE

## 2024-07-25 DIAGNOSIS — F43.9 TRAUMA AND STRESSOR-RELATED DISORDER: ICD-10-CM

## 2024-07-25 DIAGNOSIS — Z65.8 PSYCHOSOCIAL STRESSORS: Primary | ICD-10-CM

## 2024-07-25 DIAGNOSIS — F41.1 GAD (GENERALIZED ANXIETY DISORDER): ICD-10-CM
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
## 2024-08-01 ENCOUNTER — CLINICAL SUPPORT (OUTPATIENT)
Dept: PRIMARY CARE CLINIC | Facility: CLINIC | Age: 79
End: 2024-08-01
Payer: MEDICARE

## 2024-08-01 DIAGNOSIS — F41.9 ANXIETY: ICD-10-CM

## 2024-08-01 DIAGNOSIS — Z65.8 PSYCHOSOCIAL STRESSORS: ICD-10-CM

## 2024-08-01 DIAGNOSIS — F43.9 TRAUMA AND STRESSOR-RELATED DISORDER: Primary | ICD-10-CM
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
## 2024-08-08 ENCOUNTER — CLINICAL SUPPORT (OUTPATIENT)
Dept: PRIMARY CARE CLINIC | Facility: CLINIC | Age: 79
End: 2024-08-08
Payer: MEDICARE

## 2024-08-08 DIAGNOSIS — F41.9 ANXIETY: Primary | ICD-10-CM

## 2024-08-08 DIAGNOSIS — Z65.8 PSYCHOSOCIAL STRESSORS: ICD-10-CM

## 2024-08-08 DIAGNOSIS — F43.9 TRAUMA AND STRESSOR-RELATED DISORDER: ICD-10-CM

## 2024-08-10 DIAGNOSIS — K21.9 GASTROESOPHAGEAL REFLUX DISEASE WITHOUT ESOPHAGITIS: Primary | Chronic | ICD-10-CM

## 2024-08-12 RX ORDER — PANTOPRAZOLE SODIUM 40 MG/1
40 TABLET, DELAYED RELEASE ORAL
Qty: 90 TABLET | Refills: 3 | Status: SHIPPED | OUTPATIENT
Start: 2024-08-12 | End: 2024-08-13 | Stop reason: SDUPTHER

## 2024-08-13 ENCOUNTER — OFFICE VISIT (OUTPATIENT)
Dept: PRIMARY CARE CLINIC | Facility: CLINIC | Age: 79
End: 2024-08-13
Payer: MEDICARE

## 2024-08-13 VITALS
WEIGHT: 132.81 LBS | TEMPERATURE: 98 F | OXYGEN SATURATION: 97 % | DIASTOLIC BLOOD PRESSURE: 60 MMHG | BODY MASS INDEX: 22.67 KG/M2 | SYSTOLIC BLOOD PRESSURE: 112 MMHG | HEART RATE: 66 BPM | HEIGHT: 64 IN

## 2024-08-13 DIAGNOSIS — K21.9 GASTROESOPHAGEAL REFLUX DISEASE WITHOUT ESOPHAGITIS: Chronic | ICD-10-CM

## 2024-08-13 DIAGNOSIS — B37.2 CUTANEOUS CANDIDIASIS: Primary | ICD-10-CM

## 2024-08-13 DIAGNOSIS — N61.0 CELLULITIS OF LEFT BREAST: ICD-10-CM

## 2024-08-13 PROCEDURE — 3074F SYST BP LT 130 MM HG: CPT | Mod: HCNC,CPTII,S$GLB, | Performed by: FAMILY MEDICINE

## 2024-08-13 PROCEDURE — 1159F MED LIST DOCD IN RCRD: CPT | Mod: HCNC,CPTII,S$GLB, | Performed by: FAMILY MEDICINE

## 2024-08-13 PROCEDURE — 1160F RVW MEDS BY RX/DR IN RCRD: CPT | Mod: HCNC,CPTII,S$GLB, | Performed by: FAMILY MEDICINE

## 2024-08-13 PROCEDURE — 99214 OFFICE O/P EST MOD 30 MIN: CPT | Mod: HCNC,S$GLB,, | Performed by: FAMILY MEDICINE

## 2024-08-13 PROCEDURE — 99999 PR PBB SHADOW E&M-EST. PATIENT-LVL III: CPT | Mod: PBBFAC,HCNC,, | Performed by: FAMILY MEDICINE

## 2024-08-13 PROCEDURE — 1126F AMNT PAIN NOTED NONE PRSNT: CPT | Mod: HCNC,CPTII,S$GLB, | Performed by: FAMILY MEDICINE

## 2024-08-13 PROCEDURE — 3288F FALL RISK ASSESSMENT DOCD: CPT | Mod: HCNC,CPTII,S$GLB, | Performed by: FAMILY MEDICINE

## 2024-08-13 PROCEDURE — 3078F DIAST BP <80 MM HG: CPT | Mod: HCNC,CPTII,S$GLB, | Performed by: FAMILY MEDICINE

## 2024-08-13 PROCEDURE — 1101F PT FALLS ASSESS-DOCD LE1/YR: CPT | Mod: HCNC,CPTII,S$GLB, | Performed by: FAMILY MEDICINE

## 2024-08-13 RX ORDER — NYSTATIN 100000 U/G
CREAM TOPICAL 2 TIMES DAILY
Qty: 30 G | Refills: 2 | Status: SHIPPED | OUTPATIENT
Start: 2024-08-13

## 2024-08-13 RX ORDER — NYSTATIN 100000 U/G
CREAM TOPICAL 2 TIMES DAILY
Qty: 30 G | Refills: 2 | Status: SHIPPED | OUTPATIENT
Start: 2024-08-13 | End: 2024-08-13 | Stop reason: SDUPTHER

## 2024-08-13 RX ORDER — PANTOPRAZOLE SODIUM 40 MG/1
40 TABLET, DELAYED RELEASE ORAL DAILY
Qty: 90 TABLET | Refills: 3 | Status: SHIPPED | OUTPATIENT
Start: 2024-08-13

## 2024-08-13 RX ORDER — PANTOPRAZOLE SODIUM 40 MG/1
40 TABLET, DELAYED RELEASE ORAL DAILY
Qty: 90 TABLET | Refills: 3 | Status: CANCELLED | OUTPATIENT
Start: 2024-08-13

## 2024-08-13 NOTE — PROGRESS NOTES
[facility] Care Provider Appointment   [facility]       Patient ID: Evelina Pinon is a 79 y.o. female.    ASSESSMENT/PLAN by Problem List:    1. Cutaneous candidiasis  2. Cellulitis of left breast    Discussion: Patient appears to have cutaneous candidiasis and associated cellulitis.  She was seen in the emergency room and placed on cephalexin along with nystatin cream.  This is improving.  On exam today there is still an open area yet to epithelialize.  She will continue nystatin cream.  Discussed the importance of proper hygiene.  Also keeping the area dry, use a cotton barrier either undergarment, etc. to keep the skin folds  at least until this area is fully epithelialized.  She was advised to contact us if worsening.  In the future let us know as soon as possible.  If treated early this could have prevented an emergency room visit.    3. Gastroesophageal reflux disease without esophagitis  -     pantoprazole (PROTONIX) 40 MG tablet; Take 1 tablet (40 mg total) by mouth once daily.  Dispense: 90 tablet; Refill: 3    Other orders  -     Discontinue: nystatin (MYCOSTATIN) cream; Apply topically 2 (two) times daily. Applied to the underside of your left breast.  Continue for 7 days or until rash resolves.  Dispense: 30 g; Refill: 2  -     nystatin (MYCOSTATIN) cream; Apply topically 2 (two) times daily. Applied to the underside of your left breast.  Continue for 7 days or until rash resolves.  Dispense: 30 g; Refill: 2         Follow Up:  One month as scheduled          Subjective:     Chief Complaint   Patient presents with    Rash     Under left breast     I have reviewed the information entered by the ancillary staff regarding the chief complaint as well as the related history.    Rash      Patient is a/an 79 y.o.  female     Emergency room follow-up.  Patient developed a painful rash under the left breast.  Apparently she really just noticed this on Saturday  "and stated it was too painful for her to wait for clinic to open on Monday.    For complete problem list, past medical history, surgical history, social history, etc., see appropriate section in the electronic medical record    Review of Systems   Respiratory: Negative.     Cardiovascular: Negative.    Gastrointestinal: Negative.    Genitourinary: Negative.    Musculoskeletal: Negative.    Skin:  Positive for rash.       Objective     Physical Exam  Vitals reviewed.   Constitutional:       General: She is not in acute distress.     Appearance: She is well-developed. She is not diaphoretic.   HENT:      Head: Normocephalic and atraumatic.   Eyes:      General: No scleral icterus.  Pulmonary:      Effort: Pulmonary effort is normal. No respiratory distress.   Chest:       Neurological:      Mental Status: She is alert and oriented to person, place, and time.   Psychiatric:         Mood and Affect: Mood normal.         Behavior: Behavior normal.     Vitals:    08/13/24 1330   BP: 112/60   BP Location: Right arm   Patient Position: Sitting   BP Method: Medium (Manual)   Pulse: 66   Temp: 98.2 °F (36.8 °C)   SpO2: 97%   Weight: 60.2 kg (132 lb 13.2 oz)   Height: 5' 4" (1.626 m)           THIS DOCUMENT WAS MADE IN PART WITH VOICE RECOGNITION SOFTWARE.  OCCASIONALLY THIS SOFTWARE WILL MISINTERPRET WORDS OR PHRASES.  "

## 2024-08-22 ENCOUNTER — CLINICAL SUPPORT (OUTPATIENT)
Dept: PRIMARY CARE CLINIC | Facility: CLINIC | Age: 79
End: 2024-08-22
Payer: MEDICARE

## 2024-08-22 DIAGNOSIS — F41.1 GAD (GENERALIZED ANXIETY DISORDER): ICD-10-CM

## 2024-08-22 DIAGNOSIS — Z65.8 PSYCHOSOCIAL STRESSORS: Primary | ICD-10-CM

## 2024-08-22 NOTE — PROGRESS NOTES
Individual Psychotherapy (PhD/LCSW)    8/22/2024    Site:  [place] [address]      Chief complaint/reason for encounter:     Mood check: scale of:0 best, 10 worst. Patient rated:  Depression rated at - did not assess   Anxiety rated at  -  did not assess    Risk parameters:  Patient reports no suicidal ideation  Patient reports no homicidal ideation  Patient reports no self-injurious behavior  Patient reports no violent behavior    [place]:  1.  Argument with her friend   2.  Relationship with other females    History of present condition/content of session:   She began the session sharing an argument she had with her friend Lena. Pt said that she is not going  to apologize for something that she did not do. She spent time sharing her hx of problems through out her life with other women. But she agreed that it was a certain personality of women that she found problematic: often others being jealous and judgmental.  She met Lena about 20 years ago.  Pt said that they were intimate friends, shared much with each other, and talked daily. She said she has been stressed out for days, and having px sleeping over it. Yet, it was difficult for pt to accept validation of her emotions by clinician.     Pt also spent time sharing her hx of teaching her self how to drive, and her determination to do so. She also shared their hx of the houses they lived in previously, and currently.     Therapeutic Intervention:   Pt was assessed for present condition, self esteem, and level of insight.  Pt was provided with empathy, and support.  Active Listening was used in the session.   Patient encouraged to begin to restructure irrational beliefs by reviewing reality-based evidence and misinterpretation. Pt. was provided with tentative interpretations of events and encouraged to view situation from the other person's perspective.     Target symptoms: anxiety   Why chosen therapy is appropriate versus another modality: evidence based  "practice  Outcome monitoring methods: checklist/rating scale  Therapeutic intervention type: behavior modifying psychotherapy    Patient's response to intervention:  The patient's response to intervention is accepting. Pt stated therapy is helping her to talk about her painful past because no one else wants to hear it. As pt continues to process her hx, it appears that she is having more insight into the situation, and working toward improving her self worth. Pt appears to be improving on ability to accept validation of her emotions.      Progress toward goals and other mental status changes:  The patient's progress toward goals is  90% complete .    Pt identified goals:  "To get it all out... I never told people." 6/20/2024  - Pt does not agree that this goal is completed.     Added 6/20/2024   Goal:  Continue to identify feelings/thoughts   - Working with pt to improve communication skills     - setting healthy boundaries   - Pt will allow herself to have negative emotions     Long term goal identified by program objectives is to improve pt's compliance with medical recommendations from PCP (with focus on improving self esteem and self worth).   -  Although she denied having low self esteem, clinician plans to explore further her meaning of feeling "less than."    -  Explore her anger    - Allow herself to have negative emotions - 8/8/2024-     Diagnosis:   R/O Claustrophobia   Generalized Anxiety D/O  R/O  Trauma related disorders     Plan:  individual psychotherapy Clinician plans to continue to work with patient to determine triggers for her past that foster low esteem and to encourage pt to allow herself to have negative emotions.  Clinician was able to introduce worksheet on self esteem, and plan to address in next session.     Return to clinic:     Length of Service (minutes): 60                        "

## 2024-08-26 ENCOUNTER — TELEPHONE (OUTPATIENT)
Dept: PRIMARY CARE CLINIC | Facility: CLINIC | Age: 79
End: 2024-08-26
Payer: MEDICARE

## 2024-08-26 RX ORDER — ATORVASTATIN CALCIUM 10 MG/1
10 TABLET, FILM COATED ORAL
Qty: 90 TABLET | Refills: 3 | Status: SHIPPED | OUTPATIENT
Start: 2024-08-26

## 2024-08-26 NOTE — TELEPHONE ENCOUNTER
Clinician called pt to inform her of appt change from 9/4 to 9/5 at 9:00. She verbalized agreement. Focus of session will on the disagreement with a friend.

## 2024-08-27 DIAGNOSIS — S72.001A CLOSED FRACTURE OF NECK OF RIGHT FEMUR, INITIAL ENCOUNTER: Primary | ICD-10-CM

## 2024-08-29 ENCOUNTER — HOSPITAL ENCOUNTER (OUTPATIENT)
Dept: RADIOLOGY | Facility: HOSPITAL | Age: 79
Discharge: HOME | End: 2024-08-29
Attending: ORTHOPAEDIC SURGERY
Payer: MEDICARE

## 2024-08-29 ENCOUNTER — OFFICE VISIT (OUTPATIENT)
Dept: ORTHOPEDICS | Facility: CLINIC | Age: 79
End: 2024-08-29
Payer: MEDICARE

## 2024-08-29 VITALS — BODY MASS INDEX: 22.65 KG/M2 | HEIGHT: 64 IN | WEIGHT: 132.69 LBS

## 2024-08-29 DIAGNOSIS — S72.001A CLOSED FRACTURE OF NECK OF RIGHT FEMUR, INITIAL ENCOUNTER: Primary | ICD-10-CM

## 2024-08-29 DIAGNOSIS — S72.001A CLOSED FRACTURE OF NECK OF RIGHT FEMUR, INITIAL ENCOUNTER: ICD-10-CM

## 2024-08-29 PROCEDURE — 99214 OFFICE O/P EST MOD 30 MIN: CPT | Mod: HCNC,S$GLB,, | Performed by: ORTHOPAEDIC SURGERY

## 2024-08-29 PROCEDURE — 3288F FALL RISK ASSESSMENT DOCD: CPT | Mod: HCNC,CPTII,S$GLB, | Performed by: ORTHOPAEDIC SURGERY

## 2024-08-29 PROCEDURE — 1160F RVW MEDS BY RX/DR IN RCRD: CPT | Mod: HCNC,CPTII,S$GLB, | Performed by: ORTHOPAEDIC SURGERY

## 2024-08-29 PROCEDURE — 1101F PT FALLS ASSESS-DOCD LE1/YR: CPT | Mod: HCNC,CPTII,S$GLB, | Performed by: ORTHOPAEDIC SURGERY

## 2024-08-29 PROCEDURE — 73502 X-RAY EXAM HIP UNI 2-3 VIEWS: CPT | Mod: 26,RT,, | Performed by: RADIOLOGY

## 2024-08-29 PROCEDURE — 1125F AMNT PAIN NOTED PAIN PRSNT: CPT | Mod: HCNC,CPTII,S$GLB, | Performed by: ORTHOPAEDIC SURGERY

## 2024-08-29 PROCEDURE — 1159F MED LIST DOCD IN RCRD: CPT | Mod: HCNC,CPTII,S$GLB, | Performed by: ORTHOPAEDIC SURGERY

## 2024-08-29 PROCEDURE — 99999 PR PBB SHADOW E&M-EST. PATIENT-LVL III: CPT | Mod: PBBFAC,HCNC,, | Performed by: ORTHOPAEDIC SURGERY

## 2024-08-29 PROCEDURE — 73502 X-RAY EXAM HIP UNI 2-3 VIEWS: CPT | Mod: TC,PO,RT

## 2024-08-30 ENCOUNTER — CLINICAL SUPPORT (OUTPATIENT)
Dept: PRIMARY CARE CLINIC | Facility: CLINIC | Age: 79
End: 2024-08-30
Payer: MEDICARE

## 2024-08-30 DIAGNOSIS — Z65.8 PSYCHOSOCIAL STRESSORS: Primary | ICD-10-CM

## 2024-08-30 DIAGNOSIS — F41.1 GAD (GENERALIZED ANXIETY DISORDER): ICD-10-CM

## 2024-08-30 DIAGNOSIS — F43.9 TRAUMA AND STRESSOR-RELATED DISORDER: ICD-10-CM

## 2024-08-30 PROCEDURE — 99499 UNLISTED E&M SERVICE: CPT | Mod: HCNC,S$GLB,, | Performed by: SOCIAL WORKER

## 2024-08-30 NOTE — PROGRESS NOTES
Individual Psychotherapy (PhD/LCSW)    2024    Site:  [place] [address]      Chief complaint/reason for encounter: realization that her mother leaving her was not her fault     Mood check: scale of:0 best, 10 worst. Patient rated:  Depression rated at - did not assess   Anxiety rated at  -  did not assess    Risk parameters:  Patient reports no suicidal ideation  Patient reports no homicidal ideation  Patient reports no self-injurious behavior  Patient reports no violent behavior    [place]:  1.  Pt's new hair  2.  Painful childhood memories.     History of present condition/content of session:   Pt began the session saying that she does not know what she wants to talk about today. She talked about her hair loss, having to wear a wig, and hx of alopecia.  This segued into pt sharing some of her hx of being bullied in school about her red hair.     She spent time sharing hx of  whom pt expressed belief  has a hx of rigid thinking. Pt shared her hx of beng non- judgmental. She said it began with her childhood and marginal people living in the [facility] who helped her out. She shared her lifelong friendship with a female impersonator who was reyes, whom she met when she was aged 3 y/o.  She verbalized some guilt that she was not with him when he . She also talked about some guilt that had when her grandfather .     Pt has a knack for remembering the names of people in her formative years before aged 18 y/o.  Pt shared some of the memories from that time period that were very painful to her at the time. She admitted that some of these memories can be just as painful now as they were now.  She also shared some positive of her grandfather and playing sports in high school.     Biggest of area of growth - pt verbalized finally accepting that she is not at fault for her mother leaving her in the house when pt was aged 2 months old. She has previously said she must have been an ugly baby for her mother to  "abandon her like that. But pt has learned to expressed negative emotions, and verbalized understanding that there was nothing that she could have done to deserve her mother deserting her like that.     Therapeutic Intervention:   Pt was assessed for present condition, self esteem, and level of insight.  Pt was provided with empathy, and support.  Active Listening was used in the session.   Patient encouraged to begin to restructure irrational beliefs by reviewing reality-based evidence and misinterpretation. Pt. was provided with tentative interpretations of events and encouraged to view situation from the other person's perspective.  She was given positive reinforcement for identified area of growth.     Target symptoms: anxiety   Why chosen therapy is appropriate versus another modality: evidence based practice  Outcome monitoring methods: checklist/rating scale  Therapeutic intervention type: behavior modifying psychotherapy    Patient's response to intervention:  The patient's response to intervention is accepting. Pt stated therapy is helping her to talk about her painful past because no one else wants to hear it. As pt continues to process her hx, it appears that she is having more insight into the situation, and working toward improving her self worth. Pt appears to be improving on ability to accept validation of her emotions.  Big area of growth noted in the session today, that is hopefully a turning point for pt.     Progress toward goals and other mental status changes:  The patient's progress toward goals is  90% complete .    Pt identified goals:  "To get it all out... I never told people." 6/20/2024 - Pt does not agree that this goal is completed.     Added 6/20/2024   Goal:  Continue to identify feelings/thoughts   - Working with pt to improve communication skills     - setting healthy boundaries   - Pt will allow herself to have negative emotions     Long term goal identified by program objectives is " "to improve pt's compliance with medical recommendations from PCP (with focus on improving self esteem and self worth).   -  Although she denied having low self esteem, clinician plans to explore further her meaning of feeling "less than."    -  Explore her anger    - Allow herself to have negative emotions - 8/29/2024 - almost complete     Diagnosis:   R/O Claustrophobia   Generalized Anxiety D/O  R/O  Trauma related disorders     Plan:  individual psychotherapy Clinician plans to continue to work with patient to determine triggers for her past that foster low esteem and to encourage pt to allow herself to have negative emotions.  Clinician was able to introduce worksheet on self esteem, and plan to address in next session.     Return to clinic: 9/5/2024 at 3:00     Length of Service (minutes): 65                          "

## 2024-09-03 DIAGNOSIS — I27.20 PULMONARY HYPERTENSION: Primary | ICD-10-CM

## 2024-09-04 RX ORDER — AMLODIPINE BESYLATE 2.5 MG/1
2.5 TABLET ORAL
Qty: 30 TABLET | Refills: 11 | Status: SHIPPED | OUTPATIENT
Start: 2024-09-04

## 2024-09-05 ENCOUNTER — LAB VISIT (OUTPATIENT)
Dept: LAB | Facility: HOSPITAL | Age: 79
End: 2024-09-05
Attending: STUDENT IN AN ORGANIZED HEALTH CARE EDUCATION/TRAINING PROGRAM
Payer: MEDICARE

## 2024-09-05 ENCOUNTER — CLINICAL SUPPORT (OUTPATIENT)
Dept: PRIMARY CARE CLINIC | Facility: CLINIC | Age: 79
End: 2024-09-05
Payer: MEDICARE

## 2024-09-05 DIAGNOSIS — Z65.8 PSYCHOSOCIAL STRESSORS: ICD-10-CM

## 2024-09-05 DIAGNOSIS — D84.821 DRUG-INDUCED IMMUNODEFICIENCY: ICD-10-CM

## 2024-09-05 DIAGNOSIS — Z79.899 HIGH RISK MEDICATION USE: ICD-10-CM

## 2024-09-05 DIAGNOSIS — H20.9 UVEITIS: ICD-10-CM

## 2024-09-05 DIAGNOSIS — Z79.899 DRUG-INDUCED IMMUNODEFICIENCY: ICD-10-CM

## 2024-09-05 DIAGNOSIS — Z86.2 HISTORY OF SARCOIDOSIS: ICD-10-CM

## 2024-09-05 DIAGNOSIS — F43.9 TRAUMA AND STRESSOR-RELATED DISORDER: ICD-10-CM

## 2024-09-05 DIAGNOSIS — F41.1 GAD (GENERALIZED ANXIETY DISORDER): Primary | ICD-10-CM

## 2024-09-05 LAB
ALBUMIN SERPL BCP-MCNC: 3.7 G/DL (ref 3.5–5.2)
ALP SERPL-CCNC: 122 U/L (ref 55–135)
ALT SERPL W/O P-5'-P-CCNC: 66 U/L (ref 10–44)
ANION GAP SERPL CALC-SCNC: 9 MMOL/L (ref 8–16)
AST SERPL-CCNC: 57 U/L (ref 10–40)
BASOPHILS # BLD AUTO: 0.03 K/UL (ref 0–0.2)
BASOPHILS NFR BLD: 0.5 % (ref 0–1.9)
BILIRUB SERPL-MCNC: 0.5 MG/DL (ref 0.1–1)
BUN SERPL-MCNC: 26 MG/DL (ref 8–23)
CALCIUM SERPL-MCNC: 9.4 MG/DL (ref 8.7–10.5)
CHLORIDE SERPL-SCNC: 114 MMOL/L (ref 95–110)
CO2 SERPL-SCNC: 19 MMOL/L (ref 23–29)
CREAT SERPL-MCNC: 1.2 MG/DL (ref 0.5–1.4)
CRP SERPL-MCNC: 1.3 MG/L (ref 0–8.2)
DIFFERENTIAL METHOD BLD: ABNORMAL
EOSINOPHIL # BLD AUTO: 0.2 K/UL (ref 0–0.5)
EOSINOPHIL NFR BLD: 3.4 % (ref 0–8)
ERYTHROCYTE [DISTWIDTH] IN BLOOD BY AUTOMATED COUNT: 15.6 % (ref 11.5–14.5)
ERYTHROCYTE [SEDIMENTATION RATE] IN BLOOD BY PHOTOMETRIC METHOD: 24 MM/HR (ref 0–36)
EST. GFR  (NO RACE VARIABLE): 45.8 ML/MIN/1.73 M^2
GLUCOSE SERPL-MCNC: 105 MG/DL (ref 70–110)
HCT VFR BLD AUTO: 33.2 % (ref 37–48.5)
HGB BLD-MCNC: 10.1 G/DL (ref 12–16)
IMM GRANULOCYTES # BLD AUTO: 0.02 K/UL (ref 0–0.04)
IMM GRANULOCYTES NFR BLD AUTO: 0.4 % (ref 0–0.5)
LYMPHOCYTES # BLD AUTO: 1.2 K/UL (ref 1–4.8)
LYMPHOCYTES NFR BLD: 22.4 % (ref 18–48)
MCH RBC QN AUTO: 34.7 PG (ref 27–31)
MCHC RBC AUTO-ENTMCNC: 30.4 G/DL (ref 32–36)
MCV RBC AUTO: 114 FL (ref 82–98)
MONOCYTES # BLD AUTO: 0.9 K/UL (ref 0.3–1)
MONOCYTES NFR BLD: 15.4 % (ref 4–15)
NEUTROPHILS # BLD AUTO: 3.2 K/UL (ref 1.8–7.7)
NEUTROPHILS NFR BLD: 57.9 % (ref 38–73)
NRBC BLD-RTO: 0 /100 WBC
PLATELET # BLD AUTO: 181 K/UL (ref 150–450)
PMV BLD AUTO: 11 FL (ref 9.2–12.9)
POTASSIUM SERPL-SCNC: 5.1 MMOL/L (ref 3.5–5.1)
PROT SERPL-MCNC: 6.2 G/DL (ref 6–8.4)
RBC # BLD AUTO: 2.91 M/UL (ref 4–5.4)
SODIUM SERPL-SCNC: 142 MMOL/L (ref 136–145)
WBC # BLD AUTO: 5.53 K/UL (ref 3.9–12.7)

## 2024-09-05 PROCEDURE — 85652 RBC SED RATE AUTOMATED: CPT | Mod: HCNC | Performed by: STUDENT IN AN ORGANIZED HEALTH CARE EDUCATION/TRAINING PROGRAM

## 2024-09-05 PROCEDURE — 36415 COLL VENOUS BLD VENIPUNCTURE: CPT | Mod: HCNC,PO | Performed by: STUDENT IN AN ORGANIZED HEALTH CARE EDUCATION/TRAINING PROGRAM

## 2024-09-05 PROCEDURE — 85025 COMPLETE CBC W/AUTO DIFF WBC: CPT | Mod: HCNC | Performed by: STUDENT IN AN ORGANIZED HEALTH CARE EDUCATION/TRAINING PROGRAM

## 2024-09-05 PROCEDURE — 86140 C-REACTIVE PROTEIN: CPT | Mod: HCNC | Performed by: STUDENT IN AN ORGANIZED HEALTH CARE EDUCATION/TRAINING PROGRAM

## 2024-09-05 PROCEDURE — 99499 UNLISTED E&M SERVICE: CPT | Mod: HCNC,S$GLB,, | Performed by: SOCIAL WORKER

## 2024-09-05 PROCEDURE — 80053 COMPREHEN METABOLIC PANEL: CPT | Mod: HCNC | Performed by: STUDENT IN AN ORGANIZED HEALTH CARE EDUCATION/TRAINING PROGRAM

## 2024-09-05 NOTE — PROGRESS NOTES
"Individual Psychotherapy (PhD/LCSW)    9/5/2024    Site:  [facility]      Chief complaint/reason for encounter: argument with her      Mood check: scale of:0 best, 10 worst. Patient rated:  Depression rated at - did not assess   Anxiety rated at  -  did not assess    Risk parameters:  Patient reports no suicidal ideation  Patient reports no homicidal ideation  Patient reports no self-injurious behavior  Patient reports no violent behavior    [place]:  1.  Argument with her   2.  Painful childhood memories.     History of present condition/content of session:   Pt began the session sharing the argument she had with her  this morning about her losing her phone.  Pt said her  was negative to her and blamed her for losing her phone. She said that countered that he often loses things.  She said that her  does not like when she stands up for herself. She reported a hx of being the "fool" in relationships and always apologizing for everything in relationships. However, she said she is not going to apologize to her friend for something that she didn't do. She admitted that this is another area of growth for her: standing up for herself.     She segued into sharing some of the traumatic images from her childhood. Pt admitted these images are intrusive, and hard for her to let go of them. She was encouraged to utilize coping skill such as telling herself to STOP, and not allow herself to think about it.  And ask herself if there is reason that she needs to remember these things, and if not, then redirect herself. She also shared some of more positive memories of the freedom she had as child in the summer time. She acknowledged that there are happy memories especially when she was in high school.     She was encouraged to understand that she has value and worth. She did not acknowledge when pointed out to her, that she is learning to stand up for herself with improved self confidence. She " "verbalized understanding to give herself permission to have negative emotions.     Therapeutic Intervention:   Pt was assessed for present condition, self esteem, and level of insight.  Pt was provided with empathy, and support.  Active Listening was used in the session.   Patient encouraged to begin to restructure irrational beliefs by reviewing reality-based evidence and misinterpretation. Pt. was provided with tentative interpretations of events and encouraged to view situation from the other person's perspective.  She was given positive reinforcement for identified area of growth.     Target symptoms: anxiety   Why chosen therapy is appropriate versus another modality: evidence based practice  Outcome monitoring methods: checklist/rating scale  Therapeutic intervention type: behavior modifying psychotherapy    Patient's response to intervention:  The patient's response to intervention is accepting. Pt stated therapy is helping her to talk about her painful past because no one else wants to hear it. As pt continues to process her hx, it appears that she is having more insight into the situation, and working toward improving her self worth. Pt appears to be improving on ability to accept validation of her emotions.  Big area of growth noted in the session today, that is hopefully a turning point for pt.     Progress toward goals and other mental status changes:  The patient's progress toward goals is  90% complete .    Pt identified goals:  "To get it all out... I never told people." 6/20/2024 - Pt does not agree that this goal is completed.     Added 6/20/2024   Goal:  Continue to identify feelings/thoughts   - Working with pt to improve communication skills     - setting healthy boundaries   - Pt will allow herself to have negative emotions     Long term goal identified by program objectives is to improve pt's compliance with medical recommendations from PCP (with focus on improving self esteem and self worth). " "  -  Although she denied having low self esteem, clinician plans to explore further her meaning of feeling "less than."    -  Explore her anger    - Allow herself to have negative emotions - 8/29/2024 - almost complete     Diagnosis:   R/O Claustrophobia   Generalized Anxiety D/O  R/O  Trauma related disorders     Plan:  individual psychotherapy Clinician plans to continue to work with patient to determine triggers for her past that foster low esteem and to encourage pt to allow herself to have negative emotions.      Return to clinic: 9/19/2024 at 3:00    Length of Service (minutes): 60                            "

## 2024-09-09 ENCOUNTER — LAB VISIT (OUTPATIENT)
Dept: LAB | Facility: HOSPITAL | Age: 79
End: 2024-09-09
Attending: FAMILY MEDICINE
Payer: MEDICARE

## 2024-09-09 DIAGNOSIS — Z79.899 DRUG-INDUCED IMMUNODEFICIENCY: ICD-10-CM

## 2024-09-09 DIAGNOSIS — H20.9 UVEITIS: ICD-10-CM

## 2024-09-09 DIAGNOSIS — D84.821 DRUG-INDUCED IMMUNODEFICIENCY: ICD-10-CM

## 2024-09-09 DIAGNOSIS — Z86.2 HISTORY OF SARCOIDOSIS: ICD-10-CM

## 2024-09-09 DIAGNOSIS — N18.31 STAGE 3A CHRONIC KIDNEY DISEASE: ICD-10-CM

## 2024-09-09 DIAGNOSIS — Z79.899 HIGH RISK MEDICATION USE: ICD-10-CM

## 2024-09-09 LAB
ALBUMIN/CREAT UR: NORMAL UG/MG (ref 0–30)
BACTERIA #/AREA URNS HPF: ABNORMAL /HPF
BILIRUB UR QL STRIP: NEGATIVE
CLARITY UR: ABNORMAL
COLOR UR: YELLOW
CREAT UR-MCNC: 47 MG/DL (ref 15–325)
CREAT UR-MCNC: 47 MG/DL (ref 15–325)
GLUCOSE UR QL STRIP: NEGATIVE
HGB UR QL STRIP: NEGATIVE
KETONES UR QL STRIP: NEGATIVE
LEUKOCYTE ESTERASE UR QL STRIP: NEGATIVE
MICROALBUMIN UR DL<=1MG/L-MCNC: <5 UG/ML
MICROSCOPIC COMMENT: ABNORMAL
NITRITE UR QL STRIP: POSITIVE
PH UR STRIP: 6 [PH] (ref 5–8)
PROT UR QL STRIP: NEGATIVE
PROT UR-MCNC: <7 MG/DL (ref 0–15)
PROT/CREAT UR: NORMAL MG/G{CREAT} (ref 0–0.2)
SP GR UR STRIP: 1.01 (ref 1–1.03)
SQUAMOUS #/AREA URNS HPF: 1 /HPF
URN SPEC COLLECT METH UR: ABNORMAL
WBC #/AREA URNS HPF: 2 /HPF (ref 0–5)

## 2024-09-09 PROCEDURE — 82570 ASSAY OF URINE CREATININE: CPT | Mod: HCNC | Performed by: STUDENT IN AN ORGANIZED HEALTH CARE EDUCATION/TRAINING PROGRAM

## 2024-09-09 PROCEDURE — 81000 URINALYSIS NONAUTO W/SCOPE: CPT | Mod: HCNC,PO | Performed by: STUDENT IN AN ORGANIZED HEALTH CARE EDUCATION/TRAINING PROGRAM

## 2024-09-09 PROCEDURE — 82043 UR ALBUMIN QUANTITATIVE: CPT | Mod: HCNC | Performed by: FAMILY MEDICINE

## 2024-09-10 ENCOUNTER — TELEPHONE (OUTPATIENT)
Dept: PRIMARY CARE CLINIC | Facility: CLINIC | Age: 79
End: 2024-09-10
Payer: MEDICARE

## 2024-09-10 NOTE — TELEPHONE ENCOUNTER
Spoke with pt, notified that office would be closed due to increment weather. Pt verbalized understanding.   Explained someone would call to schedule an appt. Pt verbalized understanding,

## 2024-09-10 NOTE — PROGRESS NOTES
Chief Complaint   Patient presents with    Right Hip - Post-op Evaluation       HPI:   This is a 79 y.o. who returns today status post right hip hemiarthroplasty 9 months ago. Patient has been FWB.  Pain is dull. No numbness or tingling. No associated signs or symptoms.    Past Medical History:   Diagnosis Date    ALLERGIC RHINITIS 2/22/2012    Alopecia     Anticoagulant long-term use     Anxiety 2/22/2012    Dyslipidemia     Fx patella     Left    Gout 2/22/2012    HTN (hypertension) 2/22/2012    TIA (transient ischemic attack) 2/22/2012     Past Surgical History:   Procedure Laterality Date    CATARACT EXTRACTION Left 07/2023    CORONARY ANGIOGRAPHY N/A 08/25/2020    Procedure: ANGIOGRAM, CORONARY ARTERY;  Surgeon: Rex Kovacs MD;  Location: [facility] CATH;  Service: Cardiology;  Laterality: N/A;    ENDOSCOPIC ULTRASOUND OF UPPER GASTROINTESTINAL TRACT Left 05/26/2021    Procedure: ULTRASOUND, UPPER GI TRACT, ENDOSCOPIC;  Surgeon: Ray Francois MD;  Location: [facility] ENDO;  Service: Endoscopy;  Laterality: Left;  scopes TBA    ESOPHAGOGASTRODUODENOSCOPY N/A 05/26/2021    Procedure: EGD (ESOPHAGOGASTRODUODENOSCOPY);  Surgeon: Ray Francois MD;  Location: [facility] ENDO;  Service: Endoscopy;  Laterality: N/A;    HEMIARTHROPLASTY OF HIP Right 12/18/2023    Procedure: HEMIARTHROPLASTY, HIP- right;  Surgeon: Edward Ortiz MD;  Location: [facility] OR;  Service: Orthopedics;  Laterality: Right;    HYSTERECTOMY      LEFT HEART CATHETERIZATION N/A 08/25/2020    Procedure: Left heart cath;  Surgeon: Rex Kovacs MD;  Location: [facility] CATH;  Service: Cardiology;  Laterality: N/A;    PATELLA FRACTURE SURGERY Left 2013    RIGHT HEART CATHETERIZATION Right 07/09/2021    Procedure: INSERTION, CATHETER, RIGHT HEART;  Surgeon: Demetrius Medley MD;  Location: [facility] CATH;  Service: Cardiology;  Laterality: Right;     Current Outpatient Medications on File Prior to Visit   Medication Sig Dispense Refill     allopurinoL (ZYLOPRIM) 100 MG tablet Take 2 tablets (200 mg total) by mouth once daily. 180 tablet 3    atorvastatin (LIPITOR) 10 MG tablet TAKE 1 TABLET(10 MG) BY MOUTH EVERY DAY 90 tablet 3    clopidogreL (PLAVIX) 75 mg tablet TAKE 1 TABLET(75 MG) BY MOUTH EVERY DAY 90 tablet 3    colchicine (COLCRYS) 0.6 mg tablet Take 2 tablets at 1st sign of a gout attack.  Then continue once a day for the next 5-7 days as needed until improved. 30 tablet 1    difluprednate (DUREZOL) 0.05 % Drop ophthalmic solution Place 1 drop into the left eye 2 (two) times daily.      fluticasone propionate (FLONASE) 50 mcg/actuation nasal spray 2 sprays (100 mcg total) by Each Nostril route once daily. 16 g 3    folic acid (FOLVITE) 1 MG tablet Take 1 tablet (1 mg total) by mouth once daily. 90 tablet 3    ketoconazole (NIZORAL) 2 % cream Apply topically once daily. 60 g 3    LORazepam (ATIVAN) 0.5 MG tablet Take 1 tablet (0.5 mg total) by mouth nightly as needed for Anxiety. as needed for anxiety. 30 tablet 2    losartan (COZAAR) 100 MG tablet TAKE 1 TABLET(100 MG) BY MOUTH EVERY DAY 90 tablet 3    magnesium oxide (MAG-OX) 400 mg (241.3 mg magnesium) tablet Take 1 tablet (400 mg total) by mouth once daily. 90 tablet 1    methocarbamoL (ROBAXIN) 750 MG Tab Take 1 tablet (750 mg total) by mouth 4 (four) times daily as needed. 44 tablet 3    methotrexate 2.5 MG Tab Take 6 tablets (15 mg total) by mouth every 7 days. 72 tablet 3    metoprolol succinate (TOPROL-XL) 100 MG 24 hr tablet Take 1 tablet (100 mg total) by mouth once daily. 90 tablet 3    nystatin (MYCOSTATIN) cream Apply topically 2 (two) times daily. Applied to the underside of your left breast.  Continue for 7 days or until rash resolves. 30 g 2    pantoprazole (PROTONIX) 40 MG tablet Take 1 tablet (40 mg total) by mouth once daily. 90 tablet 3    psyllium husk (METAMUCIL ORAL) Take 1 Scoop by mouth as needed (constipation).      triamcinolone acetonide 0.1% (KENALOG) 0.1 % cream  "Apply topically 2 (two) times daily. 80 g 5     No current facility-administered medications on file prior to visit.     Review of patient's allergies indicates:   Allergen Reactions    Augmentin [amoxicillin-pot clavulanate] Rash    Penicillins Hives    Opioids - morphine analogues      Itchy, redness, mild sob,, "feels funny"     Family History   Adopted: Yes   Problem Relation Name Age of Onset    Anxiety disorder Mother       Social History     Socioeconomic History    Marital status:    Tobacco Use    Smoking status: Former     Current packs/day: 0.00     Average packs/day: 1 pack/day for 40.0 years (40.0 ttl pk-yrs)     Types: Cigarettes     Start date:      Quit date:      Years since quittin.7     Passive exposure: Past    Smokeless tobacco: Never   Substance and Sexual Activity    Alcohol use: Not Currently    Drug use: No   Social History Narrative    ** Merged History Encounter **          Social Determinants of Health     Food Insecurity: No Food Insecurity (2023)    Hunger Vital Sign     Worried About Running Out of Food in the Last Year: Never true     Ran Out of Food in the Last Year: Never true   Transportation Needs: No Transportation Needs (2023)    PRAPARE - Transportation     Lack of Transportation (Medical): No     Lack of Transportation (Non-Medical): No   Housing Stability: Low Risk  (2023)    Housing Stability Vital Sign     Unable to Pay for Housing in the Last Year: No     Number of Places Lived in the Last Year: 1     Unstable Housing in the Last Year: No       Review of Systems:  Constitutional:  Denies fever or chills   Eyes:  Denies change in visual acuity   HENT:  Denies nasal congestion or sore throat   Respiratory:  Denies cough or shortness of breath   Cardiovascular:  Denies chest pain or edema   GI:  Denies abdominal pain, nausea, vomiting, bloody stools or diarrhea   :  Denies dysuria   Integument:  Denies rash   Neurologic:  Denies " headache, focal weakness or sensory changes   Endocrine:  Denies polyuria or polydipsia   Lymphatic:  Denies swollen glands   Psychiatric:  Denies depression or anxiety     Physical Exam:   Constitutional:  Well developed, well nourished, no acute distress, non-toxic appearance   Integument:  Well hydrated, no rash   Lymphatic:  No lymphadenopathy noted   Neurologic:  Alert & oriented x 3, CN 2-12 normal, normal motor function, normal sensory function, no focal deficits noted   Psychiatric:  Speech and behavior appropriate   Gi: abdomen soft  Eyes: EOMI    L hip  Exam performed same as contralateral side and is normal  R hip  FROM. Stable to stress. Overall normal alignment. Skin intact. NVI distally.     X-rays were performed today, personally reviewed by me and findings discussed with the patient.  3 views of the right hip show implants intact in good position      Closed fracture of neck of right femur, initial encounter        Continue daily gait training. RTC 6 months

## 2024-09-11 ENCOUNTER — TELEPHONE (OUTPATIENT)
Dept: PRIMARY CARE CLINIC | Facility: CLINIC | Age: 79
End: 2024-09-11
Payer: MEDICARE

## 2024-09-11 NOTE — TELEPHONE ENCOUNTER
Spoke with pt, she is aware that office is opened but we need to assess if we have any damage or lost electricity tomorrow. Also, I explained, we need to make sure Suzanne can make it into office. Pt verbalized understanding, will call pt if she cannot be seen tomorrow.

## 2024-09-12 ENCOUNTER — CLINICAL SUPPORT (OUTPATIENT)
Dept: PRIMARY CARE CLINIC | Facility: CLINIC | Age: 79
End: 2024-09-12
Payer: MEDICARE

## 2024-09-12 DIAGNOSIS — F43.9 TRAUMA AND STRESSOR-RELATED DISORDER: Primary | ICD-10-CM

## 2024-09-12 DIAGNOSIS — F41.1 GAD (GENERALIZED ANXIETY DISORDER): ICD-10-CM

## 2024-09-12 DIAGNOSIS — Z65.8 PSYCHOSOCIAL STRESSORS: ICD-10-CM
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
## 2024-09-19 ENCOUNTER — CLINICAL SUPPORT (OUTPATIENT)
Dept: PRIMARY CARE CLINIC | Facility: CLINIC | Age: 79
End: 2024-09-19
Payer: MEDICARE

## 2024-09-19 DIAGNOSIS — F43.9 TRAUMA AND STRESSOR-RELATED DISORDER: ICD-10-CM

## 2024-09-19 DIAGNOSIS — F41.9 ANXIETY: ICD-10-CM

## 2024-09-19 DIAGNOSIS — Z65.8 PSYCHOSOCIAL STRESSORS: Primary | ICD-10-CM
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
## 2024-09-26 ENCOUNTER — CLINICAL SUPPORT (OUTPATIENT)
Dept: PRIMARY CARE CLINIC | Facility: CLINIC | Age: 79
End: 2024-09-26
Payer: MEDICARE

## 2024-09-26 DIAGNOSIS — F41.9 ANXIETY: ICD-10-CM

## 2024-09-26 DIAGNOSIS — Z65.8 PSYCHOSOCIAL STRESSORS: ICD-10-CM

## 2024-09-26 DIAGNOSIS — F43.9 TRAUMA AND STRESSOR-RELATED DISORDER: Primary | ICD-10-CM
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
## 2024-10-01 ENCOUNTER — OFFICE VISIT (OUTPATIENT)
Dept: PRIMARY CARE CLINIC | Facility: CLINIC | Age: 79
End: 2024-10-01
Payer: MEDICARE

## 2024-10-01 VITALS
OXYGEN SATURATION: 99 % | TEMPERATURE: 98 F | DIASTOLIC BLOOD PRESSURE: 58 MMHG | BODY MASS INDEX: 22.67 KG/M2 | HEART RATE: 50 BPM | SYSTOLIC BLOOD PRESSURE: 110 MMHG | HEIGHT: 64 IN | WEIGHT: 132.81 LBS

## 2024-10-01 DIAGNOSIS — K12.1 ORAL ULCER: Primary | ICD-10-CM

## 2024-10-01 PROCEDURE — 99213 OFFICE O/P EST LOW 20 MIN: CPT | Mod: HCNC,S$GLB,, | Performed by: PHYSICIAN ASSISTANT

## 2024-10-01 PROCEDURE — 1101F PT FALLS ASSESS-DOCD LE1/YR: CPT | Mod: HCNC,CPTII,S$GLB, | Performed by: PHYSICIAN ASSISTANT

## 2024-10-01 PROCEDURE — 3078F DIAST BP <80 MM HG: CPT | Mod: HCNC,CPTII,S$GLB, | Performed by: PHYSICIAN ASSISTANT

## 2024-10-01 PROCEDURE — 3074F SYST BP LT 130 MM HG: CPT | Mod: HCNC,CPTII,S$GLB, | Performed by: PHYSICIAN ASSISTANT

## 2024-10-01 PROCEDURE — 1125F AMNT PAIN NOTED PAIN PRSNT: CPT | Mod: HCNC,CPTII,S$GLB, | Performed by: PHYSICIAN ASSISTANT

## 2024-10-01 PROCEDURE — 1159F MED LIST DOCD IN RCRD: CPT | Mod: HCNC,CPTII,S$GLB, | Performed by: PHYSICIAN ASSISTANT

## 2024-10-01 PROCEDURE — 3288F FALL RISK ASSESSMENT DOCD: CPT | Mod: HCNC,CPTII,S$GLB, | Performed by: PHYSICIAN ASSISTANT

## 2024-10-01 PROCEDURE — 99999 PR PBB SHADOW E&M-EST. PATIENT-LVL IV: CPT | Mod: PBBFAC,HCNC,, | Performed by: PHYSICIAN ASSISTANT

## 2024-10-01 RX ORDER — TRIAMCINOLONE ACETONIDE 1 MG/G
PASTE DENTAL 2 TIMES DAILY
Qty: 5 G | Refills: 0 | Status: SHIPPED | OUTPATIENT
Start: 2024-10-01 | End: 2024-10-31

## 2024-10-01 NOTE — PROGRESS NOTES
"Subjective:      Patient ID: Evelina Pinon is a 79 y.o. female.    Vitals:  height is 5' 4" (1.626 m) and weight is 60.2 kg (132 lb 13.2 oz). Her temperature is 98.2 °F (36.8 °C). Her blood pressure is 110/58 (abnormal) and her pulse is 50 (abnormal). Her oxygen saturation is 99%.     Chief Complaint: Mouth Lesions (Patient presents for eval of recurrent mouth sores. Patient stated nothing exacerbates sores in mouth, and she cannot recall anything that brings them on.)    79-year-old female presents for evaluation of mouth ulcers.  Patient states that she has had ulcers in the past for which Dr. Garibay has prescribed her medicine for.  Upon review of records I can not find any documentation of this.  She states that ulcers are on upper left gumline under dentures.  She denies having ill-fitting dentures.  She states ulcers have been there for about a week and a half.  She also complains of ulcer on left side of tongue.  She denies any associated fever or chills.    Mouth Lesions   Associated symptoms include mouth sores. Pertinent negatives include no fever.       Constitution: Negative for chills and fever.   HENT:  Positive for mouth sores.       Objective:     Physical Exam   Constitutional:  Non-toxic appearance. No distress.   HENT:   Head: Normocephalic and atraumatic.   Ears:   Right Ear: External ear normal.   Left Ear: External ear normal.   Mouth/Throat:      Comments: Two small approximate 1 mm in size ulcers noted to upper left gumline under dentures.  I appreciate no ulcers on the tongue.  Cardiovascular: Normal rate.   Pulmonary/Chest: Effort normal. No respiratory distress.   Neurological: She is alert.   Skin: Skin is warm, dry, not diaphoretic and not pale. jaundice  Nursing note and vitals reviewed.      Assessment:     1. Oral ulcer        Plan:       Oral ulcer    Other orders  -     triamcinolone acetonide 0.1% (KENALOG) 0.1 % paste; Place onto teeth 2 (two) times daily.  Dispense: 5 " g; Refill: 0    I do not appreciate any ulcer on patient's tongue.  She does have 2 small ulcers on the upper left gumline.  I asked about her dentures and she states they are well fitting and she denies having any issues.  I have advised patient to also rinsed with warm salt water.  Discussed not to use the dental paste more than 2 times daily.  This is not for prolonged usage.  Encouraged to reach out to  for check.

## 2024-10-03 ENCOUNTER — CLINICAL SUPPORT (OUTPATIENT)
Dept: PRIMARY CARE CLINIC | Facility: CLINIC | Age: 79
End: 2024-10-03
Payer: MEDICARE

## 2024-10-03 DIAGNOSIS — F43.9 TRAUMA AND STRESSOR-RELATED DISORDER: Primary | ICD-10-CM

## 2024-10-03 DIAGNOSIS — Z65.8 PSYCHOSOCIAL STRESSORS: ICD-10-CM

## 2024-10-03 PROCEDURE — 99499 UNLISTED E&M SERVICE: CPT | Mod: HCNC,S$GLB,, | Performed by: SOCIAL WORKER
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
## 2024-10-04 ENCOUNTER — OFFICE VISIT (OUTPATIENT)
Dept: PRIMARY CARE CLINIC | Facility: CLINIC | Age: 79
End: 2024-10-04
Payer: MEDICARE

## 2024-10-04 VITALS
HEART RATE: 54 BPM | WEIGHT: 131.81 LBS | OXYGEN SATURATION: 99 % | DIASTOLIC BLOOD PRESSURE: 60 MMHG | SYSTOLIC BLOOD PRESSURE: 118 MMHG | BODY MASS INDEX: 22.63 KG/M2

## 2024-10-04 DIAGNOSIS — I10 ESSENTIAL HYPERTENSION: Primary | Chronic | ICD-10-CM

## 2024-10-04 DIAGNOSIS — M25.561 ACUTE PAIN OF RIGHT KNEE: ICD-10-CM

## 2024-10-04 DIAGNOSIS — D86.0 SARCOIDOSIS OF LUNG: Chronic | ICD-10-CM

## 2024-10-04 DIAGNOSIS — I25.10 CORONARY ARTERY DISEASE INVOLVING NATIVE CORONARY ARTERY OF NATIVE HEART WITHOUT ANGINA PECTORIS: Chronic | ICD-10-CM

## 2024-10-04 DIAGNOSIS — E78.2 MIXED HYPERLIPIDEMIA: Chronic | ICD-10-CM

## 2024-10-04 PROCEDURE — 99999 PR PBB SHADOW E&M-EST. PATIENT-LVL V: CPT | Mod: PBBFAC,HCNC,, | Performed by: FAMILY MEDICINE

## 2024-10-04 RX ORDER — DICLOFENAC SODIUM 10 MG/G
2 GEL TOPICAL 2 TIMES DAILY
Qty: 200 G | Refills: 3 | Status: SHIPPED | OUTPATIENT
Start: 2024-10-04

## 2024-10-04 NOTE — PROGRESS NOTES
Primary Care Provider Appointment   [facility]       Patient ID: Evelina Pinon is a 79 y.o. female.    ASSESSMENT/PLAN by Problem List:    Assessment & Plan    Assessed knee pain, likely due to arthritis flare-up. Knee structure appears intact, but inflammation present  Considered conservative management with anti-inflammatory medication and physical therapy before pursuing further interventions  Evaluated blood pressure, noting it was elevated today likely due to missed medication and stress  Assessed cognitive function, noting some memory concerns but not indicative of dementia at this time, continuing therapy for depression and other concerns with Suzanne at this time.  Reviewed stable chronic conditions: hypertension, hyperlipidemia, sarcoidosis, and coronary artery disease    KNEE PAIN:  - Explained that knee pain is likely temporary and can improve with treatment.  - Discussed Voltaren gel mechanism of action, noting it may take a few days of regular use to see effects.  - Evelina to wear tennis shoes for better ankle and knee support.  - Started Voltaren gel, apply to knee up to 3 times daily.  - Knee X-ray ordered.  - Referred to physical therapy with Karolyn for knee exercises.  - Follow up next week for X-ray, can cancel if knee improves significantly.  - Karolyn will call to schedule physical therapy appointment, likely on a Tuesday.    HYPERTENSION:  - Evelina to limit sodium intake.  - Evelina to check blood pressure at home periodically, a few times per week.  - Continued amlodipine 2.5 mg daily, losartan 100 mg daily.    HYPERLIPIDEMIA:  - Evelina to make dietary changes to improve lipid levels.  - Continued atorvastatin 10 mg daily.    CARDIOVASCULAR HEALTH:  - Evelina to increase regular exercise.  - Continued Plavix.         1. Essential hypertension    2. Mixed hyperlipidemia    3. Sarcoidosis of lung    4. Coronary artery disease involving native coronary artery of native  heart without angina pectoris    5. Acute pain of right knee  -     X-Ray Knee 3 View Right; Future; Expected date: 10/04/2024  -     Ambulatory referral/consult to Physical/Occupational Therapy; Future; Expected date: 10/11/2024    Other orders  -     diclofenac sodium (VOLTAREN ARTHRITIS PAIN) 1 % Gel; Apply 2 g topically 2 (two) times daily.  Dispense: 200 g; Refill: 3        Follow Up:  2-3 months      Subjective:       HPI    Patient is a/an 79 y.o.  female       History of Present Illness    CHIEF COMPLAINT:  Evelina presents today for follow up of chronic conditions and knee pain.    CARDIOVASCULAR:  She reports stable and well-controlled hypertension. She continues amlodipine 2.5 mg daily and losartan 100 mg daily, acknowledging the need to limit sodium intake and increase exercise. Regarding hyperlipidemia, she remains on atorvastatin 10 mg. Recent lipid panel showed LDL slightly above target of 70. She is aware of the need for dietary changes and upcoming repeat lipid testing. Concerning coronary artery disease, she denies any unstable symptoms and reports the condition appears stable. She continues clopidogrel and statin, and is followed closely by her cardiologist.    SARCOIDOSIS:  She denies any worrisome signs or symptoms related to her history of sarcoidosis. She continues to follow up with pulmonology and rheumatology.    MUSCULOSKELETAL:  She reports onset of severe right knee pain on Monday, initially so intense that she could not put her foot on the floor. The pain has improved but persists when standing and walking, requiring the use of a walker. She denies any recent twisting or injury to the knee. She reports a history of previous knee issues a while ago, mentioning possible fluid in the knee, but is uncertain about specific details or treatments. The pain is localized to the inside of the joint and is exacerbated by weight-bearing activities. She denies any pain in the left knee. She reports  "improvement in her hip condition, stating it no longer hurts when she walks.    SLEEP:  She reports experiencing insomnia, stating she did not sleep at all the previous night. She denies knowing the specific reason for her inability to sleep.    MOOD:  She reports feelings of depression related to her limited mobility and inability to drive. She expresses significant frustration with her current situation, stating she feels "pinned up" and finds it "terrible." She is particularly concerned about her increased dependency on her , noting that she relies on him for everything. She describes her  as controlling and mentions that this situation is causing strain in their 63-year marriage. She reports feeling forgotten by others and isolated after being homebound for an extended period. She expresses a strong desire for independence and the ability to go out, describing herself as someone who loves to "go, go, go, do, do, do."    COGNITIVE:  She reports experiencing significant memory issues, expressing concern about forgetting recent events, including a phone call received yesterday to remind her of the current appointment. She is "forgetting a lot" and describes the situation as "scary." She acknowledges a family history of memory problems, referencing her father's similar experiences. She believes these cognitive issues may be temporary and potentially related to stress and poor sleep. She denies any current diagnosis of dementia.    MEDICATIONS:  She continues amlodipine 2.5 mg daily and losartan 100 mg daily for hyperte  nsion, atorvastatin 10 mg for hyperlipidemia, and clopidogrel for coronary artery disease. She admits to not taking her blood pressure medication on the day of the appointment due to lack of sleep. She reports her usual home blood pressure readings are around 110-120 systolic. She recalls a recent low reading of 80/60, though expresses uncertainty about the accuracy of this " measurement.    FAMILY HISTORY:  She reports a family history of memory issues in her father.    ROS:  General: -fever, -chills, -fatigue, -weight gain, -weight loss  Eyes: -vision changes, -redness, -discharge  ENT: -ear pain, -nasal congestion, -sore throat  Cardiovascular: -chest pain, -palpitations, -lower extremity edema  Respiratory: -cough, -shortness of breath  Gastrointestinal: -abdominal pain, -nausea, -vomiting, -diarrhea, -constipation, -blood in stool  Genitourinary: -dysuria, -hematuria, -frequency  Musculoskeletal: +joint pain, -muscle pain, -joint swelling  Skin: -rash, -lesion  Neurological: -headache, -dizziness, -numbness, -tingling  Psychiatric: -anxiety, +depression, +sleep difficulty, +memory problems         For complete problem list, past medical history, surgical history, social history, etc., see appropriate section in the electronic medical record    Review of Systems    Objective     Physical Exam  Vitals reviewed.   Constitutional:       General: She is not in acute distress.     Appearance: She is well-developed. She is not toxic-appearing.   HENT:      Head: Normocephalic and atraumatic.   Eyes:      General: No scleral icterus.  Cardiovascular:      Rate and Rhythm: Regular rhythm. Bradycardia present.   Pulmonary:      Effort: Pulmonary effort is normal. No respiratory distress.   Musculoskeletal:      Comments: Right knee reveals full range of motion, no instability, no swelling or redness or some discomfort with palpation over the medial joint line   Neurological:      Mental Status: She is alert and oriented to person, place, and time.      Comments: She was brought back again in a wheelchair for the long walk to the room.  She can ambulate with mild antalgic gait   Psychiatric:      Comments: She is tangential, tearful at times.       Vitals:    10/04/24 1501 10/04/24 1530   BP: (!) 144/80 118/60   BP Location: Left arm    Pulse: (!) 54    SpO2: 99%    Weight: 59.8 kg (131 lb  13.4 oz)      Physical Exam        This note was generated with the assistance of ambient listening technology. Verbal consent was obtained by the patient and accompanying visitor(s) for the recording of patient appointment to facilitate this note. I attest to having reviewed and edited the generated note for accuracy, though some syntax or spelling errors may persist. Please contact the author of this note for any clarification.  Parts of the note were also generated with voice recognition software.  Occasionally this software will misinterpreted words or phrases

## 2024-10-07 ENCOUNTER — HOSPITAL ENCOUNTER (OUTPATIENT)
Dept: RADIOLOGY | Facility: HOSPITAL | Age: 79
Discharge: HOME | End: 2024-10-07
Attending: FAMILY MEDICINE
Payer: MEDICARE

## 2024-10-07 DIAGNOSIS — E78.2 MIXED HYPERLIPIDEMIA: Primary | ICD-10-CM

## 2024-10-07 DIAGNOSIS — M25.561 ACUTE PAIN OF RIGHT KNEE: ICD-10-CM

## 2024-10-07 PROCEDURE — 73562 X-RAY EXAM OF KNEE 3: CPT | Mod: 26,HCNC,RT, | Performed by: RADIOLOGY

## 2024-10-07 PROCEDURE — 73562 X-RAY EXAM OF KNEE 3: CPT | Mod: TC,HCNC,FY,PO,RT

## 2024-10-07 RX ORDER — LANOLIN ALCOHOL/MO/W.PET/CERES
CREAM (GRAM) TOPICAL
Qty: 90 TABLET | Refills: 3 | Status: SHIPPED | OUTPATIENT
Start: 2024-10-07

## 2024-10-08 RX ORDER — CLOPIDOGREL BISULFATE 75 MG/1
75 TABLET ORAL
Qty: 90 TABLET | Refills: 3 | Status: SHIPPED | OUTPATIENT
Start: 2024-10-08

## 2024-10-10 ENCOUNTER — CLINICAL SUPPORT (OUTPATIENT)
Dept: PRIMARY CARE CLINIC | Facility: CLINIC | Age: 79
End: 2024-10-10
Payer: MEDICARE

## 2024-10-10 ENCOUNTER — TELEPHONE (OUTPATIENT)
Dept: PRIMARY CARE CLINIC | Facility: CLINIC | Age: 79
End: 2024-10-10
Payer: MEDICARE

## 2024-10-10 DIAGNOSIS — F43.9 TRAUMA AND STRESSOR-RELATED DISORDER: Primary | ICD-10-CM

## 2024-10-10 DIAGNOSIS — F41.9 ANXIETY: ICD-10-CM

## 2024-10-10 DIAGNOSIS — Z65.8 PSYCHOSOCIAL STRESSORS: ICD-10-CM

## 2024-10-10 PROCEDURE — 99499 UNLISTED E&M SERVICE: CPT | Mod: HCNC,S$GLB,, | Performed by: SOCIAL WORKER

## 2024-10-10 NOTE — TELEPHONE ENCOUNTER
"Spoke with pt regarding the results of her knee XR.    Informed her of Dr. Whyte's message:   "The x-ray does reveal some arthritis changes and calcifications.  I do recommend proceeding with physical therapy as planned.  If however symptoms are not improving the next step would be Orthopedics."  Pt verbalized understanding to all.   "

## 2024-10-10 NOTE — PROGRESS NOTES
"Individual Psychotherapy (PhD/LCSW)    10/10/2024    Site:  [place] [id number]      Chief complaint/reason for encounter: validation of emotions     Mood check: scale of:0 best, 10 worst. Patient rated:  Depression rated at - improved   Anxiety rated at  -  improved     Risk parameters:  Patient reports no suicidal ideation  Patient reports no homicidal ideation  Patient reports no self-injurious behavior  Patient reports no violent behavior    [place]:  1. Shared more of her history   2. Identified areas of growth   3. Changing appointments from weekly to every 2 weeks.     History of present condition/content of session:   She began the session sharing her hx of likely being malnourished as a child. She said that she grew up in the bar room, and only eating salted foods, such as pickled pigs feet, pickled onions, and potato chips. She said that she did not learn about peanut butter and jelly until she was aged 8 y/o.     Pt spent time sharing hx of others, such as Tao[employer] friends, who make negative comments about her successes in her life. She was encouraged to review Personal Bill of Rights. Especially "I have a right to not give reasons or excuses for my behavior."  Pt was encouraged to understand that instead of being happy for her, many people in her life seemed to keep her down. In healthy relationships, pt verbalized understanding in  others tend to be more positive and lift you up, and not bring you down. Included in this discussion, she talked at a great length about her paternal sister who was enormously overweight. Pt acknowledged a hx of other's taking advantage of her kindness.     As the discussion continued on her areas of growthClinician redirected pt to asking her to identify some 5 of her positive qualities. This was done to assess her level of self esteem (to collaborate self identified improvement in this area).    - kindness   - forgiving  - fun loving  - "I'm a good writer"     Given her identified " "and collaborated areas of improvement, clinician assessed pt for her readiness to switch to seeing clinician every 2 weeks. However, pt did not verbalize readiness AEB she stated that she is not ready to move appointments to every 2 weeks.      Therapeutic Intervention:   Pt was assessed for present condition, self esteem, and level of insight.  Pt was provided with empathy, and support.  Active Listening was used in the session.  Pt was encouraged to accept positive strokes when she was given positive reinforcement for identified areas of growth. Discussion was held on progression to point where she no longer needs weekly appointments. Self validation was also discussed.     Target symptoms: anxiety  and hx of trauma   Why chosen therapy is appropriate versus another modality: evidence based practice  Outcome monitoring methods: checklist/rating scale  Therapeutic intervention type: behavior modifying psychotherapy    Patient's response to intervention:  The patient's response to intervention is accepting. Pt stated therapy is helping her to talk about her painful past because no one else wants to hear it. As pt continues to process her hx, it appears that she is having more insight into the situation, and working toward improving her self worth. Pt appears to be improving on ability to accept validation of her emotions.      Progress toward goals and other mental status changes:  The patient's progress toward goals is  90% complete  for getting it all out. Clinician added goals, and she is progressing at a slow but steady pace. Clinician believes that pt has progressed to appointments every 2 weeks, but she is not receptive to doing so.     Pt identified goals:  "To get it all out... I never told people." 6/20/2024 - Pt does not agree that this goal is completed.     Added 6/20/2024   Goal:  Continue to identify feelings/thoughts   - Working with pt to improve communication skills     - setting healthy boundaries " "  - Pt will allow herself to have negative emotions     Long term goal identified by program objectives is to improve pt's compliance with medical recommendations from PCP (with focus on improving self esteem and self worth).   -  Although she denied having low self esteem, clinician plans to explore further her meaning of feeling "less than."  9/26/2024 - Showing improvement   -  Explore her anger    - Allow herself to have negative emotions - 8/29/2024 - almost complete     Diagnosis:   R/O Claustrophobia   Generalized Anxiety D/O  R/O  Trauma related disorders     Plan:  individual psychotherapy Clinician plans plans to begin work on her anxiety and/or claustrophobia.     Return to clinic: [id number] at 3:00.     Length of Service (minutes): 60                                    "

## 2024-10-17 ENCOUNTER — CLINICAL SUPPORT (OUTPATIENT)
Dept: PRIMARY CARE CLINIC | Facility: CLINIC | Age: 79
End: 2024-10-17
Payer: MEDICARE

## 2024-10-17 DIAGNOSIS — F43.9 TRAUMA AND STRESSOR-RELATED DISORDER: ICD-10-CM

## 2024-10-17 DIAGNOSIS — Z65.8 PSYCHOSOCIAL STRESSORS: ICD-10-CM

## 2024-10-17 DIAGNOSIS — F41.9 ANXIETY: Primary | ICD-10-CM

## 2024-10-17 NOTE — PROGRESS NOTES
"Individual Psychotherapy (PhD/LCSW)    10/17/2024    Site:  [place] [address]      Chief complaint/reason for encounter: sharing painful history     Mood check: scale of:0 best, 10 worst. Patient rated:  Depression rated at -  did not assess  Anxiety rated at  - did not assess     Risk parameters:  Patient reports no suicidal ideation  Patient reports no homicidal ideation  Patient reports no self-injurious behavior  Patient reports no violent behavior    [place]:  1. Shared more of her history   2. Exploring barrier to moving forward      History of present condition/content of session:   She continued to share the shame she felt being so unclean as a child and not being taught social decorum and manners.  Pt spent a large of time talking about her poor diet when growing up. She said that "you learned to cover up the lies."  Clinician assessed for the reasons that she continues to have these memories, and has the need to share this painful hx.  She verbalized agreement that there are things in the present that keep back in her past.  Clinician elected not to put details these things secondary to respect pt's privacy.   But her ability to admit that there is a barrier and to identify the barrier is a big area of growth for her. Included in this discussion, pt shared decisions she made in her adult life and reasons she made the choices that she made.     Clinician again noted pt's progress and transitioning to appointments every 2 weeks, as opposed to q week.  She voiced concern that clinician is trying to get rid her. She verbalized understanding, when explained to her, that it is actually a sign of her growth.  Pt admitted that she is starting to feel better about herself and not waking up with painful memories automatically coming into her conscious mind. However, she continues to verbalize that she is not ready to move appointments to every 2 weeks.      Therapeutic Intervention:   Pt was assessed for present " "condition, self esteem, and level of insight.  Pt was provided with empathy and support.  Active Listening was used in the session.  Pt given positive reinforcement for identified areas of growth. Help identifying the links to childhood experiences of current feelings and behaviors was also provided. It was reflected to the patient that most people would experience the same distress and difficulties given the circumstances, thoughts, feelings.      Target symptoms: anxiety  and hx of trauma   Why chosen therapy is appropriate versus another modality: evidence based practice  Outcome monitoring methods: checklist/rating scale  Therapeutic intervention type: behavior modifying psychotherapy    Patient's response to intervention:  The patient's response to intervention is accepting. Pt stated therapy is helping her to talk about her painful past because no one else wants to hear it. As pt continues to process her hx, it appears that she is having more insight into the situation, and working toward improving her self worth. Pt appears to be improving on ability to accept validation of her emotions.      Progress toward goals and other mental status changes:  The patient's progress toward goals is  90% complete  for getting it all out. Clinician added goals, and she is progressing at a slow but steady pace. Clinician believes that pt has progressed to appointments every 2 weeks, but she is not receptive to doing so.     Pt identified goals:  "To get it all out... I never told people." 6/20/2024 - Pt does not agree that this goal is completed.     Added 6/20/2024   Goal:  Continue to identify feelings/thoughts   - Working with pt to improve communication skills     - setting healthy boundaries   - Pt will allow herself to have negative emotions     Long term goal identified by program objectives is to improve pt's compliance with medical recommendations from PCP (with focus on improving self esteem and self worth).   -  " "Although she denied having low self esteem, clinician plans to explore further her meaning of feeling "less than."  9/26/2024 - Showing improvement   -  Explore her anger    - Allow herself to have negative emotions - 8/29/2024 - almost complete     Diagnosis:   R/O Claustrophobia   Generalized Anxiety D/O  R/O  Trauma related disorders     Plan:  individual psychotherapy Clinician plans plans to begin work on her anxiety and/or claustrophobia.     Return to clinic: 10/24/2024 at 3:00.     Length of Service (minutes): 60      "

## 2024-10-18 RX ORDER — TRIAMCINOLONE ACETONIDE 1 MG/G
PASTE DENTAL 2 TIMES DAILY
Qty: 5 G | Refills: 0 | Status: SHIPPED | OUTPATIENT
Start: 2024-10-18 | End: 2024-11-17

## 2024-10-22 ENCOUNTER — CLINICAL SUPPORT (OUTPATIENT)
Dept: REHABILITATION | Facility: HOSPITAL | Age: 79
End: 2024-10-22
Payer: MEDICARE

## 2024-10-22 DIAGNOSIS — R29.898 DECREASED STRENGTH OF LOWER EXTREMITY: ICD-10-CM

## 2024-10-22 DIAGNOSIS — M25.561 ACUTE PAIN OF RIGHT KNEE: Primary | ICD-10-CM

## 2024-10-22 PROCEDURE — 97112 NEUROMUSCULAR REEDUCATION: CPT | Mod: KX,HCNC,PN | Performed by: PHYSICAL THERAPIST

## 2024-10-22 PROCEDURE — 97110 THERAPEUTIC EXERCISES: CPT | Mod: KX,HCNC,PN | Performed by: PHYSICAL THERAPIST

## 2024-10-22 PROCEDURE — 97162 PT EVAL MOD COMPLEX 30 MIN: CPT | Mod: KX,HCNC,PN | Performed by: PHYSICAL THERAPIST

## 2024-10-22 NOTE — PATIENT INSTRUCTIONS
"  heel slides     heel slides while supine 2 sets of 10      QUAD SETS - ISOMETRIC QUADS    Sit down and straighten your leg and knee. Tighten your top thigh muscle to press the back of your knee downward. Hold this and then relax and repeat. Hold 5 seconds. Repeat 20x.      STRAIGHT LEG RAISE - SLR    While lying on your back, raise up your leg with a straight knee.  Keep the opposite knee bent with the foot planted on the ground. 2 sets of 10    BRIDGE - BRIDGING    While lying on your back with knees bent, tighten your lower abdominal muscles, squeeze your buttocks and then raise your buttocks off the floor/bed as creating a "Bridge" with your body. Hold and then lower yourself and repeat. 2 sets of 10    HIP ABDUCTION - SIDELYING    While lying on your side, slowly raise up your top leg towards the kyara. Keep your knee straight and maintain your toes pointed forward the entire time. Keep your leg in-line with your body. 2 sets of 10    The bottom leg can be bent to stabilize your body.     KNEE EXTENSION - LONG ARC QUAD (LAQ)    While seated with your knee in a bent position and your heel touching the ground, slowly straighten your knee as you raise your foot upwards as shown. Lower your foot back down until your heel touches the ground and then repeat. 2 sets of 10.  "

## 2024-10-23 PROBLEM — R29.898 DECREASED STRENGTH OF LOWER EXTREMITY: Status: ACTIVE | Noted: 2024-10-23

## 2024-10-23 PROBLEM — M25.561 ACUTE PAIN OF RIGHT KNEE: Status: ACTIVE | Noted: 2024-10-23

## 2024-10-23 NOTE — PLAN OF CARE
[facility] OUTPATIENT THER[facility] WELLNESS   Physical Therapy Initial Evaluation        Date: 10/22/2024   Name: Evelina Pinon  Clinic Number: [id number]    Therapy Diagnosis:   Encounter Diagnoses   Name Primary?    Acute pain of right knee Yes    Decreased strength of lower extremity      Physician: Jeffrey Garibay MD    Physician Orders: PT Eval and Treat   Medical Diagnosis from Referral: acute pain of right knee  Evaluation Date: 10/22/2024  Authorization Period Expiration: 10/4/24  Plan of Care Expiration: 1/22/25  Progress Note Due: 11/22/24  Visit # / Visits authorized: 1/ 1       Precautions: Standard     Time In: 11:15AM  Time Out: 12:10PM  Total Appointment Time (timed & untimed codes): 55 minutes      SUBJECTIVE     Date of onset: 2-3 weeks    History of current condition - Evelina reports: she has had right medial knee pain for the last few weeks that has limited her ability to walk. She came into clinic in wheelchair. She had left hemiarthroplasty of hip 12/23/23 after suffering a fall. She felt that she did well and was back to walking independently without assistive device until knee pain started a few weeks ago.    Current Activity Level: not walking    Falls: 1x    Imaging, xray 10/4/24: Moderate medial compartment narrowing on the right. Very mild medial narrowing on the left. Faint calcifications in medial compartment study suggesting chondrocalcinosis. Mild degenerative changes. Patella femoral joint space narrowing on the left. No acute fracture or dislocation. The skeletal structures appear osteopenic    Prior Therapy: yes in 2024 following hip surgery  Social History:   lives with their spouse  Occupation:   Prior Level of Function: walking independently  Current Level of Function: using wheelchair or using rolling walker for gait    Pain:  Current: 2 / 10, Worst: 6 / 10, Best: 1 / 10   Location: right medial knee  Description: Aching and Sharp  Aggravating Factors: Walking  Easing Factors:  "rest    Patients goals: to resolve right knee pain     Medical History:   Past Medical History:   Diagnosis Date    ALLERGIC RHINITIS 2/22/2012    Alopecia     Anticoagulant long-term use     Anxiety 2/22/2012    Dyslipidemia     Fx patella     Left    Gout 2/22/2012    HTN (hypertension) 2/22/2012    TIA (transient ischemic attack) 2/22/2012       Surgical History:   Evelina Pinon  has a past surgical history that includes Hysterectomy; Patella fracture surgery (Left, 2013); Left heart catheterization (N/A, 08/25/2020); Coronary angiography (N/A, 08/25/2020); Esophagogastroduodenoscopy (N/A, 05/26/2021); Endoscopic ultrasound of upper gastrointestinal tract (Left, 05/26/2021); Right heart catheterization (Right, 07/09/2021); Hemiarthroplasty of hip (Right, 12/18/2023); and Cataract extraction (Left, 07/2023).    Medications:   Evelina has a current medication list which includes the following prescription(s): allopurinol, amlodipine, atorvastatin, clopidogrel, colchicine, diclofenac sodium, difluprednate, fluticasone propionate, folic acid, ketoconazole, lorazepam, losartan, magnesium oxide, methocarbamol, methotrexate, metoprolol succinate, nystatin, pantoprazole, psyllium husk, triamcinolone acetonide 0.1%, and triamcinolone acetonide 0.1%.    Allergies:   Review of patient's allergies indicates:   Allergen Reactions    Augmentin [amoxicillin-pot clavulanate] Rash    Penicillins Hives    Opioids - morphine analogues      Itchy, redness, mild sob,, "feels funny"          OBJECTIVE       Gait: Pt ambulatory with rolling walker with pain right knee        Active Range of Motion:  knee   Action Left Right   Flexion 140 degrees 120 degrees   Extension 0 degrees 0 degrees       L/E MMT Right  (spine) Left   Hip Flexion  4-/5 4/5   Hip Extension  4-/5 4/5   Hip Abduction  4-/5 4/5   Knee Extension 3+/5 4+/5   Knee Flexion 4-/5 4/5   Hip IR 4/5 5/5   Hip ER 4-/5 5/5           Special Tests:  Positive (+) " Tests:  Adrianne test right knee    Palpation and Additional Assessment:  Pain with palpation medial joint line right knee      Intake Outcome Measure for FOTO knee Survey    Therapist reviewed FOTO scores for Evelina Pinon on 10/22/2024.   FOTO documents entered into [employer] - see Media section or FOTO account episode details.    Intake Score: not captured         TREATMENT     Total Treatment time (time-based codes) separate from Evaluation: 38 minutes     Evelina received the treatments listed below:      Therapeutic Exercises to develop strength, endurance, ROM, and flexibility for 15 minutes including:  Heel slides x20  Quad sets x20  LAQs 2/10        Neuromuscular re-education activities to improve: Balance, Coordination, Kinesthetic, Sense, and Proprioception for 23 minutes. The following activities were included:  SLR 2/10 with focus on quad control  Bridging 2/10 wth focus on hip stabilization  Hip adductor ball squeeze 2/10 with focus on hip stabilization  Sidelying hip abduction 2/10 with focus on hip alignment/stabilization    PATIENT EDUCATION AND HOME EXERCISES     Education provided:   Patient  was instructed in home exercise program  to address the deficits listed above and to address overall condition and quality of life.  Patient  was encouraged to participate in cardiovascular exercise and wellness exercise in fitness center with assistance of health  at [address]   Patient was educated on all the above exercise prior/during/after for proper posture, positioning, and execution for safe performance with home exercise program.    Written Home Exercises Provided: Patient instructed to cont prior HEP. Exercises were reviewed and Evelina was able to demonstrate them prior to the end of the session.  Evelina demonstrated fair  understanding of the education provided. See EMR under Patient Instructions for exercises provided during therapy sessions.    ASSESSMENT     Evelina is a 79 y.o. female  referred to outpatient Physical Therapy with a medical diagnosis of acute pain of right knee. The patient presents with signs and symptoms consistent with diagnosis of and impairments which include weakness, impaired functional mobility, gait instability, impaired balance, decreased lower extremity function, pain, and decreased ROM.    Patient prognosis is Good.   Patient will benefit from skilled outpatient Physical Therapy to address the deficits stated above and in the chart below, provide patient /family education, and to maximize patientt's level of independence.     Plan of care discussed with patient: Yes  Patient's spiritual, cultural and educational needs considered and patient is agreeable to the plan of care and goals as stated below:     Anticipated Barriers for therapy: none    Medical Necessity is demonstrated by the following   History  Co-morbidities and personal factors that may impact the plan of care [] LOW: no personal factors / co-morbidities  [x] MODERATE: 1-2 personal factors / co-morbidities  [] HIGH: 3+ personal factors / co-morbidities    Moderate / High Support Documentation:   Past Medical History:   Diagnosis Date    ALLERGIC RHINITIS 2/22/2012    Alopecia     Anticoagulant long-term use     Anxiety 2/22/2012    Dyslipidemia     Fx patella     Left    Gout 2/22/2012    HTN (hypertension) 2/22/2012    TIA (transient ischemic attack) 2/22/2012         Examination  Body Structures and Functions, activity limitations and participation restrictions that may impact the plan of care [] LOW: addressing 1-2 elements  [x] MODERATE: 2+ elements  [] HIGH: 3+ elements (please support below)    Moderate / High Support Documentation: see evaluation/objective measurements above.     Clinical Presentation [] LOW: stable  [x] MODERATE: Evolving  [] HIGH: Unstable     Decision Making/ Complexity Score: moderate       Goals:  Short Term Goals: 8 weeks   Pt will report worst pain of 2/10 in order to progress  toward max functional ability and improve quality of life   Patient will improve AROM of right knee by 10 degrees in order to progress towards independence with functional activities.   Patient will improve strength of left LE by 1/3 grade in order to progress towards independence with functional activities.   Patient will demonstrate improved gait mechanics including independent gait in order to improve functional mobility, improve quality of life, and decrease risk of further injury or fall.   Patient will demonstrate independence with HEP in order to progress toward functional independence.        PLAN   Plan of care Certification: 10/22/2024 to 1/22/24.    Outpatient Physical Therapy 1 times/  8 week(s) to include the following interventions: Gait Training, Neuromuscular Re-ed, Patient Education, Therapeutic Activities, and Therapeutic Exercise to establish safe and effective exercise program that patient can perform independently.     Karolyn Pineda, PT

## 2024-10-24 ENCOUNTER — CLINICAL SUPPORT (OUTPATIENT)
Dept: PRIMARY CARE CLINIC | Facility: CLINIC | Age: 79
End: 2024-10-24
Payer: MEDICARE

## 2024-10-24 DIAGNOSIS — F41.9 ANXIETY: ICD-10-CM

## 2024-10-24 DIAGNOSIS — F43.9 TRAUMA AND STRESSOR-RELATED DISORDER: ICD-10-CM

## 2024-10-24 DIAGNOSIS — Z65.8 PSYCHOSOCIAL STRESSORS: Primary | ICD-10-CM

## 2024-10-24 PROCEDURE — 99499 UNLISTED E&M SERVICE: CPT | Mod: HCNC,S$GLB,, | Performed by: SOCIAL WORKER

## 2024-10-29 ENCOUNTER — CLINICAL SUPPORT (OUTPATIENT)
Dept: REHABILITATION | Facility: HOSPITAL | Age: 79
End: 2024-10-29
Payer: MEDICARE

## 2024-10-29 DIAGNOSIS — M25.561 ACUTE PAIN OF RIGHT KNEE: Primary | ICD-10-CM

## 2024-10-29 DIAGNOSIS — R29.898 DECREASED STRENGTH OF LOWER EXTREMITY: ICD-10-CM

## 2024-10-29 PROCEDURE — 97112 NEUROMUSCULAR REEDUCATION: CPT | Mod: KX,HCNC,PN | Performed by: PHYSICAL THERAPIST

## 2024-10-29 PROCEDURE — 97530 THERAPEUTIC ACTIVITIES: CPT | Mod: KX,HCNC,PN | Performed by: PHYSICAL THERAPIST

## 2024-10-29 PROCEDURE — 97110 THERAPEUTIC EXERCISES: CPT | Mod: KX,HCNC,PN | Performed by: PHYSICAL THERAPIST

## 2024-11-01 ENCOUNTER — CLINICAL SUPPORT (OUTPATIENT)
Dept: PRIMARY CARE CLINIC | Facility: CLINIC | Age: 79
End: 2024-11-01
Payer: MEDICARE

## 2024-11-01 DIAGNOSIS — F41.9 ANXIETY: ICD-10-CM

## 2024-11-01 DIAGNOSIS — F43.9 TRAUMA AND STRESSOR-RELATED DISORDER: ICD-10-CM

## 2024-11-01 DIAGNOSIS — Z65.8 PSYCHOSOCIAL STRESSORS: Primary | ICD-10-CM

## 2024-11-01 PROCEDURE — 99499 UNLISTED E&M SERVICE: CPT | Mod: HCNC,S$GLB,, | Performed by: SOCIAL WORKER

## 2024-11-01 NOTE — PROGRESS NOTES
Individual Psychotherapy (PhD/LCSW)    11/1/2024    Site:  [place] [id number]      Chief complaint/reason for encounter:  follow up     Mood check: scale of:0 best, 10 worst. Patient rated:  Depression rated at -  did not assess  Anxiety rated at  - did not assess     MENTAL HEALTH STATUS EXAM  General Appearance:  Disheveled- her shirt is inside out and backwards    Speech: normal tone, normal rate, normal pitch, normal volume, but tangental       Level of Cooperation: cooperative      Thought Processes: tangential   Mood: anxious      Thought Content: normal, no suicidality, no homicidality, delusions, or paranoia   Affect: expansive   Orientation: Oriented x3   Memory: Did not assess    Attention Span & Concentration: Did not assess, but appears to be wnl    Fund of General Knowledge: Did not assess, but appears to be wnl    Abstract Reasoning: Did not assess, but appears to be wnl    Judgment & Insight: Improving      Language  Did not assess, but appears to be wnl      Risk parameters:  Patient reports no suicidal ideation  Patient reports no homicidal ideation  Patient reports no self-injurious behavior  Patient reports no violent behavior    [place]:  1.   Assessing for sx of claustrophobia  2.  Putting up [place] decorations     History of present condition/content of session:   Pt began the session sharing the that she put up the 2 small [place] trees, and the 2 big ones (see notes from last session). She again shared that her  did not help her put up the decorations.  She said she said her grandson and his friend came and helped her. She said her  was not happy with her doing so.  With some assistance, she was able to identify areas of growth is that she took the matters in her own hands when she said her  was complaining about having to put all the decorations. In the past, she admitted, she would have given in and not put the decorations until he was ready to do so (another area of  growth related to this is her increased ability to problem solve.)     She shared experiences of childhood for Halloween such as running through the [id number]. Despite the much adversity and poverty in her life, she described this as some fond memories of her life.     Coping skills: She was encouraged to ask herself if she really needs to allow herself to relieve those painful memories.  She admitted that she is doing better with reliving the past secondary to participating in therapy. She verbalized understanding when clinician suggested to ask herself lf if she needs to remember this, or can she just let it go. She acknowledged that distracting herself from the memories is helpful. Clinician will continue to explore with pt if there are present triggers to her past.      Therapeutic Intervention:   Pt was assessed for present condition, self esteem, and level of insight.  Pt was provided with support and validation of her emotions.  Active Listening was used in the session.  Pt given positive reinforcement for identified areas of growth. Help identifying the links to childhood experiences of current feelings and behaviors was also provided. It was reflected to the patient that most people would experience the same distress and difficulties given the circumstances, thoughts, feelings. She was given positive reinforcement for problem solving.     Target symptoms: anxiety  and hx of trauma   Why chosen therapy is appropriate versus another modality: evidence based practice  Outcome monitoring methods: checklist/rating scale  Therapeutic intervention type: behavior modifying psychotherapy    Patient's response to intervention:  The patient's response to intervention is accepting. Pt stated therapy is helping her to talk about her painful past because no one else wants to hear it. As pt continues to process her hx, it appears that she is having more insight into the situation, and working toward improving her self  "worth. Pt appears to be improving on ability to accept validation of her emotions.      Progress toward goals and other mental status changes:  The patient's progress toward goals is  90% complete  for getting it all out. Clinician added goals, and she is progressing at a slow but steady pace. Clinician believes that pt has progressed to appointments every 2 weeks, but she is not receptive to doing so.     Pt identified goals:  "To get it all out... I never told people." 6/20/2024 - Pt does not agree that this goal is completed.     Added 6/20/2024   Goal:  Continue to identify feelings/thoughts   - Working with pt to improve communication skills     - setting healthy boundaries   - Pt will allow herself to have negative emotions     Long term goal identified by program objectives is to improve pt's compliance with medical recommendations from PCP (with focus on improving self esteem and self worth).   -  Although she denied having low self esteem, clinician plans to explore further her meaning of feeling "less than."  9/26/2024 - Showing improvement   -  Explore her anger    - Allow herself to have negative emotions - 8/29/2024 - almost complete     Diagnosis:   R/O Claustrophobia   Generalized Anxiety D/O  R/O  Trauma related disorders     Plan:  individual psychotherapy Clinician continues to have plans plans to begin work on her anxiety and/or claustrophobia. However, opportunity to present itself has not occurred.     Return to clinic: 11/7/2024 at 3:00    Length of Service (minutes): 60          "

## 2024-11-06 DIAGNOSIS — M25.561 RIGHT KNEE PAIN, UNSPECIFIED CHRONICITY: Primary | ICD-10-CM

## 2024-11-07 ENCOUNTER — CLINICAL SUPPORT (OUTPATIENT)
Dept: PRIMARY CARE CLINIC | Facility: CLINIC | Age: 79
End: 2024-11-07
Payer: MEDICARE

## 2024-11-07 ENCOUNTER — HOSPITAL ENCOUNTER (OUTPATIENT)
Dept: RADIOLOGY | Facility: HOSPITAL | Age: 79
Discharge: HOME | End: 2024-11-07
Attending: NURSE PRACTITIONER
Payer: MEDICARE

## 2024-11-07 ENCOUNTER — OFFICE VISIT (OUTPATIENT)
Dept: ORTHOPEDICS | Facility: CLINIC | Age: 79
End: 2024-11-07
Payer: MEDICARE

## 2024-11-07 VITALS — WEIGHT: 131.81 LBS | BODY MASS INDEX: 22.5 KG/M2 | HEIGHT: 64 IN

## 2024-11-07 DIAGNOSIS — Z65.8 PSYCHOSOCIAL STRESSORS: Primary | ICD-10-CM

## 2024-11-07 DIAGNOSIS — M25.561 RIGHT KNEE PAIN, UNSPECIFIED CHRONICITY: ICD-10-CM

## 2024-11-07 DIAGNOSIS — M17.11 PRIMARY OSTEOARTHRITIS OF RIGHT KNEE: Primary | ICD-10-CM

## 2024-11-07 PROCEDURE — 73560 X-RAY EXAM OF KNEE 1 OR 2: CPT | Mod: TC,HCNC,PO,LT

## 2024-11-07 PROCEDURE — 20610 DRAIN/INJ JOINT/BURSA W/O US: CPT | Mod: HCNC,RT,S$GLB, | Performed by: NURSE PRACTITIONER

## 2024-11-07 PROCEDURE — 1125F AMNT PAIN NOTED PAIN PRSNT: CPT | Mod: HCNC,CPTII,S$GLB, | Performed by: NURSE PRACTITIONER

## 2024-11-07 PROCEDURE — 73560 X-RAY EXAM OF KNEE 1 OR 2: CPT | Mod: 26,HCNC,59,LT | Performed by: RADIOLOGY

## 2024-11-07 PROCEDURE — 1101F PT FALLS ASSESS-DOCD LE1/YR: CPT | Mod: HCNC,CPTII,S$GLB, | Performed by: NURSE PRACTITIONER

## 2024-11-07 PROCEDURE — 99213 OFFICE O/P EST LOW 20 MIN: CPT | Mod: HCNC,25,S$GLB, | Performed by: NURSE PRACTITIONER

## 2024-11-07 PROCEDURE — 1159F MED LIST DOCD IN RCRD: CPT | Mod: HCNC,CPTII,S$GLB, | Performed by: NURSE PRACTITIONER

## 2024-11-07 PROCEDURE — 73562 X-RAY EXAM OF KNEE 3: CPT | Mod: 26,HCNC,RT, | Performed by: RADIOLOGY

## 2024-11-07 PROCEDURE — 3288F FALL RISK ASSESSMENT DOCD: CPT | Mod: HCNC,CPTII,S$GLB, | Performed by: NURSE PRACTITIONER

## 2024-11-07 PROCEDURE — 99999 PR PBB SHADOW E&M-EST. PATIENT-LVL III: CPT | Mod: PBBFAC,HCNC,, | Performed by: NURSE PRACTITIONER

## 2024-11-07 RX ADMIN — TRIAMCINOLONE ACETONIDE 40 MG: 40 INJECTION, SUSPENSION INTRA-ARTICULAR; INTRAMUSCULAR at 01:11

## 2024-11-07 NOTE — PROGRESS NOTES
Chief Complaint   Patient presents with    Right Knee - Pain         HPI:   This is a 79 y.o. who presents to clinic today complaining of right knee pain for 2 weeks after no known trauma. Pain is progressively worsening. No numbness or tingling. No associated signs or symptoms.    Past Medical History:   Diagnosis Date    ALLERGIC RHINITIS 2/22/2012    Alopecia     Anticoagulant long-term use     Anxiety 2/22/2012    Dyslipidemia     Fx patella     Left    Gout 2/22/2012    HTN (hypertension) 2/22/2012    TIA (transient ischemic attack) 2/22/2012     Past Surgical History:   Procedure Laterality Date    CATARACT EXTRACTION Left 07/2023    CORONARY ANGIOGRAPHY N/A 08/25/2020    Procedure: ANGIOGRAM, CORONARY ARTERY;  Surgeon: Rex Kovacs MD;  Location: [facility] CATH;  Service: Cardiology;  Laterality: N/A;    ENDOSCOPIC ULTRASOUND OF UPPER GASTROINTESTINAL TRACT Left 05/26/2021    Procedure: ULTRASOUND, UPPER GI TRACT, ENDOSCOPIC;  Surgeon: Ray Francois MD;  Location: [facility] ENDO;  Service: Endoscopy;  Laterality: Left;  scopes TBA    ESOPHAGOGASTRODUODENOSCOPY N/A 05/26/2021    Procedure: EGD (ESOPHAGOGASTRODUODENOSCOPY);  Surgeon: Ray Francois MD;  Location: [facility] ENDO;  Service: Endoscopy;  Laterality: N/A;    HEMIARTHROPLASTY OF HIP Right 12/18/2023    Procedure: HEMIARTHROPLASTY, HIP- right;  Surgeon: Edward Ortiz MD;  Location: [facility] OR;  Service: Orthopedics;  Laterality: Right;    HYSTERECTOMY      LEFT HEART CATHETERIZATION N/A 08/25/2020    Procedure: Left heart cath;  Surgeon: Rex Kovacs MD;  Location: [facility] CATH;  Service: Cardiology;  Laterality: N/A;    PATELLA FRACTURE SURGERY Left 2013    RIGHT HEART CATHETERIZATION Right 07/09/2021    Procedure: INSERTION, CATHETER, RIGHT HEART;  Surgeon: Demetrius Medley MD;  Location: [facility] CATH;  Service: Cardiology;  Laterality: Right;     Current Outpatient Medications on File Prior to Visit   Medication Sig Dispense Refill     allopurinoL (ZYLOPRIM) 100 MG tablet Take 2 tablets (200 mg total) by mouth once daily. 180 tablet 3    amLODIPine (NORVASC) 2.5 MG tablet TAKE 1 TABLET(2.5 MG) BY MOUTH EVERY DAY 30 tablet 11    atorvastatin (LIPITOR) 10 MG tablet TAKE 1 TABLET(10 MG) BY MOUTH EVERY DAY 90 tablet 3    clopidogreL (PLAVIX) 75 mg tablet TAKE 1 TABLET(75 MG) BY MOUTH EVERY DAY 90 tablet 3    colchicine (COLCRYS) 0.6 mg tablet Take 2 tablets at 1st sign of a gout attack.  Then continue once a day for the next 5-7 days as needed until improved. 30 tablet 1    diclofenac sodium (VOLTAREN ARTHRITIS PAIN) 1 % Gel Apply 2 g topically 2 (two) times daily. 200 g 3    folic acid (FOLVITE) 1 MG tablet Take 1 tablet (1 mg total) by mouth once daily. 90 tablet 3    LORazepam (ATIVAN) 0.5 MG tablet Take 1 tablet (0.5 mg total) by mouth nightly as needed for Anxiety. as needed for anxiety. 30 tablet 2    losartan (COZAAR) 100 MG tablet TAKE 1 TABLET(100 MG) BY MOUTH EVERY DAY 90 tablet 3    magnesium oxide (MAG-OX) 400 mg (241.3 mg magnesium) tablet TAKE 1 TABLET(400 MG) BY MOUTH DAILY 90 tablet 3    metoprolol succinate (TOPROL-XL) 100 MG 24 hr tablet Take 1 tablet (100 mg total) by mouth once daily. 90 tablet 3    pantoprazole (PROTONIX) 40 MG tablet Take 1 tablet (40 mg total) by mouth once daily. 90 tablet 3    triamcinolone acetonide 0.1% (KENALOG) 0.1 % paste Place onto teeth 2 (two) times daily. 5 g 0    difluprednate (DUREZOL) 0.05 % Drop ophthalmic solution Place 1 drop into the left eye 2 (two) times daily.      fluticasone propionate (FLONASE) 50 mcg/actuation nasal spray 2 sprays (100 mcg total) by Each Nostril route once daily. 16 g 3    ketoconazole (NIZORAL) 2 % cream Apply topically once daily. 60 g 3    methocarbamoL (ROBAXIN) 750 MG Tab Take 1 tablet (750 mg total) by mouth 4 (four) times daily as needed. 44 tablet 3    methotrexate 2.5 MG Tab Take 6 tablets (15 mg total) by mouth every 7 days. 72 tablet 3    nystatin (MYCOSTATIN)  "cream Apply topically 2 (two) times daily. Applied to the underside of your left breast.  Continue for 7 days or until rash resolves. 30 g 2    psyllium husk (METAMUCIL ORAL) Take 1 Scoop by mouth as needed (constipation).      triamcinolone acetonide 0.1% (KENALOG) 0.1 % cream Apply topically 2 (two) times daily. 80 g 5     No current facility-administered medications on file prior to visit.     Review of patient's allergies indicates:   Allergen Reactions    Augmentin [amoxicillin-pot clavulanate] Rash    Penicillins Hives    Opioids - morphine analogues      Itchy, redness, mild sob,, "feels funny"     Family History   Adopted: Yes   Problem Relation Name Age of Onset    Anxiety disorder Mother       Social History     Socioeconomic History    Marital status:    Tobacco Use    Smoking status: Former     Current packs/day: 0.00     Average packs/day: 1 pack/day for 40.0 years (40.0 ttl pk-yrs)     Types: Cigarettes     Start date:      Quit date:      Years since quittin.8     Passive exposure: Past    Smokeless tobacco: Never   Substance and Sexual Activity    Alcohol use: Not Currently    Drug use: No   Social History Narrative    ** Merged History Encounter **          Social Drivers of Health     Food Insecurity: No Food Insecurity (2023)    Hunger Vital Sign     Worried About Running Out of Food in the Last Year: Never true     Ran Out of Food in the Last Year: Never true   Transportation Needs: No Transportation Needs (2023)    PRAPARE - Transportation     Lack of Transportation (Medical): No     Lack of Transportation (Non-Medical): No   Housing Stability: Low Risk  (2023)    Housing Stability Vital Sign     Unable to Pay for Housing in the Last Year: No     Number of Places Lived in the Last Year: 1     Unstable Housing in the Last Year: No       Review of Systems:  Constitutional:  Denies fever or chills   Eyes:  Denies change in visual acuity   HENT:  Denies nasal " congestion or sore throat   Respiratory:  Denies cough or shortness of breath   Cardiovascular:  Denies chest pain or edema   GI:  Denies abdominal pain, nausea, vomiting, bloody stools or diarrhea   :  Denies dysuria   Integument:  Denies rash   Neurologic:  Denies headache, focal weakness or sensory changes   Endocrine:  Denies polyuria or polydipsia   Lymphatic:  Denies swollen glands   Psychiatric:  Denies depression or anxiety     Physical Exam:   Constitutional:  Well developed, well nourished, no acute distress, non-toxic appearance   Integument:  Well hydrated  Neurologic:  Alert & oriented x 3  Psychiatric:  Speech and behavior appropriate     Bilateral Knee Exam    right Knee Exam     Tenderness   The patient is experiencing tenderness in the medial joint line.    Range of Motion   Extension: abnormal   Flexion: abnormal     Muscle Strength     The patient has normal knee strength.    Tests   Adrianne:  Medial - positive   Lachman:  Anterior - negative      Varus: negative  Valgus: negative  Patellar Apprehension: negative    Other   Erythema: absent  Sensation: normal  Pulse: present  Swelling: mild      left Knee Exam   left knee exam performed same as contralateral side and is normal.            X-rays were performed, personally reviewed by me and findings discussed with the patient.  3 views of the right knee show tricompartmental degenerative change most pronounced in the medial compartment with Kellgren 3 changes          Primary osteoarthritis of right knee  -     Large Joint Aspiration/Injection: R knee  -     triamcinolone acetonide injection 40 mg      Using an aseptic technique, I injected 5 cc of lidocaine 1% without and 1 cc of kenalog 40mg into the right Knee. The patient tolerated this well. I will have them return to clinic in 3 months or as needed.

## 2024-11-07 NOTE — PROGRESS NOTES
Individual Psychotherapy (PhD/LCSW)    11/7/2024    Site:  [place] [id number]      Chief complaint/reason for encounter:  follow up     Mood check: scale of:0 best, 10 worst. Patient rated:  Depression rated at -  did not assess  Anxiety rated at  - did not assess     MENTAL HEALTH STATUS EXAM  General Appearance:  Disheveled- her shirt is inside out and backwards    Speech: normal tone, normal rate, normal pitch, normal volume, but tangental       Level of Cooperation: cooperative      Thought Processes: tangential   Mood: anxious      Thought Content: normal, no suicidality, no homicidality, delusions, or paranoia   Affect: expansive   Orientation: Oriented x3   Memory: Did not assess    Attention Span & Concentration: Did not assess, but appears to be wnl    Fund of General Knowledge: Did not assess, but appears to be wnl    Abstract Reasoning: Did not assess, but appears to be wnl    Judgment & Insight: Improving      Language  Did not assess, but appears to be wnl      Risk parameters:  Patient reports no suicidal ideation  Patient reports no homicidal ideation  Patient reports no self-injurious behavior  Patient reports no violent behavior    [place]:  1.   Assessing for sx of claustrophobia  2.  Putting up [place] decorations     History of present condition/content of session:   Clinician began the session assessing pt for ability to attend ADL's. Since she broke her hip, she said her  is having to pick her slack. She said that she has several wigs, wears one for 6 weeks, and then throws them away. She said that she is able to dress herself and can shower herself. She reported that her  complains that he is preparing or responsible for 3 meals/day and that he is tired of it. But then later in the session, pt said that her children are bringing food and she and her  eat out alot.  She voiced belief that she doesn't think that she deserves that the attention from her children.  Clinician  could not get pt to acknowledge that she has value and worth. She said that she is sick of trying with her oldest daughter. She said since aged 2 y/o, her oldest daughter verbalized dislike of pt. She said that she doesn't have much contact with her oldest daughter.  Pt stated her daughter has tried over the years to talk to her about her (concerns). It is unknown if pt takes an responsibility for whatever the issue is with her daughter.     Pt stated she is not in a good mood today. Clinician attempted to explore further with pt. She admitted that she is lonely, and misses doing things with her friends. She said that friends are not calling or coming to see her.  Pt spent time showing clinician pictures of her house decorated for [employer] and pictures of her friends and family. Clinician was not successful in redirecting pt back to herself and what exactly has her feeling this way.     Review of sx: continued right pain. She denied sleep px or appetite changes.      Therapeutic Intervention:   Pt was assessed for present condition, self esteem, and level of insight.  Pt was provided with support and validation of her emotions.  Active Listening was used in the session.  Pt given positive reinforcement for identified areas of growth. Help identifying the links to childhood experiences of current feelings and behaviors was also provided. It was reflected to the patient that most people would experience the same distress and difficulties given the circumstances, thoughts, feelings. She was given positive reinforcement for problem solving.     Target symptoms: anxiety  and hx of trauma   Why chosen therapy is appropriate versus another modality: evidence based practice  Outcome monitoring methods: checklist/rating scale  Therapeutic intervention type: behavior modifying psychotherapy    Patient's response to intervention:  The patient's response to intervention is accepting. Pt stated therapy is helping her to talk  "about her painful past because no one else wants to hear it. As pt continues to process her hx, it appears that she is having more insight into the situation, and working toward improving her self worth. Pt appears to be improving on ability to accept validation of her emotions.      Progress toward goals and other mental status changes:  The patient's progress toward goals is  90% complete  for getting it all out. Clinician added goals, and she is progressing at a slow but steady pace. Clinician believes that pt has progressed to appointments every 2 weeks, but she is not receptive to doing so.     Pt identified goals:  "To get it all out... I never told people." 6/20/2024 - Pt does not agree that this goal is completed.     Added 6/20/2024   Goal:  Continue to identify feelings/thoughts   - Working with pt to improve communication skills     - setting healthy boundaries   - Pt will allow herself to have negative emotions     Long term goal identified by program objectives is to improve pt's compliance with medical recommendations from PCP (with focus on improving self esteem and self worth).   -  Although she denied having low self esteem, clinician plans to explore further her meaning of feeling "less than."  9/26/2024 - Showing improvement   -  Explore her anger    - Allow herself to have negative emotions - 8/29/2024 - almost complete     Diagnosis:   R/O Claustrophobia   Generalized Anxiety D/O  R/O  Trauma related disorders     Plan:  individual psychotherapy Clinician continues to have plans to begin work on her anxiety and/or claustrophobia. However, opportunity to present itself has not occurred.     Return to clinic: 11/14/2024 at 3:00    Length of Service (minutes): 60            "

## 2024-11-11 RX ORDER — TRIAMCINOLONE ACETONIDE 40 MG/ML
40 INJECTION, SUSPENSION INTRA-ARTICULAR; INTRAMUSCULAR
Status: DISCONTINUED | OUTPATIENT
Start: 2024-11-07 | End: 2024-11-11 | Stop reason: HOSPADM

## 2024-11-11 NOTE — PROCEDURES
Large Joint Aspiration/Injection: R knee    Date/Time: 11/7/2024 1:00 PM    Performed by: Julisa Lowe FNP  Authorized by: Julisa Lowe FNP    Consent Done?:  Yes (Verbal)  Indications:  Pain  Timeout: prior to procedure the correct patient, procedure, and site was verified    Prep: patient was prepped and draped in usual sterile fashion    Local anesthetic:  Lidocaine 1% without epinephrine  Anesthetic total (ml):  5      Details:  Needle Size:  21 G  Approach:  Anterolateral  Location:  Knee  Site:  R knee  Medications:  40 mg triamcinolone acetonide 40 mg/mL  Patient tolerance:  Patient tolerated the procedure well with no immediate complications

## 2024-11-12 ENCOUNTER — TELEPHONE (OUTPATIENT)
Dept: REHABILITATION | Facility: HOSPITAL | Age: 79
End: 2024-11-12
Payer: MEDICARE

## 2024-11-12 NOTE — TELEPHONE ENCOUNTER
11/12/24 Called pt. She had knee injected on 11/7/24 and states it is feeling much better. She states she was told not to walk for 5 days so she is using wheelchair. Recommended pt start walking using walker around home and for shorter distances. She has decided not to return to physical therapy at this time. Recommend she try to do some light exercise and to call if she needs anything further.

## 2024-11-14 ENCOUNTER — CLINICAL SUPPORT (OUTPATIENT)
Dept: PRIMARY CARE CLINIC | Facility: CLINIC | Age: 79
End: 2024-11-14
Payer: MEDICARE

## 2024-11-14 DIAGNOSIS — F32.A DEPRESSIVE DISORDER: ICD-10-CM

## 2024-11-14 DIAGNOSIS — F43.9 TRAUMA AND STRESSOR-RELATED DISORDER: Primary | ICD-10-CM

## 2024-11-14 DIAGNOSIS — F41.9 ANXIETY: ICD-10-CM

## 2024-11-14 DIAGNOSIS — Z65.8 PSYCHOSOCIAL STRESSORS: ICD-10-CM
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
## 2024-11-21 ENCOUNTER — CLINICAL SUPPORT (OUTPATIENT)
Dept: PRIMARY CARE CLINIC | Facility: CLINIC | Age: 79
End: 2024-11-21
Payer: MEDICARE

## 2024-11-21 DIAGNOSIS — F41.9 ANXIETY: ICD-10-CM

## 2024-11-21 DIAGNOSIS — F43.9 TRAUMA AND STRESSOR-RELATED DISORDER: ICD-10-CM

## 2024-11-21 DIAGNOSIS — Z65.8 PSYCHOSOCIAL STRESSORS: Primary | ICD-10-CM

## 2024-11-21 DIAGNOSIS — F32.A DEPRESSIVE DISORDER: ICD-10-CM

## 2024-11-21 PROCEDURE — 99499 UNLISTED E&M SERVICE: CPT | Mod: HCNC,S$GLB,, | Performed by: SOCIAL WORKER
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
## 2024-11-29 ENCOUNTER — LAB VISIT (OUTPATIENT)
Dept: LAB | Facility: HOSPITAL | Age: 79
End: 2024-11-29
Attending: STUDENT IN AN ORGANIZED HEALTH CARE EDUCATION/TRAINING PROGRAM
Payer: MEDICARE

## 2024-11-29 DIAGNOSIS — H20.9 UVEITIS: ICD-10-CM

## 2024-11-29 DIAGNOSIS — Z79.899 DRUG-INDUCED IMMUNODEFICIENCY: ICD-10-CM

## 2024-11-29 DIAGNOSIS — Z86.2 HISTORY OF SARCOIDOSIS: ICD-10-CM

## 2024-11-29 DIAGNOSIS — D84.821 DRUG-INDUCED IMMUNODEFICIENCY: ICD-10-CM

## 2024-11-29 DIAGNOSIS — Z79.899 HIGH RISK MEDICATION USE: ICD-10-CM

## 2024-11-29 LAB
ALBUMIN SERPL BCP-MCNC: 3.6 G/DL (ref 3.5–5.2)
ALP SERPL-CCNC: 99 U/L (ref 40–150)
ALT SERPL W/O P-5'-P-CCNC: 20 U/L (ref 10–44)
ANION GAP SERPL CALC-SCNC: 10 MMOL/L (ref 8–16)
AST SERPL-CCNC: 26 U/L (ref 10–40)
BACTERIA #/AREA URNS HPF: ABNORMAL /HPF
BASOPHILS # BLD AUTO: 0.03 K/UL (ref 0–0.2)
BASOPHILS NFR BLD: 0.4 % (ref 0–1.9)
BILIRUB SERPL-MCNC: 0.6 MG/DL (ref 0.1–1)
BILIRUB UR QL STRIP: NEGATIVE
BUN SERPL-MCNC: 26 MG/DL (ref 8–23)
CALCIUM SERPL-MCNC: 9.3 MG/DL (ref 8.7–10.5)
CHLORIDE SERPL-SCNC: 112 MMOL/L (ref 95–110)
CLARITY UR: CLEAR
CO2 SERPL-SCNC: 20 MMOL/L (ref 23–29)
COLOR UR: YELLOW
CREAT SERPL-MCNC: 1.1 MG/DL (ref 0.5–1.4)
CREAT UR-MCNC: 55 MG/DL (ref 15–325)
CRP SERPL-MCNC: 1.5 MG/L (ref 0–8.2)
DIFFERENTIAL METHOD BLD: ABNORMAL
EOSINOPHIL # BLD AUTO: 0.2 K/UL (ref 0–0.5)
EOSINOPHIL NFR BLD: 2.5 % (ref 0–8)
ERYTHROCYTE [DISTWIDTH] IN BLOOD BY AUTOMATED COUNT: 14 % (ref 11.5–14.5)
ERYTHROCYTE [SEDIMENTATION RATE] IN BLOOD BY PHOTOMETRIC METHOD: 6 MM/HR (ref 0–36)
EST. GFR  (NO RACE VARIABLE): 50.8 ML/MIN/1.73 M^2
GLUCOSE SERPL-MCNC: 66 MG/DL (ref 70–110)
GLUCOSE UR QL STRIP: NEGATIVE
HCT VFR BLD AUTO: 36.7 % (ref 37–48.5)
HGB BLD-MCNC: 11.2 G/DL (ref 12–16)
HGB UR QL STRIP: NEGATIVE
IMM GRANULOCYTES # BLD AUTO: 0.03 K/UL (ref 0–0.04)
IMM GRANULOCYTES NFR BLD AUTO: 0.4 % (ref 0–0.5)
KETONES UR QL STRIP: NEGATIVE
LEUKOCYTE ESTERASE UR QL STRIP: NEGATIVE
LYMPHOCYTES # BLD AUTO: 1.2 K/UL (ref 1–4.8)
LYMPHOCYTES NFR BLD: 15.6 % (ref 18–48)
MCH RBC QN AUTO: 35.6 PG (ref 27–31)
MCHC RBC AUTO-ENTMCNC: 30.5 G/DL (ref 32–36)
MCV RBC AUTO: 117 FL (ref 82–98)
MICROSCOPIC COMMENT: ABNORMAL
MONOCYTES # BLD AUTO: 0.6 K/UL (ref 0.3–1)
MONOCYTES NFR BLD: 7.1 % (ref 4–15)
NEUTROPHILS # BLD AUTO: 5.8 K/UL (ref 1.8–7.7)
NEUTROPHILS NFR BLD: 74 % (ref 38–73)
NITRITE UR QL STRIP: POSITIVE
NRBC BLD-RTO: 0 /100 WBC
PH UR STRIP: 6 [PH] (ref 5–8)
PLATELET # BLD AUTO: 148 K/UL (ref 150–450)
PMV BLD AUTO: 11.7 FL (ref 9.2–12.9)
POTASSIUM SERPL-SCNC: 5.5 MMOL/L (ref 3.5–5.1)
PROT SERPL-MCNC: 5.8 G/DL (ref 6–8.4)
PROT UR QL STRIP: NEGATIVE
PROT UR-MCNC: <7 MG/DL (ref 0–15)
PROT/CREAT UR: NORMAL MG/G{CREAT} (ref 0–0.2)
RBC # BLD AUTO: 3.15 M/UL (ref 4–5.4)
RBC #/AREA URNS HPF: 2 /HPF (ref 0–4)
SODIUM SERPL-SCNC: 142 MMOL/L (ref 136–145)
SP GR UR STRIP: 1.02 (ref 1–1.03)
SQUAMOUS #/AREA URNS HPF: 1 /HPF
URN SPEC COLLECT METH UR: ABNORMAL
WBC # BLD AUTO: 7.88 K/UL (ref 3.9–12.7)
WBC #/AREA URNS HPF: 5 /HPF (ref 0–5)

## 2024-11-29 PROCEDURE — 86140 C-REACTIVE PROTEIN: CPT | Mod: HCNC | Performed by: STUDENT IN AN ORGANIZED HEALTH CARE EDUCATION/TRAINING PROGRAM

## 2024-11-29 PROCEDURE — 85652 RBC SED RATE AUTOMATED: CPT | Mod: HCNC | Performed by: STUDENT IN AN ORGANIZED HEALTH CARE EDUCATION/TRAINING PROGRAM

## 2024-11-29 PROCEDURE — 80053 COMPREHEN METABOLIC PANEL: CPT | Mod: HCNC | Performed by: STUDENT IN AN ORGANIZED HEALTH CARE EDUCATION/TRAINING PROGRAM

## 2024-11-29 PROCEDURE — 85025 COMPLETE CBC W/AUTO DIFF WBC: CPT | Mod: HCNC | Performed by: STUDENT IN AN ORGANIZED HEALTH CARE EDUCATION/TRAINING PROGRAM

## 2024-11-29 PROCEDURE — 82570 ASSAY OF URINE CREATININE: CPT | Mod: HCNC | Performed by: STUDENT IN AN ORGANIZED HEALTH CARE EDUCATION/TRAINING PROGRAM

## 2024-11-29 PROCEDURE — 81000 URINALYSIS NONAUTO W/SCOPE: CPT | Mod: HCNC,PO | Performed by: STUDENT IN AN ORGANIZED HEALTH CARE EDUCATION/TRAINING PROGRAM

## 2024-11-29 PROCEDURE — 36415 COLL VENOUS BLD VENIPUNCTURE: CPT | Mod: HCNC,PO | Performed by: STUDENT IN AN ORGANIZED HEALTH CARE EDUCATION/TRAINING PROGRAM

## 2024-12-04 NOTE — PROGRESS NOTES
Subjective:      Patient ID: Evelina Pinon is a 79 y.o. female.    Chief Complaint: Disease Management    HPI    Rheumatologic History:      - Diagnosis/es:              - Chorioretinitis and panuveitis OS: Per Dr Alonzo note (2023):  Punched out lesions in the peripheral retina, thinning of outer retinal layers and macula.  Chorioretinal lesions appear chronic and are not actively leaking on FA.  Inflammation is improving.  Uveitis is mostly posterior and involving the choroid and retina.              - Sarcoidosis diagnosed by LN biopsy 2021 involving the lymph nodes, lungs, and eyes              - Pulmonary HTN              - Cutaneous lupus:  In , she presented with psoriasiform plaques on the shoulders, anterior proximal thigh, and superior thoracic spine and was treated with topical betamethasone.  Shave biopsy showed interface dermatitis.              - Gout              - Osteoporosis with recent right femoral fracture  - Social History: Former smoker (quit 30-40 years ago), occasional alcohol intake  - Gyn History:     - Family History: adopted   - Positive serologies: ACE (138), +HLA B27, +CARMINE 1:320 nuclear and nucleolar, +SSA, + lysozyme (8.8)  - Negative serologies: HLA A29, dsDNA, Smith, RNP, SSA, SSB, CORBY, B2G, cardiolipin, DRVVT, RF, CCP  - Pathology:              - Chest station 4L LN biopsy (2021) noncaseating granulomas present              - shave biopsy left medial upper back (2020) interface dermatitis  - Infectious screening labs: Negative RPR, QuantiFERON (2023), hepatitis-B, C, and HIV (2023)  - Imaging:              - EKG () Sinus bradycardia with Premature ventricular complexes or Fusion complexes   Right bundle branch block               - TTE (2021): EF 55%, CVP 8, PASP 63 mmHg  - RHC (2021) Right atrial pressure 13/10/8 mmHg  Right ventricular pressure 42/5/14 mm Hg  Pulmonary artery pressure 42/17/26 mm Hg  Pulmonary capillary wedge  16/22/18 mm Hg  Cardiac output 4.36  - CT chest (10/2023) Similar extent of diffuse reticulonodular disease in the lungs and mediastinal adenopathy.  Atypical infection, pneumonitis, sarcoidosis, inhalational disease, neoplasm are some differential considerations.   - DEXA (2/2024) osteoporosis  - Previous Treatments:               - Prednisone 60mg per Ophthalmology  - Current Treatments:               - Allopurinol 200mg per PCP              - Colchicine per PCP              - MTX 15mg plus folic acid daily              - Reclast (3/12/24- )  Interval History:   She reports some pain in the right knee and had a steroid injection with orthopedics 2 weeks ago that only helped partially. Otherwise, she denies joint pain, shortness of breath, rashes, vision changes.     Objective:   BP 91/64 (BP Location: Left arm, Patient Position: Sitting)   Pulse (!) 49   Physical Exam   Constitutional: normal appearance.   HENT:   Head: Normocephalic and atraumatic.   Cardiovascular: Normal rate, regular rhythm and normal heart sounds.   Pulmonary/Chest: Effort normal and breath sounds normal.   Musculoskeletal:      Comments: No synovitis, dactylitis, enthesitis, effusions     Neurological: She is alert.   Skin: Skin is warm and dry. No rash noted.   No skin thickening, telangiectasias, calcinosis, psoriasiform lesions, lupoid lesions         No data to display    Labs (11/29/24)  CBC HGB 11.2, WBC WNL,   CMP CR 1.1, GFR 50.8, AST, ALT WNL   ESR CRP WNL   UA RBC 2, WBC 5, bacteria many   UPC WNL     Assessment:     1. Sarcoidosis of lung    2. Interstitial pulmonary disease, unspecified    3. Uveitis    4. Drug-induced immunodeficiency    5. High risk medication use    6. Osteoporosis, unspecified osteoporosis type, unspecified pathological fracture presence      This is a 79-year-old woman who is a poor historian with history of hypertension, osteoporosis on Reclast, gout on allopurinol 200mg and colchicine, cutaneous  lupus previously on topical betamethasone (biopsy proven interface dermatitis 2020), biopsy proven sarcoidosis involving the hilar LN with positive ACE and hypercalcemia, pulmonary hypertension, chorioretinitis and uveitis OS s/p short course of prednisone 60mg and now on MTX 15mg weekly plus folic acid daily. She is doing well and denies joint pain, swelling, vision changes, and chest pain. Continue current medication.     Plan:     Problem List Items Addressed This Visit          Ophtho    Uveitis    Relevant Orders    Anti-DNA Ab, Double-Stranded    C3 Complement    CBC Auto Differential    C4 Complement    Protein/Creatinine Ratio, Urine    Sedimentation rate    C-Reactive Protein    Comprehensive Metabolic Panel    Urinalysis       Pulmonary    Sarcoidosis of lung - Primary (Chronic)    Relevant Orders    CT Chest Without Contrast    Anti-DNA Ab, Double-Stranded    C3 Complement    CBC Auto Differential    C4 Complement    Protein/Creatinine Ratio, Urine    Sedimentation rate    C-Reactive Protein    Comprehensive Metabolic Panel    Urinalysis    Interstitial pulmonary disease, unspecified    Relevant Orders    CT Chest Without Contrast    Anti-DNA Ab, Double-Stranded    C3 Complement    CBC Auto Differential    C4 Complement    Protein/Creatinine Ratio, Urine    Sedimentation rate    C-Reactive Protein    Comprehensive Metabolic Panel    Urinalysis       Immunology/Multi System    Drug-induced immunodeficiency    Relevant Orders    Anti-DNA Ab, Double-Stranded    C3 Complement    CBC Auto Differential    C4 Complement    Protein/Creatinine Ratio, Urine    Sedimentation rate    C-Reactive Protein    Comprehensive Metabolic Panel    Urinalysis       Palliative Care    High risk medication use    Relevant Orders    Anti-DNA Ab, Double-Stranded    C3 Complement    CBC Auto Differential    C4 Complement    Protein/Creatinine Ratio, Urine    Sedimentation rate    C-Reactive Protein    Comprehensive Metabolic Panel     Urinalysis     Other Visit Diagnoses       Osteoporosis, unspecified osteoporosis type, unspecified pathological fracture presence              1.) Sarcoidosis with lung involvement  2.) Uveitis: quiescent  3.) Drug induced immunodeficiency  4.) High risk medication use  - methotrexate 15mg weekly plus folic acid  - Repeat CT chest  - CBC, CMP, ESR, CRP every 12 weeks  - Avoid AZA as patient is on allopurinol   - Pre-DMARDs yearly  - RBB present since 2020, is cardiac MRI necessary? Patient to discuss with Dr Snow  - Continue follow up with Pulm (Dr Ye)  - eye exam yearly     5.) Osteoporosis: T-score -3.1 in the left femoral neck with recent right femoral fracture  - Reclast (3/12/2024- )    Follow up in 6 months or sooner if needed    30 minutes of total time spent on the encounter, which includes face to face time and non-face to face time preparing to see the patient (eg, review of tests), Obtaining and/or reviewing separately obtained history, Documenting clinical information in the electronic or other health record, Independently interpreting results (not separately reported) and communicating results to the patient/family/caregiver, or Care coordination (not separately reported).     This note was prepared with Instant AV Direct voice recognition transcription software. Garbled syntax, mangled pronouns, and other bizarre constructions may be attributed to that software system       Mary Ford M.D.  Rheumatology Dept  [place], [place]

## 2024-12-05 ENCOUNTER — OFFICE VISIT (OUTPATIENT)
Dept: RHEUMATOLOGY | Facility: CLINIC | Age: 79
End: 2024-12-05
Payer: MEDICARE

## 2024-12-05 ENCOUNTER — CLINICAL SUPPORT (OUTPATIENT)
Dept: PRIMARY CARE CLINIC | Facility: CLINIC | Age: 79
End: 2024-12-05
Payer: MEDICARE

## 2024-12-05 VITALS — SYSTOLIC BLOOD PRESSURE: 91 MMHG | HEART RATE: 49 BPM | DIASTOLIC BLOOD PRESSURE: 64 MMHG

## 2024-12-05 DIAGNOSIS — J84.9 INTERSTITIAL PULMONARY DISEASE, UNSPECIFIED: ICD-10-CM

## 2024-12-05 DIAGNOSIS — M81.0 OSTEOPOROSIS, UNSPECIFIED OSTEOPOROSIS TYPE, UNSPECIFIED PATHOLOGICAL FRACTURE PRESENCE: ICD-10-CM

## 2024-12-05 DIAGNOSIS — Z79.899 HIGH RISK MEDICATION USE: ICD-10-CM

## 2024-12-05 DIAGNOSIS — D86.0 SARCOIDOSIS OF LUNG: Primary | Chronic | ICD-10-CM

## 2024-12-05 DIAGNOSIS — H20.9 UVEITIS: ICD-10-CM

## 2024-12-05 DIAGNOSIS — D84.821 DRUG-INDUCED IMMUNODEFICIENCY: ICD-10-CM

## 2024-12-05 DIAGNOSIS — F41.9 ANXIETY: Primary | ICD-10-CM

## 2024-12-05 DIAGNOSIS — Z79.899 DRUG-INDUCED IMMUNODEFICIENCY: ICD-10-CM

## 2024-12-05 DIAGNOSIS — Z65.8 PSYCHOSOCIAL STRESSORS: ICD-10-CM

## 2024-12-05 DIAGNOSIS — F43.9 TRAUMA AND STRESSOR-RELATED DISORDER: ICD-10-CM

## 2024-12-05 PROCEDURE — 1125F AMNT PAIN NOTED PAIN PRSNT: CPT | Mod: CPTII,S$GLB,, | Performed by: STUDENT IN AN ORGANIZED HEALTH CARE EDUCATION/TRAINING PROGRAM

## 2024-12-05 PROCEDURE — 3074F SYST BP LT 130 MM HG: CPT | Mod: CPTII,S$GLB,, | Performed by: STUDENT IN AN ORGANIZED HEALTH CARE EDUCATION/TRAINING PROGRAM

## 2024-12-05 PROCEDURE — 3078F DIAST BP <80 MM HG: CPT | Mod: CPTII,S$GLB,, | Performed by: STUDENT IN AN ORGANIZED HEALTH CARE EDUCATION/TRAINING PROGRAM

## 2024-12-05 PROCEDURE — 99215 OFFICE O/P EST HI 40 MIN: CPT | Mod: S$GLB,,, | Performed by: STUDENT IN AN ORGANIZED HEALTH CARE EDUCATION/TRAINING PROGRAM

## 2024-12-05 PROCEDURE — 99999 PR PBB SHADOW E&M-EST. PATIENT-LVL III: CPT | Mod: PBBFAC,HCNC,, | Performed by: STUDENT IN AN ORGANIZED HEALTH CARE EDUCATION/TRAINING PROGRAM

## 2024-12-05 PROCEDURE — 3288F FALL RISK ASSESSMENT DOCD: CPT | Mod: CPTII,S$GLB,, | Performed by: STUDENT IN AN ORGANIZED HEALTH CARE EDUCATION/TRAINING PROGRAM

## 2024-12-05 PROCEDURE — 1159F MED LIST DOCD IN RCRD: CPT | Mod: CPTII,S$GLB,, | Performed by: STUDENT IN AN ORGANIZED HEALTH CARE EDUCATION/TRAINING PROGRAM

## 2024-12-05 PROCEDURE — 1160F RVW MEDS BY RX/DR IN RCRD: CPT | Mod: CPTII,S$GLB,, | Performed by: STUDENT IN AN ORGANIZED HEALTH CARE EDUCATION/TRAINING PROGRAM

## 2024-12-05 PROCEDURE — 1101F PT FALLS ASSESS-DOCD LE1/YR: CPT | Mod: CPTII,S$GLB,, | Performed by: STUDENT IN AN ORGANIZED HEALTH CARE EDUCATION/TRAINING PROGRAM

## 2024-12-05 ASSESSMENT — ROUTINE ASSESSMENT OF PATIENT INDEX DATA (RAPID3)
PAIN SCORE: 0
FATIGUE SCORE: 0
MDHAQ FUNCTION SCORE: 2.7
PSYCHOLOGICAL DISTRESS SCORE: 0
TOTAL RAPID3 SCORE: 3
PATIENT GLOBAL ASSESSMENT SCORE: 0

## 2024-12-05 NOTE — PROGRESS NOTES
Individual Psychotherapy (PhD/LCSW)    12/5/2024    Site:  [place] [id number]      Chief complaint/reason for encounter:  follow up     Mood check: scale of:0 best, 10 worst. Patient rated:  Depression rated at -  did not assess  Anxiety rated at  - did not assess     MENTAL HEALTH STATUS EXAM  General Appearance:  Disheveled- her shirt is inside out and backwards    Speech: normal tone, normal rate, normal pitch, normal volume, but tangental       Level of Cooperation: cooperative      Thought Processes: tangential   Mood: anxious      Thought Content: normal, no suicidality, no homicidality, delusions, or paranoia   Affect: expansive   Orientation: Oriented x3   Memory: Did not assess    Attention Span & Concentration: Did not assess, but appears to be wnl    Fund of General Knowledge: Did not assess, but appears to be wnl    Abstract Reasoning: Did not assess, but appears to be wnl    Judgment & Insight: Improving      Language  Did not assess, but appears to be wnl      Risk parameters:  Patient reports no suicidal ideation  Patient reports no homicidal ideation  Patient reports no self-injurious behavior  Patient reports no violent behavior    [place]:  1.    2.  Setting healthy boundaries      History of present condition/content of session:   Pt began the session sharing how they spent of Thanksgiving. She said they went to her daughter's house. She said they had a lot of fun, laughing and talking. She said that her middle daughter has been coming for many years from [place] for the holidays. So, this year, she said this daughter's 2 daughters live out of state. And K will spend time her daughter's. She identified feelings of sadness that her daughter won't be here for holidays anymore.     She talked about Yennifer and Mode, friends of caleb. She said she met Yennifer when she was a child. Because of a rift, pt did not see Yennifer for many years. Pt stated they connected later on their husbands became. She said they  stayed with Yennifer and her  for years, and lived close. Yennifer  last year and   recently. She said the tradition has been that they spent many Thanksgiving. Pt was able to acknowledge it was too painful to go back there (Yennifer and Yon' daughter had the dinner at their parent's house).     She said its empowering that she can put up her own Monty tress (more than one) and 3 Monty villages. Secondary to not having any [place]  decorations as a child. Therefore, she admitted she goes overboard with the decorations.  See notes from last session, she talked about the children deciding not to exchange Monty gifts. She said that she decided to go give their gifts. Although, she said her  does not agree.     Areas of growth; seeing her daughter at Thanksgiving and she didn't acknowledge pt. Area of growth is that she wasn't upset. She stated she was surprised that she was not bothered. When given the positive reinforcement for identified areas of growth, she changed the subject. When pointed out to her, she verbalized recognition. And she verbalized recognition that her thoughts are rooted in experiences.     Therapeutic Intervention:   Pt was assessed for present condition, self esteem, and level of insight.  Pt was provided with support and validation of her emotions.  Active Listening was used in the session.  Pt  was given positive reinforcement for identified areas of growth. Help identifying the links to childhood experiences of current feelings and behaviors was also provided. It was reflected to the patient that most people would experience the same distress and difficulties given the circumstances, thoughts, feelings.  Pt was given validation of her emotions. Grief sx were normalized. Pt was encouraged to use STOP technique when she is relieving painful childhood experiences.     Target symptoms: anxiety  and hx of trauma   Why chosen therapy is appropriate versus another  "modality: evidence based practice  Outcome monitoring methods: checklist/rating scale  Therapeutic intervention type: behavior modifying psychotherapy    Patient's response to intervention:  The patient's response to intervention is accepting. Pt stated therapy is helping her to talk about her painful past because no one else wants to hear it. As pt continues to process her hx, it appears that she is having more insight into the situation, and working toward improving her self worth. Pt appears to be improving on ability to accept validation of her emotions.     Progress toward goals and other mental status changes:  The patient's progress toward goals is  90% complete  for getting it all out. Clinician added goals, and she is progressing at a slow but steady pace. Clinician believes that pt has progressed to having appointments every 2 weeks, but she is not receptive to doing so.     Pt identified goals:  "To get it all out... I never told people." 6/20/2024 - Pt does not agree that this goal is completed.     Added 6/20/2024   Goal:  Continue to identify feelings/thoughts   - Working with pt to improve communication skills     - setting healthy boundaries   - Pt will allow herself to have negative emotions     Long term goal identified by program objectives is to improve pt's compliance with medical recommendations from PCP (with focus on improving self esteem and self worth).   -  Although she denied having low self esteem, clinician plans to explore further her meaning of feeling "less than."  9/26/2024 - Showing improvement   -  Explore her anger    - Allow herself to have negative emotions - 8/29/2024 - almost complete     Diagnosis:   R/O Claustrophobia   Generalized Anxiety D/O  R/O  Trauma related disorders     Plan:  individual psychotherapy Clinician continues to have plans to begin work on her anxiety and/or claustrophobia. However, opportunity to present itself has not occurred. Clinician will continue " to pursue reasons that pt is appears to be stuck. Future plans is to work on identifying any sx of  co-dependence.     Return to clinic: 12/12/2024 at 3:00    Length of Service (minutes): 60

## 2024-12-10 PROBLEM — M25.561 ACUTE PAIN OF RIGHT KNEE: Status: RESOLVED | Noted: 2024-10-23 | Resolved: 2024-10-29

## 2024-12-10 PROBLEM — R29.898 DECREASED STRENGTH OF LOWER EXTREMITY: Status: RESOLVED | Noted: 2024-10-23 | Resolved: 2024-10-29

## 2024-12-12 ENCOUNTER — CLINICAL SUPPORT (OUTPATIENT)
Dept: PRIMARY CARE CLINIC | Facility: CLINIC | Age: 79
End: 2024-12-12
Payer: MEDICARE

## 2024-12-12 DIAGNOSIS — Z65.8 PSYCHOSOCIAL STRESSORS: ICD-10-CM

## 2024-12-12 DIAGNOSIS — F43.9 TRAUMA AND STRESSOR-RELATED DISORDER: Primary | ICD-10-CM

## 2024-12-12 NOTE — PROGRESS NOTES
"Individual Psychotherapy (PhD/LCSW)    12/12/2024    Site:  [place] [id number]      Chief complaint/reason for encounter:  follow up     Mood check: scale of:0 best, 10 worst. Patient rated:  Depression rated at -  "good"   Anxiety rated at  -  "good"    MENTAL HEALTH STATUS EXAM  General Appearance:  Unremarkable    Speech: normal tone, normal rate, normal pitch, normal volume, but tangental       Level of Cooperation: cooperative      Thought Processes: tangential   Mood: anxious      Thought Content: normal, no suicidality, no homicidality, delusions, or paranoia   Affect: expansive   Orientation: Oriented x3   Memory: Did not assess    Attention Span & Concentration: Did not assess, but appears to be wnl    Fund of General Knowledge: Did not assess, but appears to be wnl    Abstract Reasoning: Did not assess, but appears to be wnl    Judgment & Insight: Improving      Language  Did not assess, but appears to be wnl      Risk parameters:  Patient reports no suicidal ideation  Patient reports no homicidal ideation  Patient reports no self-injurious behavior  Patient reports no violent behavior    [place]:  1.  Value of family   2.  Areas of growth     History of present condition/content of session:   She said that her 3 daughters were over last night until late. She said her mood is good secondary to family time. She said her daughter from [place] and will leave tomorrow. She shared that her grand daughter, aged 16 y/o, is a ballerina. She said they will go see her in the [employer] in [place]. Time was spent with pt sharing about decorating her house for Monty. She talked about her hx of taking Ceramics and making a couple of Monty trees. Which she still uses.  She shared some of her holiday plans, and said that she had contact with her friend (Lena, see previous notes about the rift). She said her friend apologized for what she said. Pt admitted that her friend validated her feelings (big area of growth " "is that she accepted the apology).  Pt shared that her one grand daughter, ISSA, is the closest to pt. She said B is her her favorite secondary to the emotional connection she continues to have with pt. She shared how close they are and how much B loves her and tells pt how much she loves her. Pt was encouraged to understand that if she was the awful,terrible person, that she was led to believe she is,  would B love her like that??? Pt acknowledged understanding.     Some of the time she spent talking about her Grandfather, Gary. She said he was very kind to her. She said that he would buy her shoes, clothes, But she was unsure of the reason that he didn't adopt her. She shared many positive memories. For as bad as it was, she said "there were lots of positive." She noted it feels so good to get out the horrible childhood.      Therapeutic Intervention:   Pt was assessed for present condition, self esteem, and level of insight.  Pt was provided with support and validation of her emotions.  Active Listening was used in the session.  Pt  was given positive reinforcement for identified areas of growth. Help identifying the links to childhood experiences of current feelings and behaviors was also provided. It was reflected to the patient that most people would experience the same distress and difficulties given the circumstances, thoughts, feelings.  Pt was given validation of her emotions.  Pt was encouraged to use STOP technique when she is relieving painful childhood experiences and ask herself if she really need to think about the memories that pop up in her head (or the disturbing images)??     Target symptoms: anxiety  and hx of trauma   Why chosen therapy is appropriate versus another modality: evidence based practice  Outcome monitoring methods: checklist/rating scale  Therapeutic intervention type: behavior modifying psychotherapy    Patient's response to intervention:  The patient's response to intervention is " "accepting. Pt stated therapy is helping her to talk about her painful past because no one else wants to hear it. As pt continues to process her hx, it appears that she is having more insight into the situation, and working toward improving her self worth. Pt appears to be improving on ability to accept validation of her emotions, sometimes. Other times, such as today, in the session, she totally ignored any + reinforcement.      Progress toward goals and other mental status changes:  The patient's progress toward goals is  90% complete  for getting it all out. Clinician added goals, and she is progressing at a slow but steady pace. Clinician believes that pt has progressed to having appointments every 2 weeks, but she is not receptive to doing so.     Pt identified goals:  "To get it all out... I never told people." 6/20/2024 - Pt does not agree that this goal is completed.     Added 6/20/2024   Goal:  Continue to identify feelings/thoughts   - Working with pt to improve communication skills     - setting healthy boundaries   - Pt will allow herself to have negative emotions     Long term goal identified by program objectives is to improve pt's compliance with medical recommendations from PCP (with focus on improving self esteem and self worth).   -  Although she denied having low self esteem, clinician plans to explore further her meaning of feeling "less than."  9/26/2024 - Showing improvement   -  Explore her anger    - Allow herself to have negative emotions - 8/29/2024 - almost complete     Diagnosis:   R/O Claustrophobia   Generalized Anxiety D/O  R/O  Trauma related disorders     Plan:  individual psychotherapy Clinician continues to have plans to begin work on her anxiety and/or claustrophobia. However, opportunity to present itself has not occurred. Clinician will continue to pursue reasons that pt is appears to be stuck. Future plans is to work on identifying any sx of  co-dependence.     Return to clinic: " [id number] at 3:00     Length of Service (minutes): 60

## 2024-12-21 ENCOUNTER — HOSPITAL ENCOUNTER (OUTPATIENT)
Dept: RADIOLOGY | Facility: HOSPITAL | Age: 79
Discharge: HOME | End: 2024-12-21
Attending: STUDENT IN AN ORGANIZED HEALTH CARE EDUCATION/TRAINING PROGRAM
Payer: MEDICARE

## 2024-12-21 DIAGNOSIS — J84.9 INTERSTITIAL PULMONARY DISEASE, UNSPECIFIED: ICD-10-CM

## 2024-12-21 DIAGNOSIS — D86.0 SARCOIDOSIS OF LUNG: Chronic | ICD-10-CM

## 2024-12-21 PROCEDURE — 71250 CT THORAX DX C-: CPT | Mod: TC,PO

## 2024-12-21 PROCEDURE — 71250 CT THORAX DX C-: CPT | Mod: 26,,, | Performed by: RADIOLOGY

## 2024-12-23 ENCOUNTER — CLINICAL SUPPORT (OUTPATIENT)
Dept: PRIMARY CARE CLINIC | Facility: CLINIC | Age: 79
End: 2024-12-23
Payer: MEDICARE

## 2024-12-23 DIAGNOSIS — F43.9 TRAUMA AND STRESSOR-RELATED DISORDER: Primary | ICD-10-CM

## 2024-12-23 DIAGNOSIS — Z65.8 PSYCHOSOCIAL STRESSORS: ICD-10-CM

## 2024-12-23 DIAGNOSIS — F32.A DEPRESSIVE DISORDER: ICD-10-CM
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
## 2025-01-02 ENCOUNTER — CLINICAL SUPPORT (OUTPATIENT)
Dept: PRIMARY CARE CLINIC | Facility: CLINIC | Age: 80
End: 2025-01-02
Payer: MEDICARE

## 2025-01-02 DIAGNOSIS — F32.A DEPRESSIVE DISORDER: ICD-10-CM

## 2025-01-02 DIAGNOSIS — Z65.8 PSYCHOSOCIAL STRESSORS: Primary | ICD-10-CM
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
## 2025-01-09 ENCOUNTER — CLINICAL SUPPORT (OUTPATIENT)
Dept: PRIMARY CARE CLINIC | Facility: CLINIC | Age: 80
End: 2025-01-09
Payer: MEDICARE

## 2025-01-09 DIAGNOSIS — Z65.8 PSYCHOSOCIAL STRESSORS: Primary | ICD-10-CM

## 2025-01-09 DIAGNOSIS — F43.9 TRAUMA AND STRESSOR-RELATED DISORDER: ICD-10-CM
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
## 2025-01-13 ENCOUNTER — OFFICE VISIT (OUTPATIENT)
Dept: PRIMARY CARE CLINIC | Facility: CLINIC | Age: 80
End: 2025-01-13
Payer: MEDICARE

## 2025-01-13 VITALS
DIASTOLIC BLOOD PRESSURE: 60 MMHG | HEIGHT: 64 IN | HEART RATE: 59 BPM | BODY MASS INDEX: 22.5 KG/M2 | SYSTOLIC BLOOD PRESSURE: 124 MMHG | OXYGEN SATURATION: 98 % | WEIGHT: 131.81 LBS

## 2025-01-13 DIAGNOSIS — F41.9 ANXIETY: Chronic | ICD-10-CM

## 2025-01-13 DIAGNOSIS — I25.10 CORONARY ARTERY DISEASE INVOLVING NATIVE CORONARY ARTERY OF NATIVE HEART WITHOUT ANGINA PECTORIS: Chronic | ICD-10-CM

## 2025-01-13 DIAGNOSIS — E78.2 MIXED HYPERLIPIDEMIA: Chronic | ICD-10-CM

## 2025-01-13 DIAGNOSIS — Z79.899 CHRONIC PRESCRIPTION BENZODIAZEPINE USE: Chronic | ICD-10-CM

## 2025-01-13 DIAGNOSIS — I27.20 PULMONARY HYPERTENSION: ICD-10-CM

## 2025-01-13 DIAGNOSIS — I10 ESSENTIAL HYPERTENSION: Primary | Chronic | ICD-10-CM

## 2025-01-13 DIAGNOSIS — N18.31 STAGE 3A CHRONIC KIDNEY DISEASE: ICD-10-CM

## 2025-01-13 DIAGNOSIS — M32.9 PERSONAL HISTORY OF SYSTEMIC LUPUS ERYTHEMATOSUS (SLE): ICD-10-CM

## 2025-01-13 PROCEDURE — 3078F DIAST BP <80 MM HG: CPT | Mod: HCNC,CPTII,S$GLB, | Performed by: FAMILY MEDICINE

## 2025-01-13 PROCEDURE — 1101F PT FALLS ASSESS-DOCD LE1/YR: CPT | Mod: HCNC,CPTII,S$GLB, | Performed by: FAMILY MEDICINE

## 2025-01-13 PROCEDURE — 1160F RVW MEDS BY RX/DR IN RCRD: CPT | Mod: HCNC,CPTII,S$GLB, | Performed by: FAMILY MEDICINE

## 2025-01-13 PROCEDURE — 3074F SYST BP LT 130 MM HG: CPT | Mod: HCNC,CPTII,S$GLB, | Performed by: FAMILY MEDICINE

## 2025-01-13 PROCEDURE — 1158F ADVNC CARE PLAN TLK DOCD: CPT | Mod: HCNC,CPTII,S$GLB, | Performed by: FAMILY MEDICINE

## 2025-01-13 PROCEDURE — 1159F MED LIST DOCD IN RCRD: CPT | Mod: HCNC,CPTII,S$GLB, | Performed by: FAMILY MEDICINE

## 2025-01-13 PROCEDURE — 1126F AMNT PAIN NOTED NONE PRSNT: CPT | Mod: HCNC,CPTII,S$GLB, | Performed by: FAMILY MEDICINE

## 2025-01-13 PROCEDURE — 99999 PR PBB SHADOW E&M-EST. PATIENT-LVL V: CPT | Mod: PBBFAC,HCNC,, | Performed by: FAMILY MEDICINE

## 2025-01-13 PROCEDURE — 3288F FALL RISK ASSESSMENT DOCD: CPT | Mod: HCNC,CPTII,S$GLB, | Performed by: FAMILY MEDICINE

## 2025-01-13 PROCEDURE — 99214 OFFICE O/P EST MOD 30 MIN: CPT | Mod: HCNC,S$GLB,, | Performed by: FAMILY MEDICINE

## 2025-01-13 RX ORDER — LORAZEPAM 0.5 MG/1
0.5 TABLET ORAL NIGHTLY PRN
Qty: 15 TABLET | Refills: 2 | Status: SHIPPED | OUTPATIENT
Start: 2025-01-13

## 2025-01-13 NOTE — PROGRESS NOTES
Primary Care Provider Appointment   [facility]       Patient ID: Evelina Pinon is a 79 y.o. female.    ASSESSMENT/PLAN by Problem List:    Assessment & Plan    IMPRESSION:  - Assessed blood pressure and cholesterol; both well-controlled on current regimen  - Evaluated osteoporosis management; patient receiving annual Reclast infusion  - Considered knee pain etiology; likely osteoarthritis rather than osteoporosis-related  - Reviewed anxiety management; noted improvement with current psychotherapy    UNSPECIFIED ATRIAL FIBRILLATION:  - Inquired about any cardiac symptoms, including heart issues, chest pain, or angina.  Currently stable    HYPERTENSION:  - Ms. Rosados blood pressure was very good today.  - Expressed satisfaction with current medications and decided to maintain them.    HYPERLIPIDEMIA:  - Ordered cholesterol check before next visit.  - Noted satisfactory cholesterol in last check.  - Confirmed patient is taking cholesterol medication.    OSTEOPOROSIS:  - Confirmed patient has osteoporosis and is receiving Reclast infusion for treatment.  - Explained the difference between osteoporosis (bone thinning) and osteoarthritis (joint wear and tear).    OSTEOARTHRITIS:  - Ms. Pinon reports pain in left knee, requiring use of walker.  - Confirmed osteoarthritis in left knee, previously diagnosed by orthopedist.  - Prescribed Voltaren gel for knee pain; instructed to apply to affected area 2-3 times daily for at least 1 week.  - If pain persists after trying Voltaren gel, patient should contact the office.  - Suggested considering physical therapy to strengthen muscles.  - Advised returning to orthopedist if pain persists, noting previous injection received.  - Discussed importance of maintaining mobility to prevent further decline in function, advising patient to do so within limits of knee pain.    ANXIETY:  - Ms. Pinon reports improvement in anxiety and  stress.  - Confirmed patient is seeing a therapist for anxiety and using Ativan sparingly, mainly for sleep.  - Clarified role of therapist in mental health treatment.  - Instructed patient to continue attending therapy sessions.  - Continued Ativan as needed for anxiety, emphasizing sparing use due to fall risk.  - Refilled Ativan prescription.    HIP FRACTURE:  - Noted patient's previous hip fracture.    GASTROESOPHAGEAL REFLUX DISEASE (GERD):  - Discussed approach to reflux treatment.  - Planned to start with H2 blocker.  - Noted previous treatment with Prilosec.    WEIGHT MANAGEMENT:  - Ms. Pinon reports weight gain.  - Inquired about patient's diet.    CHILDHOOD TRAUMA:  - Discussed patient's past experiences of social exclusion, childhood trauma, and difficult upbringing.  - Noted that patient is receiving therapy to address these issues.    OTHER INSTRUCTIONS:  Encouraged her to reconsider physical therapy but she complained about the cost of the co-pay.  And is not interested at this time    FOLLOW UP:  - Ms. Pinon to maintain as much mobility as possible within limits of knee pain.  - Follow up in 3 months.         CODING, ORDERS, AND ADDITIONAL DOCUMENTATION RELATED TO THIS ENCOUNTER:  (note that the diagnoses and clinical summaries above were generated with the assistance of "[employer] software and represents my assessment and plan.  This software does not always generate the precise nor most specific diagnosis codes.  Therefore the specific diagnosis codes and orders for billing purposes are detailed below along with any additional documentation if needed)      1. Essential hypertension    2. Mixed hyperlipidemia    3. Coronary artery disease involving native coronary artery of native heart without angina pectoris    4. Anxiety  -     LORazepam (ATIVAN) 0.5 MG tablet; Take 1 tablet (0.5 mg total) by mouth nightly as needed for Anxiety.  Dispense: 15 tablet; Refill: 2    5. Chronic prescription  benzodiazepine use  Overview:  She presented to me as a new patient on May 5, 2022, dependent on all lorazepam 0.5 once or twice daily for many years.    Orders:  -     LORazepam (ATIVAN) 0.5 MG tablet; Take 1 tablet (0.5 mg total) by mouth nightly as needed for Anxiety.  Dispense: 15 tablet; Refill: 2    6. Personal history of systemic lupus erythematosus (SLE)  Assessment & Plan:  Clinically she appears stable.  She has findings consistent with cutaneous SLE.  She has sarcoidosis but can not exclude systemic SLE as previously diagnosed.  We will defer current diagnosis and treatment to Rheumatology with whom she follows      7. Pulmonary hypertension  Overview:  per cardiology:  She was admitted in July 2021 with volume overload, hyponatremia.  Initial TTE revealed evidence of of pulmonary htn - post diuresis she underwent RHC revealing normal pulmonary pressures.      Assessment & Plan:  Reviewed previous documentation.  Elevated PA pressures on echocardiogram when she presented with volume overload and right-sided failure.  She subsequently underwent a right heart catheterization which showed improved pulmonary arterial pressure.  She does have sarcoidosis and is at risk for recurrence of pulmonary hypertension.  Clinically she is currently stable and we will continue to monitor      8. Stage 3a chronic kidney disease  Assessment & Plan:  Reviewed labs over the last six months.  Creatinine remains fairly stable with an estimated GFR of around 50.  Clinically stable we will continue to monitor             Follow Up:  Three months  Advance Care Planning     Date: 01/13/2025    Power of   I initiated the process of voluntary advance care planning today and explained the importance of this process to the patient.  I introduced the concept of advance directives to the patient, as well. Then the patient received detailed information about the importance of designating a Health Care Power of  (HCPOA).  She was also instructed to communicate with this person about their wishes for future healthcare, should she become sick and lose decision-making capacity. The patient has not previously appointed a HCPOA. After our discussion, the patient has decided to complete a HCPOA and has appointed her significant other, health care agent:   Miguel Angel  & health care agent number:  See chart . I encouraged her to communicate with this person about their wishes for future healthcare, should she become sick and lose decision-making capacity.  Reviewed again today.  Patient once again indicated her  would be her primary decision maker.  Encouraged her to complete the paperwork             Subjective:       HPI    Patient is a/an 79 y.o.  female     For complete problem list, past medical history, surgical history, social history, etc., see appropriate section in the electronic medical record    History of Present Illness    CHIEF COMPLAINT:  Ms. Pinon presents today for knee pain and follow-up on osteoporosis management.    MUSCULOSKELETAL:  She reports bilateral knee pain, with one side being more severe. She received an injection that improved pain on one side, but the contralateral knee remains painful. She uses a walker for ambulation due to knee pain. She has not tried arthritis gel.    OSTEOPOROSIS:  She continues Reclast infusion for osteoporosis management. She has history of hip fracture secondary to osteoporosis.    ANXIETY:  She reports improvement in anxiety with weekly therapy sessions. She takes Ativan as needed for anxiety symptoms, though uses it sparingly.    DIET:  She reports poor dietary habits, primarily consuming pre-made meals provided by family members. She notes weight gain.      ROS:  General: +weight gain  Musculoskeletal: +joint pain  Psychiatric: +anxiety       Review of Systems   Constitutional:  Negative for fever.   Respiratory:  Negative for shortness of breath and wheezing.   "  Cardiovascular:  Negative for chest pain and leg swelling.   Gastrointestinal: Negative.    Musculoskeletal:  Positive for arthralgias.   Psychiatric/Behavioral:          Positive anxiety, improving depression.  She will continue to follow with our  for counseling.  She is improving       Objective     Physical Exam  Vitals reviewed.   Constitutional:       General: She is not in acute distress.     Appearance: She is well-developed. She is not toxic-appearing.   HENT:      Head: Normocephalic and atraumatic.   Eyes:      General: No scleral icterus.  Cardiovascular:      Rate and Rhythm: Normal rate and regular rhythm.   Pulmonary:      Effort: Pulmonary effort is normal. No respiratory distress.      Breath sounds: No wheezing.   Musculoskeletal:      Right lower leg: No edema.      Left lower leg: No edema.   Neurological:      Mental Status: She is alert and oriented to person, place, and time.      Comments: Brought back in a wheelchair.  She can ambulate but did not feel she could walk the full distance back to the room   Psychiatric:      Comments: She is tangential, tearful at times.       Vitals:    01/13/25 1533   BP: 124/60   BP Location: Right arm   Patient Position: Sitting   Pulse: (!) 59   SpO2: 98%   Weight: 59.8 kg (131 lb 13.4 oz)   Height: 5' 4" (1.626 m)     Physical Exam                This note was generated with the assistance of ambient listening technology. Verbal consent was obtained by the patient and accompanying visitor(s) for the recording of patient appointment to facilitate this note. I attest to having reviewed and edited the generated note for accuracy, though some syntax or spelling errors may persist. Please contact the author of this note for any clarification.  Parts of the note were also generated with voice recognition software.  Occasionally this software will misinterpreted words or phrases    "

## 2025-01-15 NOTE — PATIENT INSTRUCTIONS
Take losartan 100 mg in evening.   Take HCTZ tablet 12.5 mg in AM  Take Metoprolol XL 50 mg in the morning.   Check bp daily for 1-2 weeks. Some readings in am and some in PM.    [facility]  INPATIENT SPEECH THERAPY  [facility]  DAILY NOTE    TIME   SLP Individual Minutes  Time In: 1330  Time Out: 1400  Minutes: 30  Timed Code Treatment Minutes: 30 Minutes   Cognitive therapy: 22 minutes  Dysphagia therapy: 8 minutes     Date: 2024  Patient Name: Hector Saeed      CSN: [id number]   : 1985  (38 y.o.)  Gender: male   Referring Physician:  Dr. Ayaz Barney MD  Diagnosis: Cervical spinal cord compression HCC  Precautions: Standard, Fall risk  Current Diet: Soft and Bite size with Thin liquids- Finger foods  Respiratory Status: Room Air  Swallowing Strategies:  Upright position, Small bites/sips, Alternating liquids/solids,   Date of Last MBS/FEES:  FEES 5/15/24    Pain:  No pain reported.    Subjective:  Patient resting in bed upon ST entry, but agreeable to transfer to recliner for completion of session.      Short-Term Goals:  SHORT TERM GOAL #1:  Goal 1: Patient will complete immediate/delayed recall tasks with 70% accuracy given min cues to improve retention of novel and newly presented information-  INTERVENTIONS: Did not address due to focus on other goals.    SHORT TERM GOAL #2:  Goal 2: Patient will complete problem solving, visual/verbal reasoning, and executive functioning with 70% accuracy independently cues to improve return to IALDs/ADLs.  INTERVENTIONS: See goal 4 for details    SHORT TERM GOAL #3:  Goal 3: Patient will complete sequencing and thought organization tasks with 70% accuracy given min cues to improve mental flexibility.  INTERVENTIONS:   Verbal though organization: Patient providing instructions to therapist on a novel card game \"Black Mal\". Patient providing appropriate description of how many cards each player should have and to turn a card up, but failed to educate therapist on the value of each card and options for selecting different cards to meet a target value. Overall patient providing ~60% of  the instructions needed to play the game with therapist needing to inquire about clarifying information.     SHORT TERM GOAL #4:  Goal 4: Patient will complete attention tasks with no more than 3 errors given min cues to improve multi-tasking across IADLs.   INTERVENTIONS:   Complex attention: \"Kings in the corner\" card game.   -Provided instruction on game set up and rules of play.   *Round 1- Mod-max assist needed for all turns, breakdowns with working memory to recall sequence for red/black and playing cards in descending order, as well as sequential thinking and selective attention. Good success with basic plays, but needed increased cueing assist to consider plays within the played pile spaces.     SHORT TERM GOAL #5:  Goal 5: Patient will complete oral stretching/ROM exercises x10, mod cuesto improve mandibular ROM/movement for mastication and labial retraction/protrusion for labial seal to optimize oral feeding skills to maintain current diet. GOAL NOT MET- DISCONTINUE  INTERVENTIONS:   Patient with ongoing restriction in mandibular depression and facial labial retraction/protrusion caused by recurrent tumor. Now limited to liquids and puree items that can be sucked through a straw due to reduced oral opening and limitations in rotary mastication. Dr. Murdock consulted, with anticipated placement of PEG within the next few days prior to d/c from IPR.     Patient managing use of compensatory strategies and tolerance of textures well. Completion of oral ROM stretching not beneficial at this point given progression of oral cancer. Discussed need to continue to focus on consuming nutrient dense foods, supplements and eating more frequently throughout the day.     SHORT TERM GOAL #6:  Goal 6: Patient will complete structured speech tasks with focus on use of speech strategies (speak up, over articulate, slow down) with 80% accuracy, min cues to optimize speech clarity-   INTERVENTIONS:  Goal not addressed this date  Session:  functional carryover, thought organization, executive function, MAR review  Discharge Recommendations: Outpatient Speech Therapy      Janell Oshea MS, CCC-SLP [id number]              [At Term] : at term [[place] : in [place] [Normal Vaginal Route] : by normal vaginal route [None] : there were no delivery complications [Jaundice] : not jaundice [Phototherapy] : no phototherapy [Transfusion] : no transfusion [NICU] : no NICU [FreeTextEntry1] : 7lb 5oz

## 2025-01-16 ENCOUNTER — CLINICAL SUPPORT (OUTPATIENT)
Dept: PRIMARY CARE CLINIC | Facility: CLINIC | Age: 80
End: 2025-01-16
Payer: MEDICARE

## 2025-01-16 DIAGNOSIS — F43.9 TRAUMA AND STRESSOR-RELATED DISORDER: Primary | ICD-10-CM

## 2025-01-16 DIAGNOSIS — F32.A DEPRESSIVE DISORDER: ICD-10-CM

## 2025-01-16 DIAGNOSIS — F41.9 ANXIETY: ICD-10-CM

## 2025-01-16 PROCEDURE — 99499 UNLISTED E&M SERVICE: CPT | Mod: HCNC,S$GLB,, | Performed by: SOCIAL WORKER

## 2025-01-16 NOTE — PROGRESS NOTES
Individual Psychotherapy (PhD/LCSW)    1/16/2025    Site:  [place] [id number]      Chief complaint/reason for encounter:  continued exploring hx    Mood check: scale of:0 best, 10 worst. Patient rated:  Depression rated at - PQH 9 score is 2   Anxiety rated at  -  MADINA 7 score is 2     MENTAL HEALTH STATUS EXAM  General Appearance:  Unremarkable    Speech: normal tone, normal rate, normal pitch, normal volume, but tangental       Level of Cooperation: cooperative      Thought Processes: Tangential    Mood: Euthymic       Thought Content: normal, no suicidality, no homicidality, delusions, or paranoia   Affect: WNL   Orientation: Oriented x3   Memory: Did not assess    Attention Span & Concentration: Did not assess, but appears to be wnl    Fund of General Knowledge: Did not assess, but appears to be wnl    Abstract Reasoning: Did not assess, but appears to be wnl    Judgment & Insight: Improving      Language  Did not assess, but appears to be wnl      Risk parameters:  Patient reports no suicidal ideation  Patient reports no homicidal ideation  Patient reports no self-injurious behavior  Patient reports no violent behavior    [place]:  1.  Update on current condition   2.  Identified area of growth    History of present condition/content of session:   Pt began the session sharing that she hurt her foot. When clinician tried to get the root of the problem with her and her  fighting, she did not make any connections. She ignored any positive validation from clinician. She interjected 2x about not having any where to go for the holiday celebration. Clinician spent time exploring the reason she continues to bring it up, and it was important to her.  She equated it to not having any where to go for holidays as she grew up. Final result, was that her  discounted the value she placed on having somewhere to go for Monty [employer]reeti and Monty Day.  Pt stated what she created was her own tradition, of going to visit  the various churches in [place]. With continued exploring, she said her  does not like Monty. And apparently, part of the problem with the push back from him with her going overboard decorating for Monty.  She said that she had her grandson and his friend come over the help pt get all of it put up.  She continues to share that she did not have any [place] celebrations as child. Clinician assessed pt for associated feelings. She acknowledged being ashamed, and asked about anger, for her adopted mother cheating her out of [place] gifts, decorations.  Explored what about her adopted mother wanting pt's oldest daughter living with her. She acknowledged maybe she jealous because of the attention mother was giving to her oldest daughter (because it was not given to pt as a child.)  She shared that all the play kitchen stuff her mother bought for pt's oldest daughter. She said it created chaos with her other 2 kids, because oldest one would not share because that's what pt's mother told the kid (that the set is only hers, and doesn't have to share with other kids). Pt shared it bothered her because pt believed her mother was  trying to manipulate her oldest daughter.     Another repeated theme is pt's belief that she does all the work, and her  gets the glory from the kids. She repeated having to go to work to pay tuition for 4 kids to attend private, [employer]. She shared other memories of their early marriage. She said that he was not able to hear her call for help when he injured her foot. Further explored her associated thoughts/feelings. With her new injury on left foot, she identified feeling powerless like she did as a child. She said she sick of being sick, and having to be dependent on someone else.  She and her  are spending too much time together, he is having to help her so much, and she admitted that are not getting along. Time was spent exploring paying her  " to come help with ADL's. She said already contacted the  to come and help her.     Therapeutic Intervention:   Pt was assessed for present condition, self esteem, and level of insight.  Pt was provided with support and validation of her emotions.  Active Listening was used in the session.  Pt  was given positive reinforcement for identified areas of growth. Reviewed the specifics of CBT with focus of reframing cognitive distortions; and encouraging the patient to utilize healthy behavior patterns. Patient was encouraged to examine automatic thoughts.   Pt was given positive reinforcement for identification and expression of emotions.     Target symptoms: anxiety  and hx of trauma   Why chosen therapy is appropriate versus another modality: evidence based practice  Outcome monitoring methods: checklist/rating scale  Therapeutic intervention type: behavior modifying psychotherapy    Patient's response to intervention:  The patient's response to intervention is accepting. Pt stated therapy is helping her to talk about her painful past because no one else wants to hear it. As pt continues to process her hx, it appears that she is having more insight into the situation, and working toward improving her self worth.      Progress toward goals and other mental status changes:  The patient's progress toward goals is  90% complete  for getting it all out. Clinician added goals, and she is progressing at a slow but steady pace. Clinician believes that pt has progressed to having appointments every 2 weeks, but she is not receptive to doing so.  Clinician again tried to get pt to move having appointments every 2 weeks. She voiced belief that clinician is trying to get rid of and not wanting to see her anymore. Pt was not receptive to explanation that it is positive thing, and an area of growth.     Pt identified goals:  "To get it all out... I never told people." 6/20/2024 - Pt does not agree that this " "goal is completed.     Added 6/20/2024   Goal:  Continue to identify feelings/thoughts   - Working with pt to improve communication skills     - setting healthy boundaries   - Pt will allow herself to have negative emotions     Long term goal identified by program objectives is to improve pt's compliance with medical recommendations from PCP (with focus on improving self esteem and self worth).   -  Although she denied having low self esteem, clinician plans to explore further her meaning of feeling "less than."  9/26/2024 - Showing improvement   -  Explore her anger    - Allow herself to have negative emotions - 8/29/2024 - almost complete     Diagnosis:   R/O Claustrophobia   Generalized Anxiety D/O  R/O  Trauma related disorders     Plan:  individual psychotherapy Clinician continues to have plans to begin work on her anxiety and/or claustrophobia. However, opportunity to present itself has not occurred.  Clinician would like to explore if there is shame in her aunts's profession and her grandfather's heritage (seems to be a repeated theme).      Return to clinic: 1/23/2025 at 3:00    Length of Service (minutes): 60                            "

## 2025-01-18 PROBLEM — I48.91 ATRIAL FIBRILLATION, UNSPECIFIED TYPE: Status: ACTIVE | Noted: 2025-01-18

## 2025-01-18 NOTE — ASSESSMENT & PLAN NOTE
Clinically she appears stable.  She has findings consistent with cutaneous SLE.  She has sarcoidosis but can not exclude systemic SLE as previously diagnosed.  We will defer current diagnosis and treatment to Rheumatology with whom she follows

## 2025-01-18 NOTE — ASSESSMENT & PLAN NOTE
Reviewed labs over the last six months.  Creatinine remains fairly stable with an estimated GFR of around 50.  Clinically stable we will continue to monitor

## 2025-01-18 NOTE — ASSESSMENT & PLAN NOTE
Reviewed previous documentation.  Elevated PA pressures on echocardiogram when she presented with volume overload and right-sided failure.  She subsequently underwent a right heart catheterization which showed improved pulmonary arterial pressure.  She does have sarcoidosis and is at risk for recurrence of pulmonary hypertension.  Clinically she is currently stable and we will continue to monitor

## 2025-01-23 ENCOUNTER — CLINICAL SUPPORT (OUTPATIENT)
Dept: PRIMARY CARE CLINIC | Facility: CLINIC | Age: 80
End: 2025-01-23
Payer: MEDICARE

## 2025-01-23 DIAGNOSIS — Z65.8 PSYCHOSOCIAL STRESSORS: ICD-10-CM

## 2025-01-23 DIAGNOSIS — F41.9 ANXIETY: ICD-10-CM

## 2025-01-23 DIAGNOSIS — F43.9 TRAUMA AND STRESSOR-RELATED DISORDER: Primary | ICD-10-CM

## 2025-01-23 PROCEDURE — 99499 UNLISTED E&M SERVICE: CPT | Mod: HCNC,S$GLB,, | Performed by: SOCIAL WORKER
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
## 2025-01-30 DIAGNOSIS — Z00.00 ENCOUNTER FOR MEDICARE ANNUAL WELLNESS EXAM: ICD-10-CM

## 2025-02-06 ENCOUNTER — CLINICAL SUPPORT (OUTPATIENT)
Dept: PRIMARY CARE CLINIC | Facility: CLINIC | Age: 80
End: 2025-02-06
Payer: MEDICARE

## 2025-02-06 DIAGNOSIS — F43.9 TRAUMA AND STRESSOR-RELATED DISORDER: Primary | ICD-10-CM

## 2025-02-06 DIAGNOSIS — F41.9 ANXIETY: ICD-10-CM

## 2025-02-06 PROCEDURE — 99499 UNLISTED E&M SERVICE: CPT | Mod: HCNC,S$GLB,, | Performed by: SOCIAL WORKER

## 2025-02-06 NOTE — PROGRESS NOTES
Individual Psychotherapy (PhD/LCSW)    2/6/2025    Site:  [place] [address]      Chief complaint/reason for encounter:  pt sharing painful memories     Mood check: scale of:0 best, 10 worst. Patient rated:  Depression rated at - did not assess   Anxiety rated at  -  did not assess    MENTAL HEALTH STATUS EXAM  General Appearance:  Unremarkable    Speech: normal tone, normal rate, normal pitch, normal volume, but tangental       Level of Cooperation: cooperative      Thought Processes: Tangential    Mood: Euthymic       Thought Content: normal, no suicidality, no homicidality, delusions, or paranoia   Affect: WNL   Orientation: Oriented x3   Memory: Did not assess    Attention Span & Concentration: Did not assess, but appears to be wnl    Fund of General Knowledge: Did not assess, but appears to be wnl    Abstract Reasoning: Did not assess, but appears to be wnl    Judgment & Insight: Improving      Language  Did not assess, but appears to be wnl      Risk parameters:  Patient reports no suicidal ideation  Patient reports no homicidal ideation  Patient reports no self-injurious behavior  Patient reports no violent behavior    [place]:  1.  Update on current condition   2.  Explore pt's thoughts on referral to a trauma therapist     Content of session:   Clinician began the sessions asking pt about her interests in a referral to trauma therapy. Secondary to pt appeared to be making progress, but after the last session, it does not appear that she is able to move forward with reliving these painful childhood memories. Therefore, clinician expressed belief to pt that likely she would benefit from a referral to a therapist who specializes in trauma therapy. Secondary to this clinician is not a trained trauma therapist.  Pt was not overly receptive. Jumped to the conclusion that clinician is does not want to see her anymore. She c/o that she trusted clinician and wishes she would not have shared so much. And she said she does  "not want to repeat this to another therapist.  Pt voiced belief that her issues are so bad, that clinician didn't want to hear about it.  Clinician used CBT technique of asking pt to provide evidence for thoughts  that clinician does not  want to see her and clinician provided evidence to the contrary. Pt answered because clinician is referring her out. This became circular with pt sharing some more painful memories and then again voicing belief that clinician does not want to see her or hear her history. Pt verbalized her goal was to have someone to listen to her and "to get it all out." Pt encouraged to understand that therapy includes getting it all out. And then working to move forward so that she will no longer have those intrusive painful memories.     Pt also said she had an argument with her daughter.  Pt stated her daughter came over to remove decorations from one of pt's [employer] trees. Pt verbalized concern that her daughter was not packing the ornaments as she would have done. She said she felt disrespected. And when she said something to her daughter, she said her daughter told her she [pt} was hard to please. And her daughter left feeling angry and went to stay with pt's other daughter. There was not sufficient time to discuss.     Clinician had to set healthy boundaries with pt and stop the session. Pt stated she does not want to continue with therapy.     Therapeutic Intervention:   Pt was assessed for present condition and willingness to seek a therapist more qualified to meet her needs. Active Listening was used in the session as pt expressed her beliefs.  P Reviewed the specifics of CBT with focus of reframing cognitive distortions; and encouraging the patient to utilize healthy behavior patterns. Patient was encouraged to examine automatic thoughts and assumptions and provide evidence to support her thoughts.      Target symptoms: anxiety  and hx of trauma   Why chosen therapy is appropriate " "versus another modality: evidence based practice  Outcome monitoring methods: checklist/rating scale  Therapeutic intervention type: behavior modifying psychotherapy    Patient's response to intervention:  The patient's response to intervention is accepting. Pt stated therapy is helping her to talk about her painful past because no one else wants to hear it. As pt continues to process her hx, it appears that she is having more insight into the situation, and working toward improving her self worth.      Progress toward goals and other mental status changes:  The patient's progress toward goals is  90% complete  for getting it all out.     Pt identified goals:  "To get it all out... I never told people." 6/20/2024 - Pt does not agree that this goal is completed.     Added 6/20/2024   Goal:  Continue to identify feelings/thoughts   - Working with pt to improve communication skills     - setting healthy boundaries   - Pt will allow herself to have negative emotions     Long term goal identified by program objectives is to improve pt's compliance with medical recommendations from PCP (with focus on improving self esteem and self worth).   -  Although she denied having low self esteem, clinician plans to explore further her meaning of feeling "less than."  9/26/2024 - Showing improvement   -  Explore her anger    - Allow herself to have negative emotions - 8/29/2024 - almost complete     Diagnosis:   R/O Claustrophobia   Generalized Anxiety D/O  R/O  Trauma related disorders     Plan:Clinician believes that pt would benefit from a referral to a trauma therapist. However, she is not receptive to doing so. Clinician will update PCP on current situation and pt status.     Return to clinic: N/A - pt elected not to continue with therapy and future appointments were cancelled.     Length of Service (minutes): 60                                "

## 2025-02-07 ENCOUNTER — DOCUMENTATION ONLY (OUTPATIENT)
Dept: PRIMARY CARE CLINIC | Facility: CLINIC | Age: 80
End: 2025-02-07
Payer: MEDICARE

## 2025-02-07 DIAGNOSIS — F41.9 ANXIETY: Primary | Chronic | ICD-10-CM

## 2025-02-07 NOTE — PROGRESS NOTES
1/6/2025 - Clinician met 1:1 with Rober CHOW to review case and make recommendations. Clinician shared concerns that pt is not progressing as previously thought. In that she seems to be stuck reliving painful childhood memories. Clinician voiced concern that not being a trained trauma therapist,  pt would be likely benefit from trauma focused care. Discussion was held with Rober CHOW about possibility of pt being a good candidate for EMDR.  Clinician verbalized agreement and plans to address with pt.

## 2025-02-07 NOTE — ASSESSMENT & PLAN NOTE
Clinician met 1:1 with Rober CHOW to review case and make recommendations. Clinician shared concerns that pt is not progressing as previously thought. In that she seems to be stuck reliving painful memories. AEB, the history in the sessions that pt shares the same memories, several times, but differences in details. Clinician voiced concern that not being a trained trauma therapist, that pt would be likely benefit froma different type of care. Discussion was held with Rober CHOW about possibility of pt being a good candidate for EMDR.  Clinician verbalized agreement and plans to address with pt.
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
## 2025-03-03 RX ORDER — ALLOPURINOL 100 MG/1
TABLET ORAL
Qty: 180 TABLET | Refills: 3 | OUTPATIENT
Start: 2025-03-03

## 2025-03-10 RX ORDER — ACETAMINOPHEN 325 MG/1
650 TABLET ORAL
Status: CANCELLED | OUTPATIENT
Start: 2025-03-10

## 2025-03-10 RX ORDER — ZOLEDRONIC ACID 5 MG/100ML
5 INJECTION, SOLUTION INTRAVENOUS
Status: CANCELLED | OUTPATIENT
Start: 2025-03-10

## 2025-03-10 RX ORDER — SODIUM CHLORIDE 0.9 % (FLUSH) 0.9 %
10 SYRINGE (ML) INJECTION
Status: CANCELLED | OUTPATIENT
Start: 2025-03-10

## 2025-03-10 RX ORDER — HEPARIN 100 UNIT/ML
500 SYRINGE INTRAVENOUS
Status: CANCELLED | OUTPATIENT
Start: 2025-03-10

## 2025-03-11 ENCOUNTER — LAB VISIT (OUTPATIENT)
Dept: LAB | Facility: HOSPITAL | Age: 80
End: 2025-03-11
Attending: STUDENT IN AN ORGANIZED HEALTH CARE EDUCATION/TRAINING PROGRAM
Payer: MEDICARE

## 2025-03-11 ENCOUNTER — INFUSION (OUTPATIENT)
Dept: INFUSION THERAPY | Facility: HOSPITAL | Age: 80
End: 2025-03-11
Attending: STUDENT IN AN ORGANIZED HEALTH CARE EDUCATION/TRAINING PROGRAM
Payer: MEDICARE

## 2025-03-11 ENCOUNTER — RESULTS FOLLOW-UP (OUTPATIENT)
Dept: RHEUMATOLOGY | Facility: CLINIC | Age: 80
End: 2025-03-11

## 2025-03-11 VITALS
SYSTOLIC BLOOD PRESSURE: 118 MMHG | OXYGEN SATURATION: 95 % | TEMPERATURE: 98 F | RESPIRATION RATE: 20 BRPM | DIASTOLIC BLOOD PRESSURE: 57 MMHG | HEART RATE: 51 BPM

## 2025-03-11 DIAGNOSIS — S72.001A CLOSED DISPLACED FRACTURE OF RIGHT FEMORAL NECK: Primary | ICD-10-CM

## 2025-03-11 DIAGNOSIS — J84.9 INTERSTITIAL PULMONARY DISEASE, UNSPECIFIED: ICD-10-CM

## 2025-03-11 DIAGNOSIS — Z79.899 HIGH RISK MEDICATION USE: ICD-10-CM

## 2025-03-11 DIAGNOSIS — H20.9 UVEITIS: ICD-10-CM

## 2025-03-11 DIAGNOSIS — D86.0 SARCOIDOSIS OF LUNG: Chronic | ICD-10-CM

## 2025-03-11 DIAGNOSIS — D84.821 DRUG-INDUCED IMMUNODEFICIENCY: ICD-10-CM

## 2025-03-11 DIAGNOSIS — Z79.899 DRUG-INDUCED IMMUNODEFICIENCY: ICD-10-CM

## 2025-03-11 DIAGNOSIS — M80.00XD AGE-RELATED OSTEOPOROSIS WITH CURRENT PATHOLOGICAL FRACTURE WITH ROUTINE HEALING, SUBSEQUENT ENCOUNTER: ICD-10-CM

## 2025-03-11 LAB
ALBUMIN SERPL BCP-MCNC: 3.3 G/DL (ref 3.5–5.2)
ALP SERPL-CCNC: 116 U/L (ref 40–150)
ALT SERPL W/O P-5'-P-CCNC: 26 U/L (ref 10–44)
ANION GAP SERPL CALC-SCNC: 9 MMOL/L (ref 8–16)
AST SERPL-CCNC: 21 U/L (ref 10–40)
BASOPHILS # BLD AUTO: 0.04 K/UL (ref 0–0.2)
BASOPHILS NFR BLD: 0.4 % (ref 0–1.9)
BILIRUB SERPL-MCNC: 0.6 MG/DL (ref 0.1–1)
BUN SERPL-MCNC: 22 MG/DL (ref 8–23)
CALCIUM SERPL-MCNC: 8.5 MG/DL (ref 8.7–10.5)
CHLORIDE SERPL-SCNC: 109 MMOL/L (ref 95–110)
CO2 SERPL-SCNC: 23 MMOL/L (ref 23–29)
CREAT SERPL-MCNC: 1.2 MG/DL (ref 0.5–1.4)
CRP SERPL-MCNC: 8.1 MG/L (ref 0–8.2)
DIFFERENTIAL METHOD BLD: ABNORMAL
EOSINOPHIL # BLD AUTO: 0.1 K/UL (ref 0–0.5)
EOSINOPHIL NFR BLD: 1.2 % (ref 0–8)
ERYTHROCYTE [DISTWIDTH] IN BLOOD BY AUTOMATED COUNT: 13.7 % (ref 11.5–14.5)
ERYTHROCYTE [SEDIMENTATION RATE] IN BLOOD BY PHOTOMETRIC METHOD: 11 MM/HR (ref 0–36)
EST. GFR  (NO RACE VARIABLE): 45.8 ML/MIN/1.73 M^2
GLUCOSE SERPL-MCNC: 115 MG/DL (ref 70–110)
HCT VFR BLD AUTO: 34.8 % (ref 37–48.5)
HGB BLD-MCNC: 11.3 G/DL (ref 12–16)
IMM GRANULOCYTES # BLD AUTO: 0.03 K/UL (ref 0–0.04)
IMM GRANULOCYTES NFR BLD AUTO: 0.3 % (ref 0–0.5)
LYMPHOCYTES # BLD AUTO: 1.2 K/UL (ref 1–4.8)
LYMPHOCYTES NFR BLD: 11.1 % (ref 18–48)
MCH RBC QN AUTO: 34.9 PG (ref 27–31)
MCHC RBC AUTO-ENTMCNC: 32.5 G/DL (ref 32–36)
MCV RBC AUTO: 107 FL (ref 82–98)
MONOCYTES # BLD AUTO: 0.3 K/UL (ref 0.3–1)
MONOCYTES NFR BLD: 3.1 % (ref 4–15)
NEUTROPHILS # BLD AUTO: 9.1 K/UL (ref 1.8–7.7)
NEUTROPHILS NFR BLD: 83.9 % (ref 38–73)
NRBC BLD-RTO: 0 /100 WBC
PLATELET # BLD AUTO: 116 K/UL (ref 150–450)
PMV BLD AUTO: 10.7 FL (ref 9.2–12.9)
POTASSIUM SERPL-SCNC: 5.4 MMOL/L (ref 3.5–5.1)
PROT SERPL-MCNC: 5.9 G/DL (ref 6–8.4)
RBC # BLD AUTO: 3.24 M/UL (ref 4–5.4)
SODIUM SERPL-SCNC: 141 MMOL/L (ref 136–145)
WBC # BLD AUTO: 10.88 K/UL (ref 3.9–12.7)

## 2025-03-11 PROCEDURE — 86140 C-REACTIVE PROTEIN: CPT | Mod: HCNC | Performed by: STUDENT IN AN ORGANIZED HEALTH CARE EDUCATION/TRAINING PROGRAM

## 2025-03-11 PROCEDURE — 25000003 PHARM REV CODE 250: Mod: HCNC,PN | Performed by: STUDENT IN AN ORGANIZED HEALTH CARE EDUCATION/TRAINING PROGRAM

## 2025-03-11 PROCEDURE — A4216 STERILE WATER/SALINE, 10 ML: HCPCS | Mod: HCNC,PN | Performed by: STUDENT IN AN ORGANIZED HEALTH CARE EDUCATION/TRAINING PROGRAM

## 2025-03-11 PROCEDURE — 36415 COLL VENOUS BLD VENIPUNCTURE: CPT | Mod: HCNC,PN | Performed by: STUDENT IN AN ORGANIZED HEALTH CARE EDUCATION/TRAINING PROGRAM

## 2025-03-11 PROCEDURE — 86160 COMPLEMENT ANTIGEN: CPT | Mod: 59,HCNC | Performed by: STUDENT IN AN ORGANIZED HEALTH CARE EDUCATION/TRAINING PROGRAM

## 2025-03-11 PROCEDURE — 80053 COMPREHEN METABOLIC PANEL: CPT | Mod: HCNC,PN | Performed by: STUDENT IN AN ORGANIZED HEALTH CARE EDUCATION/TRAINING PROGRAM

## 2025-03-11 PROCEDURE — 85651 RBC SED RATE NONAUTOMATED: CPT | Mod: HCNC,PN | Performed by: STUDENT IN AN ORGANIZED HEALTH CARE EDUCATION/TRAINING PROGRAM

## 2025-03-11 PROCEDURE — 63600175 PHARM REV CODE 636 W HCPCS: Mod: HCNC,PN | Performed by: STUDENT IN AN ORGANIZED HEALTH CARE EDUCATION/TRAINING PROGRAM

## 2025-03-11 PROCEDURE — 86225 DNA ANTIBODY NATIVE: CPT | Mod: HCNC | Performed by: STUDENT IN AN ORGANIZED HEALTH CARE EDUCATION/TRAINING PROGRAM

## 2025-03-11 PROCEDURE — 96365 THER/PROPH/DIAG IV INF INIT: CPT | Mod: HCNC,PN

## 2025-03-11 PROCEDURE — 85025 COMPLETE CBC W/AUTO DIFF WBC: CPT | Mod: HCNC,PN | Performed by: STUDENT IN AN ORGANIZED HEALTH CARE EDUCATION/TRAINING PROGRAM

## 2025-03-11 PROCEDURE — 86160 COMPLEMENT ANTIGEN: CPT | Mod: HCNC | Performed by: STUDENT IN AN ORGANIZED HEALTH CARE EDUCATION/TRAINING PROGRAM

## 2025-03-11 RX ORDER — HEPARIN 100 UNIT/ML
500 SYRINGE INTRAVENOUS
OUTPATIENT
Start: 2025-03-11

## 2025-03-11 RX ORDER — HEPARIN 100 UNIT/ML
500 SYRINGE INTRAVENOUS
Status: DISCONTINUED | OUTPATIENT
Start: 2025-03-11 | End: 2025-03-11 | Stop reason: HOSPADM

## 2025-03-11 RX ORDER — ACETAMINOPHEN 325 MG/1
650 TABLET ORAL
Status: DISCONTINUED | OUTPATIENT
Start: 2025-03-11 | End: 2025-03-11 | Stop reason: HOSPADM

## 2025-03-11 RX ORDER — ZOLEDRONIC ACID 5 MG/100ML
5 INJECTION, SOLUTION INTRAVENOUS
Status: COMPLETED | OUTPATIENT
Start: 2025-03-11 | End: 2025-03-11

## 2025-03-11 RX ORDER — ACETAMINOPHEN 325 MG/1
650 TABLET ORAL
OUTPATIENT
Start: 2025-03-11

## 2025-03-11 RX ORDER — SODIUM CHLORIDE 0.9 % (FLUSH) 0.9 %
10 SYRINGE (ML) INJECTION
OUTPATIENT
Start: 2025-03-11

## 2025-03-11 RX ORDER — SODIUM CHLORIDE 0.9 % (FLUSH) 0.9 %
10 SYRINGE (ML) INJECTION
Status: DISCONTINUED | OUTPATIENT
Start: 2025-03-11 | End: 2025-03-11 | Stop reason: HOSPADM

## 2025-03-11 RX ORDER — ZOLEDRONIC ACID 5 MG/100ML
5 INJECTION, SOLUTION INTRAVENOUS
OUTPATIENT
Start: 2025-03-11

## 2025-03-11 RX ADMIN — ZOLEDRONIC ACID 5 MG: 0.05 INJECTION, SOLUTION INTRAVENOUS at 03:03

## 2025-03-11 RX ADMIN — Medication 10 ML: at 03:03

## 2025-03-11 RX ADMIN — SODIUM CHLORIDE: 9 INJECTION, SOLUTION INTRAVENOUS at 03:03

## 2025-03-11 NOTE — PLAN OF CARE
Problem: Adult Inpatient Plan of Care  Goal: Plan of Care Review  Outcome: Progressing  Flowsheets (Taken 3/11/2025 1549)  Plan of Care Reviewed With: patient  Goal: Patient-Specific Goal (Individualized)  Outcome: Progressing  Flowsheets (Taken 3/11/2025 1549)  Individualized Care Needs: recliner, blanket, education,  at chairside  Anxieties, Fears or Concerns: none     Problem: Fall Injury Risk  Goal: Absence of Fall and Fall-Related Injury  Outcome: Progressing  Intervention: Identify and Manage Contributors  Flowsheets (Taken 3/11/2025 1549)  Medication Review/Management: medications reviewed  Intervention: Promote Injury-Free Environment  Flowsheets (Taken 3/11/2025 1549)  Safety Promotion/Fall Prevention:   assistive device/personal item within reach   in recliner, wheels locked     Problem: Fatigue  Goal: Improved Activity Tolerance  Outcome: Progressing  Intervention: Promote Improved Energy  Flowsheets (Taken 3/11/2025 1549)  Fatigue Management: paced activity encouraged  Sleep/Rest Enhancement: natural light exposure provided  Activity Management: Up in chair - L3  Environmental Support:   environmental consistency promoted   distractions minimized  Pt. tolerated reclast infusion well. VS stable, no adverse reactions noted. Education r/t medication completed, all questions answered. Discussed future appointments, NAD noted. Pt. discharged to home via W/C, accompanied by .

## 2025-03-12 LAB
C3 SERPL-MCNC: 116 MG/DL (ref 50–180)
C4 SERPL-MCNC: 26 MG/DL (ref 11–44)

## 2025-03-13 LAB — DSDNA AB SER-ACNC: NORMAL [IU]/ML

## 2025-03-20 ENCOUNTER — LAB VISIT (OUTPATIENT)
Dept: LAB | Facility: HOSPITAL | Age: 80
End: 2025-03-20
Attending: STUDENT IN AN ORGANIZED HEALTH CARE EDUCATION/TRAINING PROGRAM
Payer: MEDICARE

## 2025-03-20 DIAGNOSIS — J84.9 INTERSTITIAL PULMONARY DISEASE, UNSPECIFIED: ICD-10-CM

## 2025-03-20 DIAGNOSIS — D84.821 DRUG-INDUCED IMMUNODEFICIENCY: ICD-10-CM

## 2025-03-20 DIAGNOSIS — Z79.899 HIGH RISK MEDICATION USE: ICD-10-CM

## 2025-03-20 DIAGNOSIS — Z79.899 DRUG-INDUCED IMMUNODEFICIENCY: ICD-10-CM

## 2025-03-20 DIAGNOSIS — H20.9 UVEITIS: ICD-10-CM

## 2025-03-20 DIAGNOSIS — D86.0 SARCOIDOSIS OF LUNG: Chronic | ICD-10-CM

## 2025-03-20 LAB
BACTERIA #/AREA URNS HPF: ABNORMAL /HPF
BILIRUB UR QL STRIP: NEGATIVE
CLARITY UR: ABNORMAL
COLOR UR: YELLOW
CREAT UR-MCNC: 56 MG/DL (ref 15–325)
GLUCOSE UR QL STRIP: NEGATIVE
HGB UR QL STRIP: ABNORMAL
HYALINE CASTS #/AREA URNS LPF: 0 /LPF
KETONES UR QL STRIP: NEGATIVE
LEUKOCYTE ESTERASE UR QL STRIP: ABNORMAL
MICROSCOPIC COMMENT: ABNORMAL
NITRITE UR QL STRIP: NEGATIVE
PH UR STRIP: 7 [PH] (ref 5–8)
PROT UR QL STRIP: ABNORMAL
PROT UR-MCNC: 98 MG/DL (ref 0–15)
PROT/CREAT UR: 1.75 MG/G{CREAT} (ref 0–0.2)
RBC #/AREA URNS HPF: 5 /HPF (ref 0–4)
SP GR UR STRIP: 1.02 (ref 1–1.03)
SQUAMOUS #/AREA URNS HPF: 5 /HPF
URN SPEC COLLECT METH UR: ABNORMAL
WBC #/AREA URNS HPF: >100 /HPF (ref 0–5)

## 2025-03-20 PROCEDURE — 81000 URINALYSIS NONAUTO W/SCOPE: CPT | Mod: HCNC,PO | Performed by: STUDENT IN AN ORGANIZED HEALTH CARE EDUCATION/TRAINING PROGRAM

## 2025-03-20 PROCEDURE — 84156 ASSAY OF PROTEIN URINE: CPT | Mod: HCNC | Performed by: STUDENT IN AN ORGANIZED HEALTH CARE EDUCATION/TRAINING PROGRAM

## 2025-03-21 DIAGNOSIS — R82.81 PYURIA: ICD-10-CM

## 2025-03-21 DIAGNOSIS — R31.9 HEMATURIA, UNSPECIFIED TYPE: ICD-10-CM

## 2025-03-21 DIAGNOSIS — R80.9 PROTEINURIA, UNSPECIFIED TYPE: Primary | ICD-10-CM

## 2025-03-24 ENCOUNTER — OFFICE VISIT (OUTPATIENT)
Dept: PRIMARY CARE CLINIC | Facility: CLINIC | Age: 80
End: 2025-03-24
Payer: MEDICARE

## 2025-03-24 ENCOUNTER — PATIENT MESSAGE (OUTPATIENT)
Dept: RESEARCH | Facility: HOSPITAL | Age: 80
End: 2025-03-24
Payer: MEDICARE

## 2025-03-24 VITALS
SYSTOLIC BLOOD PRESSURE: 112 MMHG | BODY MASS INDEX: 22.5 KG/M2 | DIASTOLIC BLOOD PRESSURE: 58 MMHG | OXYGEN SATURATION: 96 % | HEIGHT: 64 IN | WEIGHT: 131.81 LBS | HEART RATE: 58 BPM

## 2025-03-24 DIAGNOSIS — L30.4 INTERTRIGO: Primary | ICD-10-CM

## 2025-03-24 PROCEDURE — 3074F SYST BP LT 130 MM HG: CPT | Mod: HCNC,CPTII,S$GLB, | Performed by: PHYSICIAN ASSISTANT

## 2025-03-24 PROCEDURE — 1101F PT FALLS ASSESS-DOCD LE1/YR: CPT | Mod: HCNC,CPTII,S$GLB, | Performed by: PHYSICIAN ASSISTANT

## 2025-03-24 PROCEDURE — 99999 PR PBB SHADOW E&M-EST. PATIENT-LVL IV: CPT | Mod: PBBFAC,HCNC,, | Performed by: PHYSICIAN ASSISTANT

## 2025-03-24 PROCEDURE — 1125F AMNT PAIN NOTED PAIN PRSNT: CPT | Mod: HCNC,CPTII,S$GLB, | Performed by: PHYSICIAN ASSISTANT

## 2025-03-24 PROCEDURE — 3078F DIAST BP <80 MM HG: CPT | Mod: HCNC,CPTII,S$GLB, | Performed by: PHYSICIAN ASSISTANT

## 2025-03-24 PROCEDURE — 99213 OFFICE O/P EST LOW 20 MIN: CPT | Mod: HCNC,S$GLB,, | Performed by: PHYSICIAN ASSISTANT

## 2025-03-24 PROCEDURE — 1159F MED LIST DOCD IN RCRD: CPT | Mod: HCNC,CPTII,S$GLB, | Performed by: PHYSICIAN ASSISTANT

## 2025-03-24 PROCEDURE — 3288F FALL RISK ASSESSMENT DOCD: CPT | Mod: HCNC,CPTII,S$GLB, | Performed by: PHYSICIAN ASSISTANT

## 2025-03-24 RX ORDER — MUPIROCIN 20 MG/G
OINTMENT TOPICAL 3 TIMES DAILY
Qty: 30 G | Refills: 2 | Status: SHIPPED | OUTPATIENT
Start: 2025-03-24

## 2025-03-24 RX ORDER — NYSTATIN 100000 U/G
CREAM TOPICAL 2 TIMES DAILY
Qty: 30 G | Refills: 2 | Status: SHIPPED | OUTPATIENT
Start: 2025-03-24

## 2025-03-31 RX ORDER — LOSARTAN POTASSIUM 100 MG/1
100 TABLET ORAL
Qty: 90 TABLET | Refills: 3 | Status: SHIPPED | OUTPATIENT
Start: 2025-03-31

## 2025-04-02 ENCOUNTER — TELEPHONE (OUTPATIENT)
Dept: RESEARCH | Facility: HOSPITAL | Age: 80
End: 2025-04-02
Payer: MEDICARE

## 2025-04-02 NOTE — TELEPHONE ENCOUNTER
Called and left voicemail regarding the KIMI research study.     Reny, clinical research coordinator  [phone]  [email or web address]      Study title: IGNACIO BOLDEN  IRB #: [id number]

## 2025-04-04 ENCOUNTER — OFFICE VISIT (OUTPATIENT)
Dept: PRIMARY CARE CLINIC | Facility: CLINIC | Age: 80
End: 2025-04-04
Payer: MEDICARE

## 2025-04-04 VITALS
HEART RATE: 64 BPM | DIASTOLIC BLOOD PRESSURE: 72 MMHG | BODY MASS INDEX: 22.5 KG/M2 | WEIGHT: 131.81 LBS | HEIGHT: 64 IN | SYSTOLIC BLOOD PRESSURE: 112 MMHG | OXYGEN SATURATION: 98 %

## 2025-04-04 DIAGNOSIS — B37.2 CUTANEOUS CANDIDIASIS: Primary | ICD-10-CM

## 2025-04-04 DIAGNOSIS — S31.109S OPEN WOUND OF ABDOMEN, SEQUELA: ICD-10-CM

## 2025-04-04 DIAGNOSIS — B95.8 STAPHYLOCOCCUS INFECTION OF NOSE: ICD-10-CM

## 2025-04-04 DIAGNOSIS — J34.89 STAPHYLOCOCCUS INFECTION OF NOSE: ICD-10-CM

## 2025-04-04 PROCEDURE — 3078F DIAST BP <80 MM HG: CPT | Mod: HCNC,CPTII,S$GLB, | Performed by: FAMILY MEDICINE

## 2025-04-04 PROCEDURE — 1159F MED LIST DOCD IN RCRD: CPT | Mod: HCNC,CPTII,S$GLB, | Performed by: FAMILY MEDICINE

## 2025-04-04 PROCEDURE — 1160F RVW MEDS BY RX/DR IN RCRD: CPT | Mod: HCNC,CPTII,S$GLB, | Performed by: FAMILY MEDICINE

## 2025-04-04 PROCEDURE — 3074F SYST BP LT 130 MM HG: CPT | Mod: HCNC,CPTII,S$GLB, | Performed by: FAMILY MEDICINE

## 2025-04-04 PROCEDURE — 99214 OFFICE O/P EST MOD 30 MIN: CPT | Mod: HCNC,S$GLB,, | Performed by: FAMILY MEDICINE

## 2025-04-04 PROCEDURE — 1126F AMNT PAIN NOTED NONE PRSNT: CPT | Mod: HCNC,CPTII,S$GLB, | Performed by: FAMILY MEDICINE

## 2025-04-04 PROCEDURE — 3288F FALL RISK ASSESSMENT DOCD: CPT | Mod: HCNC,CPTII,S$GLB, | Performed by: FAMILY MEDICINE

## 2025-04-04 PROCEDURE — 99999 PR PBB SHADOW E&M-EST. PATIENT-LVL III: CPT | Mod: PBBFAC,HCNC,, | Performed by: FAMILY MEDICINE

## 2025-04-04 PROCEDURE — 1101F PT FALLS ASSESS-DOCD LE1/YR: CPT | Mod: HCNC,CPTII,S$GLB, | Performed by: FAMILY MEDICINE

## 2025-04-04 RX ORDER — NYSTATIN 100000 U/G
CREAM TOPICAL 2 TIMES DAILY
Qty: 30 G | Refills: 2 | Status: SHIPPED | OUTPATIENT
Start: 2025-04-04

## 2025-04-04 RX ORDER — FLUCONAZOLE 150 MG/1
TABLET ORAL
Qty: 1 TABLET | Refills: 1 | Status: SHIPPED | OUTPATIENT
Start: 2025-04-04

## 2025-04-04 NOTE — PROGRESS NOTES
Primary Care Provider Appointment   [facility], [place]       Patient ID: Evelina Pinon is a 79 y.o. female.    ASSESSMENT/PLAN by Problem List:    Assessment & Plan    IMPRESSION:  - Diagnosed cutaneous candidiasis under abdominal folds, axilla, and breast; appears somewhat improved but still present.  - Identified open wounds on abdomen where epithelium has degenerated between folds, continuously ripping open and preventing healing.  - Suspect staph infection in right nostril based on crusting and scabbing.    CUTANEOUS CANDIDIASIS:  - Diagnosed the patient with cutaneous candidiasis under the abdominal folds, axilla, and under the breast.  - Noted that the rash is itchy and has been present under the arms, in the nose, and under the breast.  - Observed that the top layer of skin has worn off in some areas, particularly in skin folds.  - Prescribed Diflucan (fluconazole): 1-time dose now, with potential for another dose in 3-5 days.  - Prescribed Nystatin cream to be applied to affected areas under abdominal folds, axilla, and under breast.  - Recommend regular bathing with gentle soap and keeping the affected areas dry.  - Noted that the condition appears somewhat improved compared to a previous visit.    NASAL INFECTION:  - Noted that patient reports a new issue in the nose that started 2 days ago.  - Examined the nose and observed crusting and scabbing in the right nostril.  - Assessed the condition as a possible staph infection in the nostril, distinct from the fungal infection elsewhere.  - Prescribed Bactroban cream to be applied in a small amount just inside the tip of right nostril twice daily using a Q-tip.  - Instructed the patient to contact the office if the nasal infection worsens or does not improve.  - Advised considering oral antibiotics if the condition worsens.    GENERAL CARE INSTRUCTIONS:  - Recommend allowing affected areas to air out.  - Ms. Pinon to place  a handkerchief or cotton T-shirt under skin folds to prevent skin-to-skin contact.    FOLLOW-UP:  - Scheduled a follow up in 1-2 days for Cande or Grace to demonstrate proper application of barrier cream and non-stick pad to abdominal wounds.         CODING, ORDERS, AND ADDITIONAL DOCUMENTATION RELATED TO THIS ENCOUNTER:  (note that the diagnoses and clinical summaries above were generated with the assistance of [employer] software and represents my assessment and plan.  This software does not always generate the precise nor most specific diagnosis codes.  Therefore the specific diagnosis codes and orders for billing purposes are detailed below along with any additional documentation if needed)      1. Cutaneous candidiasis    2. Staphylococcus infection of nose    3. Open wound of abdomen, sequela    Other orders  -     nystatin (MYCOSTATIN) cream; Apply topically 2 (two) times daily.  Dispense: 30 g; Refill: 2  -     fluconazole (DIFLUCAN) 150 MG Tab; Take 1 tablet as a single dose  Dispense: 1 tablet; Refill: 1           Follow Up:  One month as scheduled    Subjective:       HPI    Patient is a/an 79 y.o.  female     For complete problem list, past medical history, surgical history, social history, etc., see appropriate section in the electronic medical record    History of Present Illness    CHIEF COMPLAINT:  Ms. Pinon presents today for persistent and spreading rash    SKIN CONCERNS:  She reports a recurrent yeast infection in skin folds, initially under the breast and now present under the arms. The affected areas show skin breakdown where folds meet, with the top layer of skin wearing off and tearing due to friction preventing healing. She also reports a nasal infection that started a few days ago, located on the edge of nostril.    MEDICATIONS:  She is using antibiotic cream but reports she was unable to obtain previously prescribed antifungal cream from pharmacy.      ROS:  ENT:  "+nosebleeds  Skin: +rash, +itching       Review of Systems   Constitutional:  Negative for chills and fever.   Respiratory: Negative.     Cardiovascular: Negative.    Gastrointestinal: Negative.    Skin:  Positive for rash.       Objective     Physical Exam  Vitals reviewed.   Constitutional:       General: She is not in acute distress.     Appearance: She is well-developed. She is not toxic-appearing or diaphoretic.   HENT:      Head: Normocephalic and atraumatic.      Nose:      Comments: Crusting and scabbing around left nostril  Eyes:      General: No scleral icterus.  Pulmonary:      Effort: Pulmonary effort is normal. No respiratory distress.   Skin:     Comments: Erythematous rash in multiple skin folds including under the breasts and abdominal skin folds and axilla.  There are some areas of epidermal breakdown under the left lower abdominal skin folds   Neurological:      Mental Status: She is alert and oriented to person, place, and time.   Psychiatric:         Mood and Affect: Mood normal.         Behavior: Behavior normal.       Vitals:    04/04/25 1406   BP: 112/72   Patient Position: Sitting   Pulse: 64   SpO2: 98%   Weight: 59.8 kg (131 lb 13.4 oz)   Height: 5' 4" (1.626 m)     Physical Exam                This note was generated with the assistance of ambient listening technology. Verbal consent was obtained by the patient and accompanying visitor(s) for the recording of patient appointment to facilitate this note. I attest to having reviewed and edited the generated note for accuracy, though some syntax or spelling errors may persist. Please contact the author of this note for any clarification.  Parts of the note were also generated with voice recognition software.  Occasionally this software will misinterpreted words or phrases    "

## 2025-04-14 ENCOUNTER — TELEPHONE (OUTPATIENT)
Dept: PRIMARY CARE CLINIC | Facility: CLINIC | Age: 80
End: 2025-04-14
Payer: MEDICARE

## 2025-04-14 RX ORDER — TRIAMCINOLONE ACETONIDE 1 MG/G
PASTE DENTAL
Qty: 5 G | Refills: 3 | Status: SHIPPED | OUTPATIENT
Start: 2025-04-14

## 2025-04-14 NOTE — TELEPHONE ENCOUNTER
Pt's  called and I advised him that if pt was having significant shortness of breath, that she should go to the ED.  Offered him an appt to see Zuleika if shortness of breath was not severe and advised that Dr. Whyte does not have any openings today.  He states that his wife wants to see Dr. Whyte, but he will talk to his wife about this and get back to me.

## 2025-04-14 NOTE — TELEPHONE ENCOUNTER
Patient's  states the patient is having weakness and SOB. She wants to be seen by Dr. Garibay. She does not want to see anyone else.

## 2025-04-14 NOTE — TELEPHONE ENCOUNTER
Called pt back and LM that if pt is having significant shortness of breath, it may be best to go to the ED; however, requested a call back.

## 2025-04-24 ENCOUNTER — OFFICE VISIT (OUTPATIENT)
Dept: PRIMARY CARE CLINIC | Facility: CLINIC | Age: 80
End: 2025-04-24
Payer: MEDICARE

## 2025-04-24 VITALS
DIASTOLIC BLOOD PRESSURE: 56 MMHG | OXYGEN SATURATION: 99 % | BODY MASS INDEX: 22.92 KG/M2 | HEIGHT: 64 IN | SYSTOLIC BLOOD PRESSURE: 88 MMHG | HEART RATE: 49 BPM | WEIGHT: 134.25 LBS

## 2025-04-24 DIAGNOSIS — I50.32 CHRONIC DIASTOLIC CONGESTIVE HEART FAILURE: ICD-10-CM

## 2025-04-24 DIAGNOSIS — I95.9 HYPOTENSION, UNSPECIFIED HYPOTENSION TYPE: Primary | ICD-10-CM

## 2025-04-24 DIAGNOSIS — K59.00 CONSTIPATION, UNSPECIFIED CONSTIPATION TYPE: ICD-10-CM

## 2025-04-24 DIAGNOSIS — R21 RASH: ICD-10-CM

## 2025-04-24 DIAGNOSIS — R53.83 FATIGUE, UNSPECIFIED TYPE: ICD-10-CM

## 2025-04-24 DIAGNOSIS — K21.9 GASTROESOPHAGEAL REFLUX DISEASE WITHOUT ESOPHAGITIS: Chronic | ICD-10-CM

## 2025-04-24 LAB
OHS QRS DURATION: 140 MS
OHS QTC CALCULATION: 396 MS

## 2025-04-24 PROCEDURE — 99999 PR PBB SHADOW E&M-EST. PATIENT-LVL V: CPT | Mod: PBBFAC,HCNC,, | Performed by: PHYSICIAN ASSISTANT

## 2025-04-24 RX ORDER — METOPROLOL SUCCINATE 50 MG/1
50 TABLET, EXTENDED RELEASE ORAL DAILY
Qty: 90 TABLET | Refills: 3 | Status: SHIPPED | OUTPATIENT
Start: 2025-04-24 | End: 2026-04-24

## 2025-04-24 RX ORDER — PANTOPRAZOLE SODIUM 40 MG/1
40 TABLET, DELAYED RELEASE ORAL DAILY
Qty: 90 TABLET | Refills: 3 | Status: SHIPPED | OUTPATIENT
Start: 2025-04-24 | End: 2026-04-24

## 2025-04-24 RX ORDER — PANTOPRAZOLE SODIUM 40 MG/1
40 TABLET, DELAYED RELEASE ORAL DAILY
Qty: 90 TABLET | Refills: 3 | Status: CANCELLED | OUTPATIENT
Start: 2025-04-24

## 2025-04-24 NOTE — PROGRESS NOTES
Primary Care Provider Appointment   [facility]       Patient ID: Evelina Pinon is a 79 y.o. female.    ASSESSMENT/PLAN by Problem List:    1. Hypotension, unspecified hypotension type  -     IN OFFICE EKG 12-LEAD (to [place])  -     Ambulatory referral/consult to Home Health; Future; Expected date: 04/25/2025    Patient does admit that she has not drank a lot of water today.  She has also not been eating recently.  We did weigh her today and she is not necessarily lost any weight.  She is currently taking 100 mg metoprolol.  We have made the decision to cut this down to 50 mg.  She was given explicit instructions to start this as soon as her next dose.  Unfortunately part of this visit was trying to reconcile medications.  She had complained about reflux and apparently has not picked up her Protonix prescription.  She can tell you some of her medications but not others.  She does not know she takes this at morning at night but suspects at night.  I have advised her that she needs to start the new decreased prescription as soon as possible.  The daughter will also consult with patient's has been to make sure these medications are being given correctly.  I do think that home health could help with medication issues and possibly consult palliative Care.    2. Gastroesophageal reflux disease without esophagitis  -     pantoprazole (PROTONIX) 40 MG tablet; Take 1 tablet (40 mg total) by mouth once daily.  Dispense: 90 tablet; Refill: 3    3. Chronic diastolic congestive heart failure  -     metoprolol succinate (TOPROL-XL) 50 MG 24 hr tablet; Take 1 tablet (50 mg total) by mouth once daily.  Dispense: 90 tablet; Refill: 3  -     Ambulatory referral/consult to Home Health; Future; Expected date: 04/25/2025    4. Fatigue, unspecified type     I suspect that her fatigue recently and today is caused by a low blood pressure.  She has been instructed to go home and hydrate and have a  salty snack.  I have asked that she monitor blood pressure twice daily over the next couple of days.  She will notify me Monday what her blood pressure is running.  It may be that we need to consider cutting down on other high blood pressure medications as well.    5. Constipation, unspecified constipation type     Discussed will improve with fluid intake.  Possibly she has not been drinking enough fluids over the last couple of days.  I have given her instructions to get MiraLax or generic and begin taking this as directed.  Discussed if she begins having any severe abdominal or rectal pain may need to go to ED for disimpaction.    6. Rash      Her rash looks significantly better than when I last saw her and better than what the note the picks from her last visit.  I have asked her to continue using the nystatin cream until she no longer sees any redness in the areas.  I have asked that she continue to use barrier cream, base at least every other day and during the hot weather use some sort of tissue to absorb sweat underneath the pannus.    Follow Up:  1 week    My total time spent on this encounter was 86 minutes which included the following activities: preparing to see the patient, performing a medically appropriate and/or evaluation, counseling and educating the patient and family/caregiver, ordering medications, tests, or procedures, referring and communicating with other healthcare providers, documenting clinical information in the electronic or other health record, and independently interpreting results. This time is independent and non-overlapping.        Subjective:     Chief Complaint   Patient presents with    Fatigue     Had a fall      Anorexia     Not eating,      Constipation     3 days ago      Request     Home health or something to help her at home.     I have reviewed the information entered by the ancillary staff regarding the chief complaint as well as the related history.    79-year-old female  presents for what was initially a follow-up for rash.  We have been following patient for candidal infection of skin underneath pannus on abdomen as well as bilateral groin area.  At last visit it had not improved much so she was given oral Diflucan and strict instructions to be the least every 48 hours.  She does present with her daughter today who states that she has been adhering to all his recommendations.    Daughter brings up concerns that Mrs. Pinon has not been eating.  She states that she will eat a bowl of me so soup and possibly some oatmeal, but not much during the day.  N    Patient also has complaints of constipation.  She has not had a bowel movement in 3 days.  She has tried glycerin suppositories.  She has not tried any over-the-counter osmotic laxatives.  She denies any abdominal pain.    Patient complains of increased fatigue and weakness.  Daughter also notes that she has been sleeping more and has placed a commode next to the bedside so she does not have to walk far to use the restroom.  She even had a fall the other day.    Patient's daughter states that she does drink a lot of water.  Patient admits she has not drank a lot of water today.    Daughter is enquiring about hospice care for patient.  Apparently it is becoming difficult for her elderly parents to take care of each other.        Patient is a/an 79 y.o.  female       For complete problem list, past medical history, surgical history, social history, etc., see appropriate section in the electronic medical record    Review of Systems   Constitutional:  Positive for appetite change and fatigue.   Cardiovascular:  Negative for chest pain, palpitations and leg swelling.   Gastrointestinal:  Positive for constipation.   Skin:  Positive for rash.   All other systems reviewed and are negative.      Objective     Physical Exam  Vitals and nursing note reviewed.   HENT:      Head: Normocephalic and atraumatic.      Right Ear: External ear  "normal.      Left Ear: External ear normal.   Cardiovascular:      Rate and Rhythm: Bradycardia present.      Pulses:           Radial pulses are 1+ on the right side and 1+ on the left side.   Pulmonary:      Effort: Pulmonary effort is normal. No respiratory distress.   Musculoskeletal:      Right lower leg: No edema.      Left lower leg: No edema.   Skin:     General: Skin is warm and dry.      Coloration: Skin is not jaundiced or pale.      Comments: Rash greatly improved. Slight redness in left groin area. Barrier cream in place under pannus and in bilateral groin area. No excoriations of the skin.   Neurological:      Mental Status: She is alert.   Psychiatric:         Attention and Perception: She is inattentive (mild).         Speech: Speech normal.         Behavior: Behavior is agitated (slightly).         Thought Content: Thought content normal.         Cognition and Memory: Memory is impaired.       Vitals:    04/24/25 1408   BP: (!) 88/56   Patient Position: Sitting   Pulse: (!) 49   SpO2: 99%   Weight: 60.9 kg (134 lb 4.2 oz)   Height: 5' 4" (1.626 m)           THIS DOCUMENT WAS MADE IN PART WITH VOICE RECOGNITION SOFTWARE.  OCCASIONALLY THIS SOFTWARE WILL MISINTERPRET WORDS OR PHRASES.  "

## 2025-04-24 NOTE — PATIENT INSTRUCTIONS
Get the new metoprolol (Toprol XL) prescription and start lower dose in place of 100 mg  62 ounces water a day  Take our blood pressure 2 x a day and call Monday with readings  Use skin cream until you see no more redness AT ALL   protonix at [facility] (Take in morning on an empty stomach)  Miralax (generic ok) Take as directed.

## 2025-04-28 ENCOUNTER — TELEPHONE (OUTPATIENT)
Dept: PRIMARY CARE CLINIC | Facility: CLINIC | Age: 80
End: 2025-04-28
Payer: MEDICARE

## 2025-04-28 NOTE — TELEPHONE ENCOUNTER
" called to update you on patient's BP readings from over the weekend. I asked for the readings, but  said he wanted to "discuss". Please call him.    #[phone]  "

## 2025-04-30 ENCOUNTER — OFFICE VISIT (OUTPATIENT)
Dept: PRIMARY CARE CLINIC | Facility: CLINIC | Age: 80
End: 2025-04-30
Payer: MEDICARE

## 2025-04-30 ENCOUNTER — TELEPHONE (OUTPATIENT)
Dept: CARDIOLOGY | Facility: CLINIC | Age: 80
End: 2025-04-30
Payer: MEDICARE

## 2025-04-30 VITALS
BODY MASS INDEX: 23.05 KG/M2 | WEIGHT: 134.25 LBS | HEART RATE: 53 BPM | OXYGEN SATURATION: 99 % | DIASTOLIC BLOOD PRESSURE: 52 MMHG | SYSTOLIC BLOOD PRESSURE: 112 MMHG

## 2025-04-30 DIAGNOSIS — I95.9 HYPOTENSION, UNSPECIFIED HYPOTENSION TYPE: Primary | ICD-10-CM

## 2025-04-30 PROCEDURE — 3074F SYST BP LT 130 MM HG: CPT | Mod: CPTII,HCNC,S$GLB, | Performed by: PHYSICIAN ASSISTANT

## 2025-04-30 PROCEDURE — 99213 OFFICE O/P EST LOW 20 MIN: CPT | Mod: HCNC,S$GLB,, | Performed by: PHYSICIAN ASSISTANT

## 2025-04-30 PROCEDURE — 99999 PR PBB SHADOW E&M-EST. PATIENT-LVL IV: CPT | Mod: PBBFAC,HCNC,, | Performed by: PHYSICIAN ASSISTANT

## 2025-04-30 PROCEDURE — 3288F FALL RISK ASSESSMENT DOCD: CPT | Mod: CPTII,HCNC,S$GLB, | Performed by: PHYSICIAN ASSISTANT

## 2025-04-30 PROCEDURE — 1159F MED LIST DOCD IN RCRD: CPT | Mod: CPTII,HCNC,S$GLB, | Performed by: PHYSICIAN ASSISTANT

## 2025-04-30 PROCEDURE — 1101F PT FALLS ASSESS-DOCD LE1/YR: CPT | Mod: CPTII,HCNC,S$GLB, | Performed by: PHYSICIAN ASSISTANT

## 2025-04-30 PROCEDURE — 3078F DIAST BP <80 MM HG: CPT | Mod: CPTII,HCNC,S$GLB, | Performed by: PHYSICIAN ASSISTANT

## 2025-04-30 PROCEDURE — 1126F AMNT PAIN NOTED NONE PRSNT: CPT | Mod: CPTII,HCNC,S$GLB, | Performed by: PHYSICIAN ASSISTANT

## 2025-04-30 RX ORDER — LOSARTAN POTASSIUM 50 MG/1
50 TABLET ORAL DAILY
Qty: 90 TABLET | Refills: 3 | Status: SHIPPED | OUTPATIENT
Start: 2025-04-30 | End: 2026-04-30

## 2025-04-30 NOTE — TELEPHONE ENCOUNTER
----- Message from LEO Flores sent at 4/30/2025  3:43 PM CDT -----  Regarding: Needs a follow up appt  Hi, Pt has recently started becoming hypotensive and bradycardic.  Pt has been under the care of PCP office and medication adjustments have been made; however, pt needs a cardiology f/u appt as soon as possible.  I scheduled pt to see Dr. Chan, but it's not until 9/2/25.  Can you please reach out to pt's , Miguel Angel, to get pt scheduled for an appt with an LENNY in the office?Thank you, Sandra

## 2025-04-30 NOTE — TELEPHONE ENCOUNTER
Tentatively scheduled for 5/6--she was in the grocery store and I told her I would call back to confirm

## 2025-04-30 NOTE — PROGRESS NOTES
Primary Care Provider Appointment   [facility] [place]       Patient ID: Evelina Pinon is a 79 y.o. female.    ASSESSMENT/PLAN by Problem List:    1. Hypotension, unspecified hypotension type    Other orders  -     losartan (COZAAR) 50 MG tablet; Take 1 tablet (50 mg total) by mouth once daily.  Dispense: 90 tablet; Refill: 3    We will decrease the losartan to 50 mg. Patient will continue to keep a log. I have asked patient to call if she starts consistently gets readings over 130. Patient only wants to follow up with Dr. Garibay. I have scheduled her in the first available appointment he has. She was supposed to have cardiology follow up in October of 2024, we will try to get her scheduled.     Follow Up:  1 month    My total time spent on this encounter was 23 minutes which included the following activities: preparing to see the patient, performing a medically appropriate and/or evaluation, counseling and educating the patient and family/caregiver, ordering medications, tests, or procedures, referring and communicating with other healthcare providers, documenting clinical information in the electronic or other health record, and independently interpreting results. This time is independent and non-overlapping.        Subjective:     Chief Complaint   Patient presents with    Follow-up     I have reviewed the information entered by the ancillary staff regarding the chief complaint as well as the related history.    79 year old patient presents for 1 week follow up for hypotension and fatigue. At last visit we decreased metoprolol from 100 to 50 mg. Her BP has shown some improvement, but is still around 110 and below. She has not lost any weight recently. She has decreased food intake, but the daughter says this is not new.        Patient is a/an 79 y.o.  female         For complete problem list, past medical history, surgical history, social history, etc., see appropriate  section in the electronic medical record    Review of Systems   Constitutional:  Positive for fatigue.   Cardiovascular:  Negative for chest pain and palpitations.   All other systems reviewed and are negative.      Objective     Physical Exam  Vitals and nursing note reviewed.   Constitutional:       General: She is not in acute distress.     Appearance: Normal appearance. She is well-developed. She is not diaphoretic.   HENT:      Head: Normocephalic and atraumatic.      Right Ear: External ear normal.      Left Ear: External ear normal.   Eyes:      General:         Right eye: No discharge.         Left eye: No discharge.      Extraocular Movements: Extraocular movements intact.      Conjunctiva/sclera: Conjunctivae normal.   Neck:      Vascular: No JVD.   Cardiovascular:      Rate and Rhythm: Bradycardia present.   Pulmonary:      Effort: Pulmonary effort is normal. No respiratory distress.   Skin:     General: Skin is warm and dry.      Coloration: Skin is not jaundiced or pale.   Neurological:      Mental Status: She is alert.   Psychiatric:         Mood and Affect: Mood normal.         Behavior: Behavior normal.         Thought Content: Thought content normal.         Judgment: Judgment normal.       Vitals:    04/30/25 1501   BP: (!) 112/52   Patient Position: Sitting   Pulse: (!) 53   SpO2: 99%   Weight: 60.9 kg (134 lb 4.2 oz)           THIS DOCUMENT WAS MADE IN PART WITH VOICE RECOGNITION SOFTWARE.  OCCASIONALLY THIS SOFTWARE WILL MISINTERPRET WORDS OR PHRASES.

## 2025-05-06 ENCOUNTER — OFFICE VISIT (OUTPATIENT)
Dept: CARDIOLOGY | Facility: CLINIC | Age: 80
End: 2025-05-06
Payer: MEDICARE

## 2025-05-06 VITALS — WEIGHT: 130 LBS | BODY MASS INDEX: 22.2 KG/M2 | HEIGHT: 64 IN

## 2025-05-06 DIAGNOSIS — E78.2 MIXED HYPERLIPIDEMIA: Chronic | ICD-10-CM

## 2025-05-06 DIAGNOSIS — I25.10 CORONARY ARTERY DISEASE INVOLVING NATIVE CORONARY ARTERY OF NATIVE HEART WITHOUT ANGINA PECTORIS: Primary | Chronic | ICD-10-CM

## 2025-05-06 DIAGNOSIS — N18.31 STAGE 3A CHRONIC KIDNEY DISEASE: ICD-10-CM

## 2025-05-06 DIAGNOSIS — I10 ESSENTIAL HYPERTENSION: Chronic | ICD-10-CM

## 2025-05-06 DIAGNOSIS — I50.32 CHRONIC DIASTOLIC CONGESTIVE HEART FAILURE: ICD-10-CM

## 2025-05-06 PROBLEM — R79.89 TROPONIN LEVEL ELEVATED: Status: RESOLVED | Noted: 2021-07-04 | Resolved: 2025-05-06

## 2025-05-06 PROBLEM — R94.39 POSITIVE CARDIAC STRESS TEST: Status: RESOLVED | Noted: 2020-08-22 | Resolved: 2025-05-06

## 2025-05-06 PROCEDURE — 99214 OFFICE O/P EST MOD 30 MIN: CPT | Mod: S$GLB,,,

## 2025-05-06 PROCEDURE — 3288F FALL RISK ASSESSMENT DOCD: CPT | Mod: CPTII,S$GLB,,

## 2025-05-06 PROCEDURE — 1159F MED LIST DOCD IN RCRD: CPT | Mod: CPTII,S$GLB,,

## 2025-05-06 PROCEDURE — 1126F AMNT PAIN NOTED NONE PRSNT: CPT | Mod: CPTII,S$GLB,,

## 2025-05-06 PROCEDURE — 99999 PR PBB SHADOW E&M-EST. PATIENT-LVL IV: CPT | Mod: PBBFAC,HCNC,,

## 2025-05-06 PROCEDURE — 1101F PT FALLS ASSESS-DOCD LE1/YR: CPT | Mod: CPTII,S$GLB,,

## 2025-05-06 RX ORDER — METOPROLOL SUCCINATE 25 MG/1
25 TABLET, EXTENDED RELEASE ORAL DAILY
Qty: 90 TABLET | Refills: 3 | Status: SHIPPED | OUTPATIENT
Start: 2025-05-06 | End: 2026-05-06

## 2025-05-06 NOTE — PROGRESS NOTES
[facility]  Date: 5/6/25    Patient: Evelina Pinon, 1945, [id number]  Primary Care Provider: Jeffrey Garibay MD     Chief Complaint: Follow up     Subjective:       Evelina Pinon is a 79 y.o. female who presents for follow up. They follow with Dr. Kovacs.    At last office visit, patient doing well from cardiac standpoint for which medications were continued as is.     Since then, patient reports fatigue with low BP and HR. Unsure of average readings at home, but recent doctors visits with SBP 80-100s and HR 50s. Primary care recently decreased losartan from 100 mg to 50 mg qd without significant improvement in symptoms. Denies chest discomfort, dyspnea, palpitations, dizziness, syncope, orthopnea, PND, edema.    Focused Past History includes:  Coronary artery disease  Nuclear stress 8/2020: mod reversible defect in anteroapical walls   LHC 8/2020: 20-30% stenosis in prox RCA, 40-50% stenosis in mid RCA   Chronic diastolic congestive heart failure  TTE 7/2021: LVEF 55%, grade II DD, mild LAE, mild MR, mild-mod TR, PASP 63  Essential hypertension  Mixed hyperlipidemia  Stage 3a chronic kidney disease    Current Outpatient Medications   Medication Sig    allopurinoL (ZYLOPRIM) 100 MG tablet Take 2 tablets (200 mg total) by mouth once daily.    amLODIPine (NORVASC) 2.5 MG tablet TAKE 1 TABLET(2.5 MG) BY MOUTH EVERY DAY    atorvastatin (LIPITOR) 10 MG tablet TAKE 1 TABLET(10 MG) BY MOUTH EVERY DAY    clopidogreL (PLAVIX) 75 mg tablet TAKE 1 TABLET(75 MG) BY MOUTH EVERY DAY    colchicine (COLCRYS) 0.6 mg tablet Take 2 tablets at 1st sign of a gout attack.  Then continue once a day for the next 5-7 days as needed until improved. (Patient taking differently: Take 0.6 mg by mouth as needed. Take 2 tablets at 1st sign of a gout attack.  Then continue once a day for the next 5-7 days as needed until improved.)    diclofenac sodium (VOLTAREN ARTHRITIS PAIN) 1 % Gel Apply 2 g  topically 2 (two) times daily. (Patient taking differently: Apply 2 g topically 2 (two) times a day. As needed)    fluconazole (DIFLUCAN) 150 MG Tab Take 1 tablet as a single dose    fluticasone propionate (FLONASE) 50 mcg/actuation nasal spray 2 sprays (100 mcg total) by Each Nostril route once daily.    folic acid (FOLVITE) 1 MG tablet Take 1 tablet (1 mg total) by mouth once daily.    ketoconazole (NIZORAL) 2 % cream Apply topically once daily.    LORazepam (ATIVAN) 0.5 MG tablet Take 1 tablet (0.5 mg total) by mouth nightly as needed for Anxiety.    losartan (COZAAR) 50 MG tablet Take 1 tablet (50 mg total) by mouth once daily.    magnesium oxide (MAG-OX) 400 mg (241.3 mg magnesium) tablet TAKE 1 TABLET(400 MG) BY MOUTH DAILY    methocarbamoL (ROBAXIN) 750 MG Tab Take 1 tablet (750 mg total) by mouth 4 (four) times daily as needed.    mupirocin (BACTROBAN) 2 % ointment Apply topically 3 (three) times daily.    nystatin (MYCOSTATIN) cream Apply topically 2 (two) times daily. Applied to the underside of your left breast.  Continue for 7 days or until rash resolves.    nystatin (MYCOSTATIN) cream Apply topically 2 (two) times daily.    pantoprazole (PROTONIX) 40 MG tablet Take 1 tablet (40 mg total) by mouth once daily.    pantoprazole (PROTONIX) 40 MG tablet Take 1 tablet (40 mg total) by mouth once daily.    psyllium husk (METAMUCIL ORAL) Take 1 Scoop by mouth as needed (constipation).    triamcinolone acetonide 0.1% (KENALOG) 0.1 % cream Apply topically 2 (two) times daily.    triamcinolone acetonide 0.1% (KENALOG) 0.1 % paste PLACE ONTO TEETH TWICE DAILY    difluprednate (DUREZOL) 0.05 % Drop ophthalmic solution Place 1 drop into the left eye 2 (two) times daily. (Patient not taking: Reported on 5/6/2025)    methotrexate 2.5 MG Tab Take 6 tablets (15 mg total) by mouth every 7 days.    metoprolol succinate (TOPROL-XL) 25 MG 24 hr tablet Take 1 tablet (25 mg total) by mouth once daily.     No current  facility-administered medications for this visit.            Objective       Review of Systems  Constitutional: negative for fevers, night sweats, and weight loss  Eyes: negative for visual disturbance, diplopia  Respiratory: negative for cough, hemoptysis, sputum, and wheezing  Cardiovascular: see HPI  Gastrointestinal: negative for abdominal pain, bright red blood per rectum, change in bowel habits, dysphagia, melena, and reflux symptoms  Genitourinary:negative for dysuria, frequency, and hematuria  Hematologic/lymphatic: negative for bleeding, easy bruising, and lymphadenopathy  Musculoskeletal:negative for arthralgias, back pain, and myalgias  Neurological: negative for gait problems, paresthesia, speech problems, vertigo, and weakness  Behavioral/Psych: negative for excessive alcohol consumption, illegal drug usage, and sleep disturbance    -------------------------------------    ALLERGIC RHINITIS    Alopecia    Anticoagulant long-term use    Anxiety    Dyslipidemia    Fx patella    Left    Gout    HTN (hypertension)    TIA (transient ischemic attack)     ----------------------------    Cataract extraction    Coronary angiography    Procedure: ANGIOGRAM, CORONARY ARTERY;  Surgeon: Rex Kovacs MD;  Location: [facility] CATH;  Service: Cardiology;  Laterality: N/A;    Endoscopic ultrasound of upper gastrointestinal tract    Procedure: ULTRASOUND, UPPER GI TRACT, ENDOSCOPIC;  Surgeon: Ray Farncois MD;  Location: [facility] ENDO;  Service: Endoscopy;  Laterality: Left;  scopes TBA    Esophagogastroduodenoscopy    Procedure: EGD (ESOPHAGOGASTRODUODENOSCOPY);  Surgeon: Ray Francois MD;  Location: [facility] ENDO;  Service: Endoscopy;  Laterality: N/A;    Hemiarthroplasty of hip    Procedure: HEMIARTHROPLASTY, HIP- right;  Surgeon: Edward Ortiz MD;  Location: [facility] OR;  Service: Orthopedics;  Laterality: Right;    Hysterectomy    Left heart catheterization    Procedure: Left heart cath;  Surgeon: Rex  "BHAKTI Kovacs MD;  Location: [facility] CATH;  Service: Cardiology;  Laterality: N/A;    Patella fracture surgery    Right heart catheterization    Procedure: INSERTION, CATHETER, RIGHT HEART;  Surgeon: Demetrius Medley MD;  Location: [facility] CATH;  Service: Cardiology;  Laterality: Right;        Family History   Adopted: Yes   Problem Relation Name Age of Onset    Anxiety disorder Mother       Social History[1]    Physical Exam  Ht 5' 4" (1.626 m)   Wt 59 kg (130 lb)   BMI 22.31 kg/m²   Body surface area is 1.63 meters squared.  Body mass index is 22.31 kg/m².    General appearance: alert, appears stated age, cooperative, and no distress  Head: Normocephalic, without obvious abnormality, atraumatic  Neck: no carotid bruit, no JVD, and supple, symmetrical, trachea midline  Lungs: clear to auscultation bilaterally  Heart: regular rhythm, bradycardic; S1, S2 normal, no murmur, click, rub or gallop  Abdomen: soft, non-tender, no distended  Extremities: extremities atraumatic, no pitting edema  Skin: warm, no cyanosis, no pathologic ecchymosis in exposed portions  Neurologic: Grossly normal. A&O x3      Lab Review   Lab Results   Component Value Date    WBC 10.88 03/11/2025    HGB 11.3 (L) 03/11/2025    HCT 34.8 (L) 03/11/2025     (H) 03/11/2025     (L) 03/11/2025         BMP  Lab Results   Component Value Date     03/11/2025    K 5.4 (H) 03/11/2025     03/11/2025    CO2 23 03/11/2025    BUN 22 03/11/2025    CREATININE 1.2 03/11/2025    CALCIUM 8.5 (L) 03/11/2025    ANIONGAP 9 03/11/2025    ESTGFRAFRICA >60.0 09/21/2021    EGFRNONAA 54.5 (A) 09/21/2021       Lab Results   Component Value Date    ALBUMIN 3.3 (L) 03/11/2025       Lab Results   Component Value Date    ALT 26 03/11/2025    AST 21 03/11/2025    ALKPHOS 116 03/11/2025    BILITOT 0.6 03/11/2025       Lab Results   Component Value Date    TSH 3.540 07/04/2021       Lab Results   Component Value Date    CHOL 140 06/04/2024    CHOL 115 (L) " 09/01/2022    CHOL 132 08/21/2020     Lab Results   Component Value Date    HDL 39 (L) 06/04/2024    HDL 30 (L) 09/01/2022    HDL 33 (L) 08/21/2020     Lab Results   Component Value Date    LDLCALC 74.0 06/04/2024    LDLCALC 59.6 (L) 09/01/2022    LDLCALC 63.2 08/21/2020     Lab Results   Component Value Date    TRIG 135 06/04/2024    TRIG 127 09/01/2022    TRIG 179 (H) 08/21/2020     Lab Results   Component Value Date    CHOLHDL 27.9 06/04/2024    CHOLHDL 26.1 09/01/2022    CHOLHDL 25.0 08/21/2020                Assessment & Plan:     This is a 79 y.o. female with PMHx of CAD, chronic HFpEF, HTN, HLD, CKD. Reports fatigue with low BP and HR.     1. Coronary artery disease  - No anginal equivalents   - Continue atorvastatin 10 mg qd   - Continue clopidogrel 75 mg qd    2. Chronic diastolic congestive heart failure  - Euvolemic on exam  - Decrease metoprolol succinate from 50 mg qd to 25 mg qd secondary to bradycardia and symptomatic hypotension  - Continue losartan 50 mg qd   - Repeat TTE given new onset fatigue with history of mitral and tricuspid regurgitation    3. Essential hypertension  - Symptomatic hypotension  - Decrease metoprolol succinate from 50 mg qd to 25 mg qd secondary to bradycardia and symptomatic hypotension  - Continue losartan 50 mg qd   - Continue amlodipine 2.5 mg qd  - Monitor BP     4. Mixed hyperlipidemia  - LDL 74, HDL 39  - Continue atorvastatin 10 mg qd     5. Stage 3a chronic kidney disease  - Cr stable at 1.2  - Continue routine labs and management per primary care              Emphasized the importance of modifying lifestyle related risk factors including limiting alcohol intake, exercise, diet most resembling a Mediterranean diet.    Please follow up in 6 weeks or sooner if needed.      Kimberly Bonilla PA-C  [facility]  Office: [phone]                 [1]   Social History  Tobacco Use    Smoking status: Former     Current packs/day: 0.00     Average  packs/day: 1 pack/day for 40.0 years (40.0 ttl pk-yrs)     Types: Cigarettes     Start date:      Quit date:      Years since quittin.3     Passive exposure: Past    Smokeless tobacco: Never   Substance Use Topics    Alcohol use: Not Currently    Drug use: No

## 2025-05-06 NOTE — PATIENT INSTRUCTIONS
Blood pressure medicines   Decrease metoprolol succinate from 50 mg daily to 25 mg daily   Losartan 50 mg daily   Amlodipine 2.5 mg daily     Cholesterol medicines  Atorvastatin 10 mg nightly   Clopidogrel 75 mg nightly

## 2025-05-07 DIAGNOSIS — Z86.2 HISTORY OF SARCOIDOSIS: ICD-10-CM

## 2025-05-07 DIAGNOSIS — H20.9 UVEITIS: ICD-10-CM

## 2025-05-07 RX ORDER — METHOTREXATE 2.5 MG/1
15 TABLET ORAL
Qty: 72 TABLET | Refills: 3 | Status: SHIPPED | OUTPATIENT
Start: 2025-05-07

## 2025-05-07 NOTE — TELEPHONE ENCOUNTER
Pharmacy requesting refill on Methotrexate 2.5mg  Pt's LOV 12/05/2024  Pt's NOV 06/06/2025  Medication pending

## 2025-05-19 DIAGNOSIS — F41.9 ANXIETY: Chronic | ICD-10-CM

## 2025-05-19 DIAGNOSIS — Z86.2 HISTORY OF SARCOIDOSIS: ICD-10-CM

## 2025-05-19 DIAGNOSIS — H20.9 UVEITIS: ICD-10-CM

## 2025-05-19 DIAGNOSIS — Z79.899 CHRONIC PRESCRIPTION BENZODIAZEPINE USE: Chronic | ICD-10-CM

## 2025-05-19 RX ORDER — LORAZEPAM 0.5 MG/1
0.5 TABLET ORAL NIGHTLY PRN
Qty: 15 TABLET | Refills: 2 | Status: SHIPPED | OUTPATIENT
Start: 2025-05-19

## 2025-05-19 RX ORDER — FOLIC ACID 1 MG/1
1 TABLET ORAL DAILY
Qty: 90 TABLET | Refills: 3 | Status: SHIPPED | OUTPATIENT
Start: 2025-05-19

## 2025-05-19 NOTE — TELEPHONE ENCOUNTER
Pharmacy requesting refill on FOLIC ACID 1MG  Pt's LOV 12/5/2024  Pt's NOV 6/6/2025  Medication pending

## 2025-05-26 ENCOUNTER — HOSPITAL ENCOUNTER (OUTPATIENT)
Dept: CARDIOLOGY | Facility: HOSPITAL | Age: 80
Discharge: HOME | End: 2025-05-26
Payer: MEDICARE

## 2025-05-26 VITALS — BODY MASS INDEX: 22.2 KG/M2 | HEART RATE: 95 BPM | HEIGHT: 64 IN | WEIGHT: 130 LBS

## 2025-05-26 DIAGNOSIS — I50.32 CHRONIC DIASTOLIC CONGESTIVE HEART FAILURE: ICD-10-CM

## 2025-05-26 DIAGNOSIS — I10 ESSENTIAL HYPERTENSION: Chronic | ICD-10-CM

## 2025-05-26 PROCEDURE — 93306 TTE W/DOPPLER COMPLETE: CPT | Mod: 26,HCNC,, | Performed by: INTERNAL MEDICINE

## 2025-05-26 PROCEDURE — 93306 TTE W/DOPPLER COMPLETE: CPT | Mod: HCNC,PO

## 2025-05-27 LAB
AORTIC SIZE INDEX (SOV): 1.9 CM/M2
AORTIC SIZE INDEX: 1.7 CM/M2
ASCENDING AORTA: 2.7 CM
AV INDEX (PROSTH): 0.67
AV MEAN GRADIENT: 6 MMHG
AV PEAK GRADIENT: 12 MMHG
AV REGURGITATION PRESSURE HALF TIME: 293 MS
AV VALVE AREA BY VELOCITY RATIO: 1.8 CM²
AV VALVE AREA: 2.1 CM²
AV VELOCITY RATIO: 0.59
BSA FOR ECHO PROCEDURE: 1.63 M2
CV ECHO LV RWT: 0.51 CM
DOP CALC AO PEAK VEL: 1.7 M/S
DOP CALC AO VTI: 29.2 CM
DOP CALC LVOT AREA: 3.1 CM2
DOP CALC LVOT DIAMETER: 2 CM
DOP CALC LVOT PEAK VEL: 1 M/S
DOP CALC LVOT STROKE VOLUME: 61.2 CM3
DOP CALCLVOT PEAK VEL VTI: 19.5 CM
E WAVE DECELERATION TIME: 187 MSEC
E/A RATIO: 0.62
E/E' RATIO: 6 M/S
ECHO LV POSTERIOR WALL: 1 CM (ref 0.6–1.1)
FRACTIONAL SHORTENING: 25.6 % (ref 28–44)
INTERVENTRICULAR SEPTUM: 1 CM (ref 0.6–1.1)
IVRT: 120 MSEC
LEFT ATRIUM AREA SYSTOLIC (APICAL 2 CHAMBER): 17.92 CM2
LEFT ATRIUM AREA SYSTOLIC (APICAL 4 CHAMBER): 10.41 CM2
LEFT ATRIUM SIZE: 3.8 CM
LEFT ATRIUM VOLUME INDEX MOD: 21 ML/M2
LEFT ATRIUM VOLUME MOD: 35 ML
LEFT INTERNAL DIMENSION IN SYSTOLE: 2.9 CM (ref 2.1–4)
LEFT VENTRICLE DIASTOLIC VOLUME INDEX: 39.88 ML/M2
LEFT VENTRICLE DIASTOLIC VOLUME: 65 ML
LEFT VENTRICLE END SYSTOLIC VOLUME APICAL 2 CHAMBER: 53.43 ML
LEFT VENTRICLE END SYSTOLIC VOLUME APICAL 4 CHAMBER: 20.5 ML
LEFT VENTRICLE MASS INDEX: 74.9 G/M2
LEFT VENTRICLE SYSTOLIC VOLUME INDEX: 19.6 ML/M2
LEFT VENTRICLE SYSTOLIC VOLUME: 32 ML
LEFT VENTRICULAR INTERNAL DIMENSION IN DIASTOLE: 3.9 CM (ref 3.5–6)
LEFT VENTRICULAR MASS: 122.1 G
LV LATERAL E/E' RATIO: 9.1 M/S
LV SEPTAL E/E' RATIO: 4.9 M/S
LVED V (TEICH): 64.88 ML
LVES V (TEICH): 31.6 ML
LVOT MG: 2.19 MMHG
LVOT MV: 0.71 CM/S
MV PEAK A VEL: 1.04 M/S
MV PEAK E VEL: 0.64 M/S
MV STENOSIS PRESSURE HALF TIME: 54.35 MS
MV VALVE AREA P 1/2 METHOD: 4.05 CM2
OHS CV RV/LV RATIO: 1.03 CM
PISA AR MAX VEL: 3.76 M/S
PISA MRMAX VEL: 5.33 M/S
PISA TR MAX VEL: 3 M/S
PULM VEIN S/D RATIO: 1.7
PV PEAK D VEL: 0.4 M/S
PV PEAK S VEL: 0.68 M/S
RA PRESSURE ESTIMATED: 8 MMHG
RA VOL SYS: 15.24 ML
RIGHT ATRIAL AREA: 7.7 CM2
RIGHT ATRIUM END SYSTOLIC VOLUME APICAL 4 CHAMBER INDEX BSA: 8.82 ML/M2
RIGHT ATRIUM VOLUME AREA LENGTH APICAL 4 CHAMBER: 14.37 ML
RIGHT VENTRICLE DIASTOLIC BASEL DIMENSION: 4 CM
RIGHT VENTRICLE DIASTOLIC LENGTH: 4.9 CM
RIGHT VENTRICLE DIASTOLIC MID DIMENSION: 2.3 CM
RIGHT VENTRICULAR END-DIASTOLIC DIMENSION: 3.95 CM
RIGHT VENTRICULAR LENGTH IN DIASTOLE (APICAL 4-CHAMBER VIEW): 4.87 CM
RV MID DIAMA: 2.32 CM
RV TB RVSP: 11 MMHG
RV TISSUE DOPPLER FREE WALL SYSTOLIC VELOCITY 1 (APICAL 4 CHAMBER VIEW): 8.53 CM/S
SINUS: 3.02 CM
STJ: 2.3 CM
TDI LATERAL: 0.07 M/S
TDI SEPTAL: 0.13 M/S
TDI: 0.1 M/S
TR MAX PG: 36 MMHG
TRICUSPID ANNULAR PLANE SYSTOLIC EXCURSION: 0.9 CM
TV REST PULMONARY ARTERY PRESSURE: 44 MMHG
Z-SCORE OF LEFT VENTRICULAR DIMENSION IN END DIASTOLE: -1.65
Z-SCORE OF LEFT VENTRICULAR DIMENSION IN END SYSTOLE: 0.13

## 2025-05-28 ENCOUNTER — RESULTS FOLLOW-UP (OUTPATIENT)
Dept: CARDIOLOGY | Facility: CLINIC | Age: 80
End: 2025-05-28

## 2025-05-29 RX ORDER — ATORVASTATIN CALCIUM 10 MG/1
10 TABLET, FILM COATED ORAL
Qty: 90 TABLET | Refills: 3 | Status: SHIPPED | OUTPATIENT
Start: 2025-05-29

## 2025-05-30 ENCOUNTER — LAB VISIT (OUTPATIENT)
Dept: LAB | Facility: HOSPITAL | Age: 80
End: 2025-05-30
Attending: STUDENT IN AN ORGANIZED HEALTH CARE EDUCATION/TRAINING PROGRAM
Payer: MEDICARE

## 2025-05-30 DIAGNOSIS — H20.9 UVEITIS: ICD-10-CM

## 2025-05-30 DIAGNOSIS — D86.0 SARCOIDOSIS OF LUNG: ICD-10-CM

## 2025-05-30 DIAGNOSIS — J84.9 INTERSTITIAL PULMONARY DISEASE, UNSPECIFIED: ICD-10-CM

## 2025-05-30 DIAGNOSIS — Z79.899 HIGH RISK MEDICATION USE: ICD-10-CM

## 2025-05-30 DIAGNOSIS — Z79.899 DRUG-INDUCED IMMUNODEFICIENCY: ICD-10-CM

## 2025-05-30 DIAGNOSIS — D84.821 DRUG-INDUCED IMMUNODEFICIENCY: ICD-10-CM

## 2025-05-30 LAB
ABSOLUTE EOSINOPHIL (OHS): 0.2 K/UL
ABSOLUTE MONOCYTE (OHS): 1.32 K/UL (ref 0.3–1)
ABSOLUTE NEUTROPHIL COUNT (OHS): 5.91 K/UL (ref 1.8–7.7)
ALBUMIN SERPL BCP-MCNC: 3.4 G/DL (ref 3.5–5.2)
ALP SERPL-CCNC: 130 UNIT/L (ref 40–150)
ALT SERPL W/O P-5'-P-CCNC: 24 UNIT/L (ref 10–44)
ANION GAP (OHS): 9 MMOL/L (ref 8–16)
AST SERPL-CCNC: 33 UNIT/L (ref 11–45)
BASOPHILS # BLD AUTO: 0.06 K/UL
BASOPHILS NFR BLD AUTO: 0.6 %
BILIRUB SERPL-MCNC: 0.5 MG/DL (ref 0.1–1)
BUN SERPL-MCNC: 21 MG/DL (ref 8–23)
C3 SERPL-MCNC: 90 MG/DL (ref 50–180)
C4 COMPLEMENT (OHS): 14 MG/DL (ref 11–44)
CALCIUM SERPL-MCNC: 8.6 MG/DL (ref 8.7–10.5)
CHLORIDE SERPL-SCNC: 110 MMOL/L (ref 95–110)
CO2 SERPL-SCNC: 21 MMOL/L (ref 23–29)
CREAT SERPL-MCNC: 1.5 MG/DL (ref 0.5–1.4)
CRP SERPL-MCNC: 3.3 MG/L
ERYTHROCYTE [DISTWIDTH] IN BLOOD BY AUTOMATED COUNT: 17.3 % (ref 11.5–14.5)
ERYTHROCYTE [SEDIMENTATION RATE] IN BLOOD BY PHOTOMETRIC METHOD: 5 MM/HR
GFR SERPLBLD CREATININE-BSD FMLA CKD-EPI: 35 ML/MIN/1.73/M2
GLUCOSE SERPL-MCNC: 98 MG/DL (ref 70–110)
HCT VFR BLD AUTO: 31.5 % (ref 37–48.5)
HGB BLD-MCNC: 9.6 GM/DL (ref 12–16)
IMM GRANULOCYTES # BLD AUTO: 0.06 K/UL (ref 0–0.04)
IMM GRANULOCYTES NFR BLD AUTO: 0.6 % (ref 0–0.5)
LYMPHOCYTES # BLD AUTO: 1.96 K/UL (ref 1–4.8)
MCH RBC QN AUTO: 34 PG (ref 27–31)
MCHC RBC AUTO-ENTMCNC: 30.5 G/DL (ref 32–36)
MCV RBC AUTO: 112 FL (ref 82–98)
NUCLEATED RBC (/100WBC) (OHS): 0 /100 WBC
PLATELET # BLD AUTO: 244 K/UL (ref 150–450)
PMV BLD AUTO: 11.3 FL (ref 9.2–12.9)
POTASSIUM SERPL-SCNC: 4.2 MMOL/L (ref 3.5–5.1)
PROT SERPL-MCNC: 5.6 GM/DL (ref 6–8.4)
RBC # BLD AUTO: 2.82 M/UL (ref 4–5.4)
RELATIVE EOSINOPHIL (OHS): 2.1 %
RELATIVE LYMPHOCYTE (OHS): 20.6 % (ref 18–48)
RELATIVE MONOCYTE (OHS): 13.9 % (ref 4–15)
RELATIVE NEUTROPHIL (OHS): 62.2 % (ref 38–73)
SODIUM SERPL-SCNC: 140 MMOL/L (ref 136–145)
WBC # BLD AUTO: 9.51 K/UL (ref 3.9–12.7)

## 2025-05-30 PROCEDURE — 86160 COMPLEMENT ANTIGEN: CPT | Mod: 59,HCNC

## 2025-05-30 PROCEDURE — 86160 COMPLEMENT ANTIGEN: CPT | Mod: HCNC

## 2025-05-30 PROCEDURE — 86225 DNA ANTIBODY NATIVE: CPT | Mod: HCNC

## 2025-05-30 PROCEDURE — 36415 COLL VENOUS BLD VENIPUNCTURE: CPT | Mod: HCNC,PO

## 2025-05-30 PROCEDURE — 85652 RBC SED RATE AUTOMATED: CPT | Mod: HCNC

## 2025-05-30 PROCEDURE — 85025 COMPLETE CBC W/AUTO DIFF WBC: CPT | Mod: HCNC

## 2025-05-30 PROCEDURE — 80053 COMPREHEN METABOLIC PANEL: CPT | Mod: HCNC

## 2025-05-30 PROCEDURE — 86140 C-REACTIVE PROTEIN: CPT | Mod: HCNC

## 2025-06-02 ENCOUNTER — RESULTS FOLLOW-UP (OUTPATIENT)
Dept: RHEUMATOLOGY | Facility: CLINIC | Age: 80
End: 2025-06-02

## 2025-06-02 ENCOUNTER — TELEPHONE (OUTPATIENT)
Dept: RHEUMATOLOGY | Facility: CLINIC | Age: 80
End: 2025-06-02
Payer: MEDICARE

## 2025-06-02 LAB
DSDNA ANTIBODY (OHS): NORMAL
DSDNA ANTIBODY TITER (OHS): NORMAL

## 2025-06-05 ENCOUNTER — OFFICE VISIT (OUTPATIENT)
Dept: PRIMARY CARE CLINIC | Facility: CLINIC | Age: 80
End: 2025-06-05
Payer: MEDICARE

## 2025-06-05 VITALS
DIASTOLIC BLOOD PRESSURE: 52 MMHG | SYSTOLIC BLOOD PRESSURE: 96 MMHG | HEART RATE: 85 BPM | OXYGEN SATURATION: 96 % | BODY MASS INDEX: 22.33 KG/M2 | WEIGHT: 130.06 LBS

## 2025-06-05 DIAGNOSIS — J84.9 INTERSTITIAL PULMONARY DISEASE, UNSPECIFIED: ICD-10-CM

## 2025-06-05 DIAGNOSIS — D64.9 ANEMIA, UNSPECIFIED TYPE: ICD-10-CM

## 2025-06-05 DIAGNOSIS — D53.9 NUTRITIONAL ANEMIA, UNSPECIFIED: ICD-10-CM

## 2025-06-05 DIAGNOSIS — I10 ESSENTIAL HYPERTENSION: Primary | Chronic | ICD-10-CM

## 2025-06-05 DIAGNOSIS — D75.89 MACROCYTOSIS: ICD-10-CM

## 2025-06-05 DIAGNOSIS — R79.89 ABNORMAL CBC: ICD-10-CM

## 2025-06-05 PROCEDURE — 99999 PR PBB SHADOW E&M-EST. PATIENT-LVL V: CPT | Mod: PBBFAC,HCNC,, | Performed by: FAMILY MEDICINE

## 2025-06-06 ENCOUNTER — OFFICE VISIT (OUTPATIENT)
Dept: RHEUMATOLOGY | Facility: CLINIC | Age: 80
End: 2025-06-06
Payer: MEDICARE

## 2025-06-06 VITALS
DIASTOLIC BLOOD PRESSURE: 76 MMHG | OXYGEN SATURATION: 98 % | HEART RATE: 80 BPM | BODY MASS INDEX: 23.39 KG/M2 | WEIGHT: 136.25 LBS | SYSTOLIC BLOOD PRESSURE: 119 MMHG

## 2025-06-06 DIAGNOSIS — H20.9 UVEITIS: ICD-10-CM

## 2025-06-06 DIAGNOSIS — Z86.2 HISTORY OF SARCOIDOSIS: ICD-10-CM

## 2025-06-06 DIAGNOSIS — Z79.899 DRUG-INDUCED IMMUNODEFICIENCY: ICD-10-CM

## 2025-06-06 DIAGNOSIS — H30.90 CHORIORETINITIS, UNSPECIFIED LATERALITY: ICD-10-CM

## 2025-06-06 DIAGNOSIS — Z79.899 HIGH RISK MEDICATION USE: ICD-10-CM

## 2025-06-06 DIAGNOSIS — L93.2 CUTANEOUS LUPUS ERYTHEMATOSUS: ICD-10-CM

## 2025-06-06 DIAGNOSIS — R31.9 HEMATURIA, UNSPECIFIED TYPE: ICD-10-CM

## 2025-06-06 DIAGNOSIS — M81.0 OSTEOPOROSIS, UNSPECIFIED OSTEOPOROSIS TYPE, UNSPECIFIED PATHOLOGICAL FRACTURE PRESENCE: ICD-10-CM

## 2025-06-06 DIAGNOSIS — R80.9 PROTEINURIA, UNSPECIFIED TYPE: ICD-10-CM

## 2025-06-06 DIAGNOSIS — D84.821 DRUG-INDUCED IMMUNODEFICIENCY: ICD-10-CM

## 2025-06-06 DIAGNOSIS — J84.9 INTERSTITIAL PULMONARY DISEASE, UNSPECIFIED: ICD-10-CM

## 2025-06-06 DIAGNOSIS — R82.81 PYURIA: ICD-10-CM

## 2025-06-06 DIAGNOSIS — D86.0 SARCOIDOSIS OF LUNG: Primary | ICD-10-CM

## 2025-06-06 PROCEDURE — 99999 PR PBB SHADOW E&M-EST. PATIENT-LVL IV: CPT | Mod: PBBFAC,HCNC,, | Performed by: STUDENT IN AN ORGANIZED HEALTH CARE EDUCATION/TRAINING PROGRAM

## 2025-06-06 ASSESSMENT — ROUTINE ASSESSMENT OF PATIENT INDEX DATA (RAPID3)
PAIN SCORE: 4
TOTAL RAPID3 SCORE: 4.33
PSYCHOLOGICAL DISTRESS SCORE: 2.2
PATIENT GLOBAL ASSESSMENT SCORE: 4
MDHAQ FUNCTION SCORE: 1.5
FATIGUE SCORE: 0

## 2025-06-09 ENCOUNTER — LAB VISIT (OUTPATIENT)
Dept: LAB | Facility: HOSPITAL | Age: 80
End: 2025-06-09
Attending: FAMILY MEDICINE
Payer: MEDICARE

## 2025-06-09 DIAGNOSIS — D75.89 MACROCYTOSIS: ICD-10-CM

## 2025-06-09 DIAGNOSIS — R79.89 ABNORMAL CBC: ICD-10-CM

## 2025-06-09 DIAGNOSIS — D53.9 NUTRITIONAL ANEMIA, UNSPECIFIED: ICD-10-CM

## 2025-06-09 DIAGNOSIS — D64.9 ANEMIA, UNSPECIFIED TYPE: ICD-10-CM

## 2025-06-09 LAB
ABSOLUTE EOSINOPHIL (OHS): 0.09 K/UL
ABSOLUTE MONOCYTE (OHS): 0.16 K/UL (ref 0.3–1)
ABSOLUTE NEUTROPHIL COUNT (OHS): 5.38 K/UL (ref 1.8–7.7)
BASOPHILS # BLD AUTO: 0.02 K/UL
BASOPHILS NFR BLD AUTO: 0.3 %
BETA 2 MICROGLOBILIN (OHS): 6 UG/ML (ref 0–2.5)
ERYTHROCYTE [DISTWIDTH] IN BLOOD BY AUTOMATED COUNT: 16 % (ref 11.5–14.5)
FERRITIN SERPL-MCNC: 237 NG/ML (ref 20–300)
HCT VFR BLD AUTO: 29.6 % (ref 37–48.5)
HGB BLD-MCNC: 9.2 GM/DL (ref 12–16)
IMM GRANULOCYTES # BLD AUTO: 0.03 K/UL (ref 0–0.04)
IMM GRANULOCYTES NFR BLD AUTO: 0.4 % (ref 0–0.5)
IRON SATN MFR SERPL: 25 % (ref 20–50)
IRON SERPL-MCNC: 68 UG/DL (ref 30–160)
LYMPHOCYTES # BLD AUTO: 1.56 K/UL (ref 1–4.8)
MCH RBC QN AUTO: 34.3 PG (ref 27–31)
MCHC RBC AUTO-ENTMCNC: 31.1 G/DL (ref 32–36)
MCV RBC AUTO: 110 FL (ref 82–98)
NUCLEATED RBC (/100WBC) (OHS): 0 /100 WBC
PLATELET # BLD AUTO: 204 K/UL (ref 150–450)
PMV BLD AUTO: 11.1 FL (ref 9.2–12.9)
RBC # BLD AUTO: 2.68 M/UL (ref 4–5.4)
RELATIVE EOSINOPHIL (OHS): 1.2 %
RELATIVE LYMPHOCYTE (OHS): 21.5 % (ref 18–48)
RELATIVE MONOCYTE (OHS): 2.2 % (ref 4–15)
RELATIVE NEUTROPHIL (OHS): 74.4 % (ref 38–73)
RETICS/RBC NFR AUTO: 0.6 % (ref 0.5–2.5)
TIBC SERPL-MCNC: 268 UG/DL (ref 250–450)
TRANSFERRIN SERPL-MCNC: 181 MG/DL (ref 200–375)
VIT B12 SERPL-MCNC: 368 PG/ML (ref 210–950)
WBC # BLD AUTO: 7.24 K/UL (ref 3.9–12.7)

## 2025-06-09 PROCEDURE — 85045 AUTOMATED RETICULOCYTE COUNT: CPT | Mod: HCNC

## 2025-06-09 PROCEDURE — 83521 IG LIGHT CHAINS FREE EACH: CPT | Mod: HCNC

## 2025-06-09 PROCEDURE — 83540 ASSAY OF IRON: CPT | Mod: HCNC

## 2025-06-09 PROCEDURE — 85025 COMPLETE CBC W/AUTO DIFF WBC: CPT | Mod: HCNC

## 2025-06-09 PROCEDURE — 82232 ASSAY OF BETA-2 PROTEIN: CPT | Mod: HCNC

## 2025-06-09 PROCEDURE — 82607 VITAMIN B-12: CPT | Mod: HCNC

## 2025-06-09 PROCEDURE — 86334 IMMUNOFIX E-PHORESIS SERUM: CPT | Mod: HCNC

## 2025-06-09 PROCEDURE — 36415 COLL VENOUS BLD VENIPUNCTURE: CPT | Mod: HCNC,PO

## 2025-06-09 PROCEDURE — 82728 ASSAY OF FERRITIN: CPT | Mod: HCNC

## 2025-06-09 PROCEDURE — 84165 PROTEIN E-PHORESIS SERUM: CPT | Mod: HCNC

## 2025-06-09 RX ORDER — ALLOPURINOL 100 MG/1
200 TABLET ORAL DAILY
Qty: 180 TABLET | Refills: 3 | Status: SHIPPED | OUTPATIENT
Start: 2025-06-09

## 2025-06-10 ENCOUNTER — TELEPHONE (OUTPATIENT)
Dept: PRIMARY CARE CLINIC | Facility: CLINIC | Age: 80
End: 2025-06-10
Payer: MEDICARE

## 2025-06-10 LAB
ALBUMIN, SPE (OHS): 3.46 G/DL (ref 3.35–5.55)
ALPHA 1 GLOB (OHS): 0.37 GM/DL (ref 0.17–0.41)
ALPHA 2 GLOB (OHS): 0.71 GM/DL (ref 0.43–0.99)
BETA GLOB (OHS): 0.62 GM/DL (ref 0.5–1.1)
GAMMA GLOBULIN (OHS): 0.64 GM/DL (ref 0.67–1.58)
KAPPA LC FREE SER-MCNC: 1.15 MG/L (ref 0.26–1.65)
KAPPA LC FREE/LAMBDA FREE SER: 3.09 MG/DL (ref 0.33–1.94)
LAMBDA LC FREE SERPL-MCNC: 2.69 MG/DL (ref 0.57–2.63)
PATHOLOGIST INTERPRETATION - IFE SERUM (OHS): NORMAL
PATHOLOGIST REVIEW - SPE (OHS): NORMAL
PROT SERPL-MCNC: 5.8 GM/DL (ref 6–8.4)

## 2025-06-10 NOTE — TELEPHONE ENCOUNTER
Pt's  called and is inquiring if pt should see a nephrologist as recommended by her rheumatologist.  He is concerned because he was told that she has protein in her urine.    Please advise.

## 2025-06-10 NOTE — TELEPHONE ENCOUNTER
Recent increase in her creatinine as well as mild proteinuria previously come certainly reasonable to consult with Nephrology as recommended by her rheumatologist.

## 2025-06-11 ENCOUNTER — TELEPHONE (OUTPATIENT)
Dept: NEPHROLOGY | Facility: CLINIC | Age: 80
End: 2025-06-11
Payer: MEDICARE

## 2025-06-11 NOTE — TELEPHONE ENCOUNTER
Copied from CRM #[id number]. Topic: Appointments - Appointment Access  >> Jun 11, 2025 10:15 AM Nina wrote:  Type:  Needs Medical Advice    Who Called:  jamal   Symptoms (please be specific): pt needs an appt to see dr. Pt has a referral in chart   Would the patient rather a call back or a response via [facility]? call  Best Call Back Number: [phone]  Additional Information: please advise and thank you

## 2025-06-13 NOTE — TELEPHONE ENCOUNTER
Attempted to return call regarding scheduling. Unable to reach patient at this time. Left voicemail.

## 2025-06-16 NOTE — TELEPHONE ENCOUNTER
Final attempt to contact patient. Unable to reach them at this time. Left voicemail. Sent message via [employer].

## 2025-06-17 ENCOUNTER — OFFICE VISIT (OUTPATIENT)
Dept: CARDIOLOGY | Facility: CLINIC | Age: 80
End: 2025-06-17
Payer: MEDICARE

## 2025-06-17 VITALS
HEART RATE: 64 BPM | SYSTOLIC BLOOD PRESSURE: 125 MMHG | DIASTOLIC BLOOD PRESSURE: 73 MMHG | HEIGHT: 64 IN | BODY MASS INDEX: 22.55 KG/M2 | WEIGHT: 132.06 LBS

## 2025-06-17 DIAGNOSIS — I10 ESSENTIAL HYPERTENSION: Chronic | ICD-10-CM

## 2025-06-17 DIAGNOSIS — N18.31 STAGE 3A CHRONIC KIDNEY DISEASE: ICD-10-CM

## 2025-06-17 DIAGNOSIS — E78.2 MIXED HYPERLIPIDEMIA: Chronic | ICD-10-CM

## 2025-06-17 DIAGNOSIS — I25.10 CORONARY ARTERY DISEASE INVOLVING NATIVE CORONARY ARTERY OF NATIVE HEART WITHOUT ANGINA PECTORIS: Primary | Chronic | ICD-10-CM

## 2025-06-17 DIAGNOSIS — I50.32 CHRONIC DIASTOLIC CONGESTIVE HEART FAILURE: ICD-10-CM

## 2025-06-17 PROCEDURE — 1126F AMNT PAIN NOTED NONE PRSNT: CPT | Mod: CPTII,HCNC,S$GLB,

## 2025-06-17 PROCEDURE — 3288F FALL RISK ASSESSMENT DOCD: CPT | Mod: CPTII,HCNC,S$GLB,

## 2025-06-17 PROCEDURE — 3078F DIAST BP <80 MM HG: CPT | Mod: CPTII,HCNC,S$GLB,

## 2025-06-17 PROCEDURE — 1101F PT FALLS ASSESS-DOCD LE1/YR: CPT | Mod: CPTII,HCNC,S$GLB,

## 2025-06-17 PROCEDURE — 1159F MED LIST DOCD IN RCRD: CPT | Mod: CPTII,HCNC,S$GLB,

## 2025-06-17 PROCEDURE — 99999 PR PBB SHADOW E&M-EST. PATIENT-LVL III: CPT | Mod: PBBFAC,HCNC,,

## 2025-06-17 PROCEDURE — 99214 OFFICE O/P EST MOD 30 MIN: CPT | Mod: HCNC,S$GLB,,

## 2025-06-17 PROCEDURE — 3074F SYST BP LT 130 MM HG: CPT | Mod: CPTII,HCNC,S$GLB,

## 2025-06-17 NOTE — PROGRESS NOTES
[facility]  Date: 6/17/25    Patient: Evelina Pinon, 1945, [id number]  Primary Care Provider: Jeffrey Garibay MD     Chief Complaint: Follow up     Subjective:       Evelina Pinon is a 79 y.o. female who presents for follow up. They follow with Dr. Kovacs.    At last office visit, patient with symptomatic hypotension for which antihypertensive regimen adjusted.     Since then, patient reports persistent symptomatic hypotension for which primary care recently stopped losartan and amlodipine. Notes resolution in symptoms since then. Denies chest discomfort, dyspnea, palpitations, dizziness, syncope, orthopnea, PND, edema.    Focused Past History includes:  Coronary artery disease  Nuclear stress 8/2020: mod reversible defect in anteroapical walls   LHC 8/2020: 20-30% stenosis in prox RCA, 40-50% stenosis in mid RCA   Chronic diastolic congestive heart failure  TTE 7/2021: LVEF 55%, grade II DD, mild LAE, mild MR, mild-mod TR, PASP 63  TTE 5/2025: LVEF 65-70%, mild MR, mild TR, PASP 44  Essential hypertension  Mixed hyperlipidemia  Stage 3a chronic kidney disease    Current Outpatient Medications   Medication Sig    allopurinoL (ZYLOPRIM) 100 MG tablet Take 2 tablets (200 mg total) by mouth once daily.    atorvastatin (LIPITOR) 10 MG tablet TAKE 1 TABLET(10 MG) BY MOUTH EVERY DAY    clopidogreL (PLAVIX) 75 mg tablet TAKE 1 TABLET(75 MG) BY MOUTH EVERY DAY    colchicine (COLCRYS) 0.6 mg tablet Take 2 tablets at 1st sign of a gout attack.  Then continue once a day for the next 5-7 days as needed until improved. (Patient taking differently: Take 0.6 mg by mouth as needed. Take 2 tablets at 1st sign of a gout attack.  Then continue once a day for the next 5-7 days as needed until improved.)    LORazepam (ATIVAN) 0.5 MG tablet Take 1 tablet (0.5 mg total) by mouth nightly as needed for Anxiety.    magnesium oxide (MAG-OX) 400 mg (241.3 mg magnesium) tablet TAKE 1 TABLET(400 MG)  BY MOUTH DAILY    methocarbamoL (ROBAXIN) 750 MG Tab Take 1 tablet (750 mg total) by mouth 4 (four) times daily as needed.    methotrexate 2.5 MG Tab Take 6 tablets (15 mg total) by mouth every 7 days.    metoprolol succinate (TOPROL-XL) 25 MG 24 hr tablet Take 1 tablet (25 mg total) by mouth once daily.    psyllium husk (METAMUCIL ORAL) Take 1 Scoop by mouth as needed (constipation).    diclofenac sodium (VOLTAREN ARTHRITIS PAIN) 1 % Gel Apply 2 g topically 2 (two) times daily. (Patient not taking: Reported on 6/17/2025)    difluprednate (DUREZOL) 0.05 % Drop ophthalmic solution Place 1 drop into the left eye 2 (two) times daily. (Patient not taking: Reported on 6/17/2025)    fluconazole (DIFLUCAN) 150 MG Tab Take 1 tablet as a single dose    fluticasone propionate (FLONASE) 50 mcg/actuation nasal spray 2 sprays (100 mcg total) by Each Nostril route once daily. (Patient not taking: Reported on 6/17/2025)    folic acid (FOLVITE) 1 MG tablet Take 1 tablet (1 mg total) by mouth once daily.    ketoconazole (NIZORAL) 2 % cream Apply topically once daily. (Patient not taking: Reported on 6/17/2025)    mupirocin (BACTROBAN) 2 % ointment Apply topically 3 (three) times daily. (Patient not taking: Reported on 6/17/2025)    nystatin (MYCOSTATIN) cream Apply topically 2 (two) times daily. Applied to the underside of your left breast.  Continue for 7 days or until rash resolves. (Patient not taking: Reported on 6/17/2025)    nystatin (MYCOSTATIN) cream Apply topically 2 (two) times daily. (Patient not taking: Reported on 6/17/2025)    pantoprazole (PROTONIX) 40 MG tablet Take 1 tablet (40 mg total) by mouth once daily.    pantoprazole (PROTONIX) 40 MG tablet Take 1 tablet (40 mg total) by mouth once daily.    triamcinolone acetonide 0.1% (KENALOG) 0.1 % cream Apply topically 2 (two) times daily. (Patient not taking: Reported on 6/17/2025)    triamcinolone acetonide 0.1% (KENALOG) 0.1 % paste PLACE ONTO TEETH TWICE DAILY  (Patient not taking: Reported on 6/17/2025)     No current facility-administered medications for this visit.            Objective       Review of Systems  Constitutional: negative for fevers, night sweats, and weight loss  Eyes: negative for visual disturbance, diplopia  Respiratory: negative for cough, hemoptysis, sputum, and wheezing  Cardiovascular: see HPI  Gastrointestinal: negative for abdominal pain, bright red blood per rectum, change in bowel habits, dysphagia, melena, and reflux symptoms  Genitourinary:negative for dysuria, frequency, and hematuria  Hematologic/lymphatic: negative for bleeding, easy bruising, and lymphadenopathy  Musculoskeletal:negative for arthralgias, back pain, and myalgias  Neurological: negative for gait problems, paresthesia, speech problems, vertigo, and weakness  Behavioral/Psych: negative for excessive alcohol consumption, illegal drug usage, and sleep disturbance    -------------------------------------    ALLERGIC RHINITIS    Alopecia    Anticoagulant long-term use    Anxiety    Dyslipidemia    Fx patella    Left    Gout    HTN (hypertension)    TIA (transient ischemic attack)     ----------------------------    Cataract extraction    Coronary angiography    Procedure: ANGIOGRAM, CORONARY ARTERY;  Surgeon: Rex Kovacs MD;  Location: [facility] CATH;  Service: Cardiology;  Laterality: N/A;    Endoscopic ultrasound of upper gastrointestinal tract    Procedure: ULTRASOUND, UPPER GI TRACT, ENDOSCOPIC;  Surgeon: Ray Francois MD;  Location: [facility] ENDO;  Service: Endoscopy;  Laterality: Left;  scopes TBA    Esophagogastroduodenoscopy    Procedure: EGD (ESOPHAGOGASTRODUODENOSCOPY);  Surgeon: Ray Francois MD;  Location: [facility] ENDO;  Service: Endoscopy;  Laterality: N/A;    Hemiarthroplasty of hip    Procedure: HEMIARTHROPLASTY, HIP- right;  Surgeon: Edward Ortiz MD;  Location: [facility] OR;  Service: Orthopedics;  Laterality: Right;    Hysterectomy    Left heart  "catheterization    Procedure: Left heart cath;  Surgeon: Rex Kovacs MD;  Location: [facility] CATH;  Service: Cardiology;  Laterality: N/A;    Patella fracture surgery    Right heart catheterization    Procedure: INSERTION, CATHETER, RIGHT HEART;  Surgeon: Demetrius Medley MD;  Location: [facility] CATH;  Service: Cardiology;  Laterality: Right;        Family History   Adopted: Yes   Problem Relation Name Age of Onset    Anxiety disorder Mother       Social History[1]    Physical Exam  /73 (BP Location: Right arm, Patient Position: Sitting)   Pulse 64   Ht 5' 4" (1.626 m)   Wt 59.9 kg (132 lb 0.9 oz)   BMI 22.67 kg/m²   Body surface area is 1.64 meters squared.  Body mass index is 22.67 kg/m².    General appearance: alert, appears stated age, cooperative, and no distress  Head: Normocephalic, without obvious abnormality, atraumatic  Neck: no carotid bruit, no JVD, and supple, symmetrical, trachea midline  Lungs: clear to auscultation bilaterally  Heart: regular rhythm, bradycardic; S1, S2 normal, no murmur, click, rub or gallop  Abdomen: soft, non-tender, no distended  Extremities: extremities atraumatic, no pitting edema  Skin: warm, no cyanosis, no pathologic ecchymosis in exposed portions  Neurologic: Grossly normal. A&O x3      Lab Review   Lab Results   Component Value Date    WBC 7.24 06/09/2025    HGB 9.2 (L) 06/09/2025    HCT 29.6 (L) 06/09/2025     (H) 06/09/2025     06/09/2025         BMP  Lab Results   Component Value Date     05/30/2025    K 4.2 05/30/2025     05/30/2025    CO2 21 (L) 05/30/2025    BUN 21 05/30/2025    CREATININE 1.5 (H) 05/30/2025    CALCIUM 8.6 (L) 05/30/2025    ANIONGAP 9 05/30/2025    ESTGFRAFRICA >60.0 09/21/2021    EGFRNONAA 54.5 (A) 09/21/2021       Lab Results   Component Value Date    ALBUMIN 3.4 (L) 05/30/2025       Lab Results   Component Value Date    ALT 24 05/30/2025    AST 33 05/30/2025    ALKPHOS 130 05/30/2025    BILITOT 0.5 05/30/2025 "       Lab Results   Component Value Date    TSH 3.540 07/04/2021       Lab Results   Component Value Date    CHOL 140 06/04/2024    CHOL 115 (L) 09/01/2022    CHOL 132 08/21/2020     Lab Results   Component Value Date    HDL 39 (L) 06/04/2024    HDL 30 (L) 09/01/2022    HDL 33 (L) 08/21/2020     Lab Results   Component Value Date    LDLCALC 74.0 06/04/2024    LDLCALC 59.6 (L) 09/01/2022    LDLCALC 63.2 08/21/2020     Lab Results   Component Value Date    TRIG 135 06/04/2024    TRIG 127 09/01/2022    TRIG 179 (H) 08/21/2020     Lab Results   Component Value Date    CHOLHDL 27.9 06/04/2024    CHOLHDL 26.1 09/01/2022    CHOLHDL 25.0 08/21/2020                Assessment & Plan:     This is a 79 y.o. female with PMHx of CAD, chronic HFpEF, HTN, HLD, CKD. Notes symptomatic hypotension which has resolved with stopping losartan and amlodipine.     1. Coronary artery disease  - No anginal equivalents   - Continue atorvastatin 10 mg qd   - Continue clopidogrel 75 mg qd    2. Chronic diastolic congestive heart failure  - Euvolemic on exam  - Continue metoprolol succinate 25 mg qd     3. Essential hypertension  - Controlled  - Continue metoprolol succinate 25 mg qd   - Monitor BP     4. Mixed hyperlipidemia  - LDL 74, HDL 39  - Continue atorvastatin 10 mg qd     5. Stage 3a chronic kidney disease  - Cr stable at 1.2  - Continue routine labs and management per primary care              Emphasized the importance of modifying lifestyle related risk factors including limiting alcohol intake, exercise, diet most resembling a Mediterranean diet.    Recommend follow up in 6 months- patient would like to push out to 1 year or sooner if needed.      Kimberly Bonilla PA-C  [facility]  Office: [phone]                   [1]   Social History  Tobacco Use    Smoking status: Former     Current packs/day: 0.00     Average packs/day: 1 pack/day for 40.0 years (40.0 ttl pk-yrs)     Types: Cigarettes     Start date: 1960      Quit date:      Years since quittin.4     Passive exposure: Past    Smokeless tobacco: Never   Substance Use Topics    Alcohol use: Not Currently    Drug use: No

## 2025-07-14 ENCOUNTER — EXTERNAL HOME HEALTH (OUTPATIENT)
Dept: HOME HEALTH SERVICES | Facility: HOSPITAL | Age: 80
End: 2025-07-14
Payer: MEDICARE

## 2025-07-17 ENCOUNTER — DOCUMENT SCAN (OUTPATIENT)
Dept: HOME HEALTH SERVICES | Facility: HOSPITAL | Age: 80
End: 2025-07-17
Payer: MEDICARE

## 2025-08-08 ENCOUNTER — OFFICE VISIT (OUTPATIENT)
Dept: PRIMARY CARE CLINIC | Facility: CLINIC | Age: 80
End: 2025-08-08
Payer: MEDICARE

## 2025-08-08 VITALS
HEART RATE: 64 BPM | BODY MASS INDEX: 22.4 KG/M2 | OXYGEN SATURATION: 99 % | WEIGHT: 131.19 LBS | SYSTOLIC BLOOD PRESSURE: 132 MMHG | DIASTOLIC BLOOD PRESSURE: 80 MMHG | HEIGHT: 64 IN

## 2025-08-08 DIAGNOSIS — I25.10 CORONARY ARTERY DISEASE INVOLVING NATIVE CORONARY ARTERY OF NATIVE HEART WITHOUT ANGINA PECTORIS: Chronic | ICD-10-CM

## 2025-08-08 DIAGNOSIS — I10 ESSENTIAL HYPERTENSION: Primary | Chronic | ICD-10-CM

## 2025-08-08 DIAGNOSIS — E78.2 MIXED HYPERLIPIDEMIA: Chronic | ICD-10-CM

## 2025-08-08 PROCEDURE — 99999 PR PBB SHADOW E&M-EST. PATIENT-LVL IV: CPT | Mod: PBBFAC,HCNC,, | Performed by: FAMILY MEDICINE

## 2025-08-08 RX ORDER — LOSARTAN POTASSIUM 100 MG/1
100 TABLET ORAL
COMMUNITY
Start: 2025-06-24

## 2025-08-08 RX ORDER — AMLODIPINE BESYLATE 2.5 MG/1
2.5 TABLET ORAL
COMMUNITY
Start: 2025-07-03

## 2025-08-29 ENCOUNTER — LAB VISIT (OUTPATIENT)
Dept: LAB | Facility: HOSPITAL | Age: 80
End: 2025-08-29
Attending: FAMILY MEDICINE
Payer: MEDICARE

## 2025-08-29 DIAGNOSIS — I25.10 CORONARY ARTERY DISEASE INVOLVING NATIVE CORONARY ARTERY OF NATIVE HEART WITHOUT ANGINA PECTORIS: ICD-10-CM

## 2025-08-29 DIAGNOSIS — I10 ESSENTIAL HYPERTENSION: Chronic | ICD-10-CM

## 2025-08-29 DIAGNOSIS — E78.2 MIXED HYPERLIPIDEMIA: Chronic | ICD-10-CM

## 2025-08-29 LAB
ABSOLUTE EOSINOPHIL (OHS): 0.14 K/UL
ABSOLUTE MONOCYTE (OHS): 0.53 K/UL (ref 0.3–1)
ABSOLUTE NEUTROPHIL COUNT (OHS): 3.81 K/UL (ref 1.8–7.7)
ALBUMIN SERPL BCP-MCNC: 3.6 G/DL (ref 3.5–5.2)
ALP SERPL-CCNC: 132 UNIT/L (ref 40–150)
ALT SERPL W/O P-5'-P-CCNC: 27 UNIT/L (ref 0–55)
ANION GAP (OHS): 11 MMOL/L (ref 8–16)
AST SERPL-CCNC: 52 UNIT/L (ref 0–50)
BASOPHILS # BLD AUTO: 0.02 K/UL
BASOPHILS NFR BLD AUTO: 0.3 %
BILIRUB SERPL-MCNC: 0.5 MG/DL (ref 0.1–1)
BUN SERPL-MCNC: 18 MG/DL (ref 8–23)
CALCIUM SERPL-MCNC: 9.1 MG/DL (ref 8.7–10.5)
CHLORIDE SERPL-SCNC: 111 MMOL/L (ref 95–110)
CHOLEST SERPL-MCNC: 113 MG/DL (ref 120–199)
CHOLEST/HDLC SERPL: 2.8 {RATIO} (ref 2–5)
CO2 SERPL-SCNC: 20 MMOL/L (ref 23–29)
CREAT SERPL-MCNC: 1.4 MG/DL (ref 0.5–1.4)
ERYTHROCYTE [DISTWIDTH] IN BLOOD BY AUTOMATED COUNT: 14.8 % (ref 11.5–14.5)
GFR SERPLBLD CREATININE-BSD FMLA CKD-EPI: 38 ML/MIN/1.73/M2
GLUCOSE SERPL-MCNC: 90 MG/DL (ref 70–110)
HCT VFR BLD AUTO: 34.6 % (ref 37–48.5)
HDLC SERPL-MCNC: 40 MG/DL (ref 40–75)
HDLC SERPL: 35.4 % (ref 20–50)
HGB BLD-MCNC: 10.9 GM/DL (ref 12–16)
IMM GRANULOCYTES # BLD AUTO: 0.03 K/UL (ref 0–0.04)
IMM GRANULOCYTES NFR BLD AUTO: 0.5 % (ref 0–0.5)
LDLC SERPL CALC-MCNC: 48.4 MG/DL (ref 63–159)
LYMPHOCYTES # BLD AUTO: 1.91 K/UL (ref 1–4.8)
MCH RBC QN AUTO: 34.9 PG (ref 27–31)
MCHC RBC AUTO-ENTMCNC: 31.5 G/DL (ref 32–36)
MCV RBC AUTO: 111 FL (ref 82–98)
NONHDLC SERPL-MCNC: 73 MG/DL
NUCLEATED RBC (/100WBC) (OHS): 0 /100 WBC
PLATELET # BLD AUTO: 132 K/UL (ref 150–450)
PMV BLD AUTO: 11.7 FL (ref 9.2–12.9)
POTASSIUM SERPL-SCNC: 4.2 MMOL/L (ref 3.5–5.1)
PROT SERPL-MCNC: 6.2 GM/DL (ref 6–8.4)
RBC # BLD AUTO: 3.12 M/UL (ref 4–5.4)
RELATIVE EOSINOPHIL (OHS): 2.2 %
RELATIVE LYMPHOCYTE (OHS): 29.7 % (ref 18–48)
RELATIVE MONOCYTE (OHS): 8.2 % (ref 4–15)
RELATIVE NEUTROPHIL (OHS): 59.1 % (ref 38–73)
SODIUM SERPL-SCNC: 142 MMOL/L (ref 136–145)
TRIGL SERPL-MCNC: 123 MG/DL (ref 30–150)
TSH SERPL-ACNC: 1.93 UIU/ML (ref 0.4–4)
WBC # BLD AUTO: 6.44 K/UL (ref 3.9–12.7)

## 2025-08-29 PROCEDURE — 36415 COLL VENOUS BLD VENIPUNCTURE: CPT | Mod: HCNC,PO

## 2025-08-29 PROCEDURE — 80061 LIPID PANEL: CPT | Mod: HCNC

## 2025-08-29 PROCEDURE — 85025 COMPLETE CBC W/AUTO DIFF WBC: CPT | Mod: HCNC

## 2025-08-29 PROCEDURE — 80053 COMPREHEN METABOLIC PANEL: CPT | Mod: HCNC

## 2025-08-29 PROCEDURE — 84443 ASSAY THYROID STIM HORMONE: CPT | Mod: HCNC
